# Patient Record
Sex: MALE | Race: WHITE | NOT HISPANIC OR LATINO | Employment: OTHER | URBAN - METROPOLITAN AREA
[De-identification: names, ages, dates, MRNs, and addresses within clinical notes are randomized per-mention and may not be internally consistent; named-entity substitution may affect disease eponyms.]

---

## 2021-01-28 ENCOUNTER — IMMUNIZATION (OUTPATIENT)
Dept: PHARMACY | Facility: CLINIC | Age: 76
End: 2021-01-28
Payer: MEDICARE

## 2021-01-28 DIAGNOSIS — Z23 NEED FOR VACCINATION: Primary | ICD-10-CM

## 2021-02-25 ENCOUNTER — IMMUNIZATION (OUTPATIENT)
Dept: PHARMACY | Facility: CLINIC | Age: 76
End: 2021-02-25
Payer: MEDICARE

## 2021-02-25 DIAGNOSIS — Z23 NEED FOR VACCINATION: Primary | ICD-10-CM

## 2021-05-03 ENCOUNTER — OFFICE VISIT (OUTPATIENT)
Dept: URGENT CARE | Facility: CLINIC | Age: 76
End: 2021-05-03
Payer: MEDICARE

## 2021-05-03 VITALS
SYSTOLIC BLOOD PRESSURE: 144 MMHG | WEIGHT: 186 LBS | OXYGEN SATURATION: 97 % | BODY MASS INDEX: 25.19 KG/M2 | HEIGHT: 72 IN | DIASTOLIC BLOOD PRESSURE: 87 MMHG | TEMPERATURE: 98 F | HEART RATE: 67 BPM

## 2021-05-03 DIAGNOSIS — R07.81 RIB PAIN ON RIGHT SIDE: ICD-10-CM

## 2021-05-03 DIAGNOSIS — T14.8XXA CONTUSION OF SOFT TISSUE: ICD-10-CM
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
## 2021-05-03 DIAGNOSIS — S20.211A RIB CONTUSION, RIGHT, INITIAL ENCOUNTER: Primary | ICD-10-CM

## 2021-05-03 PROCEDURE — 99214 OFFICE O/P EST MOD 30 MIN: CPT | Mod: S$GLB,,, | Performed by: PHYSICIAN ASSISTANT

## 2021-05-03 PROCEDURE — 71045 XR CHEST 1 VIEW: ICD-10-PCS | Mod: S$GLB,,, | Performed by: RADIOLOGY

## 2021-05-03 PROCEDURE — 99214 PR OFFICE/OUTPT VISIT, EST, LEVL IV, 30-39 MIN: ICD-10-PCS | Mod: S$GLB,,, | Performed by: PHYSICIAN ASSISTANT

## 2021-05-03 PROCEDURE — 71045 X-RAY EXAM CHEST 1 VIEW: CPT | Mod: S$GLB,,, | Performed by: RADIOLOGY

## 2021-07-11 ENCOUNTER — OFFICE VISIT (OUTPATIENT)
Dept: URGENT CARE | Facility: CLINIC | Age: 76
End: 2021-07-11
Payer: MEDICARE

## 2021-07-11 VITALS
WEIGHT: 186 LBS | HEART RATE: 59 BPM | RESPIRATION RATE: 16 BRPM | BODY MASS INDEX: 25.23 KG/M2 | DIASTOLIC BLOOD PRESSURE: 91 MMHG | OXYGEN SATURATION: 97 % | TEMPERATURE: 99 F | SYSTOLIC BLOOD PRESSURE: 148 MMHG

## 2021-07-11 DIAGNOSIS — J02.9 SORE THROAT: ICD-10-CM

## 2021-07-11 DIAGNOSIS — H65.93 BILATERAL OTITIS MEDIA WITH EFFUSION: Primary | ICD-10-CM

## 2021-07-11 LAB
CTP QC/QA: YES
SARS-COV-2 RDRP RESP QL NAA+PROBE: NEGATIVE

## 2021-07-11 PROCEDURE — U0002 COVID-19 LAB TEST NON-CDC: HCPCS | Mod: QW,S$GLB,, | Performed by: PHYSICIAN ASSISTANT

## 2021-07-11 PROCEDURE — 99214 OFFICE O/P EST MOD 30 MIN: CPT | Mod: S$GLB,,, | Performed by: PHYSICIAN ASSISTANT

## 2021-07-11 PROCEDURE — 99214 PR OFFICE/OUTPT VISIT, EST, LEVL IV, 30-39 MIN: ICD-10-PCS | Mod: S$GLB,,, | Performed by: PHYSICIAN ASSISTANT

## 2021-07-11 PROCEDURE — U0002: ICD-10-PCS | Mod: QW,S$GLB,, | Performed by: PHYSICIAN ASSISTANT

## 2021-07-11 RX ORDER — PREDNISONE 20 MG/1
TABLET ORAL
Qty: 10 TABLET | Refills: 0 | Status: SHIPPED | OUTPATIENT
Start: 2021-07-11 | End: 2022-01-24

## 2021-07-21 ENCOUNTER — OFFICE VISIT (OUTPATIENT)
Dept: URGENT CARE | Facility: CLINIC | Age: 76
End: 2021-07-21
Payer: MEDICARE

## 2021-07-21 VITALS
HEART RATE: 71 BPM | HEIGHT: 72 IN | SYSTOLIC BLOOD PRESSURE: 158 MMHG | TEMPERATURE: 98 F | WEIGHT: 186 LBS | RESPIRATION RATE: 16 BRPM | BODY MASS INDEX: 25.19 KG/M2 | OXYGEN SATURATION: 97 % | DIASTOLIC BLOOD PRESSURE: 87 MMHG

## 2021-07-21 DIAGNOSIS — R09.82 POST-NASAL DRAINAGE: ICD-10-CM

## 2021-07-21 DIAGNOSIS — J32.9 SINUSITIS, UNSPECIFIED CHRONICITY, UNSPECIFIED LOCATION: Primary | ICD-10-CM

## 2021-07-21 DIAGNOSIS — J02.9 SORE THROAT: ICD-10-CM

## 2021-07-21 LAB
CTP QC/QA: YES
SARS-COV-2 RDRP RESP QL NAA+PROBE: NEGATIVE

## 2021-07-21 PROCEDURE — 99213 OFFICE O/P EST LOW 20 MIN: CPT | Mod: S$GLB,,, | Performed by: NURSE PRACTITIONER

## 2021-07-21 PROCEDURE — U0002: ICD-10-PCS | Mod: QW,S$GLB,, | Performed by: NURSE PRACTITIONER

## 2021-07-21 PROCEDURE — 99213 PR OFFICE/OUTPT VISIT, EST, LEVL III, 20-29 MIN: ICD-10-PCS | Mod: S$GLB,,, | Performed by: NURSE PRACTITIONER

## 2021-07-21 PROCEDURE — U0002 COVID-19 LAB TEST NON-CDC: HCPCS | Mod: QW,S$GLB,, | Performed by: NURSE PRACTITIONER

## 2021-07-21 RX ORDER — AZITHROMYCIN 250 MG/1
TABLET, FILM COATED ORAL
Qty: 6 TABLET | Refills: 0 | Status: SHIPPED | OUTPATIENT
Start: 2021-07-21 | End: 2021-07-26

## 2021-11-03 ENCOUNTER — IMMUNIZATION (OUTPATIENT)
Dept: PHARMACY | Facility: CLINIC | Age: 76
End: 2021-11-03
Payer: MEDICARE

## 2021-11-03 DIAGNOSIS — Z23 NEED FOR VACCINATION: Primary | ICD-10-CM

## 2022-01-19 ENCOUNTER — OFFICE VISIT (OUTPATIENT)
Dept: OTOLARYNGOLOGY | Facility: CLINIC | Age: 77
End: 2022-01-19
Payer: MEDICARE

## 2022-01-19 ENCOUNTER — PATIENT MESSAGE (OUTPATIENT)
Dept: OTOLARYNGOLOGY | Facility: CLINIC | Age: 77
End: 2022-01-19

## 2022-01-19 ENCOUNTER — HOSPITAL ENCOUNTER (OUTPATIENT)
Dept: RADIOLOGY | Facility: HOSPITAL | Age: 77
Discharge: HOME | End: 2022-01-19
Attending: OTOLARYNGOLOGY
Payer: MEDICARE

## 2022-01-19 VITALS — BODY MASS INDEX: 25.3 KG/M2 | WEIGHT: 186.5 LBS

## 2022-01-19 DIAGNOSIS — J39.2 HYPOPHARYNGEAL LESION: Primary | ICD-10-CM

## 2022-01-19 DIAGNOSIS — J39.2 HYPOPHARYNGEAL LESION: ICD-10-CM

## 2022-01-19 DIAGNOSIS — Z01.818 PRE-OP TESTING: ICD-10-CM

## 2022-01-19 DIAGNOSIS — Z01.818 PRE-OP TESTING: Primary | ICD-10-CM

## 2022-01-19 PROCEDURE — 1101F PR PT FALLS ASSESS DOC 0-1 FALLS W/OUT INJ PAST YR: ICD-10-PCS | Mod: CPTII,S$GLB,, | Performed by: OTOLARYNGOLOGY

## 2022-01-19 PROCEDURE — 99204 OFFICE O/P NEW MOD 45 MIN: CPT | Mod: 25,S$GLB,, | Performed by: OTOLARYNGOLOGY

## 2022-01-19 PROCEDURE — 25500020 PHARM REV CODE 255: Mod: PO | Performed by: OTOLARYNGOLOGY

## 2022-01-19 PROCEDURE — 99999 PR PBB SHADOW E&M-EST. PATIENT-LVL III: CPT | Mod: PBBFAC,,, | Performed by: OTOLARYNGOLOGY

## 2022-01-19 PROCEDURE — 3288F PR FALLS RISK ASSESSMENT DOCUMENTED: ICD-10-PCS | Mod: CPTII,S$GLB,, | Performed by: OTOLARYNGOLOGY

## 2022-01-19 PROCEDURE — 99204 PR OFFICE/OUTPT VISIT, NEW, LEVL IV, 45-59 MIN: ICD-10-PCS | Mod: 25,S$GLB,, | Performed by: OTOLARYNGOLOGY

## 2022-01-19 PROCEDURE — 70491 CT SOFT TISSUE NECK W/DYE: CPT | Mod: TC,PO

## 2022-01-19 PROCEDURE — 1126F PR PAIN SEVERITY QUANTIFIED, NO PAIN PRESENT: ICD-10-PCS | Mod: CPTII,S$GLB,, | Performed by: OTOLARYNGOLOGY

## 2022-01-19 PROCEDURE — 1126F AMNT PAIN NOTED NONE PRSNT: CPT | Mod: CPTII,S$GLB,, | Performed by: OTOLARYNGOLOGY

## 2022-01-19 PROCEDURE — 70491 CT SOFT TISSUE NECK WITH CONTRAST: ICD-10-PCS | Mod: 26,,, | Performed by: RADIOLOGY

## 2022-01-19 PROCEDURE — 1101F PT FALLS ASSESS-DOCD LE1/YR: CPT | Mod: CPTII,S$GLB,, | Performed by: OTOLARYNGOLOGY

## 2022-01-19 PROCEDURE — 99999 PR PBB SHADOW E&M-EST. PATIENT-LVL III: ICD-10-PCS | Mod: PBBFAC,,, | Performed by: OTOLARYNGOLOGY

## 2022-01-19 PROCEDURE — 3288F FALL RISK ASSESSMENT DOCD: CPT | Mod: CPTII,S$GLB,, | Performed by: OTOLARYNGOLOGY

## 2022-01-19 PROCEDURE — 70491 CT SOFT TISSUE NECK W/DYE: CPT | Mod: 26,,, | Performed by: RADIOLOGY

## 2022-01-19 RX ORDER — HYDROCODONE BITARTRATE AND ACETAMINOPHEN 5; 325 MG/1; MG/1
1 TABLET ORAL EVERY 4 HOURS PRN
COMMUNITY
Start: 2021-11-17 | End: 2022-01-24

## 2022-01-19 RX ADMIN — IOHEXOL 75 ML: 350 INJECTION, SOLUTION INTRAVENOUS at 11:01

## 2022-01-19 NOTE — PROGRESS NOTES
Subjective:       Patient ID: Arthur Ribeiro is a 76 y.o. male.    Chief Complaint: Sore Throat (Since early ) and Dysphagia (Feels like something is in throat, trouble swallowing)    Arthur is here for sore throat.   Length of symptoms: 7 months.  Diagnosed with URI and prescribed Prednisone. Did not improve, then given Azithromycin.   He was diagnosed with LPR (no scope), prescribed medication for > 4 weeks without improvement.   He has a hiatal hernia.   He also has left sided cheek / temporal pain. He saw a dentist who extracted an infected wisdom tooth on the left.   He has a chronic PND and has been worse since his infection. He feels left sided sinus pressure.   He uses saline every morning.      Patient validated questionnaires (if applicable):      %       No flowsheet data found.  No flowsheet data found.  No flowsheet data found.         Social History     Tobacco Use   Smoking Status Former Smoker    Types: Cigarettes, Cigars    Quit date: 10/1/2005    Years since quittin.3   Smokeless Tobacco Never Used     Social History     Substance and Sexual Activity   Alcohol Use Yes    Comment: daily          Objective:        Constitutional:   He is oriented to person, place, and time. He appears well-developed and well-nourished. He appears alert. He is active. Normal speech.      Head:  Normocephalic and atraumatic. Head is without TMJ tenderness. No scars. Salivary glands normal.  Facial strength is normal.      Ears:    Right Ear: No drainage or swelling. No middle ear effusion.   Left Ear: No drainage or swelling.  No middle ear effusion.     Nose:  Septal deviation (L) present. No mucosal edema, rhinorrhea or sinus tenderness. No turbinate hypertrophy.    Nasal endoscopy indicated    Mouth/Throat  Oropharynx clear and moist without lesions or asymmetry, normal uvula midline and mirror exam normal. Normal dentition. No uvula swelling, lacerations or trismus. No oropharyngeal exudate. Tonsillar  erythema, tonsillar exudate.    Hyperactive gag reflux. Unable to visualize cords on mirror laryngoscopy.        Neck:  Full range of motion with neck supple and no adenopathy. Thyroid tenderness is present. No tracheal deviation, no edema, no erythema, normal range of motion, no stridor, no crepitus and no neck rigidity present. No thyroid mass present.   Mild palpable fullness left neck near hyoid. No discrete mass     Cardiovascular:   Intact distal pulses and normal pulses.      Pulmonary/Chest:   Effort normal and breath sounds normal. No stridor.     Psychiatric:   His speech is normal and behavior is normal. His mood appears not anxious. His affect is not labile.     Neurological:   He is alert and oriented to person, place, and time. No sensory deficit.     Skin:   No abrasions, lacerations, lesions, or rashes. No abrasion and no bruising noted.         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     Post-procedure diagnosis: same    Procedure: Flexible fiberoptic laryngoscopy    Surgeon: Enrique Maya MD    Anesthesia: 2% Lidocaine with Phenylephrine topical    Risks, benefits, and alternatives of the procedure were discussed with the patient, and the patient consented to the fiberoptic examination.  We applied a topical nasal decongestant and analgesic.  After adequate anesthesia was obtained, the flexible fiberoptic scope was passed through the nasal cavity. The entire pharynx (nasopharynx to hypopharynx) and the larynx were visualized. At the end of the examination, the scope was removed. The patient tolerated the procedure well with no complications.     Findings:  -     Laryngeal mucosa is normal right, swollen on the right  -     Post-cricoid region: normal with pooled secretions  -     Lingual tonsils have no hypertrophy  -     Adenoids have no  hypertrophy  -     Right vocal fold: normal mobility     mass/lesion: none  -     Left vocal fold: normal  mobility     mass/lesion: erythema along length of cord with medial edge thickening  -     Other findings: submucosal lesion of left lateral pharyngeal wall near the pyriform sinus, possibly involving the pyriform. AE fold is normal appearing. Mass does not involve base of tongue             Assessment:       1. Hypopharyngeal lesion          Plan:         CT Neck with contrast  Likely need for DL / Bx pending results

## 2022-01-23 ENCOUNTER — LAB VISIT (OUTPATIENT)
Dept: FAMILY MEDICINE | Facility: CLINIC | Age: 77
End: 2022-01-23
Payer: MEDICARE

## 2022-01-23 DIAGNOSIS — Z01.818 PRE-OP TESTING: ICD-10-CM

## 2022-01-23 PROCEDURE — U0003 INFECTIOUS AGENT DETECTION BY NUCLEIC ACID (DNA OR RNA); SEVERE ACUTE RESPIRATORY SYNDROME CORONAVIRUS 2 (SARS-COV-2) (CORONAVIRUS DISEASE [COVID-19]), AMPLIFIED PROBE TECHNIQUE, MAKING USE OF HIGH THROUGHPUT TECHNOLOGIES AS DESCRIBED BY CMS-2020-01-R: HCPCS | Performed by: OTOLARYNGOLOGY

## 2022-01-23 PROCEDURE — U0005 INFEC AGEN DETEC AMPLI PROBE: HCPCS | Performed by: OTOLARYNGOLOGY

## 2022-01-24 RX ORDER — IBUPROFEN 200 MG
400 TABLET ORAL 2 TIMES DAILY PRN
COMMUNITY
End: 2022-08-03

## 2022-01-25 ENCOUNTER — ANESTHESIA EVENT (OUTPATIENT)
Dept: SURGERY | Facility: HOSPITAL | Age: 77
End: 2022-01-25
Payer: MEDICARE

## 2022-01-25 LAB
SARS-COV-2 RNA RESP QL NAA+PROBE: NOT DETECTED
SARS-COV-2- CYCLE NUMBER: NORMAL

## 2022-01-25 RX ORDER — OXYCODONE HYDROCHLORIDE 5 MG/1
5 TABLET ORAL
Status: CANCELLED | OUTPATIENT
Start: 2022-01-25

## 2022-01-25 RX ORDER — FENTANYL CITRATE 50 UG/ML
25 INJECTION, SOLUTION INTRAMUSCULAR; INTRAVENOUS EVERY 5 MIN PRN
Status: CANCELLED | OUTPATIENT
Start: 2022-01-25 | End: 2022-01-25

## 2022-01-25 RX ORDER — HYDROMORPHONE HYDROCHLORIDE 2 MG/ML
0.2 INJECTION, SOLUTION INTRAMUSCULAR; INTRAVENOUS; SUBCUTANEOUS EVERY 5 MIN PRN
Status: CANCELLED | OUTPATIENT
Start: 2022-01-25

## 2022-01-26 ENCOUNTER — HOSPITAL ENCOUNTER (OUTPATIENT)
Facility: HOSPITAL | Age: 77
Discharge: HOME | End: 2022-01-26
Attending: OTOLARYNGOLOGY | Admitting: OTOLARYNGOLOGY
Payer: MEDICARE

## 2022-01-26 ENCOUNTER — ANESTHESIA (OUTPATIENT)
Dept: SURGERY | Facility: HOSPITAL | Age: 77
End: 2022-01-26
Payer: MEDICARE

## 2022-01-26 DIAGNOSIS — J39.2 HYPOPHARYNGEAL MASS: Primary | ICD-10-CM

## 2022-01-26 PROCEDURE — 88342 IMHCHEM/IMCYTCHM 1ST ANTB: CPT | Performed by: STUDENT IN AN ORGANIZED HEALTH CARE EDUCATION/TRAINING PROGRAM

## 2022-01-26 PROCEDURE — 71000015 HC POSTOP RECOV 1ST HR: Mod: PO | Performed by: OTOLARYNGOLOGY

## 2022-01-26 PROCEDURE — D9220A PRA ANESTHESIA: ICD-10-PCS | Mod: ANES,,, | Performed by: ANESTHESIOLOGY

## 2022-01-26 PROCEDURE — 37000009 HC ANESTHESIA EA ADD 15 MINS: Mod: PO | Performed by: OTOLARYNGOLOGY

## 2022-01-26 PROCEDURE — 25000003 PHARM REV CODE 250: Mod: PO | Performed by: NURSE ANESTHETIST, CERTIFIED REGISTERED

## 2022-01-26 PROCEDURE — 63600175 PHARM REV CODE 636 W HCPCS: Mod: PO | Performed by: NURSE ANESTHETIST, CERTIFIED REGISTERED

## 2022-01-26 PROCEDURE — 88305 TISSUE EXAM BY PATHOLOGIST: CPT | Performed by: STUDENT IN AN ORGANIZED HEALTH CARE EDUCATION/TRAINING PROGRAM

## 2022-01-26 PROCEDURE — 88342 IMHCHEM/IMCYTCHM 1ST ANTB: CPT | Mod: 26,,, | Performed by: STUDENT IN AN ORGANIZED HEALTH CARE EDUCATION/TRAINING PROGRAM

## 2022-01-26 PROCEDURE — 37000008 HC ANESTHESIA 1ST 15 MINUTES: Mod: PO | Performed by: OTOLARYNGOLOGY

## 2022-01-26 PROCEDURE — 88342 CHG IMMUNOCYTOCHEMISTRY: ICD-10-PCS | Mod: 26,,, | Performed by: STUDENT IN AN ORGANIZED HEALTH CARE EDUCATION/TRAINING PROGRAM

## 2022-01-26 PROCEDURE — 63600175 PHARM REV CODE 636 W HCPCS: Mod: PO | Performed by: ANESTHESIOLOGY

## 2022-01-26 PROCEDURE — 31536 LARYNGOSCOPY W/BX & OP SCOPE: CPT | Mod: ,,, | Performed by: OTOLARYNGOLOGY

## 2022-01-26 PROCEDURE — D9220A PRA ANESTHESIA: Mod: ANES,,, | Performed by: ANESTHESIOLOGY

## 2022-01-26 PROCEDURE — 31536 PR LARYNGOSCOPY,DIRCT,OP SCOPE,BIOPSY: ICD-10-PCS | Mod: ,,, | Performed by: OTOLARYNGOLOGY

## 2022-01-26 PROCEDURE — D9220A PRA ANESTHESIA: Mod: CRNA,,, | Performed by: NURSE ANESTHETIST, CERTIFIED REGISTERED

## 2022-01-26 PROCEDURE — D9220A PRA ANESTHESIA: ICD-10-PCS | Mod: CRNA,,, | Performed by: NURSE ANESTHETIST, CERTIFIED REGISTERED

## 2022-01-26 PROCEDURE — 71000033 HC RECOVERY, INTIAL HOUR: Mod: PO | Performed by: OTOLARYNGOLOGY

## 2022-01-26 PROCEDURE — 88305 TISSUE EXAM BY PATHOLOGIST: ICD-10-PCS | Mod: 26,,, | Performed by: STUDENT IN AN ORGANIZED HEALTH CARE EDUCATION/TRAINING PROGRAM

## 2022-01-26 PROCEDURE — 88305 TISSUE EXAM BY PATHOLOGIST: CPT | Mod: 26,,, | Performed by: STUDENT IN AN ORGANIZED HEALTH CARE EDUCATION/TRAINING PROGRAM

## 2022-01-26 PROCEDURE — 25000003 PHARM REV CODE 250: Mod: PO | Performed by: OTOLARYNGOLOGY

## 2022-01-26 PROCEDURE — 36000708 HC OR TIME LEV III 1ST 15 MIN: Mod: PO | Performed by: OTOLARYNGOLOGY

## 2022-01-26 PROCEDURE — 36000709 HC OR TIME LEV III EA ADD 15 MIN: Mod: PO | Performed by: OTOLARYNGOLOGY

## 2022-01-26 PROCEDURE — 25000003 PHARM REV CODE 250: Mod: PO | Performed by: ANESTHESIOLOGY

## 2022-01-26 RX ORDER — LIDOCAINE HYDROCHLORIDE 20 MG/ML
INJECTION INTRAVENOUS
Status: DISCONTINUED | OUTPATIENT
Start: 2022-01-26 | End: 2022-01-26

## 2022-01-26 RX ORDER — PROPOFOL 10 MG/ML
VIAL (ML) INTRAVENOUS
Status: DISCONTINUED | OUTPATIENT
Start: 2022-01-26 | End: 2022-01-26

## 2022-01-26 RX ORDER — ONDANSETRON 2 MG/ML
INJECTION INTRAMUSCULAR; INTRAVENOUS
Status: DISCONTINUED | OUTPATIENT
Start: 2022-01-26 | End: 2022-01-26

## 2022-01-26 RX ORDER — HYDROCODONE BITARTRATE AND ACETAMINOPHEN 7.5; 325 MG/1; MG/1
1 TABLET ORAL EVERY 6 HOURS PRN
Qty: 40 TABLET | Refills: 0 | Status: SHIPPED | OUTPATIENT
Start: 2022-01-26 | End: 2022-08-04

## 2022-01-26 RX ORDER — FENTANYL CITRATE 50 UG/ML
INJECTION, SOLUTION INTRAMUSCULAR; INTRAVENOUS
Status: DISCONTINUED | OUTPATIENT
Start: 2022-01-26 | End: 2022-01-26

## 2022-01-26 RX ORDER — DEXAMETHASONE SODIUM PHOSPHATE 4 MG/ML
8 INJECTION, SOLUTION INTRA-ARTICULAR; INTRALESIONAL; INTRAMUSCULAR; INTRAVENOUS; SOFT TISSUE
Status: COMPLETED | OUTPATIENT
Start: 2022-01-26 | End: 2022-01-26

## 2022-01-26 RX ORDER — ROCURONIUM BROMIDE 10 MG/ML
INJECTION, SOLUTION INTRAVENOUS
Status: DISCONTINUED | OUTPATIENT
Start: 2022-01-26 | End: 2022-01-26

## 2022-01-26 RX ORDER — OXYMETAZOLINE HCL 0.05 %
SPRAY, NON-AEROSOL (ML) NASAL
Status: DISCONTINUED | OUTPATIENT
Start: 2022-01-26 | End: 2022-01-26 | Stop reason: HOSPADM

## 2022-01-26 RX ORDER — LIDOCAINE HYDROCHLORIDE 10 MG/ML
1 INJECTION, SOLUTION EPIDURAL; INFILTRATION; INTRACAUDAL; PERINEURAL ONCE
Status: COMPLETED | OUTPATIENT
Start: 2022-01-26 | End: 2022-01-26

## 2022-01-26 RX ORDER — SODIUM CHLORIDE, SODIUM LACTATE, POTASSIUM CHLORIDE, CALCIUM CHLORIDE 600; 310; 30; 20 MG/100ML; MG/100ML; MG/100ML; MG/100ML
INJECTION, SOLUTION INTRAVENOUS CONTINUOUS
Status: DISCONTINUED | OUTPATIENT
Start: 2022-01-26 | End: 2022-01-26 | Stop reason: HOSPADM

## 2022-01-26 RX ORDER — SUCCINYLCHOLINE CHLORIDE 20 MG/ML
INJECTION INTRAMUSCULAR; INTRAVENOUS
Status: DISCONTINUED | OUTPATIENT
Start: 2022-01-26 | End: 2022-01-26

## 2022-01-26 RX ORDER — ACETAMINOPHEN 10 MG/ML
INJECTION, SOLUTION INTRAVENOUS
Status: DISCONTINUED | OUTPATIENT
Start: 2022-01-26 | End: 2022-01-26

## 2022-01-26 RX ADMIN — LIDOCAINE HYDROCHLORIDE 100 MG: 20 INJECTION, SOLUTION INTRAVENOUS at 10:01

## 2022-01-26 RX ADMIN — SUCCINYLCHOLINE CHLORIDE 160 MG: 20 INJECTION, SOLUTION INTRAMUSCULAR; INTRAVENOUS at 10:01

## 2022-01-26 RX ADMIN — LIDOCAINE HYDROCHLORIDE 10 MG: 10 INJECTION, SOLUTION EPIDURAL; INFILTRATION; INTRACAUDAL; PERINEURAL at 10:01

## 2022-01-26 RX ADMIN — SODIUM CHLORIDE, SODIUM LACTATE, POTASSIUM CHLORIDE, AND CALCIUM CHLORIDE: .6; .31; .03; .02 INJECTION, SOLUTION INTRAVENOUS at 10:01

## 2022-01-26 RX ADMIN — PROPOFOL 150 MG: 10 INJECTION, EMULSION INTRAVENOUS at 10:01

## 2022-01-26 RX ADMIN — ONDANSETRON 4 MG: 2 INJECTION INTRAMUSCULAR; INTRAVENOUS at 11:01

## 2022-01-26 RX ADMIN — FENTANYL CITRATE 50 MCG: 50 INJECTION, SOLUTION INTRAMUSCULAR; INTRAVENOUS at 10:01

## 2022-01-26 RX ADMIN — ACETAMINOPHEN 1000 MG: 10 INJECTION, SOLUTION INTRAVENOUS at 10:01

## 2022-01-26 RX ADMIN — DEXAMETHASONE SODIUM PHOSPHATE 8 MG: 4 INJECTION, SOLUTION INTRAMUSCULAR; INTRAVENOUS at 10:01

## 2022-01-26 RX ADMIN — ROCURONIUM BROMIDE 5 MG: 10 INJECTION, SOLUTION INTRAVENOUS at 10:01

## 2022-01-26 NOTE — ANESTHESIA PREPROCEDURE EVALUATION
01/26/2022  Arthur Ribeiro is a 76 y.o., male.    Anesthesia Evaluation    I have reviewed the Patient Summary Reports.    I have reviewed the Nursing Notes. I have reviewed the NPO Status.   I have reviewed the Medications.     Review of Systems  Anesthesia Hx:  No problems with previous Anesthesia    Social:  Former Smoker    Hematology/Oncology:         -- Cancer in past history: Other (see Oncology comments) surgery  Oncology Comments: H/o skin ca s/p excision      EENT/Dental:   chronic allergic rhinitis Hypopharyngeal lesion   Cardiovascular:  Cardiovascular Normal     Pulmonary:  Pulmonary Normal    Renal/:  Renal/ Normal     Hepatic/GI:   GERD    Musculoskeletal:  Spine Disorders:        Physical Exam  General:  Well nourished    Airway/Jaw/Neck:  Airway Findings: Mouth Opening: Normal Tongue: Normal  General Airway Assessment: Adult, Good  Mallampati: II  TM Distance: Normal, at least 6 cm        Eyes/Ears/Nose:  EYES/EARS/NOSE FINDINGS: Normal   Dental:  Dental Findings: In tact   Chest/Lungs:  Chest/Lungs Findings: Clear to auscultation, Normal Respiratory Rate     Heart/Vascular:  Heart Findings: Rate: Normal  Rhythm: Regular Rhythm        Mental Status:  Mental Status Findings: Normal        Anesthesia Plan  Type of Anesthesia, risks & benefits discussed:  Anesthesia Type:  general    Patient's Preference:   Plan Factors:          Intra-op Monitoring Plan: standard ASA monitors  Intra-op Monitoring Plan Comments:   Post Op Pain Control Plan: multimodal analgesia and IV/PO Opioids PRN  Post Op Pain Control Plan Comments:     Induction:   IV  Beta Blocker:  Patient is not currently on a Beta-Blocker (No further documentation required).       Informed Consent: Patient understands risks and agrees with Anesthesia plan.  Questions answered. Anesthesia consent signed with patient.  ASA Score: 2      Day of Surgery Review of History & Physical:    H&P update referred to the surgeon.         Ready For Surgery From Anesthesia Perspective.

## 2022-01-26 NOTE — BRIEF OP NOTE
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    Medications:  Reconciled Home Medications:   Current Discharge Medication List      CONTINUE these medications which have NOT CHANGED    Details   fluticasone (FLONASE) 50 mcg/actuation nasal spray SHAKE LIQUID AND USE 2 SPRAYS(100 MCG) IN EACH NOSTRIL EVERY DAY  Qty: 16 mL, Refills: 11    Associated Diagnoses: Acute nonseasonal allergic rhinitis due to pollen      ibuprofen  (ADVIL) 200 MG tablet Take 400 mg by mouth 2 (two) times daily as needed for Pain.           No discharge procedures on file.

## 2022-01-26 NOTE — PATIENT INSTRUCTIONS
Post-op Microlaryngoscopy   Enrique Maya MD  Otolaryngology - [facility] - [phone]  Cell Phone (after hours) - [phone]    Pain and Activity  · Expect sore throat for 3-5 days. Typically you do not require narcotic pain medication, but your provider will provide a prescription if needed.  · It is common to cough up a small amount of blood in the first 24-48 hours. This will improve quickly.  · Avoid coughing. Call me if you are having uncontrolled coughing or nausea.  · Light activity for 1-2 days, then you can resume your normal activity if you are feeling well.  · Voice rest:   ·  There is no voice rest required for your procedure. You may begin to use your voice normally. You may notice a slight strain or tightness in your voice, but this should improve. If you have sore throat while talking, you can rest your voice as needed.    Diet  · Drink plenty of fluids. Good choices are water, popsicles, and mild juices. Hydration is the MOST IMPORTANT factor in recovery during the healing process.  · No diet restrictions. Resume your usual diet.    Medication  Give only medications approved by your doctor. Follow directions closely when taking medications.  · Antibiotics are typically not needed following laryngoscopy surgery. Dr. Maya will let you know if this is different.  · It is generally OK to resume all of your medications.  · You may take Ibuprofen (advil, motrin) and Tylenol for pain. You can alternate each one, taking something up to every 3 hours.   · Please call Dr. Maya if the pain is not controlled with medicines above so that he can take care of it for you.      When to Call the Doctor  Mild pain and a slight fever are normal after surgery. Call if you have any of the following:  · Fever:   ¨ > 101  · Trouble breathing  · Bright red bleeding that does not stop with pressure  · Any other concerns

## 2022-01-26 NOTE — ANESTHESIA PROCEDURE NOTES
Intubation    Date/Time: 1/26/2022 10:56 AM  Performed by: Phyllis Rivers CRNA  Authorized by: Anish Padilla MD     Intubation:     Induction:  Intravenous    Intubated:  Postinduction    Mask Ventilation:  Easy mask    Attempts:  1    Method of Intubation:  Direct    Blade:  Avelar 2    Laryngeal View Grade: Grade I - full view of cords      Difficult Airway Encountered?: No      Complications:  None    Airway Device:  Oral endotracheal tube    Airway Device Size:  7.0    Style/Cuff Inflation:  Cuffed (inflated to minimal occlusive pressure)    Tube secured:  21    Secured at:  The lips    Placement Verified By:  Capnometry    Complicating Factors:  None    Findings Post-Intubation:  BS equal bilateral and atraumatic/condition of teeth unchanged

## 2022-01-26 NOTE — OP NOTE
01/26/2022     Name: Arthur Ribeiro   MRN: [id number]   YOB: 1945     Pre-procedure diagnoses:  1. Hypopharyngeal mass       Post-procedure diagnoses:  1. Hypopharyngeal mass       Procedures performed  1. Direct laryngoscopy with biopsy    Surgeon: Enrique Maya    Assistants: None    Anesthesia: General, Endotracheal    Intraoperative Findings:   1. Grade 1 view  2. Ulcerative, firm mass centered on the left aspect of posterior pharyngeal wall (hyoppharynx) with extension to the lateral wall of the oropharynx. Mass involved the posterior half of the pyriform sinus as. The esophagus is clear but the tumor extends to the level of the cricopharyngeus along the posterior wall. The base of tongue and vallecula are clear.   3. The AE fold and supraglottis were edematous but uninvolved. The cords were uninvolved.     Specimens:  1. Posterior pharyngeal wall    Complications: None apparent    Blood Loss: Minimal    Disposition: PACU    Indications:     The patient was seen and evaluated in the [facility] outpatient clinic. After history and physical examination, recommendations were made to proceed to the operating room for the above listed procedures. Indications, risks and benefits were discussed with the patient, who agreed to proceed and signed proper informed consent. Specific risks include but are not limited to bleeding, infection, pain, scarring, injury to dentition / oral mucosa / lips / tongue, airway fire, pneumothorax, need for further procedures, worsening of voice, persistence of issues.     Procedure in detail:     The patient was taken to the operating room and laid supine on the operating room table. General inhalational anesthesia was administered by the anesthesia team and the patient was intubated with a 7-0 endotracheal tube. Proper surgeon-initiated time-out was performed.    Once an adequate level of anesthesia was achieved, the head of bed was turned 90 degrees. The FiO2 was low and  communication with Anesthesia provider was maintained through the case. A maxillary tooth guard was placed.    The laryngoscope was inserted atraumatically and advanced to the oral cavity. Sequential examination of the oropharynx (tonsils, soft palate, posterior pharyngeal wall) revealed examination as above. The laryngoscope was advanced to the level of the glottis. The patient was a grade 1 view. Patient was placed in suspension. Photodocumentation was obtained with a 0 degree endoscope. Operative findings are noted above. The microscope was then brought into the field.     Biopsies were taken as above. Specimens were sent for permanent pathology.    Pledgets were placed on the larynx for hemostasis and removed. Laryngoscope was withdrawn and the dentition was noted to be intact. The patient's care was turned back over to anesthesia, and was transported to PACU in stable condition.

## 2022-01-26 NOTE — TRANSFER OF CARE
Anesthesia Transfer of Care Note    Patient: Arthur Ribeiro    Procedure(s) Performed: Procedure(s) (LRB):  LARYNGOSCOPY (Bilateral)    Patient location: PACU    Anesthesia Type: general    Transport from OR: Transported from OR on room air with adequate spontaneous ventilation    Post pain: adequate analgesia    Post assessment: no apparent anesthetic complications and tolerated procedure well    Post vital signs: stable    Level of consciousness: responds to stimulation    Nausea/Vomiting: no nausea/vomiting    Complications: none    Transfer of care protocol was followed      Last vitals:   Visit Vitals  /86 (BP Location: Left arm, Patient Position: Sitting)   Pulse 74   Temp 36.2 °C (97.2 °F) (Skin)   Resp 15   Ht 6' (1.829 m)   Wt 81.6 kg (180 lb)   SpO2 95%   BMI 24.41 kg/m²

## 2022-01-26 NOTE — ANESTHESIA POSTPROCEDURE EVALUATION
Anesthesia Post Evaluation    Patient: Arthur Ribeiro    Procedure(s) Performed: Procedure(s) (LRB):  LARYNGOSCOPY (Bilateral)    Final Anesthesia Type: general      Patient location during evaluation: PACU  Patient participation: Yes- Able to Participate  Level of consciousness: awake and alert  Post-procedure vital signs: reviewed and stable  Pain management: adequate  Airway patency: patent    PONV status at discharge: No PONV  Anesthetic complications: no      Cardiovascular status: hemodynamically stable  Respiratory status: unassisted and room air  Hydration status: euvolemic  Follow-up not needed.          Vitals Value Taken Time   /75 01/26/22 1142   Temp 36.2 °C (97.2 °F) 01/26/22 1124   Pulse 65 01/26/22 1142   Resp 12 01/26/22 1142   SpO2 96 % 01/26/22 1142         Event Time   Out of Recovery 11:28:17         Pain/Ashley Score: Ashley Score: 9 (1/26/2022 11:24 AM)

## 2022-01-27 VITALS
SYSTOLIC BLOOD PRESSURE: 165 MMHG | BODY MASS INDEX: 24.38 KG/M2 | HEIGHT: 72 IN | HEART RATE: 65 BPM | TEMPERATURE: 97 F | RESPIRATION RATE: 12 BRPM | DIASTOLIC BLOOD PRESSURE: 75 MMHG | OXYGEN SATURATION: 96 % | WEIGHT: 180 LBS

## 2022-02-01 ENCOUNTER — PATIENT MESSAGE (OUTPATIENT)
Dept: OTOLARYNGOLOGY | Facility: CLINIC | Age: 77
End: 2022-02-01
Payer: MEDICARE

## 2022-02-01 ENCOUNTER — HOSPITAL ENCOUNTER (OUTPATIENT)
Dept: RADIATION THERAPY | Facility: HOSPITAL | Age: 77
Discharge: HOME | End: 2022-02-01
Attending: STUDENT IN AN ORGANIZED HEALTH CARE EDUCATION/TRAINING PROGRAM
Payer: MEDICARE

## 2022-02-01 DIAGNOSIS — C13.2 MALIGNANT NEOPLASM OF POSTERIOR WALL OF HYPOPHARYNX: ICD-10-CM

## 2022-02-01 DIAGNOSIS — C13.9 HYPOPHARYNGEAL CANCER: Primary | ICD-10-CM

## 2022-02-01 LAB
COMMENT: NORMAL
FINAL PATHOLOGIC DIAGNOSIS: NORMAL
GROSS: NORMAL
Lab: NORMAL
MICROSCOPIC EXAM: NORMAL

## 2022-02-02 ENCOUNTER — TELEPHONE (OUTPATIENT)
Dept: RADIATION ONCOLOGY | Facility: CLINIC | Age: 77
End: 2022-02-02
Payer: MEDICARE

## 2022-02-02 ENCOUNTER — LAB VISIT (OUTPATIENT)
Dept: PRIMARY CARE CLINIC | Facility: OTHER | Age: 77
End: 2022-02-02
Payer: MEDICARE

## 2022-02-02 DIAGNOSIS — R05.9 COUGH: ICD-10-CM

## 2022-02-02 PROCEDURE — U0003 INFECTIOUS AGENT DETECTION BY NUCLEIC ACID (DNA OR RNA); SEVERE ACUTE RESPIRATORY SYNDROME CORONAVIRUS 2 (SARS-COV-2) (CORONAVIRUS DISEASE [COVID-19]), AMPLIFIED PROBE TECHNIQUE, MAKING USE OF HIGH THROUGHPUT TECHNOLOGIES AS DESCRIBED BY CMS-2020-01-R: HCPCS | Performed by: FAMILY MEDICINE

## 2022-02-02 NOTE — TELEPHONE ENCOUNTER
----- Message from [place] sent at 2/2/2022  9:06 AM CST -----  Regarding: Call back  Contact: [phone]  Type:  Patient Call Back    Who Called:pt  What this is regarding?:returning call to dequan   Would the patient rather a call back or a response via [facility]? Call back  Best Call Back Number:[phone]  Additional Information:     Advised to call back directly if there are further questions, or if these symptoms fail to improve as anticipated or worsen.

## 2022-02-02 NOTE — TELEPHONE ENCOUNTER
S/w pt and he states that he is now having some symptoms of covid (cough, HA, sinus congestion). Advised pt that he can come to [facility] to the tent on the side of the building. Advised pt to let the staff know that he is having symptoms and would like to get tested because he is scheduled for a PET CT tomorrow. Pt verbalized understanding.

## 2022-02-03 DIAGNOSIS — U07.1 COVID-19 VIRUS DETECTED: ICD-10-CM

## 2022-02-03 LAB
SARS-COV-2 RNA RESP QL NAA+PROBE: DETECTED
SARS-COV-2- CYCLE NUMBER: 30

## 2022-02-04 ENCOUNTER — TELEPHONE (OUTPATIENT)
Dept: OTOLARYNGOLOGY | Facility: CLINIC | Age: 77
End: 2022-02-04
Payer: MEDICARE

## 2022-02-04 NOTE — TELEPHONE ENCOUNTER
----- Message from Amparo Camarillo sent at 2/4/2022  1:20 PM CST -----  Type: Needs Medical Advice  Who Called:  Patient  Best Call Back Number:   Additional Information: Calling to speak with the nurse about his test results if they are available.

## 2022-02-09 PROBLEM — C13.9: Status: ACTIVE | Noted: 2022-02-09

## 2022-02-09 NOTE — PROGRESS NOTES
[facility] Consult Note    Referring provider: Enrique Maya MD    Diagnosis:  Arthur Ribeiro is a 76 y.o. 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     Oncologic History:  He has a history of tobacco use and burkitt's lymphoma treated with chemotherapy alone with Dr. Rahman.   · 1/19/22:   · FNL with Dr. Maya with normal mobility of the VC bilaterally, erythema of the left VC with medial edge thickening  · CT Neck with heterogeneous enhancement along the lateral pharyngeal wall with involvement vs narrowing of the pyriform sinus. Crosses midline. Suspected retropharyngeal space effusion. Shotty LN bilaterally, enlarged and or necrotic LN in left lbl 2B and 3. ? Mass in left TVC? Possible left RP fullness on personal review (series 6 image 1)  · 1/26/22: DL and Biopsy:  · Op note: ulcerative mass in the left posterior pharyngeal wall with extension to the lateral wall of the OPX. No involvement of esophagus but extends to the level of the cricopharyngeus along the posterior wall. No involvement of BOT or vallecula. No involvement of cords or supraglottis.  · Path: L posterior wall with moderately differentiated squamous cell carcinoma, p16-, no PNI or LVI.        History of Present Illness:  Arthur Ribeiro presents today to discuss radiotherapy in the management of his hypopharynx cancer. He is scheduled for PET/CT today.    He has no complaints at today's visit and presents to discuss radiation therapy. Hx of dental and sinus infections with associated left cheek pain. Is having some throat pain. Some times blood in nasal mucus. No hemoptysis. Has difficulty with taking pills/ tablets. Tolerating full diet but must chew this well. Has lost ~10 lbs over the past 6 months  He saw oral surgeon in January and dentist in December, and has discussed the possibility of head and neck RT, and they did not recommend any further dental work prior to  RT.    Review of Systems:  ROS as above    Social History:  Social History     Tobacco Use    Smoking status: Former Smoker     Types: Cigarettes, Cigars     Quit date: 10/1/2005     Years since quittin.3    Smokeless tobacco: Never Used   Substance Use Topics    Alcohol use: Not Currently     Comment: daily    Drug use: No       Past Medical History:  Past Medical History:   Diagnosis Date    Allergic rhinitis, cause unspecified 2015    Cancer 2005    Rakesh's lymphoma     Encounter for blood transfusion     Foot drop     Neuropathy     Spinal stenosis        Past Surgical History:   Procedure Laterality Date    INSERTION OF VENOUS ACCESS PORT  2005    LARYNGOSCOPY Bilateral 2022    Procedure: LARYNGOSCOPY;  Surgeon: Enrique Maya MD;  Location: [facility] OR;  Service: ENT;  Laterality: Bilateral;    SKIN CANCER EXCISION         Cancer-related family history includes Cancer in his brother, father, and sister.    Medications:  Current Outpatient Medications on File Prior to Visit   Medication Sig Dispense Refill    (Magic mouthwash) 1:1:1 diphenhydramine(Benadryl) 12.5mg/5ml liq, aluminum & magnesium hydroxide-simethicone (Maalox), LIDOcaine viscous 2% Swish and spit 10 mLs every 4 (four) hours as needed (gargle for 10 seconds and spit). gargle for 10 seconds and spit 450 mL 0    fluticasone (FLONASE) 50 mcg/actuation nasal spray SHAKE LIQUID AND USE 2 SPRAYS(100 MCG) IN EACH NOSTRIL EVERY DAY 16 mL 11    HYDROcodone-acetaminophen (NORCO) 7.5-325 mg per tablet Take 1 tablet by mouth every 6 (six) hours as needed for Pain. 40 tablet 0    ibuprofen (ADVIL) 200 MG tablet Take 400 mg by mouth 2 (two) times daily as needed for Pain.       No current facility-administered medications on file prior to visit.       Allergies:  Review of patient's allergies indicates:  No Known Allergies    Exam:  Vitals:    02/10/22 1014   BP: (!) 148/70   BP Location: Left arm   Patient Position: Sitting    Pulse: 81   Resp: 16   Temp: 98.4 °F (36.9 °C)   Weight: 80.9 kg (178 lb 5.6 oz)   Height: 6' (1.829 m)     Constitutional: Pleasant 76 y.o. male in no acute distress.  Well nourished. Well groomed.   HEENT: moist mucus membranes. No appreciated oral cavity or oropharyngeal mucosal lesions on direct exam. Good dentition, s/p extraction of left posterior maxillary molar. FOM soft, no appreciated oral tongue masses.   Lymph: mobile, ~1cm palpable lymph node in left neck at level of the hyoid  Cardiovascular: Upper extremities warm to touch  Lungs: No audible wheezing.  Normal effort.   Musculoskeletal: No gross MSK deformities.  Skin: No rashes appreciated.  Psych: Alert and oriented with appropriate mood and affect.  Neuro: Grossly normal.    Data Review:    Independent Interpretation of Test(s): CT neck from 1/19/22 was personally reviewed as above.    Discussion with Another Provider or Non-healthcare Professional:  I discussed this case with Dr. Maya in order to coordinate care    Assessment:   [id number] hypopharynx cancer  o Going for PET/CT today  o He has quit smoking   ECOG: (1) Restricted in physically strenuous activity, ambulatory and able to do work of light nature    Possibility of pregnancy: N/A  History of prior irradiation: No  History of prior systemic anti-cancer therapy: Yes - for Burkitt's lymphoma  History of collagen vascular disease: No  Implanted electronic device (pacer/defib/nerve stimulator): No    Plan:   Treatment options were discussed with the patient including surgery, radiation, systemic therapy, and some combination thereof.   He is to go for PET/CT after this, for staging. If PET/CT confirms localized disease, we discussed definitive radiation vs CRT, with systemic therapy per medical oncology. I also discussed palliative radiotherapy as well.   The risks, benefits, scheduling, alternatives to and rationale of radiation therapy were explained in detail.     Referral to  speech and nutrition.   He has a 10 lb weight loss, I discussed the high likelihood of requiring a PEG tube, but we will discuss that further after his PET/CT   He has met with dentistry and I will plan to reach out to them to ensure no prior dental work is required before proceeding with RT.  HEAD & NECK INFORMED CONSENT: Acute and delayed side effects of head and neck radiation, including but not limited to fatigue, redness of the skin, desquamation, dysphagia, odynophagia, mucositis, long term difficulties with swallowing, feeding tube dependency, fibrosis, xerostomia, hypothyroidism, infection as well as rare but catastrophic injury to the spinal cord, mandible, brainstem, and carotid arteries were discussed with the patient in great detail today.   I reviewed the rationale and goal of the proposed treatment course. The radiotherapeutic procedure with associated side effects and potential complications were explained. Mr. Ribeiro and his wife had the opportunity to ask questions which were answered to the best of my knowledge. The patient voiced understanding of the above and has elected to proceed with treatment. Consent form was signed and the patient was given our contact information should further questions arise.       Radiation Treatment Details:   If PET/CT confirms localized disease, I will plan to treat the hypopharyngeal primary and involved adenopathy to a dose of 70 Gy in 35 fractions and the bilateral elective neck to 56-63 Gy in 35 fractions, using IMRT and a simultaneous integrated boost.      Addendum  Called and Spoke with Mr. Ribeiro regarding PET, and forwarded PET/CT results to Dr. Rahman. His dentist is Dr. Pitts [phone]. He is to see medical oncology next week. We will proceed on with RT planning    Thuan Peñaloza MD  Radiation Oncology

## 2022-02-10 ENCOUNTER — OFFICE VISIT (OUTPATIENT)
Dept: RADIATION ONCOLOGY | Facility: CLINIC | Age: 77
End: 2022-02-10
Payer: MEDICARE

## 2022-02-10 ENCOUNTER — HOSPITAL ENCOUNTER (OUTPATIENT)
Dept: RADIOLOGY | Facility: HOSPITAL | Age: 77
Discharge: HOME | End: 2022-02-10
Attending: OTOLARYNGOLOGY
Payer: MEDICARE

## 2022-02-10 VITALS
SYSTOLIC BLOOD PRESSURE: 148 MMHG | HEIGHT: 72 IN | DIASTOLIC BLOOD PRESSURE: 70 MMHG | RESPIRATION RATE: 16 BRPM | HEART RATE: 81 BPM | TEMPERATURE: 98 F | BODY MASS INDEX: 24.16 KG/M2 | WEIGHT: 178.38 LBS

## 2022-02-10 DIAGNOSIS — C13.9 HYPOPHARYNGEAL CANCER: ICD-10-CM

## 2022-02-10 DIAGNOSIS — C13.9 PRIMARY MALIGNANT NEOPLASM OF HYPOPHARYNX: ICD-10-CM

## 2022-02-10 DIAGNOSIS — C13.2 MALIGNANT NEOPLASM OF POSTERIOR WALL OF HYPOPHARYNX: ICD-10-CM

## 2022-02-10 LAB — GLUCOSE SERPL-MCNC: 88 MG/DL (ref 70–110)

## 2022-02-10 PROCEDURE — 99205 OFFICE O/P NEW HI 60 MIN: CPT | Mod: 25,S$GLB,, | Performed by: STUDENT IN AN ORGANIZED HEALTH CARE EDUCATION/TRAINING PROGRAM

## 2022-02-10 PROCEDURE — 3077F SYST BP >= 140 MM HG: CPT | Mod: CPTII,S$GLB,, | Performed by: STUDENT IN AN ORGANIZED HEALTH CARE EDUCATION/TRAINING PROGRAM

## 2022-02-10 PROCEDURE — 77334 PR  RADN TREATMENT AID(S) COMPLX: ICD-10-PCS | Mod: 26,,, | Performed by: STUDENT IN AN ORGANIZED HEALTH CARE EDUCATION/TRAINING PROGRAM

## 2022-02-10 PROCEDURE — 78815 NM PET CT ROUTINE: ICD-10-PCS | Mod: 26,PS,, | Performed by: RADIOLOGY

## 2022-02-10 PROCEDURE — 1159F PR MEDICATION LIST DOCUMENTED IN MEDICAL RECORD: ICD-10-PCS | Mod: CPTII,S$GLB,, | Performed by: STUDENT IN AN ORGANIZED HEALTH CARE EDUCATION/TRAINING PROGRAM

## 2022-02-10 PROCEDURE — 99999 PR PBB SHADOW E&M-EST. PATIENT-LVL V: ICD-10-PCS | Mod: PBBFAC,,, | Performed by: STUDENT IN AN ORGANIZED HEALTH CARE EDUCATION/TRAINING PROGRAM

## 2022-02-10 PROCEDURE — 1125F AMNT PAIN NOTED PAIN PRSNT: CPT | Mod: CPTII,S$GLB,, | Performed by: STUDENT IN AN ORGANIZED HEALTH CARE EDUCATION/TRAINING PROGRAM

## 2022-02-10 PROCEDURE — 99205 PR OFFICE/OUTPT VISIT, NEW, LEVL V, 60-74 MIN: ICD-10-PCS | Mod: 25,S$GLB,, | Performed by: STUDENT IN AN ORGANIZED HEALTH CARE EDUCATION/TRAINING PROGRAM

## 2022-02-10 PROCEDURE — 99999 PR PBB SHADOW E&M-EST. PATIENT-LVL V: CPT | Mod: PBBFAC,,, | Performed by: STUDENT IN AN ORGANIZED HEALTH CARE EDUCATION/TRAINING PROGRAM

## 2022-02-10 PROCEDURE — 3288F PR FALLS RISK ASSESSMENT DOCUMENTED: ICD-10-PCS | Mod: CPTII,S$GLB,, | Performed by: STUDENT IN AN ORGANIZED HEALTH CARE EDUCATION/TRAINING PROGRAM

## 2022-02-10 PROCEDURE — 1101F PR PT FALLS ASSESS DOC 0-1 FALLS W/OUT INJ PAST YR: ICD-10-PCS | Mod: CPTII,S$GLB,, | Performed by: STUDENT IN AN ORGANIZED HEALTH CARE EDUCATION/TRAINING PROGRAM

## 2022-02-10 PROCEDURE — 3078F DIAST BP <80 MM HG: CPT | Mod: CPTII,S$GLB,, | Performed by: STUDENT IN AN ORGANIZED HEALTH CARE EDUCATION/TRAINING PROGRAM

## 2022-02-10 PROCEDURE — 78815 PET IMAGE W/CT SKULL-THIGH: CPT | Mod: TC,PN,PS

## 2022-02-10 PROCEDURE — 1101F PT FALLS ASSESS-DOCD LE1/YR: CPT | Mod: CPTII,S$GLB,, | Performed by: STUDENT IN AN ORGANIZED HEALTH CARE EDUCATION/TRAINING PROGRAM

## 2022-02-10 PROCEDURE — 3288F FALL RISK ASSESSMENT DOCD: CPT | Mod: CPTII,S$GLB,, | Performed by: STUDENT IN AN ORGANIZED HEALTH CARE EDUCATION/TRAINING PROGRAM

## 2022-02-10 PROCEDURE — 77334 RADIATION TREATMENT AID(S): CPT | Mod: TC,PN | Performed by: STUDENT IN AN ORGANIZED HEALTH CARE EDUCATION/TRAINING PROGRAM

## 2022-02-10 PROCEDURE — 1159F MED LIST DOCD IN RCRD: CPT | Mod: CPTII,S$GLB,, | Performed by: STUDENT IN AN ORGANIZED HEALTH CARE EDUCATION/TRAINING PROGRAM

## 2022-02-10 PROCEDURE — 3077F PR MOST RECENT SYSTOLIC BLOOD PRESSURE >= 140 MM HG: ICD-10-PCS | Mod: CPTII,S$GLB,, | Performed by: STUDENT IN AN ORGANIZED HEALTH CARE EDUCATION/TRAINING PROGRAM

## 2022-02-10 PROCEDURE — 77334 RADIATION TREATMENT AID(S): CPT | Mod: 26,,, | Performed by: STUDENT IN AN ORGANIZED HEALTH CARE EDUCATION/TRAINING PROGRAM

## 2022-02-10 PROCEDURE — 1125F PR PAIN SEVERITY QUANTIFIED, PAIN PRESENT: ICD-10-PCS | Mod: CPTII,S$GLB,, | Performed by: STUDENT IN AN ORGANIZED HEALTH CARE EDUCATION/TRAINING PROGRAM

## 2022-02-10 PROCEDURE — 78815 PET IMAGE W/CT SKULL-THIGH: CPT | Mod: 26,PS,, | Performed by: RADIOLOGY

## 2022-02-10 PROCEDURE — 3078F PR MOST RECENT DIASTOLIC BLOOD PRESSURE < 80 MM HG: ICD-10-PCS | Mod: CPTII,S$GLB,, | Performed by: STUDENT IN AN ORGANIZED HEALTH CARE EDUCATION/TRAINING PROGRAM

## 2022-02-10 NOTE — PROGRESS NOTES
PET Imaging Questionnaire    1. Are you a Diabetic? Recent Blood Sugar level? No    2. Are you anemic? Bone Marrow Stimulation Meds? No    3. Have you had a CT Scan, if so when & where was your last one? Yes -     4. Have you had a PET Scan, if so when & where was your last one? No    5. Chemotherapy or currently on Chemotherapy? Yes    6. Radiation therapy? No    Surgical History:   Past Surgical History:   Procedure Laterality Date    INSERTION OF VENOUS ACCESS PORT  2005    LARYNGOSCOPY Bilateral 1/26/2022    Procedure: LARYNGOSCOPY;  Surgeon: Enrique Maya MD;  [facility];  Service: ENT;  Laterality: Bilateral;    SKIN CANCER EXCISION     7.      8. Have you been fasting for at least 6 hours? Yes    9. Is there any chance you may be pregnant or breastfeeding? No    Assay: 11.41 MCi@:11.22   Injection Site:lt ac     Residual: .934 mCi@: 11.24   Technologist: Nancy Laguna Injected:10.47mCi

## 2022-02-11 PROCEDURE — 77263 PR  RADIATION THERAPY PLAN COMPLEX: ICD-10-PCS | Mod: ,,, | Performed by: STUDENT IN AN ORGANIZED HEALTH CARE EDUCATION/TRAINING PROGRAM

## 2022-02-11 PROCEDURE — 77263 THER RADIOLOGY TX PLNG CPLX: CPT | Mod: ,,, | Performed by: STUDENT IN AN ORGANIZED HEALTH CARE EDUCATION/TRAINING PROGRAM

## 2022-02-17 ENCOUNTER — TELEPHONE (OUTPATIENT)
Dept: HEMATOLOGY/ONCOLOGY | Facility: CLINIC | Age: 77
End: 2022-02-17
Payer: MEDICARE

## 2022-02-17 ENCOUNTER — DOCUMENTATION ONLY (OUTPATIENT)
Dept: RADIATION ONCOLOGY | Facility: CLINIC | Age: 77
End: 2022-02-17
Payer: MEDICARE

## 2022-02-17 DIAGNOSIS — C13.9 PRIMARY MALIGNANT NEOPLASM OF HYPOPHARYNX: Primary | ICD-10-CM

## 2022-02-17 NOTE — NURSING
Received msg regarding referral from Dr. Peñaloza to Dr. Stout. Dr. Stout request he be scheduled 2/23/22 @ 2pm. Patient schedueld and Lay Navigator confirmed appt with patient.    Oncology Navigation   Intake  Cancer Type: Head and Neck  Internal / External Referral: Internal  Referral Source: Epic referral from Dr. Peñaloza  Date of Referral: 2/17/2022  Initial Nurse Navigator Contact: 2/17/2022  Referral to Initial Contact Timeline (days): 0  Date Worked: 2/17/2022     Treatment              Procedures: PET scan; CT; Biopsy  Biopsy Schedule Date: 1/26/2022  CT Schedule Date: 1/19/2022  PET Scan Schedule Date: 2/10/2022                 Acuity      Follow Up  No follow-ups on file.

## 2022-02-17 NOTE — PROGRESS NOTES
Mr. Ribeiro was presented at H&N tumor board as a [id number] squamous cell carcinoma of the hypopharynx. He has a history of Burkitt's lymphoma treated with chemotherapy alone. On review of the PET/CT, he has possible involvement of the posterolateral thyroid cartilage, possible T4a. Question regarding surgical intervention as well possible induction, given age and prior chemotherapy.     TB recommendation for referral to H&N surgery. Given borderline T3/T4a, consideration for induction and local therapy pending response.     Referral placed to H&N surgery. I have called and discussed recommendations with Mr. Ribeiro. I have also called Dr. Rahman's office to discuss TB recommendations.     Thuan Peñaloza MD  Radiation Oncology

## 2022-02-18 ENCOUNTER — TUMOR BOARD CONFERENCE (OUTPATIENT)
Dept: OTOLARYNGOLOGY | Facility: HOSPITAL | Age: 77
End: 2022-02-18
Payer: MEDICARE

## 2022-02-18 NOTE — PROGRESS NOTES
Cancer Staging  Primary malignant neoplasm of hypopharynx  Staging form: Pharynx - Hypopharynx, AJCC 8th Edition  - Clinical stage from 2/17/2022: Stage RENO (cT3, cN2b, cM0) - Signed by Evon Galo NP on 2/18/2022    Presenting Hospital / Clinic: [facility]  Virtual Tumor Board Conference: Virtual  Presenter: Thuan Peñaloza  Date Presented to Tumor Board: 2/17/2022  Specialties Present: Medical Oncology; Radiation Oncology; Pathology; Navigation; Head and Neck; Nutrition; Speech Pathology  Presentation at Cancer Conference: Prospective  Cancer Type: Head and neck cancer  Recommended Plan Note: Head and Neck Oncology referral, oncology referral at [facility]

## 2022-02-23 ENCOUNTER — OFFICE VISIT (OUTPATIENT)
Dept: HEMATOLOGY/ONCOLOGY | Facility: CLINIC | Age: 77
End: 2022-02-23
Payer: MEDICARE

## 2022-02-23 ENCOUNTER — NUTRITION (OUTPATIENT)
Dept: INFUSION THERAPY | Facility: HOSPITAL | Age: 77
End: 2022-02-23
Attending: OTOLARYNGOLOGY
Payer: MEDICARE

## 2022-02-23 VITALS
HEIGHT: 72 IN | WEIGHT: 179.69 LBS | DIASTOLIC BLOOD PRESSURE: 67 MMHG | BODY MASS INDEX: 24.34 KG/M2 | TEMPERATURE: 97 F | SYSTOLIC BLOOD PRESSURE: 138 MMHG | OXYGEN SATURATION: 94 % | RESPIRATION RATE: 16 BRPM | HEART RATE: 73 BPM

## 2022-02-23 DIAGNOSIS — C13.9 PRIMARY MALIGNANT NEOPLASM OF HYPOPHARYNX: ICD-10-CM

## 2022-02-23 DIAGNOSIS — R13.13 PHARYNGEAL DYSPHAGIA: ICD-10-CM

## 2022-02-23 DIAGNOSIS — C13.9 HYPOPHARYNGEAL CANCER: ICD-10-CM

## 2022-02-23 PROCEDURE — 1125F AMNT PAIN NOTED PAIN PRSNT: CPT | Mod: CPTII,S$GLB,, | Performed by: OTOLARYNGOLOGY

## 2022-02-23 PROCEDURE — 99999 PR PBB SHADOW E&M-EST. PATIENT-LVL V: ICD-10-PCS | Mod: PBBFAC,,, | Performed by: OTOLARYNGOLOGY

## 2022-02-23 PROCEDURE — 1159F MED LIST DOCD IN RCRD: CPT | Mod: CPTII,S$GLB,, | Performed by: OTOLARYNGOLOGY

## 2022-02-23 PROCEDURE — 3078F DIAST BP <80 MM HG: CPT | Mod: CPTII,S$GLB,, | Performed by: OTOLARYNGOLOGY

## 2022-02-23 PROCEDURE — 3075F PR MOST RECENT SYSTOLIC BLOOD PRESS GE 130-139MM HG: ICD-10-PCS | Mod: CPTII,S$GLB,, | Performed by: OTOLARYNGOLOGY

## 2022-02-23 PROCEDURE — 1160F PR REVIEW ALL MEDS BY PRESCRIBER/CLIN PHARMACIST DOCUMENTED: ICD-10-PCS | Mod: CPTII,S$GLB,, | Performed by: OTOLARYNGOLOGY

## 2022-02-23 PROCEDURE — 31575 DIAGNOSTIC LARYNGOSCOPY: CPT | Mod: S$GLB,,, | Performed by: OTOLARYNGOLOGY

## 2022-02-23 PROCEDURE — 3078F PR MOST RECENT DIASTOLIC BLOOD PRESSURE < 80 MM HG: ICD-10-PCS | Mod: CPTII,S$GLB,, | Performed by: OTOLARYNGOLOGY

## 2022-02-23 PROCEDURE — 99215 OFFICE O/P EST HI 40 MIN: CPT | Mod: 25,S$GLB,, | Performed by: OTOLARYNGOLOGY

## 2022-02-23 PROCEDURE — 3075F SYST BP GE 130 - 139MM HG: CPT | Mod: CPTII,S$GLB,, | Performed by: OTOLARYNGOLOGY

## 2022-02-23 PROCEDURE — 3288F FALL RISK ASSESSMENT DOCD: CPT | Mod: CPTII,S$GLB,, | Performed by: OTOLARYNGOLOGY

## 2022-02-23 PROCEDURE — 3288F PR FALLS RISK ASSESSMENT DOCUMENTED: ICD-10-PCS | Mod: CPTII,S$GLB,, | Performed by: OTOLARYNGOLOGY

## 2022-02-23 PROCEDURE — 1125F PR PAIN SEVERITY QUANTIFIED, PAIN PRESENT: ICD-10-PCS | Mod: CPTII,S$GLB,, | Performed by: OTOLARYNGOLOGY

## 2022-02-23 PROCEDURE — 1101F PR PT FALLS ASSESS DOC 0-1 FALLS W/OUT INJ PAST YR: ICD-10-PCS | Mod: CPTII,S$GLB,, | Performed by: OTOLARYNGOLOGY

## 2022-02-23 PROCEDURE — 1160F RVW MEDS BY RX/DR IN RCRD: CPT | Mod: CPTII,S$GLB,, | Performed by: OTOLARYNGOLOGY

## 2022-02-23 PROCEDURE — 1159F PR MEDICATION LIST DOCUMENTED IN MEDICAL RECORD: ICD-10-PCS | Mod: CPTII,S$GLB,, | Performed by: OTOLARYNGOLOGY

## 2022-02-23 PROCEDURE — 99999 PR PBB SHADOW E&M-EST. PATIENT-LVL V: CPT | Mod: PBBFAC,,, | Performed by: OTOLARYNGOLOGY

## 2022-02-23 PROCEDURE — 99215 PR OFFICE/OUTPT VISIT, EST, LEVL V, 40-54 MIN: ICD-10-PCS | Mod: 25,S$GLB,, | Performed by: OTOLARYNGOLOGY

## 2022-02-23 PROCEDURE — 31575 PR LARYNGOSCOPY, FLEXIBLE; DIAGNOSTIC: ICD-10-PCS | Mod: S$GLB,,, | Performed by: OTOLARYNGOLOGY

## 2022-02-23 PROCEDURE — 1101F PT FALLS ASSESS-DOCD LE1/YR: CPT | Mod: CPTII,S$GLB,, | Performed by: OTOLARYNGOLOGY

## 2022-02-23 RX ORDER — LIDOCAINE HYDROCHLORIDE 20 MG/ML
SOLUTION ORAL; TOPICAL
COMMUNITY
Start: 2022-01-26

## 2022-02-23 NOTE — PATIENT INSTRUCTIONS
Boil one quart of water and then mix one tablespoon of salt and baking soda each   Gargle and spit 6 times a day and PRN  Saline nasal irrigations twice a day  Use Magic mouthwash--swish and swallow- before meals and PRN

## 2022-02-23 NOTE — PROGRESS NOTES
Date of Encounter: 2/23/2022  Provider: Bere Stout MD  Referring MD:PCP:  [facility] Onc: Mehran Peñaloza MD  Med Onc: Zenaida Jones MD; Quentin Rahman MD  ENT: Enrique Maya MD  Dentist: BO Pitts; Cecilio Banks [facility]      CC: [id number] SCCA left hypopharynx, p 16 negative       HPI:    Patient is 76-year-old male who was referred for evaluation of a hypopharyngeal tumor for consideration of total laryngectomy.  Patient has a history of dysphagia and odynophagia.  He was treated with steroids and reflux medication without resolution.  He was seen by Dr Maya who noted a lesion involving the left oropharynx and hypopharynx.  He was taken to the operating room where he underwent endoscopy and biopsy.  The mass was found to be involving the left hypopharynx extending up to the lateral pharyngeal wall and down to the level of the cricopharyngeus.  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 His case was presented at multidisciplinary head neck tumor Board and it was recommended that he undergo adjuvant chemotherapy with consideration for total laryngectomy.    Patient reports that he has difficulty swallowing pills and therefore he was you taking liquid pain medicine which alleviates the pain.  He also complains of thick mucus in the back of his throat and thick postnasal drip.     Patient has a history of Burkitt's lymphoma for which he was treated with chemotherapy only by Dr. Rahman who is also involved it the care of this patient.  He was seen by Dr Rahman on Thursday who has ordered chemo (tentatively set for 3/8/2022) and has an appointment to have a port placed. He has not spoken with Dr Peñaloza since he has seen Josiah.    ROS: see HPI  Constitutional: Negative for activity change and appetite change, weight loss.   Eyes: Negative for discharge, visual changes.   Respiratory: Negative for difficulty breathing and wheezing   Cardiovascular: Negative for chest pain.   Gastrointestinal: Negative for abdominal distention and abdominal  pain.   Endocrine: Negative for cold intolerance and heat intolerance.   Genitourinary: Negative for dysuria.   Musculoskeletal: Negative for gait problem, muscle pain and joint swelling.   Skin: Negative for color change and pallor; negative for skin lesions.   Neurological: Negative for syncope and weakness; no numbness face.   Psychiatric/Behavioral: Negative for agitation and confusion; negative for depression.    Physical Exam:      Constitutional  · General Appearance: well nourished, well-developed, alert, oriented, in no acute distress  · Communication: ability, understanding, normal  Head and Face  · Inspection: normocephalic, atraumatic, no scars, lesions or masses   · Palpation: no stepoffs, sinus tenderness or masses  · Parotid glands: no masses, stones, swelling or tenderness  Oral Cavity / Oropharynx  · Lips: upper and lower lips pink and moist  · Teeth: good dentition  · Gingiva: healthy  · Oral Mucosa: moist, no mucosal lesions  · Floor of Mouth: normal, no lesions, salivary ducts patent  · Tongue: moist, normal mobility, no lesions  · Palate: soft and hard palates without lesions or ulcers  Oropharynx: tonsils and walls without erythema, exudate, base of tongue soft to palpation  Nasopharynx, Hypopharynx, and Larynx  · Indirect: could not perform exam due to intolerance by patient  Neck  · Inspection and Palpation: no erythema, induration, emphysema, tenderness or masses  · Larynx and Trachea: normal position; normal crepitus  · Thyroid: no tenderness, enlargement or nodules  · Submandibular Glands: no masses or tenderness  Lymphatic:  · Anterior, Posterior, Submandibular, Submental, Supraclavicular: no lymphadenopathy present  Neurological  · Cranial Nerves: grossly intact  · General: no focal deficits  Psychiatric  · Orientation: oriented to time, place and person  · Mood and Affect: no depression, anxiety or agitation    PROCEDURES:   -------------------- LARYNGOSCOPY / NASOPHARYNGOSCOPY  -------------------------     Pre-op DX: SCCA hypopharynx; dysphagia  Post-op DX: same  Anesthesia: Topical Neosynephrine / Tetracaine  Indications: to look at larynx and pharynx  Adverse Events: None        Procedure in Detail:     Flexible endoscopy with video was performed through the nasal passages. The nasal cavity, nasopharynx, oropharynx, hypopharynx and larynx were adequately visualized. The true vocal cords and arytenoids were examined during phonation and repose.        Operative Findings:     Nasal Cavity: Within normal limits  Nasopharynx: Within normal limits  Tongue Base: Within normal limits  Pharyngeal Walls:thick tumor involving left lateral pharyngeal wall at level of tip of epiglottis extending across posterior pharyngeal wall; bulk of tumor and secretions makes it difficult to visualize the post cricoid mucosa and posterior VC's; pooling of copious secretions post cricoid  Epiglottis / Aryepiglottic Folds: Within normal limits  Pyriform Sinus: Within normal limits; pooling of secretions left pyriform sinus--cannot completely visualize  Vocal Cords: Within normal limits; left TVC slightly erythematous; mobile bilaterally  Arytenoids: cannot visualize arytenoid due to tumor     Test results:  Path:  Pharynx, left posterior wall, biopsies:   - Squamous cell carcinoma, invasive, moderately differentiated   - Depth of invasion (DOI): at least 0.5 cm   - p16 status (IHC): Negative   - No lymphovascular invasion identified   - No perineural invasion identified   NOTE: Immunostain for p16 was performed and did not demonstrate   block-immunopositive staining (interpreted as negative).  Positive control   and internal negative control for immunostain was examined and was   appropriate.     CT scans:  FINDINGS:  Skull base and brain: The included intracranial contents are grossly normal.  This is a limited evaluation of the brain but there is no hydrocephalus or midline shift.  The basilar cisterns are  open.  The vessels at the base of the brain enhance normally.  The cerebellar tonsils are normal position.     Soft tissue: The parapharyngeal fat spaces are preserved.  Fat and soft tissue planes in the neck are grossly normal.     Parotid/Submandibular glands: The parotid and submandibular glands appear normal and symmetric without focal parenchymal abnormality, calcification or ductal dilatation.     Thyroid gland: The thyroid gland appears normal in size.  There is a coarse 8 mm calcified nodule in the right lobe of the gland.  There is a small 3 mm hypodense nodule posteriorly in the left lobe with a similar small hypodense lesion in the lateral mid to lower pole of the left lobe.     Larynx, pharynx: There is indistinctness of fat and soft tissue planes with heterogeneous enhancement along the lateral pharyngeal wall on the left possibly arising from or involving the piriform sinus which is deformed and narrowed.  This heterogeneously enhancing tissue does cross midline to the left at the level of the piriform sinus.  There is a suspected retropharyngeal space effusion.  The anterior aspect of the left aryepiglottic fold does appear thickened.  The epiglottis appears normal.     Lymph nodes: There are multiple shotty lymph nodes in the neck bilaterally.  There are prominent left neck lymph nodes.  There is a 1.9 x 1.5 x 2.2 cm left level 2 lymph node.  Just inferior to this prominent node is a 1.2 x 0.9 x 2.1 cm node.  Both of these nodes are slightly heterogeneous which could represent early necrosis or cystic degeneration.     Vasculature: The vessels in the neck enhance normally.  There is no critical stenosis or occlusion.     Airway, lungs: The airway is mildly narrowed at the level of the hypopharynx but is otherwise patent.  There is mild scarring in the lung apices bilaterally.     Sinus/mastoid: There is partial opacification of the left maxillary sinus with soft tissue material and calcification  which likely represents changes of chronic sinus inflammatory disease.  The bony margins of the left maxillary sinus are somewhat thickened and sclerotic again suggesting a chronic process.  The remainder of the included paranasal sinuses are clear.  The mastoid air cells are clear.     Bones: There is degenerative change in the cervical spine but there is no fracture or malalignment and there is no significant spinal stenosis suspected.     Impression:     1. There is a poorly defined heterogeneously enhancing lesion in the lateral pharyngeal wall on the left likely involving the piriform sinus.  The anterior aspect of the left aryepiglottic fold is somewhat thickened as well.  This process does cross midline posteriorly.  Additionally, there are prominent left neck lymph nodes which are heterogeneous in density in could represent changes of early necrosis or cystic degeneration with measurements provided above.  The largest node measures 1.9 x 1.5 x 2.2 cm.  2. There are several nodules in the thyroid gland.  There is an 8 mm calcified nodule in the right lobe with smaller noncalcified nodules in the left lobe.  The thyroid gland is grossly normal in size.    PET CT scan:  FINDINGS:  Quality of the study: Adequate.     Lesion 1: Left level 2 chain of cervical lymph nodes, this shows max SUV of 14.     Lesion 2: Left pharyngeal and oropharyngeal mass showing max SUV of 26.     There are mildly avid mediastinal lymph nodes, specifically there is a superior mediastinal lymph node showing max SUV of 4.4 and a right hilar lymph node showing max SUV of 3.8.  The superior mediastinal lymph node measures 5.5 mm.  The right hilar lymph node is not clearly delineated from adjacent vascular structures.     Physiologic uptake of the tracer is present within the brain, salivary glands, myocardium, GI and  tracts.     Incidental CT findings: There is dense material seen within the left maxillary sinus from chronic sinus  infection.     Impression:     There are CT findings of a left oropharyngeal mass and chain of left level 2 cervical lymph nodes concerning for primary head and neck cancer and metastatic disease to adjacent lymph nodes.   There is mild uptake within mediastinal lymph nodes.  The degree of uptake suggest inflammatory etiology.  Continued surveillance is recommended.    I personally reviewed the following imaging: CT neck with contrast and PET CT    Records reviewed:  Op Note  Intraoperative Findings:   1. Grade 1 view  2. Ulcerative, firm mass centered on the left aspect of posterior pharyngeal wall (hyoppharynx) with extension to the lateral wall of the oropharynx. Mass involved the posterior half of the pyriform sinus as. The esophagus is clear but the tumor extends to the level of the cricopharyngeus along the posterior wall. The base of tongue and vallecula are clear.   3. The AE fold and supraglottis were edematous but uninvolved. The cords were uninvolved.     Tumor Board recommendations:  TB recommendation for referral to H&N surgery. Given borderline T3/T4a, consideration for induction and TL/TP pending response.  There is questionable involvement of the posterior lateral thyroid ala.     Assessment:   [id number] SCCA hypopharynx-tumor is bulky  Dysphagia  Pooling of thick copious secretion in hypopharynx    Plan:  According to patient plan is to proceed with concomitant chemorads  However, I will confirm plan with Tanja Peñaloza and Josiah  Referral resent for ST and PT/OT  Nutrition to see patient today  F/U with me in 8 weeks  Recommend SS gargles multiple times a day and saline nasal irrigations twice a day  Magic moutwash prior to meals and PRN    Time spent with patient 45 minutes which was greater than 50% time spent examining him

## 2022-02-24 ENCOUNTER — TUMOR BOARD CONFERENCE (OUTPATIENT)
Dept: OTOLARYNGOLOGY | Facility: CLINIC | Age: 77
End: 2022-02-24
Payer: MEDICARE

## 2022-02-24 ENCOUNTER — TELEPHONE (OUTPATIENT)
Dept: HEMATOLOGY/ONCOLOGY | Facility: CLINIC | Age: 77
End: 2022-02-24
Payer: MEDICARE

## 2022-02-24 DIAGNOSIS — C13.9 PRIMARY MALIGNANT NEOPLASM OF HYPOPHARYNX: Primary | ICD-10-CM

## 2022-02-24 NOTE — PROGRESS NOTES
Cancer Staging  Primary malignant neoplasm of hypopharynx  Staging form: Pharynx - Hypopharynx, AJCC 8th Edition  - Clinical stage from 2/17/2022: Stage RENO (cT3, cN2b, cM0) - Signed by Evon Galo NP on 2/18/2022      Presenting Hospital / Clinic: [facility]  Virtual Tumor Board Conference: Virtual  Presenter: Dr. Stout  Date Presented to Tumor Board: 2/24/2022  Specialties Present: Medical Oncology; Radiation Oncology; Navigation; Radiology; Head and Neck; Nutrition; Pathology  Presentation at Cancer Conference: Prospective  Cancer Type: Head and neck cancer  Recommended Plan Note: induction chemo, check for resonse. If no response, consider total laryngectomy. If there is a response, proceed with CRT.

## 2022-02-24 NOTE — TELEPHONE ENCOUNTER
lvm on cell, called home number and spoke with wife Dary and she said he will call back today. He was getting ready to go to another appt. I left contact information.    Poss laryngectomy, thick mucus, difficulty swallowing        ----- Message from Puja Stroud RN sent at 2/23/2022  3:26 PM CST -----  Hey looks like there are 2 referrals for Speech Therapy in for this patient but I'm not sure if you saw them. Can you reach out to schedule him?    Thanks    ----- Message -----  From: Bere Stout MD  Sent: 2/23/2022   3:04 PM CST  To: Puja Stroud RN    Please schedule him speech tx appt  Thanks

## 2022-02-24 NOTE — PROGRESS NOTES
ONCOLOGY NUTRITION  INITIAL VISIT        Arthur Ribeiro is a 76 y.o. male.    DATE: 2022     Oncology Diagnosis: Head and Neck Cancer     REFERRAL FROM:   [] Integrative Oncology   [] Med/Heme Oncology  [] Radiation Oncology  [] Surgical Oncology     [x] Routine Nutrition follow up    TREATMENT PLAN:   [] Full treatment plan pending  [x] Chemotherapy  [] Immunotherapy  [] Radiation  [] Concurrent  [] Surgery  [] Treatment complete/post-treatment    PAST MEDICAL HISTORY:  Past Medical History:   Diagnosis Date    Allergic rhinitis, cause unspecified 2015    Cancer 2005    Rakesh's lymphoma     Encounter for blood transfusion     Foot drop     Neuropathy     Spinal stenosis      Past Surgical History:   Procedure Laterality Date    INSERTION OF VENOUS ACCESS PORT      LARYNGOSCOPY Bilateral 2022    Procedure: LARYNGOSCOPY;  Surgeon: Enrique Maya MD;  Location: [facility];  Service: ENT;  Laterality: Bilateral;    SKIN CANCER EXCISION       Family History   Problem Relation Age of Onset    Heart disease Mother     Hypertension Mother     Cancer Father         throat    Cancer Sister         thyroid    Cancer Brother         pancreatic    No Known Problems Daughter     No Known Problems Son      Social History     Tobacco Use    Smoking status: Former Smoker     Types: Cigarettes, Cigars     Quit date: 10/1/2005     Years since quittin.4    Smokeless tobacco: Never Used   Substance and Sexual Activity    Alcohol use: Not Currently     Comment: daily    Drug use: No    Sexual activity: Not on file     Review of patient's allergies indicates:  No Known Allergies    Review of patient's allergies indicates:  No Known Allergies    ANTHROPOMETRICS:  Wt Readings from Last 10 Encounters:   22 81.5 kg (179 lb 10.8 oz)   02/10/22 80.9 kg (178 lb 5.6 oz)   22 81.6 kg (180 lb)   22 84.6 kg (186 lb 8.2 oz)   21 85.7 kg (188 lb 15 oz)   21 84.4 kg (186 lb)    07/11/21 84.4 kg (186 lb)   05/03/21 84.4 kg (186 lb)   03/04/21 86.2 kg (190 lb)   03/10/20 85.2 kg (187 lb 13.3 oz)     Weight Changes: 3.7% weight change in 7 months    PHYSICAL EXAM:    Muscle Wasting Observed:  [] No Deficit  [x] Mild Deficit  [] Moderate  [] Severe    INTAKE:  [x] PO Intake [] TF Intake  Current Diet: regular diet  Dietary Patterns:  Patient eating meals/snacks as tolerated - pt having minimal difficulties chewing/swallowing despite location of cancer  [x] Oral nutritional supplements: Boost - once daily    FOOD INSECURITY:   [x]Not discussed at this time  Patient is worried whether their food would run out before they got money to buy more.[] Often  [] Sometimes []Never  The food that they bought just didn't last and we didn't have money to get more.[] Often  [] Sometimes []Never    SYMPTOMS/COMPLAINTS:   [] No nutritional concerns at current  [] Diarrhea                    [] Constipation           [] Nausea                 [] Vomiting                [] Indigestion                [] Reflux              [] Poor Appetite            [] Anorexia                 [] Early Satiety         [] Gas                       [] Bloating                     [] Dry Mouth    [x] Mucositis                   [] Mouth Sores           [] Poor Dentition      [] Difficulty chewing  [] Difficulty Swallowing   [] Pain with swallowing [] Change in taste      [] Change in smell   [] Pain (general)       [] Fatigue                      [] Sleep issues   [] Weight loss   [] other, please specify    Initial Nutrition Assessment Risk: Moderate    MEDICATIONS:    Current Outpatient Medications:     (Magic mouthwash) 1:1:1 diphenhydramine(Benadryl) 12.5mg/5ml liq, aluminum & magnesium hydroxide-simethicone (Maalox), LIDOcaine viscous 2%, Swish and spit 10 mLs every 4 (four) hours as needed (gargle for 10 seconds and spit). gargle for 10 seconds and spit, Disp: 450 mL, Rfl: 0    fluticasone (FLONASE) 50 mcg/actuation  nasal spray, SHAKE LIQUID AND USE 2 SPRAYS(100 MCG) IN EACH NOSTRIL EVERY DAY, Disp: 16 mL, Rfl: 11    HYDROcodone-acetaminophen (NORCO) 7.5-325 mg per tablet, Take 1 tablet by mouth every 6 (six) hours as needed for Pain. (Patient not taking: Reported on 2/23/2022), Disp: 40 tablet, Rfl: 0    hydrocodone/acetam/diet.sup.11 (HYDROCODONE-ACETAMINOPH-SUPP11 ORAL), , Disp: , Rfl:     ibuprofen (ADVIL,MOTRIN) 200 MG tablet, Take 400 mg by mouth 2 (two) times daily as needed for Pain., Disp: , Rfl:     LIDOCAINE VISCOUS 2 % solution, , Disp: , Rfl:      LABS:  WBC   Date Value Ref Range Status   02/22/2022 6.13 3.90 - 12.70 K/uL Final     RBC   Date Value Ref Range Status   02/22/2022 3.81 (L) 4.60 - 6.20 M/uL Final     Hemoglobin   Date Value Ref Range Status   02/22/2022 11.1 (L) 14.0 - 18.0 g/dL Final     Hematocrit   Date Value Ref Range Status   02/22/2022 34.7 (L) 40.0 - 54.0 % Final     Platelets   Date Value Ref Range Status   02/22/2022 272 150 - 450 K/uL Final     Glucose   Date Value Ref Range Status   02/22/2022 83 70 - 110 mg/dL Final     Comment:     The ADA recommends the following guidelines for fasting glucose:    Normal:       less than 100 mg/dL    Prediabetes:  100 mg/dL to 125 mg/dL    Diabetes:     126 mg/dL or higher       Sodium   Date Value Ref Range Status   02/22/2022 136 136 - 145 mmol/L Final     Potassium   Date Value Ref Range Status   02/22/2022 4.3 3.5 - 5.1 mmol/L Final     Calcium   Date Value Ref Range Status   02/22/2022 8.8 8.4 - 10.2 mg/dL Final     Alkaline Phosphatase   Date Value Ref Range Status   02/22/2022 81 38 - 145 U/L Final     BUN   Date Value Ref Range Status   02/22/2022 22 (H) 9 - 21 mg/dL Final     Creatinine   Date Value Ref Range Status   02/22/2022 0.82 0.50 - 1.40 mg/dL Final     Total Bilirubin   Date Value Ref Range Status   02/22/2022 0.4 0.2 - 1.3 mg/dL Final     AST   Date Value Ref Range Status   02/22/2022 25 17 - 59 U/L Final     ALT   Date Value Ref  Range Status   02/22/2022 22 0 - 50 U/L Final     Albumin   Date Value Ref Range Status   02/22/2022 3.8 3.5 - 5.2 g/dL Final     Total Protein   Date Value Ref Range Status   02/22/2022 6.6 6.0 - 8.4 g/dL Final     eGFR if    Date Value Ref Range Status   02/22/2022 >60 >60 mL/min/1.73 m^2 Final     eGFR if non    Date Value Ref Range Status   02/22/2022 >60 >60 mL/min/1.73 m^2 Final     Comment:     Calculation used to obtain the estimated glomerular filtration  rate (eGFR) is the CKD-EPI equation.          ESTIMATED ENERGY NEEDS:  Calories : 2445 kcals (30kcals/kg)  Protein : 122 g (1.5g/kg)  Fluid: 2445 mL (1mL/kcal)    NUTRITION DIAGNOSIS (PES):   Problem: Increased energy/protein needs   Etiology (related to): Increased demands   Signs/Symptoms (as evidenced by): current H&N cancer dx and pending tx plan    EDUCATION PROVIDED:   [] No Education Needed at this time  [] New Patient Education (Food safety, common nutritional side effects regarding specific medication and treatment plan, importance of nutrition as it relates to cancer, weight maintenance, side effect/symptom management, adequate calories, protein and hydration importance)  [] Diarrhea                                              [] Constipation                          [] Nausea/Vomiting  [] Mucositis                [] Dry Mouth    [] Dealing with changes in Taste/Smell  [] Dealing with Poor Appetite   [x] Soft/moist Diet      [] Weight Loss/Gain     [x] Weight Maintenance                           [] Indigestion/GERD                 [] Gas/Bloating          [] Foods High/ Low in specific nutrients [x] Increasing Calories/Protein   [] Milkshake/Smoothies Recipes   [x] Nutrition Supplements                        [] Increasing Fluid Intakes         [] Foods that fight cancer    [] Evidence bases resources                 [] Fermented Foods/Probiotics  [] Mediterranean/Plant Based Diet     [] Other, specify                                    [] Handouts provided      [] Samples provided     RD NOTE:  RD met with patient and his wife, Dary, following appointment with Dr. Stout. Pt states despite location of cancer he is eating pretty well. Pt with some weight loss but not considered significant at this time. Pt states his goal is to be around 180#. RD encouraged pt to work to gain weight until start of treatment. Tentatively starting chemo/XRT in two weeks along with possible surgery. RD explained role in POC, especially once treatment starts and possible nutrition related side effects.     RD Goals:   [] Weight stable                  [x] Weight gain                      [] Weight Loss                               [] Continue adequate Kcal/protein   [x] Increase Kcal/protein      [] Adjust Tube-feeding Rx   [] Tolerate Tube Feedings             [] Increase tube feedings to goal     [] Tolerate Supplements     [] Symptom Improvement   [] Understand nutrition Education  [] Offer supportive visits   [] other, please specify      RECOMMENDATIONS:  1. Increase Boost to BID to help with weight gain prior to starting treatment  2. Consume adequate nutritional intake to help with ~10# weight gain  3. Continue current fluid intake to maintain proper hydration    Follow up: Start of concurrent XRT/chemo treatment    Shahnaz Galo, MS, RD, LDN  02/24/2022  8:00 AM

## 2022-02-24 NOTE — TELEPHONE ENCOUNTER
I spoke with patient about getting PT and ST scheduled. he verbalized understanding of what we offer and verbalized acceptance of a date/time. Location was reviewed.

## 2022-02-25 ENCOUNTER — CLINICAL SUPPORT (OUTPATIENT)
Dept: REHABILITATION | Facility: HOSPITAL | Age: 77
End: 2022-02-25
Payer: MEDICARE

## 2022-02-25 DIAGNOSIS — Z91.89 AT RISK FOR LYMPHEDEMA: Primary | ICD-10-CM

## 2022-02-25 DIAGNOSIS — R53.81 DEBILITY: ICD-10-CM

## 2022-02-25 DIAGNOSIS — C13.9 PRIMARY MALIGNANT NEOPLASM OF HYPOPHARYNX: ICD-10-CM

## 2022-02-25 PROCEDURE — 97162 PT EVAL MOD COMPLEX 30 MIN: CPT | Mod: PN

## 2022-02-25 NOTE — PROGRESS NOTES
Physical Therapy Initial Evaluation     Patient: Arthur Ribeiro  [facility] #: [id number]    Date: 2/25/2022  Physician: Bere Stout MD  Diagnosis:   Encounter Diagnoses   Name Primary?    At risk for lymphedema Yes    Debility     Primary malignant neoplasm of hypopharynx        Patient is a 76 y.o. 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 Patient reports recently being diagnosed with a hypopharyngeal tumor, with in the next week will be determined if he is having surgery with chemo and radiation. States his swallowing has been difficult in last 3 weeks has gotten worse, even mucous secretions he feels build up with sitting upright.  Pt states has a history of tumors on his spine at T9 and L2 (which he states his cancer was caused [place] agent [place]. Pt reports having 4 bulging disc at L2,3,4 and 5 as well in past which in 2010 ended up with foot drop in both feet.   Been trying to get back into my exercises, I have a small trampoline and march on it, use to do some wall push ups and squats. Was walking 4-5 yrs ago a mile to mile half at least 5 times a week. After my trip I got out of it. In the summer I cut my own grass and do own yard work but will probably have to stop soon with chemo and radiation coming up.     History of Present Illness:   HPI:  per medical records/Dr. Stout last visit with patient on 02/23/2022  Patient is 76-year-old male who was referred for evaluation of a hypopharyngeal tumor for consideration of total laryngectomy.  Patient has a history of dysphagia and odynophagia.  He was treated with steroids and reflux medication without resolution.  He was seen by Dr Maya who noted a lesion involving the left oropharynx and hypopharynx.  He was taken to the operating room where he underwent endoscopy and biopsy.  The mass was  found to be involving the left hypopharynx extending up to the lateral pharyngeal wall and down to the level of the cricopharyngeus.  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 His case was presented at multidisciplinary head neck tumor Board and it was recommended that he undergo adjuvant chemotherapy with consideration for total laryngectomy.   Patient reports that he has difficulty swallowing pills and therefore he was you taking liquid pain medicine which alleviates the pain.  He also complains of thick mucus in the back of his throat and thick postnasal drip.    Patient has a history of Burkitt's lymphoma for which he was treated with chemotherapy only by Dr. Rahman who is also involved it the care of this patient.  He was seen by Dr Rahman on Thursday who has ordered chemo (tentatively set for 3/8/2022) and has an appointment to have a port placed. He has not spoken with Dr Peñaloza since he has seen Josiah.  Surgery: pending  Radiation: unknown  Chemotherapy: tentatively set for 03/08/2022  Previous Lymphedema Treatment: no  PT for back pain and after chemo in [id number]    Past Medical History:   Diagnosis Date    Allergic rhinitis, cause unspecified 8/4/2015    Cancer 2005    Rakesh's lymphoma     Encounter for blood transfusion     Foot drop     Neuropathy     Spinal stenosis      Current Outpatient Medications   Medication Sig    (Magic mouthwash) 1:1:1 diphenhydramine(Benadryl) 12.5mg/5ml liq, aluminum & magnesium hydroxide-simethicone (Maalox), LIDOcaine viscous 2% Swish and spit 10 mLs every 4 (four) hours as needed (gargle for 10 seconds and spit). gargle for 10 seconds and spit    fluticasone (FLONASE) 50 mcg/actuation nasal spray SHAKE LIQUID AND USE 2 SPRAYS(100 MCG) IN EACH NOSTRIL EVERY DAY    HYDROcodone-acetaminophen (NORCO) 7.5-325 mg per tablet Take 1 tablet by mouth every 6 (six) hours as needed for Pain. (Patient not taking: Reported on 2/23/2022)    hydrocodone/acetam/diet.sup.11  (HYDROCODONE-ACETAMINOPH-SUPP11 ORAL)     ibuprofen (ADVIL,MOTRIN) 200 MG tablet Take 400 mg by mouth 2 (two) times daily as needed for Pain.    LIDOCAINE VISCOUS 2 % solution      No current facility-administered medications for this visit.     Review of patient's allergies indicates:  No Known Allergies    Precautions: cancer, Fall risk    Social History: Lives with wife, 2 story home, master bedroom on first floor. His office upstairs.  Environmental barriers: none  Abuse/Neglect: none  Nutritional status: has been difficult due to swallowing, states tries to stick with soft food.   Educational needs: signs and symptoms of lymphedema which could be associated with radiation treatments.  Spiritual/Cultural: included in POC  Fall risk: yes, bilateral foot drop, committed to using his cane 6-7 yrs ago.     MD orders:  Electronically signed by: Bere Stout MD on 02/23/22 1512 Status: Active   Ordering user: Bere Stout MD 02/23/22 1512 Authorized by: Bere Stout MD   Ordering mode: Standard   Frequency:  02/23/22 -     Diagnoses   Primary malignant neoplasm of hypopharynx [C13.9]     Questionnaire    Question Answer   Post Surgical? Yes   Eval and Treat Yes   Type of Therapy Outpatient Therapy   Order comments: primary chemorads Please evaluate and treat Thanks Lymphedema and PT       Subjective     Arthur Ribeiro rates pain at a 2/10 where 0 = no pain and 10 = maximal pain. Taking liquid tylenol, unable to take pill form. States also starting to wake up with headaches    Patient's goals are as follows: build my strength and prepare me for what is coming in regards to chemo and radiation.     Objective     Amount of Swelling: no observable to cervical region, palpable swelling to B LE's stage 2  Location of Swelling: bilateral lower legs/ankles  Skin Integrity: intact, dryness noted to LE's  Palpation/Texture: soft pliable around neck.  Circulation: intact radial  Posture: kyphotic, forward head, protracted  "scapula, slouched posture    Range of Motion - UE  (R) shoulder flexion 125, abduction 130, ER/IR WFL, elbow flexion/extension WNL  (L) shoulder flexion 130, abduction 130, ER/IR WFL, elbow flexion/extension WNL    Upper Extremity strength:  R UE grossly 4-/5 shoulders, 4/5 elbow flex/ext  L UE grossly 4-/5 shoulders, 4/5 elbow flex/ext    Lower Extremity Strength:  B LE hip flexion 4-/5, hip abd 4-/5, knee extension 4+/5, knee flexion 4-/5, ankle DF 3-/5  B LE hip flexion 4-/5, hip abd 4-/5, knee extension 4+/5, knee flexion 4-/5, ankle DF 3-/5    Current Functional Status:  Gait: ambulated with walking stick, high steppage gait to compensate for bilateral foot drop, increased ALEXANDRE  Transfers: mod I  Bed Mobility: mod I    Girth Measurements (in centimeters)  LANDMARK LEFT LE RIGHT LE   Cervical flexion  WNL    Cervical Extension 15 deg past neutral / forward head    Rotation  65 cm 65 cm   Cervical SB 20 cm 25 cm        Girth 2" below earlobe 41.7 cm    Girth 3" below earlobe 39.6 cm    Girth 4" below earlobe 37.0 cm    Manibular Depression 35 mm        Sensation: intact    Treatment Evaluation, Pt rec'd education on etiology of lymphedema, signs and symptoms of Lymphedema which could be a side effect with radiation treatment. Discussed with patient possible need for bilateral AFO's to assist with foot drop and prevent falls. Discussed with patient future visits to address education on self manual lymph drainage, overall strengthening and endurance program.    Evaluation Date: 02/25/2022  Authorization Period Expiration: pending  Plan of Care Certification Period: 10 visits/ 30 days  Visit #/Visits authorized: pending    Time In: 10:00 AM  Time Out: 11:00 AM    This patient is in agreement to participate in PT/Lymphedema treatment.    Assessment     This patient presents with recent diagnosis of primary malignant neoplasm of hypopharynx, referred to therapy PT/Lymphedema as patient is anticipating chemo and " radiation treatment. Patient also has a history of Burkitts lymphoma with complaints of residual side effects of weakness from chemo in the past. Base line measurements of ROM and cervical girth taken today as well as educating patient on signs and symptoms of lymphedema. Patient also presents with overall postural, UE and LE weakness. Functionally patient reports limitations with endurance in community, house hold chores such as managing his own lawn, safety with ambulation.      This pt would benefit from skilled therapy services to address the above impairments.    The following goals were discussed with the patient and patient is in agreement with them as to be addressed in the treatment plan.     Short Term Goals: (4 weeks)  1. Patient to demonstrate improved postural awareness and sitting tolerance.  2. Patient will demonstrate 100% knowledge of lymphedema precautions and signs of infection.   3. Patient will perform self lymph drainage techniques independently.  4. Patient will increase B UE strength 1/3 grade.   5. Patient will increase B LE strength 1/3 grade for improved functional tranfers and decrease fall risk.       Long Term Goals: (8 weeks)  1. Patient will increase B UE strength 1/3 grade.   2. Patient will increase B LE strength 1/3 grade for improved functional tranfers and decrease fall risk.   3. Patient will perform self lymph drainage techniques      PLAN   Patient to be seen 2 x per week for 8 weeks for the medically necessary treatments to include: therapeutic exercises, therapeutic activities, modalities as needed, decongestive massage, intermittent compression pump, education in lymphedema precautions, self massage, self bandaging, and assistance in obtaining appropriate compression garment in future if needed.  Next visit: assess functional 30 sec sit to stand test, initiate strengthening program.    Leticia Moses, PT, CLT  2/25/2022

## 2022-03-03 ENCOUNTER — TELEPHONE (OUTPATIENT)
Dept: HEMATOLOGY/ONCOLOGY | Facility: CLINIC | Age: 77
End: 2022-03-03
Payer: MEDICARE

## 2022-03-03 ENCOUNTER — DOCUMENTATION ONLY (OUTPATIENT)
Dept: HEMATOLOGY/ONCOLOGY | Facility: CLINIC | Age: 77
End: 2022-03-03
Payer: MEDICARE

## 2022-03-03 NOTE — TELEPHONE ENCOUNTER
Pt's wife calling to notify clinic that chemo has been set up with Dr. Rahman to start 3/8/22.  Wife would like to speak with Dr. Stout about radiation therapy.  Message given to Dr. Stout.

## 2022-03-04 NOTE — PROGRESS NOTES
Spoke with patient and discussed plan  Neoadjuvant chemo--re-image--if responds then proceed with concurrent chemorads--if not proceed to surgery

## 2022-03-08 ENCOUNTER — CLINICAL SUPPORT (OUTPATIENT)
Dept: REHABILITATION | Facility: HOSPITAL | Age: 77
End: 2022-03-08
Payer: MEDICARE

## 2022-03-08 DIAGNOSIS — C13.9 HYPOPHARYNGEAL CANCER: ICD-10-CM

## 2022-03-08 DIAGNOSIS — R13.13 PHARYNGEAL DYSPHAGIA: Primary | ICD-10-CM

## 2022-03-08 DIAGNOSIS — C13.9 PRIMARY MALIGNANT NEOPLASM OF HYPOPHARYNX: ICD-10-CM

## 2022-03-08 PROCEDURE — 92610 EVALUATE SWALLOWING FUNCTION: CPT | Mod: PN

## 2022-03-08 NOTE — PROGRESS NOTES
"[facility] Outpatient Therapy and Wellness  Speech and Language Therapy Evaluation    Date: 3/8/2022    Name: Arthur Ribeiro  MRN: [id number]  Therapy Diagnosis:   Encounter Diagnoses   Name Primary?    Primary malignant neoplasm of hypopharynx     Hypopharyngeal cancer     Pharyngeal dysphagia Yes     Physician: Thuan Peñaloza Jr., *  Physician Orders: referral to   Medical Diagnosis: primary malignant neoplasm of hypopharynx    Visit #:   1    Date of Evaluation: 03/08/22       Procedure Min.   Swallow and Oral Function Evaluation   75 min          Time In: 1430   Time Out: 1545   Total Billable Time: 75 min    Precautions:Standard    Subjective     Date of Onset: June 2021  History of Current Condition:   Pt accompanied by his wife. Pt reports sore throat since June of 2021. He reports his throat feels "tight" due to inflammation. He also reports thick secretions in his throat which make it difficult to swallow. He denies coughing/choking when swallowing. Wife confirms. He reports food feels like it gets stuck in his throat but he is able to clear with liquid wash. He is unable to take whole pills. He denies any recent chest congestion or pneumonia.  Pt reports his treatment plan is to begin chemotherapy next week. Following 3 weeks of chemotherapy, it will be decided if he will proceed with concurrent chemoradiation or surgery.  Past Medical History: Arthur Ribeiro  has a past medical history of Allergic rhinitis, cause unspecified (8/4/2015), Cancer (2005), Encounter for blood transfusion, Foot drop, Neuropathy, and Spinal stenosis.  Arthur Ribeiro  has a past surgical history that includes Skin cancer excision; Insertion of venous access port (2005); and Laryngoscopy (Bilateral, 1/26/2022).  Medical Hx and Allergies: Arthur has a current medication list which includes the following prescription(s): diphenhydramine hcl, fluticasone propionate, hydrocodone-acetaminophen, " hydrocodone/acetam/diet.sup.11, ibuprofen, and lidocaine viscous. Review of patient's allergies indicates:  No Known Allergies  Prior Therapy:  n/a  Social History:  Pt lives with his wife.   Current Level of Function: Eating mostly regular consistency foods with thin liquids. Walks with a walking stick.  Pain:   2/10  Pain Location / Description: throat, pain increases up to a 4 or 5 at times  Nutrition:  Regular consistency diet with thin liquids  Patient's Therapy Goals:  To improve swallow    Objective     Bedside Swallow Evaluation:    Subjective comments:  Pt reports he has lost approximately 15 pounds despite adequate oral intake. He is drinking 1 to 11/2 Bosst per day. He is using Magic Mouthwash and baking soda/salt oral rinses.  Objective:  Symptoms/Complaints impacting oral intake:  __Oral Dysphagia  __X Pharyngeal Dysphagia __Esophageal Dysphagia  Comments: Pt c/o food sticking in his throat  _X_ Pain   _X_ Changes in Taste __ Dry mouth  _X_ Mucositis   __ Poor Appetite  __ Mouth sores  __Nausea   __Vomiting   __ Other:  Comments:     Oral Motor Exam:  Labial: WFL    Lingual: WFL  Palate:  WFL    Jaw: WFL  Oral mucosa: pink, moist  Dentition: Natural teeth, recent tooth extraction    Swallow Reassessment:  Current Diet: regular consistency including steak, trout, fried fish, shepherds pie, soup, mash pot, eggs  Reported Caloric Intake: Adequate per pt  Weight: lost 15 pounds    Consistencies Assessed:ice water, tap water, nectar thick juice, applesauce, pudding, diced peaches, ladonna cracker  Oral Phase:  Adequate lip seal without loss of bolus. Adequate bolus preparation and mastication. AP propulsion appeared timely. No oral residue noted on any consistency.      Pharyngeal Phase:  Laryngeal lift is present on palpation and appears adequate. Bolus transit time from presentation of bolus to laryngeal lift appears timely.   Overt s/s of oropharyngeal distress (cough x1, mild throat clearing) were noted  "with thin liquid swallow. No change in vocal quality. Pt reports this is not normal for him. Improvement noted with change to tap water. Decreased s/s oropharyngeal distress noted with nectar thick liquids.   Pt noted with throat clear/regurgitation to mouth with puree and solid swallows with use of liquid wash to clear food "hanging up" in his throat. Pt reports this is normal for him and has been occurring for months but has gotten progressively worse. Trial with head turn to left did not improve swallow.  Pt had to spit thick secretions into sink several times throughout session.     Impression: moderate pharyngeal dysphagia    Education Provided:  __ Adequate swallow function for safe oral intake  _X_ Diet Modification- Encouraged use of naturally thicker liquids (Boost, smoothies) or thickener to improve swallow, Encouraged pt to choose soft, moist foods  _X_ Swallow strategies:   _X_ Strategies to increase oral intake: Increased intake with Boost outside of meals  __ Reviewed swallow exercises  __ Education regarding alternative nutrition  _X_ Monitor calories/hydration Instructed regarding My fitness pal sophy to monitor caloric intake  __ Handout for managing changes in taste  __ Strategies for managing dry mouth/oral dysphagia         Assessment     Arthur presents to [facility] s/p medical diagnosis of  Primary malignant neoplam of hypopharynx. He presents with moderate pharyngeal dysphagia.  Positive prognostic factors include pt motivation, positive outlook and family support as well as anticipation that medical treatment with chemotherapy may improve swallow function. Patient will benefit from skilled, outpatient neurological rehabilitation speech therapy to address swallow impairment throughout treatment for throat cancer.      Plan     Plan of Care Certification Period: 3/8/2022 to 6/8/22    Recommended Treatment Plan:  ST will follow up with patient in 2 " weeks to monitor swallow function. Pt will return to ST prior to initiating XRT for further education. Discussed MBSS to fully assess swallow funciton, pt would like to hold off for now as he anticipates swallow will improve with chemotherapy. Instructed pt should he notice consistent coughing after swallow a MBSS would be indicated to determine efficacy of swallow strategies and guide further treatment. Pt and wife verbalized understanding.         Therapist's Name:   Lashonda Tirado CCC-SLP   3/8/2022    Physician Name: _______________________________    Physician Signature: ____________________________

## 2022-03-22 ENCOUNTER — TELEPHONE (OUTPATIENT)
Dept: REHABILITATION | Facility: HOSPITAL | Age: 77
End: 2022-03-22
Payer: MEDICARE

## 2022-03-22 NOTE — TELEPHONE ENCOUNTER
Speech phone call follow up  Arthur reports he is doing better.  He reports he is not having any pain and is now able to swallow better. He reports he is tolerating soft, moist foods. He also reports his secretions have been a little better. He reports he is scheduled to finish chemotherapy around May 24 and then possibly begin XRT. Will follow up at end of May and schedule visit for pre XRT counseling and instruction as indicated.   Instructed pt to call ST with any questions or concerns regarding his swallow.  Lashonda Tirado MS CCC-SLP  Speech Language Pathologist  [facility]  Outpatient Therapy and Wellness  [email or web address]  Office number [phone]

## 2022-03-29 ENCOUNTER — TELEPHONE (OUTPATIENT)
Dept: HEMATOLOGY/ONCOLOGY | Facility: CLINIC | Age: 77
End: 2022-03-29
Payer: MEDICARE

## 2022-03-29 NOTE — TELEPHONE ENCOUNTER
----- Message from Anastasiya Ash sent at 3/29/2022  4:29 PM CDT -----  Type: Needs Medical Advice  Who Called: patient   Best Call Back Number: [phone]  Additional Information: patient request call back returning Iman edgar.

## 2022-04-18 ENCOUNTER — DOCUMENTATION ONLY (OUTPATIENT)
Dept: INFUSION THERAPY | Facility: HOSPITAL | Age: 77
End: 2022-04-18
Payer: MEDICARE

## 2022-04-18 ENCOUNTER — OFFICE VISIT (OUTPATIENT)
Dept: HEMATOLOGY/ONCOLOGY | Facility: CLINIC | Age: 77
End: 2022-04-18
Payer: MEDICARE

## 2022-04-18 VITALS
DIASTOLIC BLOOD PRESSURE: 73 MMHG | WEIGHT: 163.38 LBS | BODY MASS INDEX: 22.13 KG/M2 | HEIGHT: 72 IN | SYSTOLIC BLOOD PRESSURE: 128 MMHG | OXYGEN SATURATION: 96 % | TEMPERATURE: 98 F | HEART RATE: 88 BPM | RESPIRATION RATE: 18 BRPM

## 2022-04-18 DIAGNOSIS — B02.9 HERPES ZOSTER WITHOUT COMPLICATION: ICD-10-CM

## 2022-04-18 DIAGNOSIS — R63.4 WEIGHT LOSS: ICD-10-CM

## 2022-04-18 DIAGNOSIS — C13.9 PRIMARY MALIGNANT NEOPLASM OF HYPOPHARYNX: Primary | ICD-10-CM

## 2022-04-18 DIAGNOSIS — R63.0 ANOREXIA: ICD-10-CM

## 2022-04-18 PROCEDURE — 1159F MED LIST DOCD IN RCRD: CPT | Mod: CPTII,S$GLB,, | Performed by: OTOLARYNGOLOGY

## 2022-04-18 PROCEDURE — 3074F PR MOST RECENT SYSTOLIC BLOOD PRESSURE < 130 MM HG: ICD-10-PCS | Mod: CPTII,S$GLB,, | Performed by: OTOLARYNGOLOGY

## 2022-04-18 PROCEDURE — 1160F PR REVIEW ALL MEDS BY PRESCRIBER/CLIN PHARMACIST DOCUMENTED: ICD-10-PCS | Mod: CPTII,S$GLB,, | Performed by: OTOLARYNGOLOGY

## 2022-04-18 PROCEDURE — 3288F FALL RISK ASSESSMENT DOCD: CPT | Mod: CPTII,S$GLB,, | Performed by: OTOLARYNGOLOGY

## 2022-04-18 PROCEDURE — 99214 OFFICE O/P EST MOD 30 MIN: CPT | Mod: 25,S$GLB,, | Performed by: OTOLARYNGOLOGY

## 2022-04-18 PROCEDURE — 1126F AMNT PAIN NOTED NONE PRSNT: CPT | Mod: CPTII,S$GLB,, | Performed by: OTOLARYNGOLOGY

## 2022-04-18 PROCEDURE — 3078F PR MOST RECENT DIASTOLIC BLOOD PRESSURE < 80 MM HG: ICD-10-PCS | Mod: CPTII,S$GLB,, | Performed by: OTOLARYNGOLOGY

## 2022-04-18 PROCEDURE — 99999 PR PBB SHADOW E&M-EST. PATIENT-LVL IV: ICD-10-PCS | Mod: PBBFAC,,, | Performed by: OTOLARYNGOLOGY

## 2022-04-18 PROCEDURE — 3288F PR FALLS RISK ASSESSMENT DOCUMENTED: ICD-10-PCS | Mod: CPTII,S$GLB,, | Performed by: OTOLARYNGOLOGY

## 2022-04-18 PROCEDURE — 1101F PR PT FALLS ASSESS DOC 0-1 FALLS W/OUT INJ PAST YR: ICD-10-PCS | Mod: CPTII,S$GLB,, | Performed by: OTOLARYNGOLOGY

## 2022-04-18 PROCEDURE — 31575 DIAGNOSTIC LARYNGOSCOPY: CPT | Mod: S$GLB,,, | Performed by: OTOLARYNGOLOGY

## 2022-04-18 PROCEDURE — 99999 PR PBB SHADOW E&M-EST. PATIENT-LVL IV: CPT | Mod: PBBFAC,,, | Performed by: OTOLARYNGOLOGY

## 2022-04-18 PROCEDURE — 99214 PR OFFICE/OUTPT VISIT, EST, LEVL IV, 30-39 MIN: ICD-10-PCS | Mod: 25,S$GLB,, | Performed by: OTOLARYNGOLOGY

## 2022-04-18 PROCEDURE — 1101F PT FALLS ASSESS-DOCD LE1/YR: CPT | Mod: CPTII,S$GLB,, | Performed by: OTOLARYNGOLOGY

## 2022-04-18 PROCEDURE — 3078F DIAST BP <80 MM HG: CPT | Mod: CPTII,S$GLB,, | Performed by: OTOLARYNGOLOGY

## 2022-04-18 PROCEDURE — 1159F PR MEDICATION LIST DOCUMENTED IN MEDICAL RECORD: ICD-10-PCS | Mod: CPTII,S$GLB,, | Performed by: OTOLARYNGOLOGY

## 2022-04-18 PROCEDURE — 3074F SYST BP LT 130 MM HG: CPT | Mod: CPTII,S$GLB,, | Performed by: OTOLARYNGOLOGY

## 2022-04-18 PROCEDURE — 1160F RVW MEDS BY RX/DR IN RCRD: CPT | Mod: CPTII,S$GLB,, | Performed by: OTOLARYNGOLOGY

## 2022-04-18 PROCEDURE — 31575 PR LARYNGOSCOPY, FLEXIBLE; DIAGNOSTIC: ICD-10-PCS | Mod: S$GLB,,, | Performed by: OTOLARYNGOLOGY

## 2022-04-18 PROCEDURE — 1126F PR PAIN SEVERITY QUANTIFIED, NO PAIN PRESENT: ICD-10-PCS | Mod: CPTII,S$GLB,, | Performed by: OTOLARYNGOLOGY

## 2022-04-18 RX ORDER — VALACYCLOVIR HYDROCHLORIDE 1 G/1
1000 TABLET, FILM COATED ORAL 3 TIMES DAILY
COMMUNITY
Start: 2022-04-12 | End: 2023-03-16

## 2022-04-18 RX ORDER — ONDANSETRON 8 MG/1
TABLET, ORALLY DISINTEGRATING ORAL
COMMUNITY
Start: 2022-03-15 | End: 2023-03-16

## 2022-04-18 NOTE — PROGRESS NOTES
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  Patient has a history of dysphagia and odynophagia.  He was treated with steroids and reflux medication without resolution.  He was seen by Dr Maya who noted a lesion involving the left oropharynx and hypopharynx.  He was taken to the operating room where he underwent endoscopy and biopsy.  The mass was found to be involving the left hypopharynx extending up to the lateral pharyngeal wall and down to the level of the cricopharyngeus.  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 His case was presented at multidisciplinary head neck tumor Board and it was recommended that he undergo adjuvant chemotherapy with consideration for total laryngectomy.    Patient reports that he has difficulty swallowing pills and therefore he was you taking liquid pain medicine which alleviates the pain.  He also complains of thick mucus in the back of his throat and thick postnasal drip.     Patient has a history of Burkitt's lymphoma for which he was treated with chemotherapy only by Dr. Rahman who is also involved it the care of this patient.  He was seen by Dr Rahman on Thursday who has ordered chemo (tentatively set for 3/8/2022) and has an appointment to have a port placed. He has not spoken with Dr Peñaloza since he has seen Josiah.      5/18/2022  Partient is here today for F/U. Tumor board recommended neoadjuvant chemo, followed with reimaging--if responds then proceed with concurrent chemorads--if not proceed to surgery.  He is being treated by Dr Quentin Rahman.   He is S/P 3 rounds of induction chemotherapy, once a week. He had one week break  and restarted chemo last Tuesday but he is now only receiving one drug instead of tow due to SOB.   Patient is to have another dose tomorrow but according to patient Dr Rahman will substitute another drug as he had a reaction to the other.  Patient had nausea and diarrhea last week and now is taking antiemetics 3 times a day.    Patient reports that since start of therapy, his throat pain has resolved condition 2 otalgia and intermittent sharp shooting pains up the left side of his head    He was seen by ST who discussed diet consistencies. According to patient he was not given swallowing exercises.  He has had no issues swallowing but he has anorexia resulting in a 16 pound weight loss since diagnosis.   He was drinking 1-2 Ensures/day but has stopped since he developed diarrhea.  Patient is able to tolerate some fruits, soups, pureed foods such as applesauce baked beans and is starting to eat Belvita bars and oatmeal    He was diagnosed with shingles last week and is taking Valtrex.  He has not had the vaccine      ROS: see HPI  Constitutional: Negative for activity change and appetite change, weight loss.   Eyes: Negative for discharge, visual changes.   Respiratory: Negative for difficulty breathing and wheezing   Cardiovascular: Negative for chest pain.   Gastrointestinal: Negative for abdominal distention and abdominal pain.   Endocrine: Negative for cold intolerance and heat intolerance.   Genitourinary: Negative for dysuria.   Musculoskeletal: Negative for gait problem, muscle pain and joint swelling.   Skin: Negative for color change and pallor; negative for skin lesions.   Neurological: Negative for syncope and weakness; no numbness face.   Psychiatric/Behavioral: Negative for agitation and confusion; negative for depression.    Physical Exam:      Constitutional  · General Appearance: well nourished, well-developed, alert, oriented, in no acute distress; walking with cane for  balance  · Communication: ability, understanding, normal  Head and Face  · Inspection: normocephalic, atraumatic, no scars, lesions or masses   · Palpation: no stepoffs, sinus tenderness or masses  · Parotid glands: no masses, stones, swelling or tenderness  Oral Cavity / Oropharynx  · Lips: upper and lower lips pink and moist  · Teeth: good dentition  · Gingiva: healthy  · Oral Mucosa: moist, no mucosal lesions  · Floor of Mouth: normal, no lesions, salivary ducts patent  · Tongue: moist, normal mobility, no lesions  · Palate: soft and hard palates without lesions or ulcers  Oropharynx: tonsils and walls without erythema, exudate, base of tongue soft to palpation  Nasopharynx, Hypopharynx, and Larynx  · Indirect: could not perform exam due to intolerance by patient  Neck  · Inspection and Palpation: no erythema, induration, emphysema, tenderness or masses  · Larynx and Trachea: normal position; normal crepitus  · Thyroid: no tenderness, enlargement or nodules  · Submandibular Glands: no masses or tenderness  Lymphatic:  · Anterior, Posterior, Submandibular, Submental, Supraclavicular: no lymphadenopathy present  Neurological  · Cranial Nerves: grossly intact  · General: no focal deficits  Psychiatric  · Orientation: oriented to time, place and person  · Mood and Affect: no depression, anxiety or agitation    PROCEDURES:   -------------------- LARYNGOSCOPY / NASOPHARYNGOSCOPY -------------------------     Pre-op DX: SCCA hypopharynx; dysphagia  Post-op DX: same  Anesthesia: Topical Neosynephrine / Tetracaine  Indications: to look at larynx and pharynx  Adverse Events: None        Procedure in Detail:     Flexible endoscopy with video was performed through the nasal passages. The nasal cavity, nasopharynx, oropharynx, hypopharynx and larynx were adequately visualized. The true vocal cords and arytenoids were examined during phonation and repose.        Operative Findings:     Nasal Cavity: Within normal  limits  Nasopharynx: Within normal limits  Tongue Base: Within normal limits  Pharyngeal Walls:no tumor seen  Epiglottis / Aryepiglottic Folds: Within normal limits  Pyriform Sinus: Within normal limits; pooling of secretions right post cricoid area  Vocal Cords: Within normal limits;mobile bilaterally  Arytenoids: able to visualize today-normal     Test results:  Path:  Pharynx, left posterior wall, biopsies:   - Squamous cell carcinoma, invasive, moderately differentiated   - Depth of invasion (DOI): at least 0.5 cm   - p16 status (IHC): Negative   - No lymphovascular invasion identified   - No perineural invasion identified   NOTE: Immunostain for p16 was performed and did not demonstrate   block-immunopositive staining (interpreted as negative).  Positive control   and internal negative control for immunostain was examined and was   appropriate.       Records reviewed:  Op Note  Intraoperative Findings:   1. Grade 1 view  2. Ulcerative, firm mass centered on the left aspect of posterior pharyngeal wall (hyoppharynx) with extension to the lateral wall of the oropharynx. Mass involved the posterior half of the pyriform sinus as. The esophagus is clear but the tumor extends to the level of the cricopharyngeus along the posterior wall. The base of tongue and vallecula are clear.   3. The AE fold and supraglottis were edematous but uninvolved. The cords were uninvolved.     Tumor Board recommendations:  TB recommendation for referral to H&N surgery. Given borderline T3/T4a, consideration for induction and TL/TP pending response.  There is questionable involvement of the posterior lateral thyroid ala.     Assessment:   [id number] SCCA hypopharynx, S/P 4 doses of induction chemo--no tumor seen on exam today; symptoms resolved  Anorexia and weight loss    Plan:  Continue treatment as planned  Re-referred to   To see Nurtitonist today  F/U with me in 6 weeks  Will obtain progress notes from Dr Rahman    Time spent with  patient 30-35 minutes which was greater than 50% of the time spent examining him    ADDENDUM  Dr Rahman 4/19/2022  Initial treatment: Carboplatin and Taxol--received one cycle  Recent plan: continue chemotherapy with 10% dose reduction. Will stop Taxol due to infusion reaction  Started cycle 2, day 8 4/19/2022

## 2022-04-18 NOTE — PROGRESS NOTES
ONCOLOGY NUTRITION   FOLLOW UP VISIT        Arthur Ribeiro is a 76 y.o. male.  DATE: 04/18/2022        Oncology Diagnosis: Lymphoma, Primary malignant neoplasm of hypopharynx     REFERRAL FROM:   [] Integrative Oncology   [] Med/Heme Oncology  [] Radiation Oncology  [x] Surgical Oncology   [] Infusion Nurse    [] Routine Nutrition follow up    TREATMENT PLAN:   [] Full treatment plan pending  [] Chemotherapy  [] Immunotherapy  [] Radiation  [x] Concurrent  [] Surgery  [] Treatment complete/post-treatment    ANTHROPOMETRICS:  Wt Readings from Last 10 Encounters:   04/18/22 74.1 kg (163 lb 5.8 oz)   02/23/22 81.5 kg (179 lb 10.8 oz)   02/10/22 80.9 kg (178 lb 5.6 oz)   01/24/22 81.6 kg (180 lb)   01/19/22 84.6 kg (186 lb 8.2 oz)   09/07/21 85.7 kg (188 lb 15 oz)   07/21/21 84.4 kg (186 lb)   07/11/21 84.4 kg (186 lb)   05/03/21 84.4 kg (186 lb)   03/04/21 86.2 kg (190 lb)      Weight Changes: has decreased 16 pounds over last 8 weeks    PHYSICAL EXAM:  Muscle Wasting Observed:  [] No Deficit   [] Mild Deficit   [x] Moderate   [] Severe    INTAKE:  [x] PO Intake [] TF Intake  Current Diet: regular diet  Dietary Patterns:  Eating meals/snacks as tolerated  [x] Oral nutritional supplements: Boost daily    SYMPTOMS/COMPLAINTS:  [] No nutritional concerns at current  [x] Diarrhea                    [] Constipation           [] Nausea                 [] Vomiting                [] Indigestion                [] Reflux              [x] Poor Appetite            [] Anorexia                 [] Early Satiety         [] Gas                       [] Bloating                     [] Dry Mouth    [] Mucositis                   [] Mouth Sores           [] Poor Dentition      [] Difficulty chewing  [] Difficulty Swallowing   [] Pain with swallowing [] Change in taste      [] Change in smell   [] Pain (general)       [] Fatigue                      [] Sleep issues    [x] Weight loss  [] other, please specify    Nutrition Re-Assessment  "Risk: Severe    [x] Labs reviewed   [x] Meds reviewed    Education Provided:   [] No Education Needed at this time  [x] Diarrhea                                              [] Constipation                          [] Nausea/Vomiting  [] Mucositis                [] Dry Mouth    [] Dealing with changes in Taste/Smell  [x] Dealing with Poor Appetite   [] Soft/moist Diet      [] Weight Loss/Gain     [] Weight Maintenance                           [] Indigestion/GERD                 [] Gas/Bloating          [] Foods High/ Low in specific nutrients [x] Increasing Calories/Protein   [] Milkshake/Smoothies Recipes   [x] Nutrition Supplements                        [] Increasing Fluid Intakes         [] Foods that fight cancer    [] Evidence bases resources                 [] Fermented Foods/Probiotics  [] Mediterranean/Plant Based Diet     [] Other, specify                                   [x] Handouts provided: Tips to Increasing Calories and Protein     [x] Samples provided: Boost Plus, Unjury Chicken Protein Powder    RD NOTE:  RD met with patient and his wife, Dary, in the clinic following Dr. Stout appointment. Dr. Stout asked me to speak with patient d/t significant weight loss in the last few weeks. Pt states there are a few things contributing to this- 1. Patient had an allergic reaction to one of his chemotherapy drugs last week 2. Pt has shingles and just started new medication for that 3. Was not taking zofran per recommendations.   Pt states prior to these things that happened last week he was very on top of getting adequate caloric intake. RD discussed ways to "sneak in" calories such as adding PB to oatmeal and adding avocados to smoothies.   Pt states he just started taking zofran TID as well which helps.  Pt did start having some diarrhea so he stopped drinking his Boost everyday. RD discussed how Boost does not usually cause diarrhea and it may be on of the new medications. Pt VU and said he would try again. " Encouraged Boost Plus because it is higher in calorie than Boost original.   Pt getting infusion treatments at [facility]. He is not sure if there is a dietitian there.      RD Goals:  [] Weight stable                  [x] Weight gain                      [] Weight Loss                               [] Continue adequate Kcal/protein   [x] Increase Kcal/protein      [] Adjust Tube-feeding Rx   [] Tolerate Tube Feedings             [] Increase tube feedings to goal     [] Tolerate Supplements     [x] Symptom Improvement   [] Understand nutrition Education  [] Offer supportive visits   [] other, please specify    RECOMMENDATIONS:  1. Drink Boost Plus BID  2. Use tips discussed to increase caloric intake  3. Continue antiemetic per MD recs  4. Provided this RD contact information for patient to call with any other concerns that arise    Follow up: Next visit in clinic or PRN throughout treatment    Shahnaz Galo MS, RD, LDN  04/18/2022  3:42 PM

## 2022-04-20 ENCOUNTER — TELEPHONE (OUTPATIENT)
Dept: HEMATOLOGY/ONCOLOGY | Facility: CLINIC | Age: 77
End: 2022-04-20
Payer: MEDICARE

## 2022-04-20 NOTE — TELEPHONE ENCOUNTER
I spoke with Arthur about getting an appt for ST pre rxt. He said he doesn't know when rxt will be and still has to finish chemo first. I recommended getting an appt on the books and we will see when that appt time comes. He asked if I could call him mid-May to see if he has a better idea of when rxt will start.

## 2022-04-22 ENCOUNTER — TELEPHONE (OUTPATIENT)
Dept: HEMATOLOGY/ONCOLOGY | Facility: CLINIC | Age: 77
End: 2022-04-22
Payer: MEDICARE

## 2022-04-22 NOTE — NURSING
Oncology Navigation   Intake  Cancer Type: Head and Neck  Internal / External Referral: Internal  Referral Source: Epic referral from Dr. Peñaloza  Date of Referral: 2/17/2022  Initial Nurse Navigator Contact: 2/17/2022  Referral to Initial Contact Timeline (days): 0  Date Worked: 4/22/2022     Treatment  Current Status: Active  Date Presented to Tumor Board: 2/24/2022    Surgical Oncologist: Dr. Bere Stout    Medical Oncologist: Dr. Quentin Rahman  Chemotherapy: N/A  Chemotherapy Not Applicable: Patient currently being treated by Dr. Quentin Rahman    Radiation Oncologist: Dr. Thuan Peñaloza    Procedures: PET scan; CT; Biopsy  Biopsy Schedule Date: 1/26/2022  CT Schedule Date: 1/19/2022  PET Scan Schedule Date: 2/10/2022    General Referrals: Speech pathology; Dietitian  Dietitian Referral Date: 4/18/2022       Radiation Oncologist: Dr. Thuan Peñaloza        Acuity      Follow Up  No follow-ups on file.

## 2022-05-06 ENCOUNTER — TELEPHONE (OUTPATIENT)
Dept: HEMATOLOGY/ONCOLOGY | Facility: CLINIC | Age: 77
End: 2022-05-06
Payer: MEDICARE

## 2022-05-06 NOTE — TELEPHONE ENCOUNTER
----- Message from Jewell Lemus Patient Care Assistant sent at 5/6/2022 11:18 AM CDT -----  Contact: tiana  Type:  Patient Returning Call    Who Called:  tiana  Who Left Message for Patient:  carlos  Does the patient know what this is regarding?:  ?  Best Call Back Number:  [phone]    Additional Information:  tiana was returning call , please call to a speak with , thank you

## 2022-05-06 NOTE — TELEPHONE ENCOUNTER
Pt calling to discuss upcoming appointment clinic appointment with Dr Stout.   Pt wanted to make sure clinic will be open on Memorial Day, instructed that we will be here.

## 2022-05-16 ENCOUNTER — TELEPHONE (OUTPATIENT)
Dept: HEMATOLOGY/ONCOLOGY | Facility: CLINIC | Age: 77
End: 2022-05-16
Payer: MEDICARE

## 2022-05-16 NOTE — TELEPHONE ENCOUNTER
Wife calling to get the name of [facility] speech therapist that Dr Stout had referred pt to.  Information provided and I notified ST that pt would like an appointment.

## 2022-05-18 ENCOUNTER — TELEPHONE (OUTPATIENT)
Dept: HEMATOLOGY/ONCOLOGY | Facility: CLINIC | Age: 77
End: 2022-05-18
Payer: MEDICARE

## 2022-05-18 NOTE — TELEPHONE ENCOUNTER
Returned pt call and spoke to pt.  Pt is requesting to schedule his next speech therapy appt with Lashonda VOGT.   Told him I would contact the [facility] and she will be calling him back to schedule.  Pt verbalized understanding.      Called pt back and scheduled pt on speech therapy schedule.  Pt said he could not do next Tuesday 5/24, but did jairo for 5/31.

## 2022-05-18 NOTE — TELEPHONE ENCOUNTER
----- Message from Jewell Lemus Patient Care Assistant sent at 5/18/2022  2:45 PM CDT -----  Contact: tiana  Type:  Patient Returning Call    Who Called:  tiana  Who Left Message for Patient:  melissa  Does the patient know what this is regarding?:  >  Best Call Back Number: :[phone]      Additional Information:  please call  tiana marti , thanks

## 2022-05-27 ENCOUNTER — OFFICE VISIT (OUTPATIENT)
Dept: URGENT CARE | Facility: CLINIC | Age: 77
End: 2022-05-27
Payer: MEDICARE

## 2022-05-27 VITALS
WEIGHT: 163 LBS | HEART RATE: 80 BPM | RESPIRATION RATE: 18 BRPM | BODY MASS INDEX: 22.08 KG/M2 | DIASTOLIC BLOOD PRESSURE: 80 MMHG | HEIGHT: 72 IN | SYSTOLIC BLOOD PRESSURE: 144 MMHG | OXYGEN SATURATION: 98 % | TEMPERATURE: 99 F

## 2022-05-27 DIAGNOSIS — B02.9 HERPES ZOSTER WITHOUT COMPLICATION: Primary | ICD-10-CM

## 2022-05-27 PROCEDURE — 3077F PR MOST RECENT SYSTOLIC BLOOD PRESSURE >= 140 MM HG: ICD-10-PCS | Mod: CPTII,S$GLB,, | Performed by: PHYSICIAN ASSISTANT

## 2022-05-27 PROCEDURE — 1160F PR REVIEW ALL MEDS BY PRESCRIBER/CLIN PHARMACIST DOCUMENTED: ICD-10-PCS | Mod: CPTII,S$GLB,, | Performed by: PHYSICIAN ASSISTANT

## 2022-05-27 PROCEDURE — 3079F PR MOST RECENT DIASTOLIC BLOOD PRESSURE 80-89 MM HG: ICD-10-PCS | Mod: CPTII,S$GLB,, | Performed by: PHYSICIAN ASSISTANT

## 2022-05-27 PROCEDURE — 1125F PR PAIN SEVERITY QUANTIFIED, PAIN PRESENT: ICD-10-PCS | Mod: CPTII,S$GLB,, | Performed by: PHYSICIAN ASSISTANT

## 2022-05-27 PROCEDURE — 3077F SYST BP >= 140 MM HG: CPT | Mod: CPTII,S$GLB,, | Performed by: PHYSICIAN ASSISTANT

## 2022-05-27 PROCEDURE — 1159F PR MEDICATION LIST DOCUMENTED IN MEDICAL RECORD: ICD-10-PCS | Mod: CPTII,S$GLB,, | Performed by: PHYSICIAN ASSISTANT

## 2022-05-27 PROCEDURE — 1125F AMNT PAIN NOTED PAIN PRSNT: CPT | Mod: CPTII,S$GLB,, | Performed by: PHYSICIAN ASSISTANT

## 2022-05-27 PROCEDURE — 1160F RVW MEDS BY RX/DR IN RCRD: CPT | Mod: CPTII,S$GLB,, | Performed by: PHYSICIAN ASSISTANT

## 2022-05-27 PROCEDURE — 99213 PR OFFICE/OUTPT VISIT, EST, LEVL III, 20-29 MIN: ICD-10-PCS | Mod: 25,S$GLB,, | Performed by: PHYSICIAN ASSISTANT

## 2022-05-27 PROCEDURE — 3079F DIAST BP 80-89 MM HG: CPT | Mod: CPTII,S$GLB,, | Performed by: PHYSICIAN ASSISTANT

## 2022-05-27 PROCEDURE — 1159F MED LIST DOCD IN RCRD: CPT | Mod: CPTII,S$GLB,, | Performed by: PHYSICIAN ASSISTANT

## 2022-05-27 PROCEDURE — 99213 OFFICE O/P EST LOW 20 MIN: CPT | Mod: 25,S$GLB,, | Performed by: PHYSICIAN ASSISTANT

## 2022-05-27 PROCEDURE — 96372 PR INJECTION,THERAP/PROPH/DIAG2ST, IM OR SUBCUT: ICD-10-PCS | Mod: S$GLB,,, | Performed by: PHYSICIAN ASSISTANT

## 2022-05-27 PROCEDURE — 96372 THER/PROPH/DIAG INJ SC/IM: CPT | Mod: S$GLB,,, | Performed by: PHYSICIAN ASSISTANT

## 2022-05-27 RX ORDER — HYDROCODONE BITARTRATE AND ACETAMINOPHEN 7.5; 325 MG/1; MG/1
1 TABLET ORAL EVERY 6 HOURS PRN
Qty: 16 TABLET | Refills: 0 | Status: SHIPPED | OUTPATIENT
Start: 2022-05-27 | End: 2022-08-04

## 2022-05-27 RX ORDER — KETOROLAC TROMETHAMINE 30 MG/ML
30 INJECTION, SOLUTION INTRAMUSCULAR; INTRAVENOUS
Status: COMPLETED | OUTPATIENT
Start: 2022-05-27 | End: 2022-05-27

## 2022-05-27 RX ADMIN — KETOROLAC TROMETHAMINE 30 MG: 30 INJECTION, SOLUTION INTRAMUSCULAR; INTRAVENOUS at 04:05

## 2022-05-27 NOTE — PATIENT INSTRUCTIONS
You must understand that you've received an Urgent Care treatment only and that you may be released before all your medical problems are known or treated. You, the patient, will arrange for follow up care as instructed.  Follow up with your PCP or specialty clinic as directed if not improved or as needed. You can call [phone] to schedule an appointment with the appropriate provider.  If your condition worsens we recommend that you receive another evaluation at the Emergency Department for any concerns or worsening of condition.  Patient aware and verbalized understanding.    Avoid picking or manipulating the rash.  Norco RX as prescribed for pain as needed - will cause drowsiness.  Follow-up with your PCP for further evaluation as needed.  STRICT ER precautions given to patient.  Patient aware and verbalized understanding.

## 2022-05-27 NOTE — PROGRESS NOTES
Subjective:       Patient ID: Arthur Ribeiro is a 76 y.o. male.    Vitals:  height is 6' (1.829 m) and weight is 73.9 kg (163 lb). His temperature is 98.8 °F (37.1 °C). His blood pressure is 144/80 (abnormal) and his pulse is 80. His respiration is 18 and oxygen saturation is 98%.     Chief Complaint: Rash    Patient is with parent on exam. Patient reports that he was diagnosed with shingles in April - healing well, but still experiencing pain at this time. Patient has tried OTC Advil with only mild relief. Patient reports that he just completed 9 rounds of chemo and is about to begin radiation.    Rash  This is a new problem. The current episode started 1 to 4 weeks ago. The problem is unchanged. The affected locations include the back and torso. The rash is characterized by dryness, redness, pain and burning. Pertinent negatives include no anorexia, congestion, cough, diarrhea, eye pain, facial edema, fatigue, fever, joint pain, nail changes, rhinorrhea, shortness of breath, sore throat or vomiting. Treatments tried: Advil. The treatment provided mild relief.       Constitution: Negative for chills, sweating, fatigue and fever.   HENT: Negative for ear pain, drooling, congestion, sore throat, trouble swallowing and voice change.    Neck: Negative for neck pain, neck stiffness and painful lymph nodes.   Cardiovascular: Negative for chest pain, leg swelling, palpitations, sob on exertion and passing out.   Eyes: Negative for eye discharge, eye itching, eye pain, eye redness and eyelid swelling.   Respiratory: Negative for chest tightness, cough, sputum production, bloody sputum, shortness of breath, stridor and wheezing.    Gastrointestinal: Negative for abdominal pain, abdominal bloating, nausea, vomiting, constipation, diarrhea and heartburn.   Genitourinary: Negative for urine decreased.   Musculoskeletal: Positive for muscle ache. Negative for joint pain, joint swelling, abnormal ROM of joint, pain with walking  and muscle cramps.   Skin: Positive for rash. Negative for hives.   Allergic/Immunologic: Negative for hives, itching and sneezing.   Neurological: Negative for dizziness, light-headedness, passing out, loss of balance, headaches, altered mental status, loss of consciousness and seizures.   Hematologic/Lymphatic: Negative for swollen lymph nodes.   Psychiatric/Behavioral: Negative for altered mental status and nervous/anxious. The patient is not nervous/anxious.        Objective:      Physical Exam   Constitutional: He is oriented to person, place, and time. He appears well-developed. He is cooperative.  Non-toxic appearance. He does not appear ill. No distress.   HENT:   Head: Normocephalic and atraumatic.   Ears:   Right Ear: Hearing, tympanic membrane, external ear and ear canal normal.   Left Ear: Hearing, tympanic membrane, external ear and ear canal normal.   Nose: Nose normal. No mucosal edema, rhinorrhea or nasal deformity. No epistaxis. Right sinus exhibits no maxillary sinus tenderness and no frontal sinus tenderness. Left sinus exhibits no maxillary sinus tenderness and no frontal sinus tenderness.   Mouth/Throat: Uvula is midline, oropharynx is clear and moist and mucous membranes are normal. No trismus in the jaw. Normal dentition. No uvula swelling. No oropharyngeal exudate, posterior oropharyngeal edema or posterior oropharyngeal erythema.   Eyes: Conjunctivae and lids are normal. No scleral icterus.   Neck: Trachea normal and phonation normal. Neck supple. No edema present. No erythema present. No neck rigidity present.   Cardiovascular: Normal rate, regular rhythm, normal heart sounds and normal pulses.   Pulmonary/Chest: Effort normal and breath sounds normal. No accessory muscle usage or stridor. No respiratory distress. He has no decreased breath sounds. He has no wheezes. He has no rhonchi. He has no rales.   Abdominal: Normal appearance.   Musculoskeletal: Normal range of motion.          General: No deformity. Normal range of motion.   Neurological: He is alert and oriented to person, place, and time. He exhibits normal muscle tone. Coordination normal.   Skin: Skin is warm, dry, intact, not diaphoretic, not pale, rash and no abscessed. Capillary refill takes less than 2 seconds. No abrasion, No burn, No bruising, No ecchymosis and No petechiae        Psychiatric: His speech is normal and behavior is normal. Judgment and thought content normal.   Nursing note and vitals reviewed.        Assessment:       1. Herpes zoster without complication          Plan:     Reviewed patient's . Advised close follow-up with PCP and/or Specialist for further evaluation as needed. STRICT ER precautions given to patient as well. Patient aware, verbalized understanding and agreed with plan of care.    Herpes zoster without complication    Other orders  -     HYDROcodone-acetaminophen (NORCO) 7.5-325 mg per tablet; Take 1 tablet by mouth every 6 (six) hours as needed for Pain.  Dispense: 16 tablet; Refill: 0  -     ketorolac injection 30 mg      Patient Instructions   You must understand that you've received an Urgent Care treatment only and that you may be released before all your medical problems are known or treated. You, the patient, will arrange for follow up care as instructed.  Follow up with your PCP or specialty clinic as directed if not improved or as needed. You can call [phone] to schedule an appointment with the appropriate provider.  If your condition worsens we recommend that you receive another evaluation at the Emergency Department for any concerns or worsening of condition.  Patient aware and verbalized understanding.    Avoid picking or manipulating the rash.  Norco RX as prescribed for pain as needed - will cause drowsiness.  Follow-up with your PCP for further evaluation as needed.  STRICT ER precautions given to patient.  Patient aware and verbalized understanding.

## 2022-05-30 ENCOUNTER — OFFICE VISIT (OUTPATIENT)
Dept: HEMATOLOGY/ONCOLOGY | Facility: CLINIC | Age: 77
End: 2022-05-30
Payer: MEDICARE

## 2022-05-30 VITALS
BODY MASS INDEX: 20.49 KG/M2 | SYSTOLIC BLOOD PRESSURE: 121 MMHG | WEIGHT: 151.25 LBS | OXYGEN SATURATION: 97 % | RESPIRATION RATE: 18 BRPM | DIASTOLIC BLOOD PRESSURE: 76 MMHG | HEART RATE: 92 BPM | HEIGHT: 72 IN | TEMPERATURE: 97 F

## 2022-05-30 DIAGNOSIS — C13.9 PRIMARY MALIGNANT NEOPLASM OF HYPOPHARYNX: Primary | ICD-10-CM

## 2022-05-30 DIAGNOSIS — R13.12 OROPHARYNGEAL DYSPHAGIA: ICD-10-CM

## 2022-05-30 PROCEDURE — 3288F FALL RISK ASSESSMENT DOCD: CPT | Mod: CPTII,S$GLB,, | Performed by: OTOLARYNGOLOGY

## 2022-05-30 PROCEDURE — 3074F PR MOST RECENT SYSTOLIC BLOOD PRESSURE < 130 MM HG: ICD-10-PCS | Mod: CPTII,S$GLB,, | Performed by: OTOLARYNGOLOGY

## 2022-05-30 PROCEDURE — 99213 PR OFFICE/OUTPT VISIT, EST, LEVL III, 20-29 MIN: ICD-10-PCS | Mod: 25,S$GLB,, | Performed by: OTOLARYNGOLOGY

## 2022-05-30 PROCEDURE — 3078F DIAST BP <80 MM HG: CPT | Mod: CPTII,S$GLB,, | Performed by: OTOLARYNGOLOGY

## 2022-05-30 PROCEDURE — 31575 DIAGNOSTIC LARYNGOSCOPY: CPT | Mod: S$GLB,,, | Performed by: OTOLARYNGOLOGY

## 2022-05-30 PROCEDURE — 99213 OFFICE O/P EST LOW 20 MIN: CPT | Mod: 25,S$GLB,, | Performed by: OTOLARYNGOLOGY

## 2022-05-30 PROCEDURE — 3074F SYST BP LT 130 MM HG: CPT | Mod: CPTII,S$GLB,, | Performed by: OTOLARYNGOLOGY

## 2022-05-30 PROCEDURE — 1159F MED LIST DOCD IN RCRD: CPT | Mod: CPTII,S$GLB,, | Performed by: OTOLARYNGOLOGY

## 2022-05-30 PROCEDURE — 1101F PT FALLS ASSESS-DOCD LE1/YR: CPT | Mod: CPTII,S$GLB,, | Performed by: OTOLARYNGOLOGY

## 2022-05-30 PROCEDURE — 99999 PR PBB SHADOW E&M-EST. PATIENT-LVL IV: CPT | Mod: PBBFAC,,, | Performed by: OTOLARYNGOLOGY

## 2022-05-30 PROCEDURE — 3078F PR MOST RECENT DIASTOLIC BLOOD PRESSURE < 80 MM HG: ICD-10-PCS | Mod: CPTII,S$GLB,, | Performed by: OTOLARYNGOLOGY

## 2022-05-30 PROCEDURE — 3288F PR FALLS RISK ASSESSMENT DOCUMENTED: ICD-10-PCS | Mod: CPTII,S$GLB,, | Performed by: OTOLARYNGOLOGY

## 2022-05-30 PROCEDURE — 1125F AMNT PAIN NOTED PAIN PRSNT: CPT | Mod: CPTII,S$GLB,, | Performed by: OTOLARYNGOLOGY

## 2022-05-30 PROCEDURE — 1159F PR MEDICATION LIST DOCUMENTED IN MEDICAL RECORD: ICD-10-PCS | Mod: CPTII,S$GLB,, | Performed by: OTOLARYNGOLOGY

## 2022-05-30 PROCEDURE — 1160F RVW MEDS BY RX/DR IN RCRD: CPT | Mod: CPTII,S$GLB,, | Performed by: OTOLARYNGOLOGY

## 2022-05-30 PROCEDURE — 99999 PR PBB SHADOW E&M-EST. PATIENT-LVL IV: ICD-10-PCS | Mod: PBBFAC,,, | Performed by: OTOLARYNGOLOGY

## 2022-05-30 PROCEDURE — 1160F PR REVIEW ALL MEDS BY PRESCRIBER/CLIN PHARMACIST DOCUMENTED: ICD-10-PCS | Mod: CPTII,S$GLB,, | Performed by: OTOLARYNGOLOGY

## 2022-05-30 PROCEDURE — 1101F PR PT FALLS ASSESS DOC 0-1 FALLS W/OUT INJ PAST YR: ICD-10-PCS | Mod: CPTII,S$GLB,, | Performed by: OTOLARYNGOLOGY

## 2022-05-30 PROCEDURE — 31575 PR LARYNGOSCOPY, FLEXIBLE; DIAGNOSTIC: ICD-10-PCS | Mod: S$GLB,,, | Performed by: OTOLARYNGOLOGY

## 2022-05-30 PROCEDURE — 1125F PR PAIN SEVERITY QUANTIFIED, PAIN PRESENT: ICD-10-PCS | Mod: CPTII,S$GLB,, | Performed by: OTOLARYNGOLOGY

## 2022-05-30 NOTE — PROGRESS NOTES
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  Patient has a history of dysphagia and odynophagia.  He was treated with steroids and reflux medication without resolution.  He was seen by Dr Maya who noted a lesion involving the left oropharynx and hypopharynx.  He was taken to the operating room where he underwent endoscopy and biopsy.  The mass was found to be involving the left hypopharynx extending up to the lateral pharyngeal wall and down to the level of the cricopharyngeus.  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 His case was presented at multidisciplinary head neck tumor Board and it was recommended that he undergo adjuvant chemotherapy with consideration for total laryngectomy.    Patient reports that he has difficulty swallowing pills and therefore he was you taking liquid pain medicine which alleviates the pain.  He also complains of thick mucus in the back of his throat and thick postnasal drip.     Patient has a history of Burkitt's lymphoma for which he was treated with chemotherapy only by Dr. Rahman who is also involved it the care of this patient.  He was seen by Dr Rahman on Thursday who has ordered chemo (tentatively set for 3/8/2022) and has an appointment to have a port placed. He has not spoken with Dr Peñaloza since he has seen Josiah.      4/18/2022  Partient is here today for F/U. Tumor board recommended neoadjuvant chemo, followed with reimaging--if responds then proceed with concurrent chemorads--if not proceed to surgery.  He is being treated by Dr Quentin Rahman.   He is S/P 3 rounds of induction chemotherapy, once a week. He had one week break  and restarted chemo last Tuesday but he is now only receiving one drug instead of tow due to SOB.   Patient is to have another dose tomorrow but according to patient Dr Rahman will substitute another drug as he had a reaction to the other.  Patient had nausea and diarrhea last week and now is taking antiemetics 3 times a day.    Patient reports that since start of therapy, his throat pain has resolved in addition to otalgia and intermittent sharp shooting pains up the left side of his head    He was seen by ST who discussed diet consistencies. According to patient he was not given swallowing exercises.  He has had no issues swallowing but he has anorexia resulting in a 16 pound weight loss since diagnosis.   He was drinking 1-2 Ensures/day but has stopped since he developed diarrhea.  Patient is able to tolerate some fruits, soups, pureed foods such as applesauce baked beans and is starting to eat Belvita bars and oatmeal    He was diagnosed with shingles last week and is taking Valtrex.  He has not had the vaccine    5/30/2022  Patient is here today for cancer surveillance.  He has a history of squamous cell carcinoma of the hypopharynx.  He is receiving induction chemotherapy by Dr. Rahman.  He received his last dose of Carboplatinum last week(Taxol was stopped due to reaction).  He is to see Yuniel Dodson On, tomorrow to discuss concurrent chemorads.    He has not seen ST yet; he thought that he did not need an appointment. Patient reports that he is able to swallow fried foods and soft foods was having difficulty eating pasta and hamburger.  He feels like the food is getting caught in a flap in the back of his throat.  He also reports that he has a lot of white secretions that he is coughing up.  He is not using salt baking soda gargles as instructed    ROS: see HPI  Constitutional: Negative for activity change and appetite change, weight loss.   Eyes: Negative for discharge, visual changes.   Respiratory:  Negative for difficulty breathing and wheezing   Cardiovascular: Negative for chest pain.   Gastrointestinal: Negative for abdominal distention and abdominal pain.   Endocrine: Negative for cold intolerance and heat intolerance.   Genitourinary: Negative for dysuria.   Musculoskeletal: Negative for gait problem, muscle pain and joint swelling.   Skin: Negative for color change and pallor; negative for skin lesions.   Neurological: Negative for syncope and weakness; no numbness face.   Psychiatric/Behavioral: Negative for agitation and confusion; negative for depression.    Physical Exam:      Constitutional  · General Appearance: well nourished, well-developed, alert, oriented, in no acute distress; walking with cane for balance  · Communication: ability, understanding, normal  Head and Face  · Inspection: normocephalic, atraumatic, no scars, lesions or masses   · Palpation: no stepoffs, sinus tenderness or masses  · Parotid glands: no masses, stones, swelling or tenderness  Oral Cavity / Oropharynx  · Lips: upper and lower lips pink and moist  · Teeth: good dentition  · Gingiva: healthy  · Oral Mucosa: moist, no mucosal lesions  · Floor of Mouth: normal, no lesions, salivary ducts patent  · Tongue: moist, normal mobility, no lesions  · Palate: soft and hard palates without lesions or ulcers  Oropharynx: tonsils and walls without erythema, exudate, base of tongue soft to palpation  Nasopharynx, Hypopharynx, and Larynx  · Indirect: could not perform exam due to intolerance by patient  Neck  · Inspection and Palpation: no erythema, induration, emphysema, tenderness or masses  · Larynx and Trachea: normal position; normal crepitus  · Thyroid: no tenderness, enlargement or nodules  · Submandibular Glands: no masses or tenderness  Lymphatic:  · Anterior, Posterior, Submandibular, Submental, Supraclavicular: no lymphadenopathy present  Neurological  · Cranial Nerves: grossly intact  · General: no focal  deficits  Psychiatric  · Orientation: oriented to time, place and person  · Mood and Affect: no depression, anxiety or agitation    PROCEDURES:   -------------------- LARYNGOSCOPY / NASOPHARYNGOSCOPY -------------------------     Pre-op DX: SCCA hypopharynx; dysphagia  Post-op DX: same  Anesthesia: Topical Neosynephrine / Tetracaine  Indications: to look at larynx and pharynx  Adverse Events: None        Procedure in Detail:     Flexible endoscopy with video was performed through the nasal passages. The nasal cavity, nasopharynx, oropharynx, hypopharynx and larynx were adequately visualized. The true vocal cords and arytenoids were examined during phonation and repose.        Operative Findings:     Nasal Cavity: Within normal limits  Nasopharynx: Within normal limits  Tongue Base: Within normal limits  Pharyngeal Walls:no tumor seen; mild thickening on left  Epiglottis / Aryepiglottic Folds: Within normal limits  Pyriform Sinus: Within normal limits; pooling of secretions right post cricoid area--very minimal  Vocal Cords: Within normal limits;mobile bilaterally  Arytenoids: able to visualize today-normal     Test results:  Path:  Pharynx, left posterior wall, biopsies:   - Squamous cell carcinoma, invasive, moderately differentiated   - Depth of invasion (DOI): at least 0.5 cm   - p16 status (IHC): Negative   - No lymphovascular invasion identified   - No perineural invasion identified   NOTE: Immunostain for p16 was performed and did not demonstrate   block-immunopositive staining (interpreted as negative).  Positive control   and internal negative control for immunostain was examined and was   appropriate.       Records reviewed:  Op Note  Intraoperative Findings:   1. Grade 1 view  2. Ulcerative, firm mass centered on the left aspect of posterior pharyngeal wall (hyoppharynx) with extension to the lateral wall of the oropharynx. Mass involved the posterior half of the pyriform sinus as. The esophagus is clear  but the tumor extends to the level of the cricopharyngeus along the posterior wall. The base of tongue and vallecula are clear.   3. The AE fold and supraglottis were edematous but uninvolved. The cords were uninvolved.     Tumor Board recommendations:  TB recommendation for referral to H&N surgery. Given borderline T3/T4a, consideration for induction and TL/TP pending response.  There is questionable involvement of the posterior lateral thyroid ala.     Assessment:   [id number] SCCA hypopharynx, S/P 4 doses of induction chemo--no tumor seen on exam today; symptoms resolved  Anorexia and weight loss, but he reports that his weight has now stabilized  Dysphagia-he did not see speech therapy as instructed but is able to tolerate fried foods    Plan:  Continue treatment as planned  He is to see speech therapist tomorrow  It was recommended that he gargle with salt and baking soda approximately 10 times a day to help with secretions  Appointment with Dr. Peñaloza tomorrow  Follow-up with me in 2 months

## 2022-05-31 ENCOUNTER — CLINICAL SUPPORT (OUTPATIENT)
Dept: REHABILITATION | Facility: HOSPITAL | Age: 77
End: 2022-05-31
Payer: MEDICARE

## 2022-05-31 ENCOUNTER — OFFICE VISIT (OUTPATIENT)
Dept: RADIATION ONCOLOGY | Facility: CLINIC | Age: 77
End: 2022-05-31
Payer: MEDICARE

## 2022-05-31 VITALS
TEMPERATURE: 98 F | HEART RATE: 94 BPM | BODY MASS INDEX: 20.22 KG/M2 | DIASTOLIC BLOOD PRESSURE: 63 MMHG | OXYGEN SATURATION: 97 % | RESPIRATION RATE: 18 BRPM | WEIGHT: 149.25 LBS | HEIGHT: 72 IN | SYSTOLIC BLOOD PRESSURE: 125 MMHG

## 2022-05-31 DIAGNOSIS — R13.10 DYSPHAGIA, UNSPECIFIED TYPE: ICD-10-CM

## 2022-05-31 DIAGNOSIS — R63.4 WEIGHT LOSS: ICD-10-CM

## 2022-05-31 DIAGNOSIS — R68.89 COLD INTOLERANCE: ICD-10-CM

## 2022-05-31 DIAGNOSIS — C13.9 PRIMARY MALIGNANT NEOPLASM OF HYPOPHARYNX: Primary | ICD-10-CM

## 2022-05-31 DIAGNOSIS — R13.14 PHARYNGOESOPHAGEAL DYSPHAGIA: Primary | ICD-10-CM

## 2022-05-31 DIAGNOSIS — C13.9 HYPOPHARYNGEAL CANCER: ICD-10-CM

## 2022-05-31 DIAGNOSIS — R47.02 DYSPHASIA: ICD-10-CM

## 2022-05-31 DIAGNOSIS — R13.13 PHARYNGEAL DYSPHAGIA: Primary | ICD-10-CM

## 2022-05-31 DIAGNOSIS — R53.81 PHYSICAL DECONDITIONING: ICD-10-CM

## 2022-05-31 PROCEDURE — 99215 OFFICE O/P EST HI 40 MIN: CPT | Mod: S$GLB,,, | Performed by: STUDENT IN AN ORGANIZED HEALTH CARE EDUCATION/TRAINING PROGRAM

## 2022-05-31 PROCEDURE — 99215 PR OFFICE/OUTPT VISIT, EST, LEVL V, 40-54 MIN: ICD-10-PCS | Mod: S$GLB,,, | Performed by: STUDENT IN AN ORGANIZED HEALTH CARE EDUCATION/TRAINING PROGRAM

## 2022-05-31 PROCEDURE — 99999 PR PBB SHADOW E&M-EST. PATIENT-LVL IV: CPT | Mod: PBBFAC,,, | Performed by: STUDENT IN AN ORGANIZED HEALTH CARE EDUCATION/TRAINING PROGRAM

## 2022-05-31 PROCEDURE — 3288F PR FALLS RISK ASSESSMENT DOCUMENTED: ICD-10-PCS | Mod: CPTII,S$GLB,, | Performed by: STUDENT IN AN ORGANIZED HEALTH CARE EDUCATION/TRAINING PROGRAM

## 2022-05-31 PROCEDURE — 1159F PR MEDICATION LIST DOCUMENTED IN MEDICAL RECORD: ICD-10-PCS | Mod: CPTII,S$GLB,, | Performed by: STUDENT IN AN ORGANIZED HEALTH CARE EDUCATION/TRAINING PROGRAM

## 2022-05-31 PROCEDURE — 3078F PR MOST RECENT DIASTOLIC BLOOD PRESSURE < 80 MM HG: ICD-10-PCS | Mod: CPTII,S$GLB,, | Performed by: STUDENT IN AN ORGANIZED HEALTH CARE EDUCATION/TRAINING PROGRAM

## 2022-05-31 PROCEDURE — 3078F DIAST BP <80 MM HG: CPT | Mod: CPTII,S$GLB,, | Performed by: STUDENT IN AN ORGANIZED HEALTH CARE EDUCATION/TRAINING PROGRAM

## 2022-05-31 PROCEDURE — 99999 PR PBB SHADOW E&M-EST. PATIENT-LVL IV: ICD-10-PCS | Mod: PBBFAC,,, | Performed by: STUDENT IN AN ORGANIZED HEALTH CARE EDUCATION/TRAINING PROGRAM

## 2022-05-31 PROCEDURE — 92610 EVALUATE SWALLOWING FUNCTION: CPT | Mod: PN

## 2022-05-31 PROCEDURE — 1101F PR PT FALLS ASSESS DOC 0-1 FALLS W/OUT INJ PAST YR: ICD-10-PCS | Mod: CPTII,S$GLB,, | Performed by: STUDENT IN AN ORGANIZED HEALTH CARE EDUCATION/TRAINING PROGRAM

## 2022-05-31 PROCEDURE — 3074F SYST BP LT 130 MM HG: CPT | Mod: CPTII,S$GLB,, | Performed by: STUDENT IN AN ORGANIZED HEALTH CARE EDUCATION/TRAINING PROGRAM

## 2022-05-31 PROCEDURE — 1101F PT FALLS ASSESS-DOCD LE1/YR: CPT | Mod: CPTII,S$GLB,, | Performed by: STUDENT IN AN ORGANIZED HEALTH CARE EDUCATION/TRAINING PROGRAM

## 2022-05-31 PROCEDURE — 1125F PR PAIN SEVERITY QUANTIFIED, PAIN PRESENT: ICD-10-PCS | Mod: CPTII,S$GLB,, | Performed by: STUDENT IN AN ORGANIZED HEALTH CARE EDUCATION/TRAINING PROGRAM

## 2022-05-31 PROCEDURE — 3288F FALL RISK ASSESSMENT DOCD: CPT | Mod: CPTII,S$GLB,, | Performed by: STUDENT IN AN ORGANIZED HEALTH CARE EDUCATION/TRAINING PROGRAM

## 2022-05-31 PROCEDURE — 1125F AMNT PAIN NOTED PAIN PRSNT: CPT | Mod: CPTII,S$GLB,, | Performed by: STUDENT IN AN ORGANIZED HEALTH CARE EDUCATION/TRAINING PROGRAM

## 2022-05-31 PROCEDURE — 3074F PR MOST RECENT SYSTOLIC BLOOD PRESSURE < 130 MM HG: ICD-10-PCS | Mod: CPTII,S$GLB,, | Performed by: STUDENT IN AN ORGANIZED HEALTH CARE EDUCATION/TRAINING PROGRAM

## 2022-05-31 PROCEDURE — 1159F MED LIST DOCD IN RCRD: CPT | Mod: CPTII,S$GLB,, | Performed by: STUDENT IN AN ORGANIZED HEALTH CARE EDUCATION/TRAINING PROGRAM

## 2022-05-31 NOTE — PROGRESS NOTES
[facility] Outpatient Therapy [facility]  Speech and Language Therapy Evaluation         Date: 5/31/2022      Name: Arthur Ribeiro   MRN: [id number]   Therapy Diagnosis:   Encounter Diagnoses   Name Primary?    Hypopharyngeal cancer     Pharyngeal dysphagia Yes        Physician: Enrique Maya MD   Physician Orders:Consult to     Medical Diagnosis: hypopharyngeal cancer, pharyngeal dysphagia     Visit #:  2   Date of Evaluation: 05/31/22                  Procedure Min.   Swallow and Oral Function Evaluation   80 min            Time In:1020  Time Out: 1140  Total Billable Time: 80 min  Precautions:Standard  Subjective   Date of Onset: February 2022  History of Current Condition:   Pt reports he has completed 9 rounds of chemotherapy. He reports his swallowing function improved following 2 to 3 chemotherapy sessions. He reports he anticipates initiating radiation treatment within the next 3 weeks. .Arthur reports no sensation of food/liquid going down the wrong way but he does report sensation of food particles getting stuck in his throat which he clears mostly with a liquid wash or coughs particle back to mouth and re swallows. He reports he continues to have thick secretions which he has to cough up and spit out. His medical history is significant for shingles and reflux.     Arthur is seen today for dysphagia evaluation, counseling and instruction with swallow exercises pre radiation treatment.      Past Medical History: Arthur Ribeiro  has a past medical history of Allergic rhinitis, cause unspecified (8/4/2015), Cancer (2005), Encounter for blood transfusion, Foot drop, Neuropathy, and Spinal stenosis.  Arthur Ribeiro  has a past surgical history that includes Skin cancer excision; Insertion of venous access port (2005); and Laryngoscopy (Bilateral, 1/26/2022).   Medical Hx and Allergies: Arthur has a current medication list which includes the following prescription(s): diphenhydramine hcl,  fluticasone propionate, hydrocodone-acetaminophen, hydrocodone-acetaminophen, hydrocodone/acetam/diet.sup.11, ibuprofen, lidocaine viscous, ondansetron, and valacyclovir. Review of patient's allergies indicates:  No Known Allergies  Prior Therapy: Pt seen for BSE on 03/08/22  Social History: Pt is retired, he lives with his wife.  Current Level of Function:Independent  Pain:   7/10  Pain Location / Description: intermittent pain in abdomen due to shingles  Nutrition:  Patient currently eating a soft consistency diet with thin liquids  Patient's Therapy Goals:  To maintain swallow function  Objective      Formal Assessment:     BEHAVIOR:  Arthur was a very pleasant 76 year old male.  Arthur 's affect and social interaction were appropriate for situation and setting.  He was fully cooperative for the assessment. Results are considered indicative of his current levels of speech and swallowing functioning.     HEARING:  Subjectively, within normal limits.     ASSESSMENT:  Oral Peripheral:                Mandible:WFL, able to fit 3 fingers between teeth, pt reports mild tightness in jaw with stretches              Lips:  WNL for rounding, spreading, and alternating as well as impounding air, smacking, and repetition of /p/.              Tongue:  WLfor protrusion, lateralization, elevation, retroflexion.               Velum and Hard Palate:  SNL              Dentition:  good              Oropharynx: Pt reports dry mouth     Speech:  100% intelligible with no distortions.       Swallowing:  Assessed with water, applesauce, mandarin oranges, cracker.   Oral phase appears mildly reduced due to dry mouth. Pt requires foods with additional moisture of liquid wash to assist with forming bolus with dry foods. Adequate mastication noted  Pharyngeal phase: Laryngeal lift is present on palpation and appears timely. Pt noted with double swallow across consistencies. No defensive airway cough observed with any consistency. Voice remained  "clear. Pt c/o sensation of food getting stuck. He was observed with consistent regurgitation/cough after water and applesauce swallows and less consistently with oranges and cracker swallows with patient spitting up secretions and occasional secretions mixed with bolus.    Pt reports the secretions "just appear" and he has to spit them out. He reports this occurs mostly when he eats and if he is talking a lot.      Voice/Resonance:  Within normal limits.          Treatment      Education:   Arthur Ribeiro was engaged in discussion of normal swallowing function, possible effects of CRT, and therapeutic intervention.  Patient was educated regarding the possible side effects related to treatment:  Xerostomia:   Provided written resources for OTC and homemade (baking soda rinses) treatments for dry mouth.    Irritation:  Discussed that the treatment itself can cause irritation to the tissues plus certain tastes and textures may be found to be irritating as well.  Discussed mucositis. Reinforced recommendations from Dr Stout regarding baking soda mouth rinse 10 times per day.  Changes in taste: Discussed this possibility and the resource of Nutrition to assist in making diet changes that may help address this.  Radiation fibrosis: Described this effect and the possible need for continued use of swallowing exercises for an extended time (including lifelong) after completion of XRT to keep the structures involved in swallowing functioning at their maximum potential for safe swallowing.    Lymphedema:  Described this and its possible emergence following surgery and/or XRT and that it is treatable.     Reviewed the basics of the normal pharyngeal swallow using Dysphagia Randal.     Reviewed and provided written instructions for swallowing strategies and exercises as follows:  Melva, CTAR, lingual press, jaw opening, effortful swallow, mendelsohn and breath hold.  Arthur terry melvaako, mendelshohn, CTAR, lingual press, breath " hold and jaw opening exercises with good technique.Pt demonstrated consistent need to spit after water swallows when completing Effortful swallow, mendelhsohn and huyen. Handout with exercises and ball provided for CTAR exercises.     Discussed the goal of swallowing whatever is possible, as much as possible throughout  treatment for the best possible outcome (which may be relative to a given patient)..  Discussed that he may need to change the consistency of what he can swallow (e.g., if chewable solids are too hard/painful/irritating, change to pureed foods or liquids) and this is perfectly acceptable within the goal of continuing to swallow throughout the treatment and beyond.  Advised that most patients begin to experience swallowing problems about half-way through XRT.    Reiterated that the swallowing exercises are done in an effort to prevent or limit swallowing function problems during or after XRT and that he should continue to do them even if he does not think he is having a problem with swallowing safely.   Due to results of BSE with signs of decreased efficiency of swallow, a Modified Barium Swallow Study is recommended to assess his swallowing function. Pt and wife in agreement. ST will contact patient following MBSS to schedule a follow up visit to discuss results and implement any additional exercises or swallow strategies.     Assessment      Arthur presents to [facility] s/p medical diagnosis of hypopharyngeal cancer. He presents with impaired swallow function at this time c/b signs of decreased efficiency of swallow with pt c/o food sticking in his throat and consistent regurgitation of liquid/food.      RECOMMENDATIONS/PLAN OF CARE:  It is felt that Arthur Ribeiro  would benefit from  1.  Continuation of his current regular consistency diet with thin liquids using the above strategies and common aspiration precautions, including, but not limited  to  monitoring for any signs/symptoms of aspiration (such as wet/gurgly voice that does not clear with coughing, inability to make any voice sounds, any persistent coughing with oral intake, otherwise unexplained fever, unexplained increased or new difficulty or discomfort breathing, unexplained increase in  sleepiness/lethargy/significant fatigue, unexplained increase or new onset confusion or change in cognitive functioning, or any other unexplained change in health or well-being that could be related to swallowing).    Alternative nutrition may be beneficial for Arthur due to current changes in his swallow function and difficulty meeting nutritional needs by mouth. Arthur and his wife report that Dr Peñaloza has recommended a feeding tube prior to XRT. ST in agreement.     2.  Changing diet consistency as needed to facilitate swallowing.  3.  Begin swallowing exercises now and continue 3x/day through at least 3 months after completion of treatment.  4.  Monitor weight and hydration. Education provided regarding apps that may assist with monitoring calories.   5.  Return at mid-point of radiation and again 1 month after completion of treatment      Rehab Potential: good  Pt's spiritual, cultural and educational needs considered and pt agreeable to plan of care and goals.     Short Term Goals   Arthur will perform swallow exercises Independently  Long Term Goals   Arthur will be able to consume a regular diet with no sign/symptoms of aspiration.  [facility]        Therapist's Name:   Lashonda Tirado CCC-SLP   5/31/2022

## 2022-05-31 NOTE — PROGRESS NOTES
[facility] Department of Radiation Oncology  Follow Up Visit Note    Diagnosis:  Arthur Ribeiro is a 76 y.o. 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    Oncologic History:  He has a history of tobacco use and burkitt's lymphoma treated with chemotherapy alone with Dr. Rahman.   · 22:   ? FNL with Dr. Maya with normal mobility of the VC bilaterally, erythema of the left VC with medial edge thickening  ? CT Neck with heterogeneous enhancement along the lateral pharyngeal wall with involvement vs narrowing of the pyriform sinus. Crosses midline. Suspected retropharyngeal space effusion. Shotty LN bilaterally, enlarged and or necrotic LN in left lbl 2B and 3. ? Mass in left TVC? Possible left RP fullness on personal review (series 6 image 1)  · 22: DL and Biopsy:  ? Op note: ulcerative mass in the left posterior pharyngeal wall with extension to the lateral wall of the OPX. No involvement of esophagus but extends to the level of the cricopharyngeus along the posterior wall. No involvement of BOT or vallecula. No involvement of cords or supraglottis.  ? Path: L posterior wall with moderately differentiated squamous cell carcinoma, p16-, no PNI or LVI.   · Induction chemotherapy  · 22: exam with H&N surgery with ANA on FNL       Interval History  The patient presents today for a regularly scheduled follow up visit.  He was last seen in our clinic on 2/10/22.        He is recovering well, with no otalgia, no hemoptysis, no trismus. He is tolerating most foods other than beef and pork. He is doing well with fried foods. He has some dry mouth and dysgeusia. He has lost 30 lbs since starting chemo.     Review of Systems   ROS as above    Social History:  Social History     Tobacco Use    Smoking status: Former Smoker     Types: Cigarettes, Cigars     Quit date: 10/1/2005     Years since quittin.6    Smokeless tobacco:  Never Used   Substance Use Topics    Alcohol use: Not Currently     Comment: daily    Drug use: No       Family History:  Cancer-related family history includes Cancer in his brother, father, and sister.    Exam:  Vitals:    05/31/22 0904   BP: 125/63   BP Location: Right arm   Patient Position: Sitting   Pulse: 94   Resp: 18   Temp: 98.2 °F (36.8 °C)   SpO2: 97%   Weight: 67.7 kg (149 lb 4 oz)   Height: 6' (1.829 m)     Constitutional: Pleasant 76 y.o. male in no acute distress.  Well nourished. Well groomed.   HEENT: no appreciated OC or OPX lesions on direct exam. No palpable cervical LN bilaterally. No appreciated facial asymmetry  Lungs: No audible wheezing.  Normal effort. **  Musculoskeletal: No gross MSK deformities. Ambulates with a cane  Skin: No rashes appreciated.   Psych: Alert and oriented with appropriate mood and affect.  Neuro:  Grossly normal.    Data Review:      Independent Interpretation of Test(s): PET/CT from 2/10/22 was personally reviewed and shows as interpretation. There are bilateral rounded and indeterminate level II adenopathy. 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   ECOG: (1) Restricted in physically strenuous activity, ambulatory and able to do work of light nature    Plan:   Restaging scans with MRI neck and CT chest with plan for CT sim to follow.   He is set up with speech and nutrition   Should see dentistry once again (did have clearance back in 3/2022)   Referral to PT/OT for deconditioning   Think he will need a PEG, but will see how he does with pushing PO now that he has completed induction chemo.   Plan to start CRT in ~4 weeks    HEAD & NECK INFORMED CONSENT: Acute and delayed side effects of head and neck radiation, including but not limited to fatigue, redness of the skin, desquamation, dysphagia,  odynophagia, mucositis, long term difficulties with swallowing, feeding tube dependency, fibrosis, xerostomia, hypothyroidism, infection as well as rare but catastrophic injury to the spinal cord, mandible, brainstem, and carotid arteries were discussed with the patient in great detail today.    I reviewed the rationale and goal of the proposed treatment course. The radiotherapeutic procedure with associated side effects and potential complications were explained. He and his wife had the opportunity to ask questions which were answered to the best of my knowledge. The patient voiced understanding of the above and has elected to proceed with treatment. Consent form was signed and the patient was given our contact information should further questions arise.    Radiation Treatment Details:   I will plan to treat the hypopharyngeal primary and involved adenopathy to a dose of 70 Gy in 35 fractions and the bilateral elective neck to 56-63 Gy in 35 fractions, using IMRT and a simultaneous integrated boost    Thuan Peñaloza MD  Radiation Oncology

## 2022-05-31 NOTE — PLAN OF CARE
[facility] Outpatient Therapy and Wellness  Speech and Language Therapy Evaluation           Date: 5/31/2022      Name: Arthur Ribeiro   MRN: [id number]   Therapy Diagnosis:        Encounter Diagnoses   Name Primary?    Hypopharyngeal cancer      Pharyngeal dysphagia Yes         Physician: Enrique Maya MD   Physician Orders:Consult to      Medical Diagnosis: hypopharyngeal cancer, pharyngeal dysphagia     Visit #:  2   Date of Evaluation: 05/31/22                   Procedure Min.   Swallow and Oral Function Evaluation   80 min            Time In:1020  Time Out: 1140  Total Billable Time: 80 min  Precautions:Standard  Subjective   Date of Onset: February 2022  History of Current Condition:   Pt reports he has completed 9 rounds of chemotherapy. He reports his swallowing function improved following 2 to 3 chemotherapy sessions. He reports he anticipates initiating radiation treatment within the next 3 weeks. .Arthur reports no sensation of food/liquid going down the wrong way but he does report sensation of food particles getting stuck in his throat which he clears mostly with a liquid wash or coughs particle back to mouth and re swallows. He reports he continues to have thick secretions which he has to cough up and spit out. His medical history is significant for shingles and reflux.     Arthur is seen today for dysphagia evaluation, counseling and instruction with swallow exercises pre radiation treatment.        Past Medical History: Arthur Ribeiro  has a past medical history of Allergic rhinitis, cause unspecified (8/4/2015), Cancer (2005), Encounter for blood transfusion, Foot drop, Neuropathy, and Spinal stenosis.  Arthur Ribeiro  has a past surgical history that includes Skin cancer excision; Insertion of venous access port (2005); and Laryngoscopy (Bilateral, 1/26/2022).   Medical Hx and Allergies: Arthur has a current medication list which includes the following prescription(s): diphenhydramine  hcl, fluticasone propionate, hydrocodone-acetaminophen, hydrocodone-acetaminophen, hydrocodone/acetam/diet.sup.11, ibuprofen, lidocaine viscous, ondansetron, and valacyclovir. Review of patient's allergies indicates:  No Known Allergies  Prior Therapy: Pt seen for BSE on 03/08/22  Social History: Pt is retired, he lives with his wife.  Current Level of Function:Independent  Pain:   7/10  Pain Location / Description: intermittent pain in abdomen due to shingles  Nutrition:  Patient currently eating a soft consistency diet with thin liquids  Patient's Therapy Goals:  To maintain swallow function  Objective      Formal Assessment:     BEHAVIOR:  Arthur was a very pleasant 76 year old male.  Arthur 's affect and social interaction were appropriate for situation and setting.  He was fully cooperative for the assessment. Results are considered indicative of his current levels of speech and swallowing functioning.     HEARING:  Subjectively, within normal limits.     ASSESSMENT:  Oral Peripheral:                Mandible:WFL, able to fit 3 fingers between teeth, pt reports mild tightness in jaw with stretches              Lips:  WNL for rounding, spreading, and alternating as well as impounding air, smacking, and repetition of /p/.              Tongue:  WLfor protrusion, lateralization, elevation, retroflexion.               Velum and Hard Palate:  SNL              Dentition:  good              Oropharynx: Pt reports dry mouth     Speech:  100% intelligible with no distortions.       Swallowing:  Assessed with water, applesauce, mandarin oranges, cracker.   Oral phase appears mildly reduced due to dry mouth. Pt requires foods with additional moisture of liquid wash to assist with forming bolus with dry foods. Adequate mastication noted  Pharyngeal phase: Laryngeal lift is present on palpation and appears timely. Pt noted with double swallow across consistencies. No defensive airway cough observed with any consistency. Voice  "remained clear. Pt c/o sensation of food getting stuck. He was observed with consistent regurgitation/cough after water and applesauce swallows and less consistently with oranges and cracker swallows with patient spitting up secretions and occasional secretions mixed with bolus.    Pt reports the secretions "just appear" and he has to spit them out. He reports this occurs mostly when he eats and if he is talking a lot.      Voice/Resonance:  Within normal limits.          Treatment      Education:   Arthur Ribeiro was engaged in discussion of normal swallowing function, possible effects of CRT, and therapeutic intervention.  Patient was educated regarding the possible side effects related to treatment:  Xerostomia:   Provided written resources for OTC and homemade (baking soda rinses) treatments for dry mouth.    Irritation:  Discussed that the treatment itself can cause irritation to the tissues plus certain tastes and textures may be found to be irritating as well.  Discussed mucositis. Reinforced recommendations from Dr Stout regarding baking soda mouth rinse 10 times per day.  Changes in taste: Discussed this possibility and the resource of Nutrition to assist in making diet changes that may help address this.  Radiation fibrosis: Described this effect and the possible need for continued use of swallowing exercises for an extended time (including lifelong) after completion of XRT to keep the structures involved in swallowing functioning at their maximum potential for safe swallowing.    Lymphedema:  Described this and its possible emergence following surgery and/or XRT and that it is treatable.     Reviewed the basics of the normal pharyngeal swallow using Dysphagia Randal.     Reviewed and provided written instructions for swallowing strategies and exercises as follows:  Melva, CTAR, lingual press, jaw opening, effortful swallow, mendelsohn and breath hold.  Arthur completed melvaako, mendelshohn, CTAR, lingual press, " breath hold and jaw opening exercises with good technique.Pt demonstrated consistent need to spit after water swallows when completing Effortful swallow, mendelhsohn and huyen. Handout with exercises and ball provided for CTAR exercises.     Discussed the goal of swallowing whatever is possible, as much as possible throughout  treatment for the best possible outcome (which may be relative to a given patient)..  Discussed that he may need to change the consistency of what he can swallow (e.g., if chewable solids are too hard/painful/irritating, change to pureed foods or liquids) and this is perfectly acceptable within the goal of continuing to swallow throughout the treatment and beyond.  Advised that most patients begin to experience swallowing problems about half-way through XRT.    Reiterated that the swallowing exercises are done in an effort to prevent or limit swallowing function problems during or after XRT and that he should continue to do them even if he does not think he is having a problem with swallowing safely.   Due to results of BSE with signs of decreased efficiency of swallow, a Modified Barium Swallow Study is recommended to assess his swallowing function. Pt and wife in agreement. ST will contact patient following MBSS to schedule a follow up visit to discuss results and implement any additional exercises or swallow strategies.     Assessment      Arthur presents to [facility] s/p medical diagnosis of hypopharyngeal cancer. He presents with impaired swallow function at this time c/b signs of decreased efficiency of swallow with pt c/o food sticking in his throat and consistent regurgitation of liquid/food.      RECOMMENDATIONS/PLAN OF CARE:  It is felt that Arthur Ribeiro  would benefit from  1.  Continuation of his current regular consistency diet with thin liquids using the above strategies and common aspiration precautions, including, but not limited  to  monitoring for any signs/symptoms of aspiration (such as wet/gurgly voice that does not clear with coughing, inability to make any voice sounds, any persistent coughing with oral intake, otherwise unexplained fever, unexplained increased or new difficulty or discomfort breathing, unexplained increase in  sleepiness/lethargy/significant fatigue, unexplained increase or new onset confusion or change in cognitive functioning, or any other unexplained change in health or well-being that could be related to swallowing).     Alternative nutrition may be beneficial for Arthur due to current changes in his swallow function and difficulty meeting nutritional needs by mouth. Arthur and his wife report that Dr Peñaloza has recommended a feeding tube prior to XRT. ST in agreement.      2.  Changing diet consistency as needed to facilitate swallowing.  3.  Begin swallowing exercises now and continue 3x/day through at least 3 months after completion of treatment.  4.  Monitor weight and hydration. Education provided regarding apps that may assist with monitoring calories.   5.  Return at mid-point of radiation and again 1 month after completion of treatment      Rehab Potential: good  Pt's spiritual, cultural and educational needs considered and pt agreeable to plan of care and goals.     Short Term Goals   Arthur will perform swallow exercises Independently  Long Term Goals   Arthur will be able to consume a regular diet with no sign/symptoms of aspiration.  [facility]        Therapist's Name:   Lashonda Tirado CCC-SLP   5/31/2022

## 2022-06-02 ENCOUNTER — TELEPHONE (OUTPATIENT)
Dept: HEMATOLOGY/ONCOLOGY | Facility: CLINIC | Age: 77
End: 2022-06-02
Payer: MEDICARE

## 2022-06-02 DIAGNOSIS — Z91.89 AT RISK FOR LYMPHEDEMA: ICD-10-CM

## 2022-06-02 DIAGNOSIS — R53.81 DEBILITY: ICD-10-CM

## 2022-06-02 DIAGNOSIS — C13.9 PRIMARY MALIGNANT NEOPLASM OF HYPOPHARYNX: Primary | ICD-10-CM

## 2022-06-02 NOTE — TELEPHONE ENCOUNTER
LVM to get PT scheduled      ----- Message from Juan A Lion MA sent at 6/2/2022 10:23 AM CDT -----  Regarding: PT  2x for 8 weeks of PT    1st appt see Candi Kelly

## 2022-06-03 ENCOUNTER — TELEPHONE (OUTPATIENT)
Dept: HEMATOLOGY/ONCOLOGY | Facility: CLINIC | Age: 77
End: 2022-06-03
Payer: MEDICARE

## 2022-06-03 NOTE — TELEPHONE ENCOUNTER
I spoke with Arthur barry et him scheduled for PT. He voiced acceptance of all dates give. He said he will start rxt at some point, end of June and we can adjust his schedule according when he finds out his times.      ----- Message from Becki Parra sent at 6/3/2022  9:52 AM CDT -----  Type:Needs Medical Advice    Who Called:PT  Best call back number:[phone]  Additional Info: Requesting a call back regarding#Returning [facility] call to schedule PT  Please Advise- Thank you

## 2022-06-06 ENCOUNTER — IMMUNIZATION (OUTPATIENT)
Dept: PHARMACY | Facility: CLINIC | Age: 77
End: 2022-06-06
Payer: MEDICARE

## 2022-06-06 ENCOUNTER — TELEPHONE (OUTPATIENT)
Dept: HEMATOLOGY/ONCOLOGY | Facility: CLINIC | Age: 77
End: 2022-06-06
Payer: MEDICARE

## 2022-06-06 ENCOUNTER — LAB VISIT (OUTPATIENT)
Dept: LAB | Facility: HOSPITAL | Age: 77
End: 2022-06-06
Attending: STUDENT IN AN ORGANIZED HEALTH CARE EDUCATION/TRAINING PROGRAM
Payer: MEDICARE

## 2022-06-06 DIAGNOSIS — C13.9 PRIMARY MALIGNANT NEOPLASM OF HYPOPHARYNX: ICD-10-CM

## 2022-06-06 DIAGNOSIS — R53.81 PHYSICAL DECONDITIONING: ICD-10-CM

## 2022-06-06 DIAGNOSIS — Z23 NEED FOR VACCINATION: Primary | ICD-10-CM

## 2022-06-06 DIAGNOSIS — R68.89 COLD INTOLERANCE: ICD-10-CM

## 2022-06-06 LAB — TSH SERPL DL<=0.005 MIU/L-ACNC: 1.92 UIU/ML (ref 0.4–4)

## 2022-06-06 PROCEDURE — 84443 ASSAY THYROID STIM HORMONE: CPT | Performed by: STUDENT IN AN ORGANIZED HEALTH CARE EDUCATION/TRAINING PROGRAM

## 2022-06-06 PROCEDURE — 36415 COLL VENOUS BLD VENIPUNCTURE: CPT | Mod: PN | Performed by: STUDENT IN AN ORGANIZED HEALTH CARE EDUCATION/TRAINING PROGRAM

## 2022-06-06 NOTE — NURSING
Oncology Navigation   Intake  Cancer Type: Head and Neck  Internal / External Referral: Internal  Referral Source: Epic referral from Dr. Peñaloza  Date of Referral: 2/17/2022  Initial Nurse Navigator Contact: 2/17/2022  Referral to Initial Contact Timeline (days): 0  Date Worked: 6/6/2022     Treatment  Current Status: Active  Date Presented to Tumor Board: 2/24/2022    Surgical Oncologist: Dr. Bere Stout    Medical Oncologist: Dr. Quentin Rahman  Chemotherapy: N/A  Chemotherapy Not Applicable: Patient currently being treated by Dr. Quentin Rahman    Radiation Therapy: Planned  Radiation Oncologist: Simualtion date 6/15/22    Procedures: PET scan; CT; Biopsy  Biopsy Schedule Date: 1/26/2022  CT Schedule Date: 1/19/2022  PET Scan Schedule Date: 2/10/2022    General Referrals: Speech pathology; Dietitian  Dietitian Referral Date: 4/18/2022       Radiation Oncologist: Simualtion date 6/15/22        Acuity      Follow Up  No follow-ups on file.

## 2022-06-15 ENCOUNTER — CLINICAL SUPPORT (OUTPATIENT)
Dept: REHABILITATION | Facility: HOSPITAL | Age: 77
End: 2022-06-15
Attending: PSYCHIATRY & NEUROLOGY
Payer: MEDICARE

## 2022-06-15 ENCOUNTER — HOSPITAL ENCOUNTER (OUTPATIENT)
Dept: RADIATION THERAPY | Facility: HOSPITAL | Age: 77
Discharge: HOME | End: 2022-06-15
Attending: RADIOLOGY
Payer: MEDICARE

## 2022-06-15 DIAGNOSIS — R63.4 WEIGHT LOSS: ICD-10-CM

## 2022-06-15 DIAGNOSIS — C13.9 PRIMARY MALIGNANT NEOPLASM OF HYPOPHARYNX: ICD-10-CM

## 2022-06-15 DIAGNOSIS — R53.81 PHYSICAL DECONDITIONING: ICD-10-CM

## 2022-06-15 DIAGNOSIS — R53.81 DEBILITY: Primary | ICD-10-CM

## 2022-06-15 DIAGNOSIS — C13.9 PRIMARY MALIGNANT NEOPLASM OF HYPOPHARYNX: Primary | ICD-10-CM

## 2022-06-15 DIAGNOSIS — C13.9 HYPOPHARYNGEAL CANCER: ICD-10-CM

## 2022-06-15 DIAGNOSIS — Z91.89 AT RISK FOR LYMPHEDEMA: ICD-10-CM

## 2022-06-15 PROCEDURE — 77014 HC CT GUIDANCE RADIATION THERAPY FLDS PLACEMENT: CPT | Mod: TC,PN | Performed by: STUDENT IN AN ORGANIZED HEALTH CARE EDUCATION/TRAINING PROGRAM

## 2022-06-15 PROCEDURE — 77263 PR  RADIATION THERAPY PLAN COMPLEX: ICD-10-PCS | Mod: ,,, | Performed by: STUDENT IN AN ORGANIZED HEALTH CARE EDUCATION/TRAINING PROGRAM

## 2022-06-15 PROCEDURE — 77334 RADIATION TREATMENT AID(S): CPT | Mod: 26,,, | Performed by: STUDENT IN AN ORGANIZED HEALTH CARE EDUCATION/TRAINING PROGRAM

## 2022-06-15 PROCEDURE — 77334 PR  RADN TREATMENT AID(S) COMPLX: ICD-10-PCS | Mod: 26,,, | Performed by: STUDENT IN AN ORGANIZED HEALTH CARE EDUCATION/TRAINING PROGRAM

## 2022-06-15 PROCEDURE — 77014 PR  CT GUIDANCE PLACEMENT RAD THERAPY FIELDS: CPT | Mod: 26,,, | Performed by: STUDENT IN AN ORGANIZED HEALTH CARE EDUCATION/TRAINING PROGRAM

## 2022-06-15 PROCEDURE — 77014 PR  CT GUIDANCE PLACEMENT RAD THERAPY FIELDS: ICD-10-PCS | Mod: 26,,, | Performed by: STUDENT IN AN ORGANIZED HEALTH CARE EDUCATION/TRAINING PROGRAM

## 2022-06-15 PROCEDURE — 77334 RADIATION TREATMENT AID(S): CPT | Mod: TC,PN | Performed by: STUDENT IN AN ORGANIZED HEALTH CARE EDUCATION/TRAINING PROGRAM

## 2022-06-15 PROCEDURE — 97163 PT EVAL HIGH COMPLEX 45 MIN: CPT | Mod: PN

## 2022-06-15 PROCEDURE — 77263 THER RADIOLOGY TX PLNG CPLX: CPT | Mod: ,,, | Performed by: STUDENT IN AN ORGANIZED HEALTH CARE EDUCATION/TRAINING PROGRAM

## 2022-06-15 NOTE — PROGRESS NOTES
Physical Therapy Initial Evaluation     [id number]    Date: 6/15/2022  Physician: Thuan Peñaloza Jr., *  Diagnosis:   Encounter Diagnoses   Name Primary?    Debility Yes    At risk for lymphedema     Primary malignant neoplasm of hypopharynx        Patient is a 76 y.o. 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 Patient reports being diagnosed with a hypopharyngeal tumor, has been on chemo for last 3 months and now will have radiation begininng in 2 weeks. Pt has a history of tumors on his spine at T9 and L2 (which he states his cancer was caused by agent [place]. Pt reports having 4 bulging disc at L2,3,4 and 5 as well in past which in 2010 ended up with foot drop in both feet.   Fell about year ago and has not fallen since reports is very self aware with drop foot in L foot.   History of Present Illness:   HPI:  per medical records/Dr. Stout last visit with patient on 02/23/2022  Patient is 76-year-old male who was referred for evaluation of a hypopharyngeal tumor for consideration of total laryngectomy.  Patient has a history of dysphagia and odynophagia.  He was treated with steroids and reflux medication without resolution.  He was seen by Dr Maya who noted a lesion involving the left oropharynx and hypopharynx.  He was taken to the operating room where he underwent endoscopy and biopsy.  The mass was found to be involving the left hypopharynx extending up to the lateral pharyngeal wall and down to the level of the cricopharyngeus.  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 His case was presented at multidisciplinary head neck tumor Board and it was recommended that he undergo adjuvant chemotherapy with consideration for total laryngectomy.   Patient reports that he has difficulty swallowing pills and therefore he was you  taking liquid pain medicine which alleviates the pain.  He also complains of thick mucus in the back of his throat and thick postnasal drip.    Patient has a history of Burkitt's lymphoma for which he was treated with chemotherapy only by Dr. Rahman who is also involved it the care of this patient..  Radiation: Will start June 27th and chemo will be next day once a week.   Chemotherapy:underwent chemo and completed 3 weeks ago, and will begin another round of chemo after June 27th.  Previous Lymphedema Treatment: no  PT for back pain and after chemo in [id number]    Past Medical History:   Diagnosis Date    Allergic rhinitis, cause unspecified 8/4/2015    Cancer 2005    Rakesh's lymphoma     Encounter for blood transfusion     Foot drop     Neuropathy     Spinal stenosis      Current Outpatient Medications   Medication Sig    (Magic mouthwash) 1:1:1 diphenhydramine(Benadryl) 12.5mg/5ml liq, aluminum & magnesium hydroxide-simethicone (Maalox), LIDOcaine viscous 2% Swish and spit 10 mLs every 4 (four) hours as needed (gargle for 10 seconds and spit). gargle for 10 seconds and spit (Patient not taking: No sig reported)    fluticasone (FLONASE) 50 mcg/actuation nasal spray SHAKE LIQUID AND USE 2 SPRAYS(100 MCG) IN EACH NOSTRIL EVERY DAY (Patient not taking: No sig reported)    gabapentin (NEURONTIN) 300 MG capsule Take 1 capsule (300 mg total) by mouth 3 (three) times daily.    HYDROcodone-acetaminophen (NORCO) 7.5-325 mg per tablet Take 1 tablet by mouth every 6 (six) hours as needed for Pain. (Patient not taking: No sig reported)    HYDROcodone-acetaminophen (NORCO) 7.5-325 mg per tablet Take 1 tablet by mouth every 6 (six) hours as needed for Pain.    hydrocodone/acetam/diet.sup.11 (HYDROCODONE-ACETAMINOPH-SUPP11 ORAL)     ibuprofen (ADVIL,MOTRIN) 200 MG tablet Take 400 mg by mouth 2 (two) times daily as needed for Pain.    LIDOCAINE VISCOUS 2 % solution     ondansetron (ZOFRAN-ODT) 8 MG TbDL DISSOLVE 1  TABLET ON THE TONGUE THREE TIMES DAILY AS NEEDED FOR NAUSEA    valACYclovir (VALTREX) 1000 MG tablet Take 1,000 mg by mouth 3 (three) times daily.     No current facility-administered medications for this visit.     Review of patient's allergies indicates:  No Known Allergies    Precautions: cancer, Fall risk    Social History: Lives with wife, 2 story home, master bedroom on first floor. His office upstairs.  Environmental barriers: none  Abuse/Neglect: none  Nutritional status: has been difficult due to swallowing, states tries to stick with soft food.   Educational needs: signs and symptoms of lymphedema which could be associated with radiation treatments.  Spiritual/Cultural: included in POC  Fall risk: yes, bilateral foot drop, committed to using his cane 6-7 yrs ago.     MD orders:  Electronically signed by: Bere Stout MD on 02/23/22 1512 Status: Active   Ordering user: Bere Stout MD 02/23/22 1512 Authorized by: Bere Stout MD   Ordering mode: Standard   Frequency:  02/23/22 -     Diagnoses   Primary malignant neoplasm of hypopharynx [C13.9]     Questionnaire    Question Answer   Post Surgical? Yes   Eval and Treat Yes   Type of Therapy Outpatient Therapy   Order comments: primary chemorads Please evaluate and treat Thanks Lymphedema and PT       Subjective     Arthur Viveroslincoln rates pain at a 5/10 where 0= no pain and 10 = maximal pain. Taking gabapentin and advil.  Patient's goals are as follows: build my strength   Nerve ending pain in stomach area from shingles comes in waves.8-9/10   Objective     Amount of Swelling: no observable to cervical region, palpable swelling to B LE's stage 2  Location of Swelling: bilateral lower legs/ankles  Skin Integrity: intact, dryness noted to LE's  Palpation/Texture: soft pliable around neck.  Circulation: intact radial  Posture: kyphotic, forward head, protracted scapula, slouched posture    Range of Motion - UE at eval 2/25/2022  (R) shoulder flexion 125, abduction  "130, ER/IR WFL, elbow flexion/extension WNL  (L) shoulder flexion 130, abduction 130, ER/IR WFL, elbow flexion/extension WNL  Range of Motion - UE at eval 06/15/2022  (R) shoulder flexion 132, abduction 145, ER/IR WFL, elbow flexion/extension WNL  (L) shoulder flexion 128, abduction 140, ER/IR WFL, elbow flexion/extension WNL    Upper Extremity strength:6/15/2022  R UE grossly 4-/5 shoulders, 4/5 elbow flex/ext  L UE grossly 4-/5 shoulders, 4/5 elbow flex/ext    Lower Extremity Strength: eval 2/25/2022  B LE hip flexion 4-/5, hip abd 4-/5, knee extension 4+/5, knee flexion 4-/5, ankle DF 3-/5  EVAL 6/15/2022  B LE hip flexion 4-/5, hip abd 4-/5, knee extension 4+/5, knee flexion 4/5, ankle L 2+/5, R 3-/5  Limited mobility in ankle and toes B.     Current Functional Status:  Gait: ambulated with walking stick, high steppage gait to compensate for bilateral foot drop, increased ALEXANDRE  Transfers: mod I  Bed Mobility: mod I    Girth Measurements (in centimeters)  LANDMARK LEFT LE EVAL 2/25/2022 RIGHT LE EVAL 2/25/2022 LEFT LE EVAL   06/15/2022  RIGHT LE EVAL  06/15/2022    Cervical flexion  WNL  WNL    Cervical Extension 15 deg past neutral / forward head  10 deg past neutral with forward head    Rotation  65 deg 65 deg 50 deg 85 deg   Cervical SB 20 deg 25 deg 28 deg with compensatory motion 40 deg          Girth 2" below earlobe 41.7 cm  39.1cm    Girth 3" below earlobe 39.6 cm  35.5 cm    Girth 4" below earlobe 37.0 cm  35.7 cm    Manibular Depression 35 mm  33.5 cm    Reduction in girth of neck circumference, however pt with reduction in weight as well.    Sensation: intact  Functional Test:  6 min walk test completed full 6 minutes with cane and use of hand occasionally on wall total of 780 feet  TUG test: 17.49 sec  Sit to stand test: 6 reps with no control of descension noted.  Treatment Evaluation, Pt rec'd education on etiology of lymphedema, signs and symptoms of Lymphedema which could be a side effect with " radiation treatment. Discussed with patient possible need for bilateral AFO's to assist with foot drop and prevent falls. Discussed with patient future visits to address education on self manual lymph drainage, overall strengthening and endurance program. Provided pt with education regarding skin care for B LE as he has dry flaky skin with hemosiderin staining present. Discussed use of LE compression as well to assist with swelling and reduce weight of legs with walking. Pt also reporting after eval that he has much difficulty moving his toes. Will assess at next tx.     Evaluation Date: 02/25/2022  Authorization Period Expiration: pending  Plan of Care Certification Period: 10 visits/ 30 days  Visit #/Visits authorized: pending    Time In: 3:00 PM  Time Out: 4:00 PM    This patient is in agreement to participate in PT/Lymphedema treatment.    Assessment     This patient presents with diagnosis of primary malignant neoplasm of hypopharynx, referred to therapy PT/Lymphedema as patient reports decreased strength and mobility.  Patient also has a history of Burkitts lymphoma with complaints of residual side effects of weakness from chemo in the past. Base line measurements of ROM and cervical girth compared with last initial eval when pt first came to therapy 4 months ago. Pt was educated on signs and symptoms of lymphedema.     Patient presents with overall postural, UE and LE weakness, LE edema and decreased safety with lack of control in L foot with walking. Functionally patient reports limitations with endurance in community, house hold chores such as managing his own lawn, safety with ambulation. Also pt with need for AFO for L LE and also compression socks with wide band present at top. Recommending Jobst Sports sock 15-20 mmHg and use of donning aid as well to assist.      This pt would benefit from skilled therapy services to address the above impairments.    The following goals were discussed with the patient  and patient is in agreement with them as to be addressed in the treatment plan.     Short Term Goals: (4 weeks)  1. Patient to demonstrate improved postural awareness and sitting tolerance.  2. Patient will demonstrate 100% knowledge of lymphedema precautions and signs of infection.   3. Patient will perform self lymph drainage techniques independently.  4. Patient will increase B UE strength 1/3 grade.   5. Patient will increase B LE strength 1/3 grade for improved functional tranfers and decrease fall risk.   6. Measure girth of B LE in next 2 visits provide recommendation on size of Jobst sock.     Long Term Goals: (8 weeks)  1. Patient will increase B UE strength 1/3 grade.   2. Patient will increase B LE strength 1/3 grade for improved functional tranfers and decrease fall risk.   3. Patient will perform self lymph drainage techniques  4. Pt to demonstrate improved sit to stand test to 10 reps with control over descending from standing to sitting with improved quad control.  5. Pt to receive AFO to improve safety in gait.        PLAN   Patient to be seen 2 x per week for 8 weeks for the medically necessary treatments to include: therapeutic exercises, therapeutic activities, modalities as needed, decongestive massage, intermittent compression pump, education in lymphedema precautions, self massage, self bandaging, and assistance in obtaining appropriate compression garment in future if needed.  Next visit:  initiate strengthening program, request pt to complete functional survey, measure for JOBST sports sock with wide band in 15-20 MMhg.  Need for AFO on L LE, and will assess R LE next tx.   Candi Aburto, PT, CLT  6/15/2022

## 2022-06-16 ENCOUNTER — TELEPHONE (OUTPATIENT)
Dept: GASTROENTEROLOGY | Facility: CLINIC | Age: 77
End: 2022-06-16
Payer: MEDICARE

## 2022-06-16 ENCOUNTER — TELEPHONE (OUTPATIENT)
Dept: HEMATOLOGY/ONCOLOGY | Facility: CLINIC | Age: 77
End: 2022-06-16
Payer: MEDICARE

## 2022-06-16 NOTE — TELEPHONE ENCOUNTER
----- Message from Delmis Bacon RN sent at 6/16/2022  8:40 AM CDT -----  Regarding: Patient needs PEG  Hi, we were wondering who does PEG tubes at [facility]?  Dr. Peñaloza, Rad Onc, has a patient who will need one prior to starting radiation and has put in a referral.  Can you help with this?     Thanks!

## 2022-06-16 NOTE — TELEPHONE ENCOUNTER
I spoke with Arthur and scheduled him for PT in July to finish his frequency for PT. He asked to move appt time up on 6/29. I told him I only had that time that day but would make a note to let him know if anything opened up earlier. He voiced acceptance of appts.

## 2022-06-17 ENCOUNTER — TELEPHONE (OUTPATIENT)
Dept: HEMATOLOGY/ONCOLOGY | Facility: CLINIC | Age: 77
End: 2022-06-17
Payer: MEDICARE

## 2022-06-17 DIAGNOSIS — C13.9 PRIMARY MALIGNANT NEOPLASM OF HYPOPHARYNX: Primary | ICD-10-CM

## 2022-06-17 NOTE — TELEPHONE ENCOUNTER
----- Message from Dora Andrews RN sent at 6/17/2022  2:15 PM CDT -----  Regarding: RE: Help with PEG  I am at the [facility] clinic, but I will send the info to Dr. Cobb's office to see if he can place.    Komal    ----- Message -----  From: Delmis Bacon RN  Sent: 6/17/2022   2:11 PM CDT  To: [facility] Navigation Outpatient  Subject: Help with PEG                                    Can you help us try to get Mr. Ribeiro a PEG tube?  I tried messaging Michelle and Enoch at [phone] to see if they do them but didn't get a reply. He starts chemo (w/Dr. Rahman, not here) and XRT on 6/28 so we;d like to get it in the next 2 weeks if possible.    Thanks, neither Dr. Peñaloza nor I are very familiar with the [facility] MDs!

## 2022-06-20 ENCOUNTER — CLINICAL SUPPORT (OUTPATIENT)
Dept: REHABILITATION | Facility: HOSPITAL | Age: 77
End: 2022-06-20
Payer: MEDICARE

## 2022-06-20 ENCOUNTER — TELEPHONE (OUTPATIENT)
Dept: SURGERY | Facility: CLINIC | Age: 77
End: 2022-06-20
Payer: MEDICARE

## 2022-06-20 DIAGNOSIS — R53.81 DEBILITY: Primary | ICD-10-CM

## 2022-06-20 PROCEDURE — 97110 THERAPEUTIC EXERCISES: CPT | Mod: PN

## 2022-06-20 NOTE — PROGRESS NOTES
[facility] Outpatient Physical Therapy and Wellness Daily Note     Treatment Date: 6/20/2022    Name: Arthur Ribeiro  Clinic Number: [id number]  Diagnosis:   Encounter Diagnosis   Name Primary?    Debility Yes     Physician: Thuan Peñaloza Jr., *  Precautions: Standard, Cancer and Fall   Radiation: Will start June 27th and chemo will be next day once a week.   Chemotherapy:underwent chemo and completed 3 weeks ago, and will begin another round of chemo after June 27th.    Visit #: 2 of 16  PTA Visit #: 0  Time In: 03:00PM  Time Out: 04:00PM  Total Treatment Time 1:1: 60    Evaluation Date: 06/15/2022  Visit # authorized: 25  Authorization period: 06/14/2022- 12/31/2022  Plan of care Expiration: 10 visits/ 30 days  MD referral: PT Eval and Treat    Subjective     Pt reports: Doing ok, did have some dizziness this morning but not since then.  Still having some of the shingles.  Response to previous treatment: eval last visit, did ok    Pain, Nature, Location: 4/10 Nerve ending pain in stomach area from shingles comes in waves    Objective     Pt performed hand washing prior to start of session. Patient without compression to lower extremities. Patient states having compression socks with wide band at home he has had for [place]. Does not wear regularly. PT educated pt on need for donning socks first thing in the morning before feet hit the floor. Pt to return to next visit with compression socks on for PT to assess fit and compression level. Pt feels they are 20mmHg.    Pre-treatment: O2 sats 96%, HR 74    Arthur received therapeutic exercises to develop strength, endurance, ROM, flexibility, posture and core stabilization for 55 minutes including:  Nustep L4 for 6 minutes, required bilateral feet strapped to foot plates with use of theraband to keep from sliding off. (O2 sats 98%,  after)  Seated in chair with airex pad facing mirror for visual feedback of sitting posture. Pt  "sits slumped into PPT but able to correct with cues for upright posture, limited endurance to maintain.  -LAQ with 10 sec hold x 5 reps each  -hip abd RTB 3x 10 rep  - rows with RTB (cues to sit unsupported, upright posture) 2x10 reps  -SAP B UE YTB 2x10 reps  Supine:  -hook lying pelvic tilts 2x10 reps  -hook lying pelvic tilts with hip add ball squeeze 2x10 reps  -SLR's 2x5 reps  -supine hip abd 2x10 each  Seated edge of mat "T" robbin 10 sec hold x 10 reps    Patient and wife where provided handout of lower extremity lymphatic flow exercises and reviewed with patient. Instructed to perform daily, instructing wife to assist with ROM to toes and ankle due to bilateral drop foot. (may need to review technique in future visit, unable to demonstrate due to time constraint).     Written Home Exercises Provided: yes.  Exercises were reviewed and Arthur was able to demonstrate them prior to the end of the session.  Arthur demonstrated good  understanding of the education provided.     See EMR under Patient Instructions for exercises provided 6/20/2022.    Education provided re: importance of compression garments for chronic ongoing leg swelling as well as importance of lymphatic flow exercies performed daily.  Arthur verbalized good  understanding of education provided.     Assessment   Patient tolerated treatment well, fatigued quickly with SLR against gravity as well as hip abduction gravity eliminated. Educated pt may be sore next 24-48 hrs. Patient with significant LE swelling and need for compression. Pt to return to next visit with compression socks on for PT to assess fit and compression level. Pt feels they are 20mmHg.   Patient is highly motivated to participate in PT in order to work towards goals established, increase functional independence, strength and stability.   Arthur Is progressing well towards his goals.   Pt prognosis is Good.   This patient presents with diagnosis of primary malignant neoplasm of hypopharynx, " referred to therapy PT/Lymphedema as patient reports decreased strength and mobility.  Patient also has a history of Burkitts lymphoma with complaints of residual side effects of weakness from chemo in the past. Base line measurements of ROM and cervical girth compared with last initial eval when pt first came to therapy 4 months ago. Pt was educated on signs and symptoms of lymphedema.      Patient presents with overall postural, UE and LE weakness, LE edema and decreased safety with lack of control in L foot with walking. Functionally patient reports limitations with endurance in community, house hold chores such as managing his own lawn, safety with ambulation. Also pt with need for AFO for L LE and also compression socks with wide band present at top. Recommending Jobst Sports sock 15-20 mmHg and use of donning aid as well to assist.         Pt will continue to benefit from skilled outpatient physical therapy to address the deficits listed in the problem list box on initial evaluation, provide pt/family education and to maximize pt's level of independence in the home and community environment.     Pt's spiritual, cultural and educational needs considered and pt agreeable to plan of care and goals.    Anticipated barriers to physical therapy: side effects of chemo    Goals:   Short Term Goals: (4 weeks)  1. Patient to demonstrate improved postural awareness and sitting tolerance.  2. Patient will demonstrate 100% knowledge of lymphedema precautions and signs of infection.   3. Patient will perform self lymph drainage techniques independently.  4. Patient will increase B UE strength 1/3 grade.   5. Patient will increase B LE strength 1/3 grade for improved functional tranfers and decrease fall risk.   6. Measure girth of B LE in next 2 visits provide recommendation on size of Jobst sock.      Long Term Goals: (8 weeks)  1. Patient will increase B UE strength 1/3 grade.   2. Patient will increase B LE strength 1/3  grade for improved functional tranfers and decrease fall risk.   3. Patient will perform self lymph drainage techniques  4. Pt to demonstrate improved sit to stand test to 10 reps with control over descending from standing to sitting with improved quad control.  5. Pt to receive AFO to improve safety in gait.        Plan     Continue with established Plan of Care towards PT goals.    Therapist: Leticia Moses, PT  6/20/2022

## 2022-06-20 NOTE — PATIENT INSTRUCTIONS
"Lymph Drainage Exercises for Lower Extremity    Decongestive exercises are important to do to help manage your lymphedema, especially in the early phase of treatment. The exercises should be done in the following order: trunk, neck, shoulder, legs. Do 3 to 5 repetitions of each exercise. You may choose to do more, but it is important to follow them in order. It does not have to be a strong muscle contraction to work; the idea is to simply contract the muscle. If you have problems with doing these exercises, talk to your physical therapist about the changes that can be made. Your bandages or stockings should be worn during the exercises. Exercises should be done with both the affected and non-affected limb.    Abdominal Breathing Exercises      Get comfortable and relax your neck and shoulders. You can sit or lie down. Place one hand on your upper chest and place the other hand on your belly button. Use your hands to feel the movements as you breathe in and out.  Take a deep breath in through your nose and feel the hand on your stomach move out. Do not let your shoulders move up. The hand on your chest should not move.   Breathe out slow and gentle through your mouth. Pucker your lips as if you were going to whistle or blow out a candle. The hand on your stomach should move in as you breathe out. Breathe out as long as you can until all the air is gone.   To help keep the lymphatic system moving well, practice two breaths every hour using the steps for abdominal breathing exercises.      Pelvic Tilt     Lie on your back with knees slightly bent and feet flat. Using your stomach muscles "tilt" your pelvis and flatten your lower back into the floor or bed. Imagine pressing down on a deniz with the small of your back and count to ten. Release and repeat. Repeat 10 times.     Toe Curls/Toe Splays    Curl toes under, then spread toes apart. Repeat 10 times. Repeat with other foot.      Ankle Pumps    Move your foot up and " "down as if pushing down or letting up on a gas pedal in a car. Repeat 10 times. Repeat with other foot.      Ankle Inversion / Eversion    Move your foot side to side. Repeat 10 times. Repeat with other foot.      Ankle Circles    Make small circles and try to move only from the ankle. Perform [place] motion in both directions. Repeat 10 times. Repeat with other foot.      Quad Set    Lie with one leg straight and one leg bent with foot flat. With the straight leg, slowly tighten muscles on thigh. Switch leg positions and repeat on with other leg. Repeat 10 times.     Flexion    Lie with both legs straight. Slide one leg up bending the knee and slide it back down. Repeat with other leg. Repeat 10 times.     Leg Falls    Bend both knees and keep your feet flat on the floor or bed. Keep one leg in place and slowly lower your other leg out to the side. Bring your leg back to center. Repeat with the other leg. Repeat 10 times.     Leg Slides    Lie with both legs straight. Slide your leg out to the side and return it to the center. Keep your knees straight and pointing up during the exercise. Repeat with the other leg.      Gluteal Sets    Squeeze "rear end" and hold tightening you bottom. Repeat 10 times.     You may want to end your exercise session with more abdominal breathing. It may also be a good idea to continue lying down and elevate your leg on a pillow while relaxing for a few minutes.      This handout is for informational purposes only. Talk with your doctor or health care team if you have any questions about your care.  © January 27, 2022. The [facility] [place] [facility] [employer] [facility] and [employer] [place].   [email or web address]       "

## 2022-06-20 NOTE — TELEPHONE ENCOUNTER
Left pt a message to call back for scheduling    ----- Message from Swetha Arzate RN sent at 6/17/2022 12:59 PM CDT -----  Regarding: RE: Referral for PEG Insertion  Skye, Pt can see anyone from general sx in the [place] for his peg tube. He does not need to see a surg onc for peg tube.  We do not do sx in [place] we only have clinic twice a month in [place] only. All our sx are done in [place]. Since the pt want to be seen in [place] he is better off seeing someone who will do sx over there.     Thanks  Swetha      ----- Message -----  From: Aura Sierra MA  Sent: 6/17/2022  10:46 AM CDT  To: , #  Subject: Referral for PEG Insertion                       Good Morning!  A referral for Surg Onc was put in for this patient needing PEG tube insertion.  He lives on the [facility] and would like to be seen on the [facility] but the referral came to [facility].  Is this the correct department?  Can you all see him?  If not, we can call and schedule him here.  Please advise.    Thank you!  Skye

## 2022-06-22 ENCOUNTER — CLINICAL SUPPORT (OUTPATIENT)
Dept: REHABILITATION | Facility: HOSPITAL | Age: 77
End: 2022-06-22
Payer: MEDICARE

## 2022-06-22 DIAGNOSIS — R53.81 DEBILITY: Primary | ICD-10-CM

## 2022-06-22 PROCEDURE — 97110 THERAPEUTIC EXERCISES: CPT | Mod: PN

## 2022-06-22 NOTE — PATIENT INSTRUCTIONS
"Lymph Drainage Exercises for Lower Extremity    Decongestive exercises are important to do to help manage your lymphedema, especially in the early phase of treatment. The exercises should be done in the following order: trunk, neck, shoulder, legs. Do 3 to 5 repetitions of each exercise. You may choose to do more, but it is important to follow them in order. It does not have to be a strong muscle contraction to work; the idea is to simply contract the muscle. If you have problems with doing these exercises, talk to your physical therapist about the changes that can be made. Your bandages or stockings should be worn during the exercises. Exercises should be done with both the affected and non-affected limb.    Abdominal Breathing Exercises      Get comfortable and relax your neck and shoulders. You can sit or lie down. Place one hand on your upper chest and place the other hand on your belly button. Use your hands to feel the movements as you breathe in and out.  Take a deep breath in through your nose and feel the hand on your stomach move out. Do not let your shoulders move up. The hand on your chest should not move.   Breathe out slow and gentle through your mouth. Pucker your lips as if you were going to whistle or blow out a candle. The hand on your stomach should move in as you breathe out. Breathe out as long as you can until all the air is gone.   To help keep the lymphatic system moving well, practice two breaths every hour using the steps for abdominal breathing exercises.      Pelvic Tilt     Lie on your back with knees slightly bent and feet flat. Using your stomach muscles "tilt" your pelvis and flatten your lower back into the floor or bed. Imagine pressing down on a deniz with the small of your back and count to ten. Release and repeat. Repeat 10 times.     Toe Curls/Toe Splays    Curl toes under, then spread toes apart. Repeat 10 times. Repeat with other foot.      Ankle Pumps    Move your foot up and " "down as if pushing down or letting up on a gas pedal in a car. Repeat 10 times. Repeat with other foot.      Ankle Inversion / Eversion    Move your foot side to side. Repeat 10 times. Repeat with other foot.      Ankle Circles    Make small circles and try to move only from the ankle. Perform [place] motion in both directions. Repeat 10 times. Repeat with other foot.      Quad Set    Lie with one leg straight and one leg bent with foot flat. With the straight leg, slowly tighten muscles on thigh. Switch leg positions and repeat on with other leg. Repeat 10 times.     Flexion    Lie with both legs straight. Slide one leg up bending the knee and slide it back down. Repeat with other leg. Repeat 10 times.     Leg Falls    Bend both knees and keep your feet flat on the floor or bed. Keep one leg in place and slowly lower your other leg out to the side. Bring your leg back to center. Repeat with the other leg. Repeat 10 times.     Leg Slides    Lie with both legs straight. Slide your leg out to the side and return it to the center. Keep your knees straight and pointing up during the exercise. Repeat with the other leg.      Gluteal Sets    Squeeze "rear end" and hold tightening you bottom. Repeat 10 times.     You may want to end your exercise session with more abdominal breathing. It may also be a good idea to continue lying down and elevate your leg on a pillow while relaxing for a few minutes.      This handout is for informational purposes only. Talk with your doctor or health care team if you have any questions about your care.  © January 27, 2022. The [facility] [place] [facility] [employer] [facility] and [employer] [place].   [email or web address]       "

## 2022-06-22 NOTE — PROGRESS NOTES
[facility] Outpatient Physical Therapy and Wellness Daily Note     Treatment Date: 6/22/2022    Name: Arthur Ribeiro  Clinic Number: [id number]  Diagnosis:   Encounter Diagnosis   Name Primary?    Debility Yes     Physician: Thuan Peñaloza Jr., *  Precautions: Standard, Cancer and Fall   Radiation: Will start June 27th and chemo will be next day once a week.   Chemotherapy:underwent chemo and completed 3 weeks ago, and will begin another round of chemo after June 27th.    Visit #: 3 of 16  PTA Visit #: 0  Time In: 03:00PM  Time Out: 04:00PM  Total Treatment Time 1:1: 60    Evaluation Date: 06/15/2022  Visit # authorized: 25  Authorization period: 06/14/2022- 12/31/2022  Plan of care Expiration: 10 visits/ 30 days  MD referral: PT Eval and Treat    Subjective     Pt reports: Doing ok  Response to previous treatment: good    Pain, Nature, Location: 4/10 Nerve ending pain in stomach area from shingles comes in waves    Objective     Pt performed hand washing prior to start of session. Patient with compression to lower extremities with socks that created bottleneck on R LE and socks come up to shin level only,.  Pre-treatment: O2 sats 98%, HR 84  Recommendation to obtain Jobst Sport socks at local [facility] in 15-20 mmHg.  Arthur received therapeutic exercises to develop strength, endurance, ROM, flexibility, posture and core stabilization for 55 minutes including:  Nustep L4 for 10 minutes, required bilateral feet strapped to foot plates with use of theraband to keep from sliding off. (O2 sats 96%,  after)  Lymphatic flow series exercises see pt instructions.   Sit to stand ex from elevated mat x 10 reps  Sit to stand ex with ball squeeze for trunk activation x 15 reps  Applied g  F to R LE and tg  E to left Le with insturctions provided on how to edie and to wear while out of bed until he obtains the sport socks.   deferred  Seated in chair with airex pad facing mirror for visual feedback of  "sitting posture. Pt sits slumped into PPT but able to correct with cues for upright posture, limited endurance to maintain.  -LAQ with 10 sec hold x 5 reps each  -hip abd RTB 3x 10 rep  - rows with RTB (cues to sit unsupported, upright posture) 2x10 reps  -SAP B UE YTB 2x10 reps  Supine:  -hook lying pelvic tilts 2x10 reps  -hook lying pelvic tilts with hip add ball squeeze 2x10 reps  -SLR's 2x5 reps  -supine hip abd 2x10 each  Seated edge of mat "T" robbin 10 sec hold x 10 reps    Patient and wife where provided handout of lower extremity lymphatic flow exercises and reviewed with patient. Instructed to perform daily, instructing wife to assist with ROM to toes and ankle due to bilateral drop foot. (may need to review technique in future visit, unable to demonstrate due to time constraint).     Written Home Exercises Provided: yes.  Exercises were reviewed and Arthur was able to demonstrate them prior to the end of the session.  Arthur demonstrated good  understanding of the education provided.     See EMR under Patient Instructions for exercises provided 6/20/2022.    Education provided re: importance of compression garments for chronic ongoing leg swelling as well as importance of lymphatic flow exercies performed daily.  Arthur verbalized good  understanding of education provided.     Assessment   Patient tolerated treatment well. LE edema noted with poor choice of  compression sock wear. Good understanding of reasoning for proper socks.  Educated pt may be sore next 24-48 hrs. Patient with significant LE swelling and need for compression.   Patient is highly motivated to participate in PT in order to work towards goals established, increase functional independence, strength and stability.   Arthur Is progressing well towards his goals.   Pt prognosis is Good.   This patient presents with diagnosis of primary malignant neoplasm of hypopharynx, referred to therapy PT/Lymphedema as patient reports decreased strength and " mobility.  Patient also has a history of Burkitts lymphoma with complaints of residual side effects of weakness from chemo in the past. Base line measurements of ROM and cervical girth compared with last initial eval when pt first came to therapy 4 months ago. Pt was educated on signs and symptoms of lymphedema.      Patient presents with overall postural, UE and LE weakness, LE edema and decreased safety with lack of control in L foot with walking. Functionally patient reports limitations with endurance in community, house hold chores such as managing his own lawn, safety with ambulation. Also pt with need for AFO for L LE and also compression socks with wide band present at top. Recommending Jobst Sports sock 15-20 mmHg and use of donning aid as well to assist.         Pt will continue to benefit from skilled outpatient physical therapy to address the deficits listed in the problem list box on initial evaluation, provide pt/family education and to maximize pt's level of independence in the home and community environment.     Pt's spiritual, cultural and educational needs considered and pt agreeable to plan of care and goals.    Anticipated barriers to physical therapy: side effects of chemo    Goals:   Short Term Goals: (4 weeks) in progress  1. Patient to demonstrate improved postural awareness and sitting tolerance.  2. Patient will demonstrate 100% knowledge of lymphedema precautions and signs of infection.   3. Patient will perform self lymph drainage techniques independently.  4. Patient will increase B UE strength 1/3 grade.   5. Patient will increase B LE strength 1/3 grade for improved functional tranfers and decrease fall risk.   6. Measure girth of B LE in next 2 visits provide recommendation on size of Jobst sock.      Long Term Goals: (8 weeks)  1. Patient will increase B UE strength 1/3 grade.   2. Patient will increase B LE strength 1/3 grade for improved functional tranfers and decrease fall risk.    3. Patient will perform self lymph drainage techniques  4. Pt to demonstrate improved sit to stand test to 10 reps with control over descending from standing to sitting with improved quad control.  5. Pt to receive AFO to improve safety in gait.        Plan     Continue with established Plan of Care towards PT goals.  Assess compression sock fit next tx.  Therapist: Candi Aburto, PT, CLT  6/22/2022

## 2022-06-24 ENCOUNTER — TELEPHONE (OUTPATIENT)
Dept: HEMATOLOGY/ONCOLOGY | Facility: CLINIC | Age: 77
End: 2022-06-24
Payer: MEDICARE

## 2022-06-24 PROCEDURE — 77301 RADIOTHERAPY DOSE PLAN IMRT: CPT | Mod: 26,,, | Performed by: STUDENT IN AN ORGANIZED HEALTH CARE EDUCATION/TRAINING PROGRAM

## 2022-06-24 PROCEDURE — 77301 PR  INTEN MOD RADIOTHER PLAN W/DOSE VOL HIST: ICD-10-PCS | Mod: 26,,, | Performed by: STUDENT IN AN ORGANIZED HEALTH CARE EDUCATION/TRAINING PROGRAM

## 2022-06-24 PROCEDURE — 77301 RADIOTHERAPY DOSE PLAN IMRT: CPT | Mod: TC,PN | Performed by: STUDENT IN AN ORGANIZED HEALTH CARE EDUCATION/TRAINING PROGRAM

## 2022-06-24 NOTE — TELEPHONE ENCOUNTER
I spoke with Arthur and he changed his next weeks appt to Thursday at 2 to accommodate his schedule. He voiced acceptance.       ----- Message from Juan A Lion MA sent at 6/22/2022  3:20 PM CDT -----  Regarding: PT waitlist  Is anything opens up next week add appt on and move 6/29 appt time up

## 2022-06-25 PROCEDURE — 77470 SPECIAL RADIATION TREATMENT: CPT | Mod: 26,59,, | Performed by: STUDENT IN AN ORGANIZED HEALTH CARE EDUCATION/TRAINING PROGRAM

## 2022-06-25 PROCEDURE — 77300 RADIATION THERAPY DOSE PLAN: CPT | Mod: TC,PN | Performed by: STUDENT IN AN ORGANIZED HEALTH CARE EDUCATION/TRAINING PROGRAM

## 2022-06-25 PROCEDURE — 77338 DESIGN MLC DEVICE FOR IMRT: CPT | Mod: TC,PN | Performed by: STUDENT IN AN ORGANIZED HEALTH CARE EDUCATION/TRAINING PROGRAM

## 2022-06-25 PROCEDURE — 77470 PR  SPECIAL RADIATION TREATMENT: ICD-10-PCS | Mod: 26,59,, | Performed by: STUDENT IN AN ORGANIZED HEALTH CARE EDUCATION/TRAINING PROGRAM

## 2022-06-25 PROCEDURE — 77338 DESIGN MLC DEVICE FOR IMRT: CPT | Mod: 26,,, | Performed by: STUDENT IN AN ORGANIZED HEALTH CARE EDUCATION/TRAINING PROGRAM

## 2022-06-25 PROCEDURE — 77338 PR  MLC IMRT DESIGN & CONSTRUCTION PER IMRT PLAN: ICD-10-PCS | Mod: 26,,, | Performed by: STUDENT IN AN ORGANIZED HEALTH CARE EDUCATION/TRAINING PROGRAM

## 2022-06-25 PROCEDURE — 77300 PR RADIATION THERAPY,DOSIMETRY PLAN: ICD-10-PCS | Mod: 26,,, | Performed by: STUDENT IN AN ORGANIZED HEALTH CARE EDUCATION/TRAINING PROGRAM

## 2022-06-25 PROCEDURE — 77300 RADIATION THERAPY DOSE PLAN: CPT | Mod: 26,,, | Performed by: STUDENT IN AN ORGANIZED HEALTH CARE EDUCATION/TRAINING PROGRAM

## 2022-06-25 PROCEDURE — 77470 SPECIAL RADIATION TREATMENT: CPT | Mod: 59,TC,PN | Performed by: STUDENT IN AN ORGANIZED HEALTH CARE EDUCATION/TRAINING PROGRAM

## 2022-06-28 ENCOUNTER — DOCUMENTATION ONLY (OUTPATIENT)
Dept: RADIATION ONCOLOGY | Facility: CLINIC | Age: 77
End: 2022-06-28
Payer: MEDICARE

## 2022-06-28 DIAGNOSIS — R13.13 PHARYNGEAL DYSPHAGIA: Primary | ICD-10-CM

## 2022-06-28 DIAGNOSIS — R13.10 DYSPHAGIA, UNSPECIFIED TYPE: ICD-10-CM

## 2022-06-28 PROCEDURE — 77386 HC IMRT, COMPLEX: CPT | Mod: PN | Performed by: STUDENT IN AN ORGANIZED HEALTH CARE EDUCATION/TRAINING PROGRAM

## 2022-06-28 PROCEDURE — 77014 PR  CT GUIDANCE PLACEMENT RAD THERAPY FIELDS: ICD-10-PCS | Mod: 26,,, | Performed by: STUDENT IN AN ORGANIZED HEALTH CARE EDUCATION/TRAINING PROGRAM

## 2022-06-28 PROCEDURE — 77014 PR  CT GUIDANCE PLACEMENT RAD THERAPY FIELDS: CPT | Mod: 26,,, | Performed by: STUDENT IN AN ORGANIZED HEALTH CARE EDUCATION/TRAINING PROGRAM

## 2022-06-28 PROCEDURE — 77014 HC CT GUIDANCE RADIATION THERAPY FLDS PLACEMENT: CPT | Mod: TC,PN | Performed by: STUDENT IN AN ORGANIZED HEALTH CARE EDUCATION/TRAINING PROGRAM

## 2022-06-28 NOTE — PLAN OF CARE
.Pt here for 1 of 35 out patient radiation treatment . Pt no complaints and in stable condition.

## 2022-06-29 PROCEDURE — 77014 PR  CT GUIDANCE PLACEMENT RAD THERAPY FIELDS: CPT | Mod: 26,,, | Performed by: STUDENT IN AN ORGANIZED HEALTH CARE EDUCATION/TRAINING PROGRAM

## 2022-06-29 PROCEDURE — 77386 HC IMRT, COMPLEX: CPT | Mod: PN | Performed by: STUDENT IN AN ORGANIZED HEALTH CARE EDUCATION/TRAINING PROGRAM

## 2022-06-29 PROCEDURE — 77014 HC CT GUIDANCE RADIATION THERAPY FLDS PLACEMENT: CPT | Mod: TC,PN | Performed by: STUDENT IN AN ORGANIZED HEALTH CARE EDUCATION/TRAINING PROGRAM

## 2022-06-29 PROCEDURE — 77014 PR  CT GUIDANCE PLACEMENT RAD THERAPY FIELDS: ICD-10-PCS | Mod: 26,,, | Performed by: STUDENT IN AN ORGANIZED HEALTH CARE EDUCATION/TRAINING PROGRAM

## 2022-06-30 ENCOUNTER — CLINICAL SUPPORT (OUTPATIENT)
Dept: REHABILITATION | Facility: HOSPITAL | Age: 77
End: 2022-06-30
Payer: MEDICARE

## 2022-06-30 DIAGNOSIS — R53.81 DEBILITY: Primary | ICD-10-CM

## 2022-06-30 PROCEDURE — 77386 HC IMRT, COMPLEX: CPT | Mod: PN | Performed by: STUDENT IN AN ORGANIZED HEALTH CARE EDUCATION/TRAINING PROGRAM

## 2022-06-30 PROCEDURE — 77014 PR  CT GUIDANCE PLACEMENT RAD THERAPY FIELDS: ICD-10-PCS | Mod: 26,,, | Performed by: STUDENT IN AN ORGANIZED HEALTH CARE EDUCATION/TRAINING PROGRAM

## 2022-06-30 PROCEDURE — 77014 PR  CT GUIDANCE PLACEMENT RAD THERAPY FIELDS: CPT | Mod: 26,,, | Performed by: STUDENT IN AN ORGANIZED HEALTH CARE EDUCATION/TRAINING PROGRAM

## 2022-06-30 PROCEDURE — 97110 THERAPEUTIC EXERCISES: CPT | Mod: PN,CQ

## 2022-06-30 PROCEDURE — 77014 HC CT GUIDANCE RADIATION THERAPY FLDS PLACEMENT: CPT | Mod: TC,PN | Performed by: STUDENT IN AN ORGANIZED HEALTH CARE EDUCATION/TRAINING PROGRAM

## 2022-06-30 NOTE — PROGRESS NOTES
"  [facility] Outpatient Physical Therapy and Wellness Daily Note     Treatment Date: 6/30/2022    Name: Arthur Ribeiro  Clinic Number: [id number]  Diagnosis:   Encounter Diagnosis   Name Primary?    Debility Yes     Physician: Thuan Peñaloza Jr., *  Precautions: Standard, Cancer and Fall   Radiation: Will start June 27th and chemo will be next day once a week.   Chemotherapy:underwent chemo and completed 3 weeks ago, and will begin another round of chemo after June 27th.    Visit #: 4 of 16  PTA Visit #: 1  Time In: 03:00 PM  Time Out: 03:55 PM  Total Treatment Time 1:1: 55    Evaluation Date: 06/15/2022  Visit # authorized: 25  Authorization period: 06/14/2022- 12/31/2022  Plan of care Expiration: 10 visits/ 30 days  MD referral: PT Eval and Treat    Subjective     Pt reports: Doing ok, got some new compression socks. Like the way they fit, but didn't like how much they cost. Pt reports that he has been doing his home exercises. "I have problems with my balance."  Response to previous treatment: good    Pain, Nature, Location: 4/10 Nerve ending pain in stomach area from shingles comes in waves    Objective     Pt performed hand washing prior to start of session.   Pre-treatment: O2 sats 98%, HR 76    Arthur received therapeutic exercises to develop strength, endurance, ROM, flexibility, posture and core stabilization for 55 minutes including:  Nustep L4 for 10 minutes, 850 steps (no straps needed to keep feet on plates)  (O2 sats 97%, HR 99 after)  Lymphatic flow series exercises see pt instructions.   Sit to stand ex from elevated mat x 10 reps  Sit to stand ex with ball squeeze for trunk activation x 15 reps    Seated in chair with airex pad facing mirror for visual feedback of sitting posture. Pt sits slumped into PPT but able to correct with cues for upright posture, limited endurance to maintain.  -LAQ with 10 sec hold x 5 reps each  -hip abd RTB 3x 10 rep  - rows with RTB (cues to sit " "unsupported, upright posture) 2x10 reps  -SAP B UE YTB 2x10 reps  Supine:  -hook lying pelvic tilts 2x10 reps  -hook lying pelvic tilts with hip add ball squeeze 2x10 reps  -SLR's 2x5 reps  -supine hip abd 2x10 each  Seated edge of mat "T" robbin 10 sec hold x 10 reps    Patient and wife where provided handout of lower extremity lymphatic flow exercises and reviewed with patient. Instructed to perform daily, instructing wife to assist with ROM to toes and ankle due to bilateral drop foot. (reviewed)    Written Home Exercises Provided: yes.  Exercises were reviewed and Arthur was able to demonstrate them prior to the end of the session.  Arthur demonstrated good  understanding of the education provided.     See EMR under Patient Instructions for exercises provided 6/20/2022.    Education provided re: importance of compression garments for chronic ongoing leg swelling as well as importance of lymphatic flow exercies performed daily.  Arthur verbalized good  understanding of education provided.     Assessment   Patient did well today, presents with well fitting compression socks. Pt required cues to slow down. Will add some neuromuscular training next session.   Patient is highly motivated to participate in PT in order to work towards goals established, increase functional independence, strength and stability.   Arthur Is progressing well towards his goals.   Pt prognosis is Good.   This patient presents with diagnosis of primary malignant neoplasm of hypopharynx, referred to therapy PT/Lymphedema as patient reports decreased strength and mobility.  Patient also has a history of Burkitts lymphoma with complaints of residual side effects of weakness from chemo in the past. Base line measurements of ROM and cervical girth compared with last initial eval when pt first came to therapy 4 months ago. Pt was educated on signs and symptoms of lymphedema.      Patient presents with overall postural, UE and LE weakness, LE edema and " decreased safety with lack of control in L foot with walking. Functionally patient reports limitations with endurance in community, house hold chores such as managing his own lawn, safety with ambulation. Also pt with need for AFO for L LE and also compression socks with wide band present at top. Recommending Jobst Sports sock 15-20 mmHg and use of donning aid as well to assist.         Pt will continue to benefit from skilled outpatient physical therapy to address the deficits listed in the problem list box on initial evaluation, provide pt/family education and to maximize pt's level of independence in the home and community environment.     Pt's spiritual, cultural and educational needs considered and pt agreeable to plan of care and goals.    Anticipated barriers to physical therapy: side effects of chemo    Goals:   Short Term Goals: (4 weeks) in progress  1. Patient to demonstrate improved postural awareness and sitting tolerance.  2. Patient will demonstrate 100% knowledge of lymphedema precautions and signs of infection.   3. Patient will perform self lymph drainage techniques independently.  4. Patient will increase B UE strength 1/3 grade.   5. Patient will increase B LE strength 1/3 grade for improved functional tranfers and decrease fall risk.   6. Measure girth of B LE in next 2 visits provide recommendation on size of Jobst sock.      Long Term Goals: (8 weeks)  1. Patient will increase B UE strength 1/3 grade.   2. Patient will increase B LE strength 1/3 grade for improved functional tranfers and decrease fall risk.   3. Patient will perform self lymph drainage techniques  4. Pt to demonstrate improved sit to stand test to 10 reps with control over descending from standing to sitting with improved quad control.  5. Pt to receive AFO to improve safety in gait.        Plan     Continue with established Plan of Care towards PT goals.    Therapist: Malou Lam, PTA, CLT  6/30/2022

## 2022-07-01 ENCOUNTER — HOSPITAL ENCOUNTER (OUTPATIENT)
Dept: RADIATION THERAPY | Facility: HOSPITAL | Age: 77
Discharge: HOME | End: 2022-07-01
Attending: STUDENT IN AN ORGANIZED HEALTH CARE EDUCATION/TRAINING PROGRAM
Payer: MEDICARE

## 2022-07-01 PROCEDURE — 77386 HC IMRT, COMPLEX: CPT | Mod: PN | Performed by: STUDENT IN AN ORGANIZED HEALTH CARE EDUCATION/TRAINING PROGRAM

## 2022-07-01 PROCEDURE — 77014 PR  CT GUIDANCE PLACEMENT RAD THERAPY FIELDS: CPT | Mod: 26,,, | Performed by: STUDENT IN AN ORGANIZED HEALTH CARE EDUCATION/TRAINING PROGRAM

## 2022-07-01 PROCEDURE — 77014 HC CT GUIDANCE RADIATION THERAPY FLDS PLACEMENT: CPT | Mod: TC,PN | Performed by: STUDENT IN AN ORGANIZED HEALTH CARE EDUCATION/TRAINING PROGRAM

## 2022-07-01 PROCEDURE — 77014 PR  CT GUIDANCE PLACEMENT RAD THERAPY FIELDS: ICD-10-PCS | Mod: 26,,, | Performed by: STUDENT IN AN ORGANIZED HEALTH CARE EDUCATION/TRAINING PROGRAM

## 2022-07-05 PROCEDURE — 77014 PR  CT GUIDANCE PLACEMENT RAD THERAPY FIELDS: CPT | Mod: 26,,, | Performed by: STUDENT IN AN ORGANIZED HEALTH CARE EDUCATION/TRAINING PROGRAM

## 2022-07-05 PROCEDURE — 77386 HC IMRT, COMPLEX: CPT | Mod: PN | Performed by: STUDENT IN AN ORGANIZED HEALTH CARE EDUCATION/TRAINING PROGRAM

## 2022-07-05 PROCEDURE — 77336 RADIATION PHYSICS CONSULT: CPT | Mod: PN | Performed by: STUDENT IN AN ORGANIZED HEALTH CARE EDUCATION/TRAINING PROGRAM

## 2022-07-05 PROCEDURE — 77014 PR  CT GUIDANCE PLACEMENT RAD THERAPY FIELDS: ICD-10-PCS | Mod: 26,,, | Performed by: STUDENT IN AN ORGANIZED HEALTH CARE EDUCATION/TRAINING PROGRAM

## 2022-07-05 PROCEDURE — 77014 HC CT GUIDANCE RADIATION THERAPY FLDS PLACEMENT: CPT | Mod: TC,PN | Performed by: STUDENT IN AN ORGANIZED HEALTH CARE EDUCATION/TRAINING PROGRAM

## 2022-07-07 ENCOUNTER — DOCUMENTATION ONLY (OUTPATIENT)
Dept: RADIATION ONCOLOGY | Facility: CLINIC | Age: 77
End: 2022-07-07
Payer: MEDICARE

## 2022-07-07 PROCEDURE — 77014 PR  CT GUIDANCE PLACEMENT RAD THERAPY FIELDS: ICD-10-PCS | Mod: 26,,, | Performed by: STUDENT IN AN ORGANIZED HEALTH CARE EDUCATION/TRAINING PROGRAM

## 2022-07-07 PROCEDURE — 77014 PR  CT GUIDANCE PLACEMENT RAD THERAPY FIELDS: CPT | Mod: 26,,, | Performed by: STUDENT IN AN ORGANIZED HEALTH CARE EDUCATION/TRAINING PROGRAM

## 2022-07-07 PROCEDURE — 77014 HC CT GUIDANCE RADIATION THERAPY FLDS PLACEMENT: CPT | Mod: TC,PN | Performed by: STUDENT IN AN ORGANIZED HEALTH CARE EDUCATION/TRAINING PROGRAM

## 2022-07-07 PROCEDURE — 77386 HC IMRT, COMPLEX: CPT | Mod: PN | Performed by: STUDENT IN AN ORGANIZED HEALTH CARE EDUCATION/TRAINING PROGRAM

## 2022-07-07 NOTE — PLAN OF CARE
.Pt here for 6 of 35  out patient radiation treatment . Pt no complaints and in stable condition.

## 2022-07-08 ENCOUNTER — DOCUMENTATION ONLY (OUTPATIENT)
Dept: RADIATION ONCOLOGY | Facility: CLINIC | Age: 77
End: 2022-07-08
Payer: MEDICARE

## 2022-07-08 PROCEDURE — 77386 HC IMRT, COMPLEX: CPT | Mod: PN | Performed by: STUDENT IN AN ORGANIZED HEALTH CARE EDUCATION/TRAINING PROGRAM

## 2022-07-08 PROCEDURE — 77014 HC CT GUIDANCE RADIATION THERAPY FLDS PLACEMENT: CPT | Mod: TC,PN | Performed by: STUDENT IN AN ORGANIZED HEALTH CARE EDUCATION/TRAINING PROGRAM

## 2022-07-08 PROCEDURE — 77014 PR  CT GUIDANCE PLACEMENT RAD THERAPY FIELDS: CPT | Mod: 26,,, | Performed by: STUDENT IN AN ORGANIZED HEALTH CARE EDUCATION/TRAINING PROGRAM

## 2022-07-08 PROCEDURE — 77014 PR  CT GUIDANCE PLACEMENT RAD THERAPY FIELDS: ICD-10-PCS | Mod: 26,,, | Performed by: STUDENT IN AN ORGANIZED HEALTH CARE EDUCATION/TRAINING PROGRAM

## 2022-07-08 NOTE — PLAN OF CARE
.Pt here for 7  of  35 out patient radiation treatment . Pt no complaints and in stable condition.

## 2022-07-11 PROCEDURE — 77014 PR  CT GUIDANCE PLACEMENT RAD THERAPY FIELDS: CPT | Mod: 26,,, | Performed by: STUDENT IN AN ORGANIZED HEALTH CARE EDUCATION/TRAINING PROGRAM

## 2022-07-11 PROCEDURE — 77386 HC IMRT, COMPLEX: CPT | Mod: PN | Performed by: STUDENT IN AN ORGANIZED HEALTH CARE EDUCATION/TRAINING PROGRAM

## 2022-07-11 PROCEDURE — 77014 HC CT GUIDANCE RADIATION THERAPY FLDS PLACEMENT: CPT | Mod: TC,PN | Performed by: STUDENT IN AN ORGANIZED HEALTH CARE EDUCATION/TRAINING PROGRAM

## 2022-07-11 PROCEDURE — 77014 PR  CT GUIDANCE PLACEMENT RAD THERAPY FIELDS: ICD-10-PCS | Mod: 26,,, | Performed by: STUDENT IN AN ORGANIZED HEALTH CARE EDUCATION/TRAINING PROGRAM

## 2022-07-12 PROCEDURE — 77014 PR  CT GUIDANCE PLACEMENT RAD THERAPY FIELDS: CPT | Mod: 26,,, | Performed by: STUDENT IN AN ORGANIZED HEALTH CARE EDUCATION/TRAINING PROGRAM

## 2022-07-12 PROCEDURE — 77014 HC CT GUIDANCE RADIATION THERAPY FLDS PLACEMENT: CPT | Mod: TC,PN | Performed by: STUDENT IN AN ORGANIZED HEALTH CARE EDUCATION/TRAINING PROGRAM

## 2022-07-12 PROCEDURE — 77386 HC IMRT, COMPLEX: CPT | Mod: PN | Performed by: STUDENT IN AN ORGANIZED HEALTH CARE EDUCATION/TRAINING PROGRAM

## 2022-07-12 PROCEDURE — 77014 PR  CT GUIDANCE PLACEMENT RAD THERAPY FIELDS: ICD-10-PCS | Mod: 26,,, | Performed by: STUDENT IN AN ORGANIZED HEALTH CARE EDUCATION/TRAINING PROGRAM

## 2022-07-13 ENCOUNTER — CLINICAL SUPPORT (OUTPATIENT)
Dept: REHABILITATION | Facility: HOSPITAL | Age: 77
End: 2022-07-13
Attending: STUDENT IN AN ORGANIZED HEALTH CARE EDUCATION/TRAINING PROGRAM
Payer: MEDICARE

## 2022-07-13 DIAGNOSIS — R53.81 DEBILITY: Primary | ICD-10-CM

## 2022-07-13 DIAGNOSIS — Z91.89 AT RISK FOR LYMPHEDEMA: ICD-10-CM

## 2022-07-13 PROCEDURE — 77014 PR  CT GUIDANCE PLACEMENT RAD THERAPY FIELDS: ICD-10-PCS | Mod: 26,,, | Performed by: STUDENT IN AN ORGANIZED HEALTH CARE EDUCATION/TRAINING PROGRAM

## 2022-07-13 PROCEDURE — 77386 HC IMRT, COMPLEX: CPT | Mod: PN | Performed by: STUDENT IN AN ORGANIZED HEALTH CARE EDUCATION/TRAINING PROGRAM

## 2022-07-13 PROCEDURE — 77014 HC CT GUIDANCE RADIATION THERAPY FLDS PLACEMENT: CPT | Mod: TC,PN | Performed by: STUDENT IN AN ORGANIZED HEALTH CARE EDUCATION/TRAINING PROGRAM

## 2022-07-13 PROCEDURE — 77336 RADIATION PHYSICS CONSULT: CPT | Mod: PN | Performed by: STUDENT IN AN ORGANIZED HEALTH CARE EDUCATION/TRAINING PROGRAM

## 2022-07-13 PROCEDURE — 97110 THERAPEUTIC EXERCISES: CPT | Mod: PN

## 2022-07-13 PROCEDURE — 77014 PR  CT GUIDANCE PLACEMENT RAD THERAPY FIELDS: CPT | Mod: 26,,, | Performed by: STUDENT IN AN ORGANIZED HEALTH CARE EDUCATION/TRAINING PROGRAM

## 2022-07-13 NOTE — PATIENT INSTRUCTIONS
Ankle Foot Orthosis      Will benefit from bilateral AFOs to support weakness in anterior tibialis. Recommend getting prescription for AFOs and visit [facility] to assist in using insurance.    May benefit from rolling walker for support     Compact Mini Seated Elliptical Desk Exercise Equipment

## 2022-07-13 NOTE — PROGRESS NOTES
[facility] Outpatient Physical Therapy Re-Assessment and Wellness Daily Note     Treatment Date: 7/13/2022    Name: Arthur Ribeiro  Clinic Number: [id number]  Diagnosis:   Encounter Diagnoses   Name Primary?    Debility Yes    At risk for lymphedema      Physician: Thuan Peñaloza Jr., *  Precautions: Standard, Cancer and Fall   Radiation: Will start June 27th and chemo will be next day once a week.   Chemotherapy:underwent chemo and completed 3 weeks ago, and will begin another round of chemo after June 27th.    Visit #: 5 of 16  PTA Visit #: 1  Time In: 03:00 PM  Time Out: 04:00 PM  Total Treatment Time 1:1: 55    Evaluation Date: 06/15/2022  Re-assessment date: 7/13/2022  Visit # authorized: 25  Authorization period: 06/14/2022- 12/31/2022  Plan of care Expiration: 10 visits/ 30 days  MD referral: PT Eval and Treat    Subjective     Pt reports: reports a fall about 2 weeks ago when he got hung up on the end of a couch, fell onto carpet.  Like the way they fit, but didn't like how much they cost. Pt reports he has not been so diligent with his home exercises.   Response to previous treatment: good    Pain, Nature, Location: 4/10 Nerve ending pain in stomach area from shingles comes in waves    Objective     Pt performed hand washing prior to start of session.   Pre-treatment: O2 sats 98%, HR 76  Pt demonstrates unsteady gait pattern with ambulation into clinic with use of stick in R hand.    Range of Motion - UE at eval 2/25/2022  (R) shoulder flexion 125, abduction 130, ER/IR WFL, elbow flexion/extension WNL  (L) shoulder flexion 130, abduction 130, ER/IR WFL, elbow flexion/extension WNL    Range of Motion - UE at eval 06/15/2022  (R) shoulder flexion 132, abduction 145, ER/IR WFL, elbow flexion/extension WNL  (L) shoulder flexion 128, abduction 140, ER/IR WFL, elbow flexion/extension WNL   ROM UE Reassess: 7/13/2022  (R) shoulder flexion 150, abduction 150, ER/IR WFL, elbow flexion/extension  "WNL  (L) shoulder flexion 145, abduction 150, ER/IR WFL, elbow flexion/extension WNL    Upper Extremity strength:6/15/2022; no changes in UE strength at reassess on 7/13/2022  R UE grossly 4-/5 shoulders, 4/5 elbow flex/ext  L UE grossly 4-/5 shoulders, 4/5 elbow flex/ext     Lower Extremity Strength: eval 2/25/2022  B LE hip flexion 4-/5, hip abd 4-/5, knee extension 4+/5, knee flexion 4-/5, ankle DF 3-/5  EVAL 6/15/2022 no changes in LE strength at reassess on 7/13/2022  B LE hip flexion 4-/5, hip abd 4-/5, knee extension 4+/5, knee flexion 4/5, ankle L 2+/5, R 3-/5  Limited mobility in ankle and toes B.      Current Functional Status:  Gait: ambulated with walking stick, high steppage gait to compensate for bilateral foot drop, increased ALEXANDRE, unsteady gait pattern with holding onto walls intermittently  Transfers: mod I  Bed Mobility: mod I     Girth Measurements (in centimeters)  LANDMARK LEFT LE EVAL 2/25/2022 RIGHT LE EVAL 2/25/2022 LEFT LE EVAL   06/15/2022  RIGHT LE EVAL  06/15/2022  LEFT LE RE-ASSESS   7/13/2022 RIGHT LE RE-ASSESS   7/13/2022   Cervical flexion  WNL   WNL   wnl    Cervical Extension 15 deg past neutral / forward head   10 deg past neutral with forward head   45 deg    Rotation  65 deg 65 deg 50 deg 85 deg 80 deg 73 deg   Cervical SB 20 deg 25 deg 28 deg with compensatory motion 40 deg 40 deg 35 deg                 Girth 2" below earlobe 41.7 cm   39.1cm   39.1 cm    Girth 3" below earlobe 39.6 cm   35.5 cm   35.8 cm    Girth 4" below earlobe 37.0 cm   35.7 cm   34.8 cm    Manibular Depression 35 mm   33.5 cm       Reduction in girth of neck circumference, however pt with reduction in weight as well.     Sensation: intact  EVAL 6/15/2022 Functional Test:  6 min walk test completed full 6 minutes with cane and use of hand occasionally on wall total of 780 feet  TUG test: 17.49 sec  Sit to stand test: 6 reps with no control of descension noted.    RE-Assess 7/13/2022  6 min walk test completed " "full 6 minutes with cane and use of hand frequently on wall total of 302 feet  TUG test: 17.36 sec  Sit to stand test: 5 reps with poor control of LE when transitioning from sitting to standing with patient holding onto chair with posterior lean in stance.  Arthur received therapeutic exercises to develop strength, endurance, ROM, flexibility, posture and core stabilization for 55 minutes including:  Nustep L4 for 12 minutes,  (no straps needed to keep feet on plates)  (O2 sats 97%, HR 88 after)  Sit to stand ex from seat x 5 reps  6 min walk test with use of cane.  Recommendations made: AFO B LE, advised to see [facility] to obtain, and request MD for order. Also recommending pt to obtain a stationary sitting elliptical for exercise at home that does not take too much concentration. Advised pt that he would benefit from a rolling walker on his weaker days to improve safety.     deferredSeated in chair with airex pad facing mirror for visual feedback of sitting posture. Pt sits slumped into PPT but able to correct with cues for upright posture, limited endurance to maintain.  -LAQ with 10 sec hold x 5 reps each  -hip abd RTB 3x 10 rep  - rows with RTB (cues to sit unsupported, upright posture) 2x10 reps  -SAP B UE YTB 2x10 reps  Supine:  -hook lying pelvic tilts 2x10 reps  -hook lying pelvic tilts with hip add ball squeeze 2x10 reps  -SLR's 2x5 reps  -supine hip abd 2x10 each  Seated edge of mat "T" robbin 10 sec hold x 10 reps    Patient and wife where provided handout of lower extremity lymphatic flow exercises and reviewed with patient. Instructed to perform daily, instructing wife to assist with ROM to toes and ankle due to bilateral drop foot. (reviewed)    Written Home Exercises Provided: yes.  Exercises were reviewed and Arthur was able to demonstrate them prior to the end of the session.  Arthur demonstrated good  understanding of the education provided.     See EMR under Patient Instructions for " exercises provided 6/20/2022.    Education provided re: importance of compression garments for chronic ongoing leg swelling as well as importance of lymphatic flow exercies performed daily.  Arthur verbalized good  understanding of education provided.     Assessment   Patient with reduction in his 6 MWT distance with reduction in jasen for 6 minutes and only able to complete 5 min of the 6 min, likely due to pt being on chem treatments and having less energy. Will add some neuromuscular training next session. He has poor proprioception in his feet and this places him in decreased control over stance posture. Recommending use of RW for his safety. As well as B AFO to clear his feet with less difficulty. Patient is highly motivated to participate in PT in order to work towards goals established, increase functional independence, strength and stability.   Arthur Is progressing well towards his goals.   Pt prognosis is Good.   This patient presents with diagnosis of primary malignant neoplasm of hypopharynx, referred to therapy PT/Lymphedema as patient reports decreased strength and mobility.  Patient also has a history of Burkitts lymphoma with complaints of residual side effects of weakness from chemo in the past. Base line measurements of ROM and cervical girth compared with last initial eval when pt first came to therapy 4 months ago. Pt was educated on signs and symptoms of lymphedema.      Patient presents with overall postural, UE and LE weakness, LE edema and decreased safety with lack of control in L foot with walking. Functionally patient reports limitations with endurance in community, house hold chores such as managing his own lawn, safety with ambulation. Also pt with need for AFO for L LE and also compression socks with wide band present at top. Recommending [employer] sock 15-20 mmHg and use of donning aid as well to assist.         Pt will continue to benefit from skilled outpatient physical therapy to  address the deficits listed in the problem list box on initial evaluation, provide pt/family education and to maximize pt's level of independence in the home and community environment.     Pt's spiritual, cultural and educational needs considered and pt agreeable to plan of care and goals.    Anticipated barriers to physical therapy: side effects of chemo    Goals:   Short Term Goals: (4 weeks) in progress  1. Patient to demonstrate improved postural awareness and sitting tolerance. In progress 7/13/2022  2. Patient will demonstrate 100% knowledge of lymphedema precautions and signs of infection. Met 7/13/2022  3. Patient will perform self lymph drainage techniques independently.  4. Patient will increase B UE strength 1/3 grade. NOT MET7/13/2022  5. Patient will increase B LE strength 1/3 grade for improved functional tranfers and decrease fall risk. NOT MET7/13/2022  6. Measure girth of B LE in next 2 visits provide recommendation on size of Jobst sock. GOAL MET 7/13/2022     Long Term Goals: (8 weeks)  1. Patient will increase B UE strength 1/3 grade.   2. Patient will increase B LE strength 1/3 grade for improved functional tranfers and decrease fall risk.   3. Patient will perform self lymph drainage techniques  4. Pt to demonstrate improved sit to stand test to 10 reps with control over descending from standing to sitting with improved quad control.  5. Pt to receive AFO to improve safety in gait.        Plan   Work on neuro re-ed, assist with stretching gastroc and ankle mobility manual techniques as well as working on strengthening overall.  Continue with established Plan of Care towards PT goals.    Therapist: Candi Aburto, PT, CLT  7/13/2022

## 2022-07-14 ENCOUNTER — DOCUMENTATION ONLY (OUTPATIENT)
Dept: RADIATION ONCOLOGY | Facility: CLINIC | Age: 77
End: 2022-07-14
Payer: MEDICARE

## 2022-07-14 DIAGNOSIS — C13.9 HYPOPHARYNGEAL CANCER: ICD-10-CM

## 2022-07-14 DIAGNOSIS — C13.9 PRIMARY MALIGNANT NEOPLASM OF HYPOPHARYNX: Primary | ICD-10-CM

## 2022-07-14 PROCEDURE — 77386 HC IMRT, COMPLEX: CPT | Mod: PN | Performed by: STUDENT IN AN ORGANIZED HEALTH CARE EDUCATION/TRAINING PROGRAM

## 2022-07-14 PROCEDURE — 77014 PR  CT GUIDANCE PLACEMENT RAD THERAPY FIELDS: CPT | Mod: 26,,, | Performed by: STUDENT IN AN ORGANIZED HEALTH CARE EDUCATION/TRAINING PROGRAM

## 2022-07-14 PROCEDURE — 77014 PR  CT GUIDANCE PLACEMENT RAD THERAPY FIELDS: ICD-10-PCS | Mod: 26,,, | Performed by: STUDENT IN AN ORGANIZED HEALTH CARE EDUCATION/TRAINING PROGRAM

## 2022-07-14 PROCEDURE — 77014 HC CT GUIDANCE RADIATION THERAPY FLDS PLACEMENT: CPT | Mod: TC,PN | Performed by: STUDENT IN AN ORGANIZED HEALTH CARE EDUCATION/TRAINING PROGRAM

## 2022-07-14 NOTE — PLAN OF CARE
.Pt here for 11  of 35 out patient radiation treatment . Pt no complaints and in stable condition.

## 2022-07-15 ENCOUNTER — CLINICAL SUPPORT (OUTPATIENT)
Dept: REHABILITATION | Facility: HOSPITAL | Age: 77
End: 2022-07-15
Attending: STUDENT IN AN ORGANIZED HEALTH CARE EDUCATION/TRAINING PROGRAM
Payer: MEDICARE

## 2022-07-15 DIAGNOSIS — Z91.89 AT RISK FOR LYMPHEDEMA: ICD-10-CM

## 2022-07-15 DIAGNOSIS — R53.81 DEBILITY: Primary | ICD-10-CM

## 2022-07-15 PROCEDURE — 77386 HC IMRT, COMPLEX: CPT | Mod: PN | Performed by: STUDENT IN AN ORGANIZED HEALTH CARE EDUCATION/TRAINING PROGRAM

## 2022-07-15 PROCEDURE — 77014 PR  CT GUIDANCE PLACEMENT RAD THERAPY FIELDS: ICD-10-PCS | Mod: 26,,, | Performed by: STUDENT IN AN ORGANIZED HEALTH CARE EDUCATION/TRAINING PROGRAM

## 2022-07-15 PROCEDURE — 77014 HC CT GUIDANCE RADIATION THERAPY FLDS PLACEMENT: CPT | Mod: TC,PN | Performed by: STUDENT IN AN ORGANIZED HEALTH CARE EDUCATION/TRAINING PROGRAM

## 2022-07-15 PROCEDURE — 77014 PR  CT GUIDANCE PLACEMENT RAD THERAPY FIELDS: CPT | Mod: 26,,, | Performed by: STUDENT IN AN ORGANIZED HEALTH CARE EDUCATION/TRAINING PROGRAM

## 2022-07-15 PROCEDURE — 97112 NEUROMUSCULAR REEDUCATION: CPT | Mod: PN,CQ

## 2022-07-15 PROCEDURE — 97110 THERAPEUTIC EXERCISES: CPT | Mod: PN,CQ

## 2022-07-15 NOTE — PROGRESS NOTES
"  [facility] Outpatient Physical Therapy and Wellness Daily Note     Treatment Date: 7/15/2022    Name: Arthur Ribeiro  Clinic Number: [id number]  Diagnosis:   Encounter Diagnoses   Name Primary?    Debility Yes    At risk for lymphedema      Physician: Thuan Peñaloza Jr., *  Precautions: Standard, Cancer and Fall   Radiation: Will start June 27th and chemo will be next day once a week.   Chemotherapy:underwent chemo and completed 3 weeks ago, and will begin another round of chemo after June 27th.    Visit #: 6 of 16  PTA Visit #: 1  Time In: 02:06 PM  Time Out: 03:00 PM  Total Treatment Time 1:1: 54    Evaluation Date: 06/15/2022  Re-assessment date: 7/13/2022  Visit # authorized: 25  Authorization period: 06/14/2022- 12/31/2022  Plan of care Expiration: 10 visits/ 30 days  MD referral: PT Eval and Treat    Subjective     Pt reports: that he has a wedding to go to next June and hopes to be dancing by then. "I can't pick my heels or toes up when I am standing." Pt reports some back pain today that "comes and goes. I think it's from the chemo." Pt is still having pain on his abdomen from shingles, "Do you think acupuncture could help me?"  Response to previous treatment: good    Pain, Nature, Location: 5/10 Nerve ending pain in stomach area from shingles comes in waves; back pain today    Objective     Pt performed hand washing prior to start of session.   Pre-treatment: O2 sats 98%, HR 76  Pt demonstrates unsteady gait pattern with ambulation into clinic with use of stick in R hand.  Last session:  Recommendations made: EDDIE MONROY, advised to see [facility] to obtain, and request MD for order. Also recommending pt to obtain a stationary sitting elliptical for exercise at home that does not take too much concentration. Advised pt that he would benefit from a rolling walker on his weaker days to improve safety.     Nustep L4 x 12' (940 steps; O2 sats 98% p. 100)  Seated in chair with airex " "pad facing mirror for visual feedback of sitting posture. Pt sits slumped into PPT but able to correct with cues for upright posture, limited endurance to maintain.  -sit to stand 2 x 10 reps  -LAQ with 10 sec hold x 5 reps each  -hip abd RTB 3x 10 rep  - rows with RTB (cues to sit unsupported, upright posture) 2x10 reps  -SAP B UE YTB 2x10 reps  -seated ankle PF/DF x 30 reps  Supine:  -hook lying pelvic tilts 2x10 reps  -hook lying pelvic tilts with hip add ball squeeze 2x10 reps  -SLR's 2x10 reps  -supine hip abd 2x10 each  -HSS with strap 20" x 3 ea  Seated edge of mat "T" robbin 10 sec hold x 10 reps    Pt received neuromuscular training as follows:  Weight shifting on Airex pad (min assist to maintain balance)  Marches on Airex pad x 10 (min assist to maintain balance)  Static standing on Airex pad (mod assist to maintain balance)    Written Home Exercises Provided: yes. Issued red theraband, and encouraged patient to perform seated ankle pumps daily  Exercises were reviewed and Arthur was able to demonstrate them prior to the end of the session.  Arthur demonstrated good  understanding of the education provided.     See EMR under Patient Instructions for exercises provided 6/20/2022.    Education provided re: importance of compression garments for chronic ongoing leg swelling as well as importance of lymphatic flow exercies performed daily.  Arthur verbalized good  understanding of education provided.     Assessment   Pt did well today, but is having new onset of LBP. He is unable to lift heels or toes against gravity in weightbearing, but can perform seated. Encouraged pt to perform seated ankle pumps several times a day. Message sent to [facility] to see if he is a candidate for acupuncture.   He has poor proprioception in his feet and this places him in decreased control over stance posture. Recommending use of RW for his safety. As well as B AFO to clear his feet with less difficulty. Patient is highly " motivated to participate in PT in order to work towards goals established, increase functional independence, strength and stability.   Arthur Is progressing well towards his goals.   Pt prognosis is Good.   This patient presents with diagnosis of primary malignant neoplasm of hypopharynx, referred to therapy PT/Lymphedema as patient reports decreased strength and mobility.  Patient also has a history of Burkitts lymphoma with complaints of residual side effects of weakness from chemo in the past. Base line measurements of ROM and cervical girth compared with last initial eval when pt first came to therapy 4 months ago. Pt was educated on signs and symptoms of lymphedema.      Patient presents with overall postural, UE and LE weakness, LE edema and decreased safety with lack of control in L foot with walking. Functionally patient reports limitations with endurance in community, house hold chores such as managing his own lawn, safety with ambulation. Also pt with need for AFO for L LE and also compression socks with wide band present at top. Recommending [employer] sock 15-20 mmHg and use of donning aid as well to assist.         Pt will continue to benefit from skilled outpatient physical therapy to address the deficits listed in the problem list box on initial evaluation, provide pt/family education and to maximize pt's level of independence in the home and community environment.     Pt's spiritual, cultural and educational needs considered and pt agreeable to plan of care and goals.    Anticipated barriers to physical therapy: side effects of chemo    Goals:   Short Term Goals: (4 weeks) in progress  1. Patient to demonstrate improved postural awareness and sitting tolerance. In progress 7/13/2022  2. Patient will demonstrate 100% knowledge of lymphedema precautions and signs of infection. Met 7/13/2022  3. Patient will perform self lymph drainage techniques independently.  4. Patient will increase B UE strength  1/3 grade. NOT MET7/13/2022  5. Patient will increase B LE strength 1/3 grade for improved functional tranfers and decrease fall risk. NOT MET7/13/2022  6. Measure girth of B LE in next 2 visits provide recommendation on size of Jobst sock. GOAL MET 7/13/2022     Long Term Goals: (8 weeks)  1. Patient will increase B UE strength 1/3 grade.   2. Patient will increase B LE strength 1/3 grade for improved functional tranfers and decrease fall risk.   3. Patient will perform self lymph drainage techniques  4. Pt to demonstrate improved sit to stand test to 10 reps with control over descending from standing to sitting with improved quad control.  5. Pt to receive AFO to improve safety in gait.        Plan   Work on neuro re-ed, assist with stretching gastroc and ankle mobility manual techniques as well as working on strengthening overall.  Continue with established Plan of Care towards PT goals.    Therapist: Malou Lam PTA, CLT  7/15/2022  PT/PTA met face to face to discuss patient's treatment plan and progress towards established goals.   Patient will be seen by physical therapist every sixth visit and minimally once per month.

## 2022-07-18 PROCEDURE — 77014 PR  CT GUIDANCE PLACEMENT RAD THERAPY FIELDS: ICD-10-PCS | Mod: 26,,, | Performed by: STUDENT IN AN ORGANIZED HEALTH CARE EDUCATION/TRAINING PROGRAM

## 2022-07-18 PROCEDURE — 77014 PR  CT GUIDANCE PLACEMENT RAD THERAPY FIELDS: CPT | Mod: 26,,, | Performed by: STUDENT IN AN ORGANIZED HEALTH CARE EDUCATION/TRAINING PROGRAM

## 2022-07-18 PROCEDURE — 77014 HC CT GUIDANCE RADIATION THERAPY FLDS PLACEMENT: CPT | Mod: TC,PN | Performed by: STUDENT IN AN ORGANIZED HEALTH CARE EDUCATION/TRAINING PROGRAM

## 2022-07-18 PROCEDURE — 77386 HC IMRT, COMPLEX: CPT | Mod: PN | Performed by: STUDENT IN AN ORGANIZED HEALTH CARE EDUCATION/TRAINING PROGRAM

## 2022-07-19 PROCEDURE — 77386 HC IMRT, COMPLEX: CPT | Mod: PN | Performed by: STUDENT IN AN ORGANIZED HEALTH CARE EDUCATION/TRAINING PROGRAM

## 2022-07-19 PROCEDURE — 77014 HC CT GUIDANCE RADIATION THERAPY FLDS PLACEMENT: CPT | Mod: TC,PN | Performed by: STUDENT IN AN ORGANIZED HEALTH CARE EDUCATION/TRAINING PROGRAM

## 2022-07-19 PROCEDURE — 77014 PR  CT GUIDANCE PLACEMENT RAD THERAPY FIELDS: ICD-10-PCS | Mod: 26,,, | Performed by: STUDENT IN AN ORGANIZED HEALTH CARE EDUCATION/TRAINING PROGRAM

## 2022-07-19 PROCEDURE — 77014 PR  CT GUIDANCE PLACEMENT RAD THERAPY FIELDS: CPT | Mod: 26,,, | Performed by: STUDENT IN AN ORGANIZED HEALTH CARE EDUCATION/TRAINING PROGRAM

## 2022-07-20 ENCOUNTER — CLINICAL SUPPORT (OUTPATIENT)
Dept: REHABILITATION | Facility: HOSPITAL | Age: 77
End: 2022-07-20
Attending: STUDENT IN AN ORGANIZED HEALTH CARE EDUCATION/TRAINING PROGRAM
Payer: MEDICARE

## 2022-07-20 ENCOUNTER — DOCUMENTATION ONLY (OUTPATIENT)
Dept: RADIATION ONCOLOGY | Facility: CLINIC | Age: 77
End: 2022-07-20
Payer: MEDICARE

## 2022-07-20 DIAGNOSIS — R53.81 DEBILITY: Primary | ICD-10-CM

## 2022-07-20 PROCEDURE — 77014 PR  CT GUIDANCE PLACEMENT RAD THERAPY FIELDS: ICD-10-PCS | Mod: 26,,, | Performed by: STUDENT IN AN ORGANIZED HEALTH CARE EDUCATION/TRAINING PROGRAM

## 2022-07-20 PROCEDURE — 97112 NEUROMUSCULAR REEDUCATION: CPT | Mod: PN

## 2022-07-20 PROCEDURE — 77386 HC IMRT, COMPLEX: CPT | Mod: PN | Performed by: STUDENT IN AN ORGANIZED HEALTH CARE EDUCATION/TRAINING PROGRAM

## 2022-07-20 PROCEDURE — 77014 HC CT GUIDANCE RADIATION THERAPY FLDS PLACEMENT: CPT | Mod: TC,PN | Performed by: STUDENT IN AN ORGANIZED HEALTH CARE EDUCATION/TRAINING PROGRAM

## 2022-07-20 PROCEDURE — 77336 RADIATION PHYSICS CONSULT: CPT | Mod: PN | Performed by: STUDENT IN AN ORGANIZED HEALTH CARE EDUCATION/TRAINING PROGRAM

## 2022-07-20 PROCEDURE — 77014 PR  CT GUIDANCE PLACEMENT RAD THERAPY FIELDS: CPT | Mod: 26,,, | Performed by: STUDENT IN AN ORGANIZED HEALTH CARE EDUCATION/TRAINING PROGRAM

## 2022-07-20 PROCEDURE — 97140 MANUAL THERAPY 1/> REGIONS: CPT | Mod: PN

## 2022-07-20 PROCEDURE — 97110 THERAPEUTIC EXERCISES: CPT | Mod: PN

## 2022-07-20 NOTE — PLAN OF CARE
Completed 15 of 35 outpatient radiation treatments.  Patient has still been tolerating oral intake and has not yet started tube feedings.  Weight is decreasing.  Patient also feels fatigued.  All questions/concerns addressed by MD this visit.

## 2022-07-20 NOTE — PROGRESS NOTES
[facility] Outpatient Physical Therapy and Wellness Daily Note     Treatment Date: 7/20/2022    Name: Arthur Ribeiro  Clinic Number: [id number]  Diagnosis:   No diagnosis found.  Physician: Thuan Peñaloza Jr., *  Precautions: Standard, Cancer and Fall   Radiation: Will start June 27th and chemo will be next day once a week.   Chemotherapy:underwent chemo and completed 3 weeks ago, and will begin another round of chemo after June 27th.    Visit #: 7 of 16  PTA Visit #: 0  Time In: 11:13 AM late arrival from other medical appt  Time Out: 12:10 PM  Total Treatment Time 1:1: 57    Evaluation Date: 06/15/2022  Re-assessment date: 7/13/2022  Visit # authorized: 25  Authorization period: 06/14/2022- 12/31/2022  Plan of care Expiration: 10 visits/ 30 days  MD referral: PT Eval and Treat    Subjective     Pt reports: took medicine prior to coming to help with stomach nauseau. Wife present reports they tried to go to O&P and were told they needed appt, they set that up to look into getting afos. Pt reports feeling not so great today, from chemo tx.   Response to previous treatment: good    Pain, Nature, Location: 5/10 Nerve ending pain in stomach area from shingles comes in waves; back pain today    Objective     Pt performed hand washing prior to start of session.   Pre-treatment: O2 sats 98%, HR 76  Pt demonstrates unsteady gait pattern with ambulation into clinic with use of stick in R hand.  Prior session:  Recommendations made: EDDIE MONROY, advised to see [facility] to obtain, and request MD for order. Also recommending pt to obtain a stationary sitting elliptical for exercise at home that does not take too much concentration. Advised pt that he would benefit from a rolling walker on his weaker days to improve safety.     Manual techniques 15 min worked on talocrural jt mob and lateral fib head to facilitate increase in dorsiflexion available range as well as manual gastroc stretch with contract  "relax. This did allow pt to have improved available dorsiflexion B.   Exercises x 15 minutes: worked on instrinsics for B feet with Kleenex series A/B with tactile cues, toe flexion ex, toe extension ex, alternating gastroc and ant tib B LE with manual resistance for gastroc to work opp side of activation of ant tib. Standing gluteal sets 5 sec hold 10 reps, isometric hold x 10 reps each with hand on barre.  Neuro-re-ed: 40 min: pt stands with center of gravity in posterior position with very poor sensation/ proprioception in B feet with tightness in hip flexors and uses  Upper trunk to comes forward.  Worked on transitioning COG forward to midline of feet with mat behind him, moving head with cervical flexion to neutral with eyes on floor then eyes straight. Worked with cga/min assist to bend knees and perform wavering ant /post.  Next in front of mirror for feedback perf wavers for COG correction, followed by lateral trunk motion with upper trunk rotation  Deferred   Nustep L4 x 12' (940 steps; O2 sats 98% p. 100)  Seated in chair with airex pad facing mirror for visual feedback of sitting posture. Pt sits slumped into PPT but able to correct with cues for upright posture, limited endurance to maintain.  -sit to stand 2 x 10 reps  -LAQ with 10 sec hold x 5 reps each  -hip abd RTB 3x 10 rep  - rows with RTB (cues to sit unsupported, upright posture) 2x10 reps  -SAP B UE YTB 2x10 reps  -seated ankle PF/DF x 30 reps  Supine:  -hook lying pelvic tilts 2x10 reps  -hook lying pelvic tilts with hip add ball squeeze 2x10 reps  -SLR's 2x10 reps  -supine hip abd 2x10 each  -HSS with strap 20" x 3 ea  Seated edge of mat "T" robbin 10 sec hold x 10 reps    Pt received neuromuscular training as follows:  Weight shifting on Airex pad (min assist to maintain balance)  Marches on Airex pad x 10 (min assist to maintain balance)  Static standing on Airex pad (mod assist to maintain balance)    Written Home Exercises Provided: yes. " Issued red theraband, and encouraged patient to perform seated ankle pumps daily  Exercises were reviewed and Arthur was able to demonstrate them prior to the end of the session.  Arthur demonstrated good  understanding of the education provided.     See EMR under Patient Instructions for exercises provided 6/20/2022.    Education provided re: importance of compression garments for chronic ongoing leg swelling as well as importance of lymphatic flow exercies performed daily.  Arthur verbalized good  understanding of education provided.     Assessment   Pt reporting fatigue today from chemo tx, altered session to accomodate and focused on ankle ROM/ recruiting ant tib and intrinsic musculature as well as balance / neuro re-ed. Pt demonstrated improvement in toe motion and ant tib recruitment after session.   He has poor proprioception in his feet and this places him in decreased control over stance posture. Recommending use of RW for his safety. As well as B AFO to clear his feet with less difficulty. Patient is highly motivated to participate in PT in order to work towards goals established, increase functional independence, strength and stability.   Arthur Is progressing well towards his goals.   Pt prognosis is Good.   This patient presents with diagnosis of primary malignant neoplasm of hypopharynx, referred to therapy PT/Lymphedema as patient reports decreased strength and mobility.  Patient also has a history of Burkitts lymphoma with complaints of residual side effects of weakness from chemo in the past. Base line measurements of ROM and cervical girth compared with last initial eval when pt first came to therapy 4 months ago. Pt was educated on signs and symptoms of lymphedema.      Patient presents with overall postural, UE and LE weakness, LE edema and decreased safety with lack of control in L foot with walking. Functionally patient reports limitations with endurance in community, house hold chores such as  managing his own lawn, safety with ambulation. Also pt with need for AFO for L LE and also compression socks with wide band present at top. Recommending [employer] sock 15-20 mmHg and use of donning aid as well to assist.         Pt will continue to benefit from skilled outpatient physical therapy to address the deficits listed in the problem list box on initial evaluation, provide pt/family education and to maximize pt's level of independence in the home and community environment.     Pt's spiritual, cultural and educational needs considered and pt agreeable to plan of care and goals.    Anticipated barriers to physical therapy: side effects of chemo    Goals:   Short Term Goals: (4 weeks) in progress  1. Patient to demonstrate improved postural awareness and sitting tolerance. In progress 7/13/2022  2. Patient will demonstrate 100% knowledge of lymphedema precautions and signs of infection. Met 7/13/2022  3. Patient will perform self lymph drainage techniques independently.  4. Patient will increase B UE strength 1/3 grade. NOT MET7/13/2022  5. Patient will increase B LE strength 1/3 grade for improved functional tranfers and decrease fall risk. NOT MET7/13/2022  6. Measure girth of B LE in next 2 visits provide recommendation on size of Jobst sock. GOAL MET 7/13/2022     Long Term Goals: (8 weeks) IN PROGRESS  1. Patient will increase B UE strength 1/3 grade.   2. Patient will increase B LE strength 1/3 grade for improved functional tranfers and decrease fall risk.   3. Patient will perform self lymph drainage techniques  4. Pt to demonstrate improved sit to stand test to 10 reps with control over descending from standing to sitting with improved quad control.  5. Pt to receive AFO to improve safety in gait.        Plan   Continue strengthening as tolerated, work on neuro re-ed, assist with stretching gastroc and ankle mobility manual techniques as well as working on strengthening overall.  Continue with  established Plan of Care towards PT goals.    Therapist: Candi Aburto PT, CLT  7/20/2022  PT/PTA met face to face to discuss patient's treatment plan and progress towards established goals.   Patient will be seen by physical therapist every sixth visit and minimally once per month.

## 2022-07-21 PROCEDURE — 77014 HC CT GUIDANCE RADIATION THERAPY FLDS PLACEMENT: CPT | Mod: TC,PN | Performed by: STUDENT IN AN ORGANIZED HEALTH CARE EDUCATION/TRAINING PROGRAM

## 2022-07-21 PROCEDURE — 77014 PR  CT GUIDANCE PLACEMENT RAD THERAPY FIELDS: ICD-10-PCS | Mod: 26,,, | Performed by: STUDENT IN AN ORGANIZED HEALTH CARE EDUCATION/TRAINING PROGRAM

## 2022-07-21 PROCEDURE — 77386 HC IMRT, COMPLEX: CPT | Mod: PN | Performed by: STUDENT IN AN ORGANIZED HEALTH CARE EDUCATION/TRAINING PROGRAM

## 2022-07-21 PROCEDURE — 77014 PR  CT GUIDANCE PLACEMENT RAD THERAPY FIELDS: CPT | Mod: 26,,, | Performed by: STUDENT IN AN ORGANIZED HEALTH CARE EDUCATION/TRAINING PROGRAM

## 2022-07-22 ENCOUNTER — CLINICAL SUPPORT (OUTPATIENT)
Dept: REHABILITATION | Facility: HOSPITAL | Age: 77
End: 2022-07-22
Attending: STUDENT IN AN ORGANIZED HEALTH CARE EDUCATION/TRAINING PROGRAM
Payer: MEDICARE

## 2022-07-22 DIAGNOSIS — R53.81 DEBILITY: Primary | ICD-10-CM

## 2022-07-22 PROCEDURE — 77386 HC IMRT, COMPLEX: CPT | Mod: PN | Performed by: STUDENT IN AN ORGANIZED HEALTH CARE EDUCATION/TRAINING PROGRAM

## 2022-07-22 PROCEDURE — 97112 NEUROMUSCULAR REEDUCATION: CPT | Mod: PN,CQ

## 2022-07-22 PROCEDURE — 77014 PR  CT GUIDANCE PLACEMENT RAD THERAPY FIELDS: ICD-10-PCS | Mod: 26,,, | Performed by: STUDENT IN AN ORGANIZED HEALTH CARE EDUCATION/TRAINING PROGRAM

## 2022-07-22 PROCEDURE — 77014 HC CT GUIDANCE RADIATION THERAPY FLDS PLACEMENT: CPT | Mod: TC,PN | Performed by: STUDENT IN AN ORGANIZED HEALTH CARE EDUCATION/TRAINING PROGRAM

## 2022-07-22 PROCEDURE — 77014 PR  CT GUIDANCE PLACEMENT RAD THERAPY FIELDS: CPT | Mod: 26,,, | Performed by: STUDENT IN AN ORGANIZED HEALTH CARE EDUCATION/TRAINING PROGRAM

## 2022-07-22 PROCEDURE — 97110 THERAPEUTIC EXERCISES: CPT | Mod: PN,CQ

## 2022-07-22 NOTE — PROGRESS NOTES
"  [facility] Outpatient Physical Therapy and Wellness Daily Note     Treatment Date: 7/22/2022    Name: Arthur Ribeiro  Clinic Number: [id number]  Diagnosis:   Encounter Diagnosis   Name Primary?    Debility Yes     Physician: Thuan Peñaloza Jr., *  Precautions: Standard, Cancer and Fall   Radiation: Will start June 27th and chemo will be next day once a week.   Chemotherapy:underwent chemo and completed 3 weeks ago, and will begin another round of chemo after June 27th.    Visit #: 8 of 16  PTA Visit #: 1  Time In: 2:00 PM  Time Out: 3:00 PM  Total Treatment Time 1:1: 57    Evaluation Date: 06/15/2022  Re-assessment date: 7/13/2022  Visit # authorized: 25  Authorization period: 06/14/2022- 12/31/2022  Plan of care Expiration: 10 visits/ 30 days  MD referral: PT Eval and Treat    Subjective     Pt reports: Feeling much better than last session. Still having the pain on his abdomen from the shingles that "comes and goes."    Response to previous treatment: good    Pain, Nature, Location: 5/10 Nerve ending pain in stomach area from shingles comes in waves; back pain today    Objective       Pre-treatment: O2 sats 98%, HR 73  Pt demonstrates unsteady gait pattern with ambulation into clinic with use of stick in R hand. Pt compliant with wearing compression socks to bilateral lower extremities.   Prior session:  Recommendations made: EDDIE MONROY, advised to see [facility] to obtain, and request MD for order. Also recommending pt to obtain a stationary sitting elliptical for exercise at home that does not take too much concentration. Advised pt that he would benefit from a rolling walker on his weaker days to improve safety.     Nustep L4 x 10'   gastroc stretch on wedge 20" x 3  Recumbent bike L2 x 5' (O2 sats 100% p. 90) Feet came out of pedals several times  -sit to stand 2 x 10 reps  -LAQ with 10 sec hold x 5 reps each  -hip abd RTB 3x 10 rep  - rows with RTB (cues to sit unsupported, upright " "posture) 2x10 reps  -SAP B UE YTB 2x10 reps  -seated ankle PF/DF x 30 reps  Supine:  -hook lying pelvic tilts 2x10 reps  -hook lying pelvic tilts with hip add ball squeeze 2x10 reps  -SLR's 2x10 reps  -supine hip abd 2x10 each  -HSS with strap 20" x 3 ea  Seated edge of mat "T" robbin 10 sec hold x 10 reps    Pt received neuromuscular training as follows:  Weight shifting on Airex pad (min assist to maintain balance)  Marches on Airex pad x 10 (mod assist to maintain balance)  Static standing on Airex pad (min assist to maintain balance)        Deferred   Manual techniques 15 min worked on talocrural jt mob and lateral fib head to facilitate increase in dorsiflexion available range as well as manual gastroc stretch with contract relax. This did allow pt to have improved available dorsiflexion B.   Exercises x 15 minutes: worked on instrinsics for B feet with Kleenex series A/B with tactile cues, toe flexion ex, toe extension ex, alternating gastroc and ant tib B LE with manual resistance for gastroc to work opp side of activation of ant tib. Standing gluteal sets 5 sec hold 10 reps, isometric hold x 10 reps each with hand on barre.  Neuro-re-ed: 40 min: pt stands with center of gravity in posterior position with very poor sensation/ proprioception in B feet with tightness in hip flexors and uses  Upper trunk to comes forward.  Worked on transitioning COG forward to midline of feet with mat behind him, moving head with cervical flexion to neutral with eyes on floor then eyes straight. Worked with cga/min assist to bend knees and perform wavering ant /post.  Next in front of mirror for feedback perf wavers for COG correction, followed by lateral trunk motion with upper trunk rotation      Written Home Exercises Provided:  Issued red theraband, and encouraged patient to perform seated ankle pumps daily (prior session)  Exercises were reviewed and Arthur was able to demonstrate them prior to the end of the session.  Arthur " demonstrated good  understanding of the education provided.     See EMR under Patient Instructions for exercises provided 6/20/2022.    Education provided re: importance of compression garments for chronic ongoing leg swelling as well as importance of lymphatic flow exercies performed daily.  Arthur verbalized good  understanding of education provided.     Assessment   Pt was feeling better today and did well with his exercises and balance activities. In static standing on Airex pad, he tends to lean back and with weight shifting and marches tends to lean forward.   He has poor proprioception in his feet and limited PF/DF and this places him in decreased control over stance posture. Recommending use of RW for his safety. As well as B AFO to clear his feet with less difficulty. Patient is highly motivated to participate in PT in order to work towards goals established, increase functional independence, strength and stability.   Arthur Is progressing well towards his goals.   Pt prognosis is Good.   This patient presents with diagnosis of primary malignant neoplasm of hypopharynx, referred to therapy PT/Lymphedema as patient reports decreased strength and mobility.  Patient also has a history of Burkitts lymphoma with complaints of residual side effects of weakness from chemo in the past. Base line measurements of ROM and cervical girth compared with last initial eval when pt first came to therapy 4 months ago. Pt was educated on signs and symptoms of lymphedema.      Patient presents with overall postural, UE and LE weakness, LE edema and decreased safety with lack of control in L foot with walking. Functionally patient reports limitations with endurance in community, house hold chores such as managing his own lawn, safety with ambulation. Also pt with need for AFO for L LE and also compression socks with wide band present at top. Recommending [employer] sock 15-20 mmHg and use of donning aid as well to assist.          Pt will continue to benefit from skilled outpatient physical therapy to address the deficits listed in the problem list box on initial evaluation, provide pt/family education and to maximize pt's level of independence in the home and community environment.     Pt's spiritual, cultural and educational needs considered and pt agreeable to plan of care and goals.    Anticipated barriers to physical therapy: side effects of chemo    Goals:   Short Term Goals: (4 weeks) in progress  1. Patient to demonstrate improved postural awareness and sitting tolerance. In progress 7/13/2022  2. Patient will demonstrate 100% knowledge of lymphedema precautions and signs of infection. Met 7/13/2022  3. Patient will perform self lymph drainage techniques independently.  4. Patient will increase B UE strength 1/3 grade. NOT MET7/13/2022  5. Patient will increase B LE strength 1/3 grade for improved functional tranfers and decrease fall risk. NOT MET7/13/2022  6. Measure girth of B LE in next 2 visits provide recommendation on size of Jobst sock. GOAL MET 7/13/2022     Long Term Goals: (8 weeks) IN PROGRESS  1. Patient will increase B UE strength 1/3 grade.   2. Patient will increase B LE strength 1/3 grade for improved functional tranfers and decrease fall risk.   3. Patient will perform self lymph drainage techniques  4. Pt to demonstrate improved sit to stand test to 10 reps with control over descending from standing to sitting with improved quad control.  5. Pt to receive AFO to improve safety in gait.        Plan   Continue strengthening as tolerated, work on neuro re-ed, assist with stretching gastroc and ankle mobility manual techniques as well as working on strengthening overall.  Continue with established Plan of Care towards PT goals.    Therapist: Malou Lam PTA, CLT  7/22/2022  PT/PTA met face to face to discuss patient's treatment plan and progress towards established goals.   Patient will be seen by physical  therapist every sixth visit and minimally once per month.

## 2022-07-25 PROCEDURE — 77014 HC CT GUIDANCE RADIATION THERAPY FLDS PLACEMENT: CPT | Mod: TC,PN | Performed by: STUDENT IN AN ORGANIZED HEALTH CARE EDUCATION/TRAINING PROGRAM

## 2022-07-25 PROCEDURE — 77014 PR  CT GUIDANCE PLACEMENT RAD THERAPY FIELDS: CPT | Mod: 26,,, | Performed by: STUDENT IN AN ORGANIZED HEALTH CARE EDUCATION/TRAINING PROGRAM

## 2022-07-25 PROCEDURE — 77386 HC IMRT, COMPLEX: CPT | Mod: PN | Performed by: STUDENT IN AN ORGANIZED HEALTH CARE EDUCATION/TRAINING PROGRAM

## 2022-07-25 PROCEDURE — 77014 PR  CT GUIDANCE PLACEMENT RAD THERAPY FIELDS: ICD-10-PCS | Mod: 26,,, | Performed by: STUDENT IN AN ORGANIZED HEALTH CARE EDUCATION/TRAINING PROGRAM

## 2022-07-26 ENCOUNTER — TELEPHONE (OUTPATIENT)
Dept: HEMATOLOGY/ONCOLOGY | Facility: CLINIC | Age: 77
End: 2022-07-26
Payer: MEDICARE

## 2022-07-26 PROCEDURE — 77014 HC CT GUIDANCE RADIATION THERAPY FLDS PLACEMENT: CPT | Mod: TC,PN | Performed by: STUDENT IN AN ORGANIZED HEALTH CARE EDUCATION/TRAINING PROGRAM

## 2022-07-26 PROCEDURE — 77014 PR  CT GUIDANCE PLACEMENT RAD THERAPY FIELDS: ICD-10-PCS | Mod: 26,,, | Performed by: STUDENT IN AN ORGANIZED HEALTH CARE EDUCATION/TRAINING PROGRAM

## 2022-07-26 PROCEDURE — 77014 PR  CT GUIDANCE PLACEMENT RAD THERAPY FIELDS: CPT | Mod: 26,,, | Performed by: STUDENT IN AN ORGANIZED HEALTH CARE EDUCATION/TRAINING PROGRAM

## 2022-07-26 PROCEDURE — 77386 HC IMRT, COMPLEX: CPT | Mod: PN | Performed by: STUDENT IN AN ORGANIZED HEALTH CARE EDUCATION/TRAINING PROGRAM

## 2022-07-26 NOTE — TELEPHONE ENCOUNTER
Spoke with Arthur to see if he wanted to come in today for PT instead of tomorrow and he voiced he already has something else today.

## 2022-07-27 ENCOUNTER — CLINICAL SUPPORT (OUTPATIENT)
Dept: REHABILITATION | Facility: HOSPITAL | Age: 77
End: 2022-07-27
Attending: STUDENT IN AN ORGANIZED HEALTH CARE EDUCATION/TRAINING PROGRAM
Payer: MEDICARE

## 2022-07-27 ENCOUNTER — DOCUMENTATION ONLY (OUTPATIENT)
Dept: RADIATION ONCOLOGY | Facility: CLINIC | Age: 77
End: 2022-07-27
Payer: MEDICARE

## 2022-07-27 DIAGNOSIS — C13.9 PRIMARY MALIGNANT NEOPLASM OF HYPOPHARYNX: Primary | ICD-10-CM

## 2022-07-27 DIAGNOSIS — K21.9 GASTROESOPHAGEAL REFLUX DISEASE, UNSPECIFIED WHETHER ESOPHAGITIS PRESENT: ICD-10-CM

## 2022-07-27 DIAGNOSIS — R53.81 DEBILITY: Primary | ICD-10-CM

## 2022-07-27 PROCEDURE — 97140 MANUAL THERAPY 1/> REGIONS: CPT | Mod: PN

## 2022-07-27 PROCEDURE — 77014 PR  CT GUIDANCE PLACEMENT RAD THERAPY FIELDS: CPT | Mod: 26,,, | Performed by: STUDENT IN AN ORGANIZED HEALTH CARE EDUCATION/TRAINING PROGRAM

## 2022-07-27 PROCEDURE — 97110 THERAPEUTIC EXERCISES: CPT | Mod: PN

## 2022-07-27 PROCEDURE — 77014 HC CT GUIDANCE RADIATION THERAPY FLDS PLACEMENT: CPT | Mod: TC,PN | Performed by: STUDENT IN AN ORGANIZED HEALTH CARE EDUCATION/TRAINING PROGRAM

## 2022-07-27 PROCEDURE — 97116 GAIT TRAINING THERAPY: CPT | Mod: PN

## 2022-07-27 PROCEDURE — 77014 PR  CT GUIDANCE PLACEMENT RAD THERAPY FIELDS: ICD-10-PCS | Mod: 26,,, | Performed by: STUDENT IN AN ORGANIZED HEALTH CARE EDUCATION/TRAINING PROGRAM

## 2022-07-27 PROCEDURE — 77386 HC IMRT, COMPLEX: CPT | Mod: PN | Performed by: STUDENT IN AN ORGANIZED HEALTH CARE EDUCATION/TRAINING PROGRAM

## 2022-07-27 RX ORDER — PANTOPRAZOLE SODIUM 20 MG/1
20 TABLET, DELAYED RELEASE ORAL DAILY
Qty: 30 TABLET | Refills: 2 | Status: SHIPPED | OUTPATIENT
Start: 2022-07-27 | End: 2022-08-10

## 2022-07-27 NOTE — PLAN OF CARE
Completed 20 of 35 outpatient radiation treatments without difficulty.  No new problems noted.  Patient will start supplemental TF this week but is still able to eat and drink orally at this time.  All questions and concerns addressed by MD this visit.

## 2022-07-27 NOTE — PROGRESS NOTES
"  [facility] Outpatient Physical Therapy and Wellness Daily Note     Treatment Date: 7/27/2022    Name: Arthur Ribeiro  Clinic Number: [id number]  Diagnosis:   Encounter Diagnosis   Name Primary?    Debility Yes     Physician: Thuan Peñaloza Jr., *  Precautions: Standard, Cancer and Fall   Radiation: Will start June 27th and chemo will be next day once a week.   Chemotherapy:underwent chemo and completed 3 weeks ago, and will begin another round of chemo after June 27th.    Visit #: 9 of 16  PTA Visit #: 1  Time In: 2:00 PM  Time Out: 3:00 PM  Total Treatment Time 1:1: 57    Evaluation Date: 06/15/2022  Re-assessment date: 7/13/2022  Visit # authorized: 25  Authorization period: 06/14/2022- 12/31/2022  Plan of care Expiration: 10 visits/ 30 days  MD referral: PT Eval and Treat    Subjective     Pt reports: Pt reports met with O&P and feel he will get the left AFO, wife states he walked much steadier with it on and pt said it was comfortable. Will take 4-6 weeks to get in.     Response to previous treatment: good    Pain, Nature, Location: 5/10 Nerve ending pain in stomach area from shingles comes in waves; back pain today    Objective     Pre-treatment: O2 sat monitor unable to  O2 sat, HR   Pt demonstrates unsteady gait pattern with ambulation into clinic with use of stick in R hand. Pt compliant with wearing compression socks to bilateral lower extremities.   Prior session:  Recommendations made: EDDIE MONROY, advised to see [facility] to obtain, and request MD for order. Also recommending pt to obtain a stationary sitting elliptical for exercise at home that does not take too much concentration. Advised pt that he would benefit from a rolling walker on his weaker days to improve safety.   Performed therapeutic exercises for 35 min including  Nustep L4 x 10'   gastroc stretch on wedge 20" x 3  Recumbent bike L2 x 7'  Ant tib exercises   in supine B gastroc stretches with and without " "sheet.   Side lye hip abduction 10 reps x 2 trials B LE   Side lye clams 10 reps x 3 trials B LE     Gait training 10 min: working on heel toe gait pattern with use of cane, and with use of hand on barre, pt with min assist needed at times, overall improved activation of R ant tib in gait with improved steadiness of R. L LE will benefit from AFO and recommending rolling walker for safety.    Manual techniques 15 min worked on talocrural jt mob and lateral fib head to facilitate increase in dorsiflexion available range as well as manual gastroc stretch with contract relax. This did allow pt to have improved available dorsiflexion B.   defer-sit to stand 2 x 10 reps  -LAQ with 10 sec hold x 5 reps each  -hip abd RTB 3x 10 rep  - rows with RTB (cues to sit unsupported, upright posture) 2x10 reps  -SAP B UE YTB 2x10 reps  -seated ankle PF/DF x 30 reps  Supine:  -hook lying pelvic tilts 2x10 reps  -hook lying pelvic tilts with hip add ball squeeze 2x10 reps  -SLR's 2x10 reps  -supine hip abd 2x10 each  -HSS with strap 20" x 3 ea  Seated edge of mat "T" robbin 10 sec hold x 10 reps    Pt received neuromuscular training as follows:  Standing wavers on level floor  for correction of posterior cog with legs straight and then knees bent    Deferred:  Weight shifting on Airex pad (min assist to maintain balance)  Marches on Airex pad x 10 (mod assist to maintain balance)  Static standing on Airex pad (min assist to maintain balance)        Deferred     Exercises x 15 minutes: worked on instrinsics for B feet with Kleenex series A/B with tactile cues, toe flexion ex, toe extension ex, alternating gastroc and ant tib B LE with manual resistance for gastroc to work opp side of activation of ant tib. Standing gluteal sets 5 sec hold 10 reps, isometric hold x 10 reps each with hand on barre.  Neuro-re-ed: 40 min: pt stands with center of gravity in posterior position with very poor sensation/ proprioception in B feet with tightness in " hip flexors and uses  Upper trunk to comes forward.  Worked on transitioning COG forward to midline of feet with mat behind him, moving head with cervical flexion to neutral with eyes on floor then eyes straight. Worked with cga/min assist to bend knees and perform wavering ant /post.  Next in front of mirror for feedback perf wavers for COG correction, followed by lateral trunk motion with upper trunk rotation      Written Home Exercises Provided:  Issued red theraband, and encouraged patient to perform seated ankle pumps daily (prior session)  Exercises were reviewed and Arthur was able to demonstrate them prior to the end of the session.  Arthur demonstrated good  understanding of the education provided.     See EMR under Patient Instructions for exercises provided 6/20/2022.    Education provided re: importance of compression garments for chronic ongoing leg swelling as well as importance of lymphatic flow exercies performed daily.  Arthur verbalized good  understanding of education provided.     Assessment   Pt with unsteadiness throughout today's session, therapist walked pt to outdoor area where he was to get in to car. Feel he would benefit from Pt was feeling better today and did well with his exercises and balance activities. In static standing on Airex pad, he tends to lean back and with weight shifting and marches tends to lean forward.   He has poor proprioception in his feet and limited PF/DF and this places him in decreased control over stance posture. Recommending use of RW for his safety. As well as B AFO to clear his feet with less difficulty. Patient is highly motivated to participate in PT in order to work towards goals established, increase functional independence, strength and stability.   Arthur Is progressing well towards his goals.   Pt prognosis is Good.   This patient presents with diagnosis of primary malignant neoplasm of hypopharynx, referred to therapy PT/Lymphedema as patient reports  decreased strength and mobility.  Patient also has a history of Burkitts lymphoma with complaints of residual side effects of weakness from chemo in the past. Base line measurements of ROM and cervical girth compared with last initial eval when pt first came to therapy 4 months ago. Pt was educated on signs and symptoms of lymphedema.      Patient presents with overall postural, UE and LE weakness, LE edema and decreased safety with lack of control in L foot with walking. Functionally patient reports limitations with endurance in community, house hold chores such as managing his own lawn, safety with ambulation. Also pt with need for AFO for L LE and also compression socks with wide band present at top. Recommending [employer] sock 15-20 mmHg and use of donning aid as well to assist.         Pt will continue to benefit from skilled outpatient physical therapy to address the deficits listed in the problem list box on initial evaluation, provide pt/family education and to maximize pt's level of independence in the home and community environment.     Pt's spiritual, cultural and educational needs considered and pt agreeable to plan of care and goals.    Anticipated barriers to physical therapy: side effects of chemo    Goals:   Short Term Goals: (4 weeks) in progress  1. Patient to demonstrate improved postural awareness and sitting tolerance. In progress 7/13/2022  2. Patient will demonstrate 100% knowledge of lymphedema precautions and signs of infection. Met 7/13/2022  3. Patient will perform self lymph drainage techniques independently.  4. Patient will increase B UE strength 1/3 grade. NOT MET7/13/2022  5. Patient will increase B LE strength 1/3 grade for improved functional tranfers and decrease fall risk. NOT MET7/13/2022  6. Measure girth of B LE in next 2 visits provide recommendation on size of Jobst sock. GOAL MET 7/13/2022     Long Term Goals: (8 weeks) IN PROGRESS  1. Patient will increase B UE strength  1/3 grade.   2. Patient will increase B LE strength 1/3 grade for improved functional tranfers and decrease fall risk.   3. Patient will perform self lymph drainage techniques  4. Pt to demonstrate improved sit to stand test to 10 reps with control over descending from standing to sitting with improved quad control.  5. Pt to receive AFO to improve safety in gait.        Plan   Continue strengthening as tolerated, work on neuro re-ed, assist with stretching gastroc and ankle mobility manual techniques as well as working on strengthening overall.  Continue with established Plan of Care towards PT goals.    Therapist: Candi Aburto, PT, CLT  7/27/2022  PT/PTA met face to face to discuss patient's treatment plan and progress towards established goals.   Patient will be seen by physical therapist every sixth visit and minimally once per month.

## 2022-07-28 ENCOUNTER — TELEPHONE (OUTPATIENT)
Dept: HEMATOLOGY/ONCOLOGY | Facility: CLINIC | Age: 77
End: 2022-07-28
Payer: MEDICARE

## 2022-07-28 PROCEDURE — 77014 HC CT GUIDANCE RADIATION THERAPY FLDS PLACEMENT: CPT | Mod: TC,PN | Performed by: STUDENT IN AN ORGANIZED HEALTH CARE EDUCATION/TRAINING PROGRAM

## 2022-07-28 PROCEDURE — 77014 PR  CT GUIDANCE PLACEMENT RAD THERAPY FIELDS: CPT | Mod: 26,,, | Performed by: STUDENT IN AN ORGANIZED HEALTH CARE EDUCATION/TRAINING PROGRAM

## 2022-07-28 PROCEDURE — 77014 PR  CT GUIDANCE PLACEMENT RAD THERAPY FIELDS: ICD-10-PCS | Mod: 26,,, | Performed by: STUDENT IN AN ORGANIZED HEALTH CARE EDUCATION/TRAINING PROGRAM

## 2022-07-28 PROCEDURE — 77336 RADIATION PHYSICS CONSULT: CPT | Mod: PN | Performed by: STUDENT IN AN ORGANIZED HEALTH CARE EDUCATION/TRAINING PROGRAM

## 2022-07-28 PROCEDURE — 77386 HC IMRT, COMPLEX: CPT | Mod: PN | Performed by: STUDENT IN AN ORGANIZED HEALTH CARE EDUCATION/TRAINING PROGRAM

## 2022-07-29 ENCOUNTER — CLINICAL SUPPORT (OUTPATIENT)
Dept: REHABILITATION | Facility: HOSPITAL | Age: 77
End: 2022-07-29
Attending: STUDENT IN AN ORGANIZED HEALTH CARE EDUCATION/TRAINING PROGRAM
Payer: MEDICARE

## 2022-07-29 ENCOUNTER — DOCUMENTATION ONLY (OUTPATIENT)
Dept: INFUSION THERAPY | Facility: HOSPITAL | Age: 77
End: 2022-07-29
Payer: MEDICARE

## 2022-07-29 DIAGNOSIS — R53.81 DEBILITY: Primary | ICD-10-CM

## 2022-07-29 PROCEDURE — 77014 HC CT GUIDANCE RADIATION THERAPY FLDS PLACEMENT: CPT | Mod: TC,PN | Performed by: STUDENT IN AN ORGANIZED HEALTH CARE EDUCATION/TRAINING PROGRAM

## 2022-07-29 PROCEDURE — 77014 PR  CT GUIDANCE PLACEMENT RAD THERAPY FIELDS: ICD-10-PCS | Mod: 26,,, | Performed by: STUDENT IN AN ORGANIZED HEALTH CARE EDUCATION/TRAINING PROGRAM

## 2022-07-29 PROCEDURE — 97110 THERAPEUTIC EXERCISES: CPT | Mod: PN,CQ

## 2022-07-29 PROCEDURE — 97116 GAIT TRAINING THERAPY: CPT | Mod: PN,CQ

## 2022-07-29 PROCEDURE — 97140 MANUAL THERAPY 1/> REGIONS: CPT | Mod: PN,CQ

## 2022-07-29 PROCEDURE — 77386 HC IMRT, COMPLEX: CPT | Mod: PN | Performed by: STUDENT IN AN ORGANIZED HEALTH CARE EDUCATION/TRAINING PROGRAM

## 2022-07-29 PROCEDURE — 77014 PR  CT GUIDANCE PLACEMENT RAD THERAPY FIELDS: CPT | Mod: 26,,, | Performed by: STUDENT IN AN ORGANIZED HEALTH CARE EDUCATION/TRAINING PROGRAM

## 2022-07-29 NOTE — PROGRESS NOTES
Nutrition Note:    RD met with patient and his wife in the lobby. His wife, Dary, states the patient had a PEG placed by Dr. Sagastume and just started using it this week. The tube feedings are supplemental as patient can eat and drink by mouth as needed. Dr. Sagastume had patient see Gertrudis Rodney RD with [facility] to determine TF rate/formula. Dary wondering if it is okay if she sees me while she is here with follow up questions. Assured patient this was fine and RD happy to help as needed. Dary appreciative and accepted nutrition check in on the same day as her weekly check in next Wednesday with Dr. Peñaloza.     Shahnaz Galo 07/29/2022   2:54 PM

## 2022-07-29 NOTE — PROGRESS NOTES
"  [facility] Outpatient Physical Therapy and Wellness Daily Note     Treatment Date: 7/29/2022    Name: Arthur Ribeiro  Clinic Number: [id number]  Diagnosis:   Encounter Diagnosis   Name Primary?    Debility Yes     Physician: Thuan Peñaloza Jr., *  Precautions: Standard, Cancer and Fall   Radiation: Will start June 27th and chemo will be next day once a week.   Chemotherapy:underwent chemo and completed 3 weeks ago, and will begin another round of chemo after June 27th.    Visit #: 10 of 16  PTA Visit #: 2  Time In: 2:00 PM  Time Out: 3:00 PM  Total Treatment Time 1:1: 57    Evaluation Date: 06/15/2022  Re-assessment date: 7/13/2022  Visit # authorized: 25  Authorization period: 06/14/2022- 12/31/2022  Plan of care Expiration: 10 visits/ 30 days  MD referral: PT Eval and Treat    Subjective     Pt reports: that he is waiting for AFO; doing ok.     Response to previous treatment: good    Pain, Nature, Location: 5/10 Nerve ending pain in stomach area from shingles comes in waves; back pain today    Objective     Pre-treatment: O2 sat monitor unable to  O2 sat, HR   Pt demonstrates unsteady gait pattern with ambulation into clinic with use of stick in R hand. Pt compliant with wearing compression socks to bilateral lower extremities.   Prior session:  Recommendations made: ELMERO DODIE MONROY, advised to see [facility] to obtain, and request MD for order. Also recommending pt to obtain a stationary sitting elliptical for exercise at home that does not take too much concentration. Advised pt that he would benefit from a rolling walker on his weaker days to improve safety.   Performed therapeutic exercises for 35 min including  Nustep L4 x 10'   gastroc stretch on wedge 20" x 3  Recumbent bike L2 x 7'  Ant tib exercises   in supine B gastroc stretches with and without sheet.   Side lye hip abduction 10 reps x 2 trials B LE   Side lye clams 10 reps x 3 trials B LE     Gait training 10 min: working " "on heel toe gait pattern with use of cane, and with use of hand on barre, pt with min assist needed at times, overall improved activation of R ant tib in gait with improved steadiness of R. L LE will benefit from AFO and recommending rolling walker for safety.    Manual techniques 15 min worked on talocrural jt mob and lateral fib head to facilitate increase in dorsiflexion available range as well as manual gastroc stretch with contract relax. This did allow pt to have improved available dorsiflexion B.   defer-sit to stand 2 x 10 reps  -LAQ with 10 sec hold x 5 reps each  -hip abd RTB 3x 10 rep  - rows with RTB (cues to sit unsupported, upright posture) 2x10 reps  -SAP B UE YTB 2x10 reps  -seated ankle PF/DF x 30 reps  Supine:  -hook lying pelvic tilts 2x10 reps  -hook lying pelvic tilts with hip add ball squeeze 2x10 reps  -SLR's 2x10 reps  -supine hip abd 2x10 each  -HSS with strap 20" x 3 ea  Seated edge of mat "T" robbin 10 sec hold x 10 reps    Pt received neuromuscular training as follows:  Standing wavers on level floor  for correction of posterior cog with legs straight and then knees bent    Deferred:  Weight shifting on Airex pad (min assist to maintain balance)  Marches on Airex pad x 10 (mod assist to maintain balance)  Static standing on Airex pad (min assist to maintain balance)        Deferred     Exercises x 15 minutes: worked on instrinsics for B feet with Kleenex series A/B with tactile cues, toe flexion ex, toe extension ex, alternating gastroc and ant tib B LE with manual resistance for gastroc to work opp side of activation of ant tib. Standing gluteal sets 5 sec hold 10 reps, isometric hold x 10 reps each with hand on barre.  Neuro-re-ed: 40 min: pt stands with center of gravity in posterior position with very poor sensation/ proprioception in B feet with tightness in hip flexors and uses  Upper trunk to comes forward.  Worked on transitioning COG forward to midline of feet with mat behind him, " moving head with cervical flexion to neutral with eyes on floor then eyes straight. Worked with cga/min assist to bend knees and perform wavering ant /post.  Next in front of mirror for feedback perf wavers for COG correction, followed by lateral trunk motion with upper trunk rotation      Written Home Exercises Provided:  Issued red theraband, and encouraged patient to perform seated ankle pumps daily (prior session)  Exercises were reviewed and Arthur was able to demonstrate them prior to the end of the session.  Arthur demonstrated good  understanding of the education provided.     See EMR under Patient Instructions for exercises provided 6/20/2022.    Education provided re: importance of compression garments for chronic ongoing leg swelling as well as importance of lymphatic flow exercies performed daily.  Arthur verbalized good  understanding of education provided.     Assessment    Pt was feeling better today and did well with his exercises and balance activities. In static standing on Airex pad, he tends to lean back and with weight shifting and marches tends to lean forward.   He has poor proprioception in his feet and limited PF/DF and this places him in decreased control over stance posture. Recommending use of RW for his safety. As well as B AFO to clear his feet with less difficulty. Patient is highly motivated to participate in PT in order to work towards goals established, increase functional independence, strength and stability.   Arthur Is progressing well towards his goals.   Pt prognosis is Good.   This patient presents with diagnosis of primary malignant neoplasm of hypopharynx, referred to therapy PT/Lymphedema as patient reports decreased strength and mobility.  Patient also has a history of Burkitts lymphoma with complaints of residual side effects of weakness from chemo in the past. Base line measurements of ROM and cervical girth compared with last initial eval when pt first came to therapy 4 months  ago. Pt was educated on signs and symptoms of lymphedema.      Patient presents with overall postural, UE and LE weakness, LE edema and decreased safety with lack of control in L foot with walking. Functionally patient reports limitations with endurance in community, house hold chores such as managing his own lawn, safety with ambulation. Also pt with need for AFO for L LE and also compression socks with wide band present at top. Recommending [employer] sock 15-20 mmHg and use of donning aid as well to assist.         Pt will continue to benefit from skilled outpatient physical therapy to address the deficits listed in the problem list box on initial evaluation, provide pt/family education and to maximize pt's level of independence in the home and community environment.     Pt's spiritual, cultural and educational needs considered and pt agreeable to plan of care and goals.    Anticipated barriers to physical therapy: side effects of chemo    Goals:   Short Term Goals: (4 weeks) in progress  1. Patient to demonstrate improved postural awareness and sitting tolerance. In progress 7/13/2022  2. Patient will demonstrate 100% knowledge of lymphedema precautions and signs of infection. Met 7/13/2022  3. Patient will perform self lymph drainage techniques independently.  4. Patient will increase B UE strength 1/3 grade. NOT MET7/13/2022  5. Patient will increase B LE strength 1/3 grade for improved functional tranfers and decrease fall risk. NOT MET7/13/2022  6. Measure girth of B LE in next 2 visits provide recommendation on size of Jobst sock. GOAL MET 7/13/2022     Long Term Goals: (8 weeks) IN PROGRESS  1. Patient will increase B UE strength 1/3 grade.   2. Patient will increase B LE strength 1/3 grade for improved functional tranfers and decrease fall risk.   3. Patient will perform self lymph drainage techniques  4. Pt to demonstrate improved sit to stand test to 10 reps with control over descending from standing  to sitting with improved quad control.  5. Pt to receive AFO to improve safety in gait.        Plan   Continue strengthening as tolerated, work on neuro re-ed, assist with stretching gastroc and ankle mobility manual techniques as well as working on strengthening overall.  Continue with established Plan of Care towards PT goals.    Therapist: Malou Lam PTA, CLT  7/29/2022  PT/PTA met face to face to discuss patient's treatment plan and progress towards established goals.   Patient will be seen by physical therapist every sixth visit and minimally once per month.

## 2022-08-01 ENCOUNTER — OFFICE VISIT (OUTPATIENT)
Dept: HEMATOLOGY/ONCOLOGY | Facility: CLINIC | Age: 77
End: 2022-08-01
Payer: MEDICARE

## 2022-08-01 ENCOUNTER — HOSPITAL ENCOUNTER (OUTPATIENT)
Dept: RADIATION THERAPY | Facility: HOSPITAL | Age: 77
Discharge: HOME | End: 2022-08-01
Attending: STUDENT IN AN ORGANIZED HEALTH CARE EDUCATION/TRAINING PROGRAM
Payer: MEDICARE

## 2022-08-01 VITALS
BODY MASS INDEX: 19.88 KG/M2 | HEIGHT: 72 IN | TEMPERATURE: 98 F | DIASTOLIC BLOOD PRESSURE: 58 MMHG | OXYGEN SATURATION: 97 % | RESPIRATION RATE: 18 BRPM | HEART RATE: 80 BPM | WEIGHT: 146.81 LBS | SYSTOLIC BLOOD PRESSURE: 113 MMHG

## 2022-08-01 DIAGNOSIS — R13.12 OROPHARYNGEAL DYSPHAGIA: ICD-10-CM

## 2022-08-01 DIAGNOSIS — R63.4 WEIGHT LOSS: ICD-10-CM

## 2022-08-01 DIAGNOSIS — C13.9 PRIMARY MALIGNANT NEOPLASM OF HYPOPHARYNX: Primary | ICD-10-CM

## 2022-08-01 PROCEDURE — 1159F PR MEDICATION LIST DOCUMENTED IN MEDICAL RECORD: ICD-10-PCS | Mod: CPTII,S$GLB,, | Performed by: OTOLARYNGOLOGY

## 2022-08-01 PROCEDURE — 77014 PR  CT GUIDANCE PLACEMENT RAD THERAPY FIELDS: CPT | Mod: 26,,, | Performed by: STUDENT IN AN ORGANIZED HEALTH CARE EDUCATION/TRAINING PROGRAM

## 2022-08-01 PROCEDURE — 1101F PT FALLS ASSESS-DOCD LE1/YR: CPT | Mod: CPTII,S$GLB,, | Performed by: OTOLARYNGOLOGY

## 2022-08-01 PROCEDURE — 3288F FALL RISK ASSESSMENT DOCD: CPT | Mod: CPTII,S$GLB,, | Performed by: OTOLARYNGOLOGY

## 2022-08-01 PROCEDURE — 3078F PR MOST RECENT DIASTOLIC BLOOD PRESSURE < 80 MM HG: ICD-10-PCS | Mod: CPTII,S$GLB,, | Performed by: OTOLARYNGOLOGY

## 2022-08-01 PROCEDURE — 1126F PR PAIN SEVERITY QUANTIFIED, NO PAIN PRESENT: ICD-10-PCS | Mod: CPTII,S$GLB,, | Performed by: OTOLARYNGOLOGY

## 2022-08-01 PROCEDURE — 1160F PR REVIEW ALL MEDS BY PRESCRIBER/CLIN PHARMACIST DOCUMENTED: ICD-10-PCS | Mod: CPTII,S$GLB,, | Performed by: OTOLARYNGOLOGY

## 2022-08-01 PROCEDURE — 31575 PR LARYNGOSCOPY, FLEXIBLE; DIAGNOSTIC: ICD-10-PCS | Mod: S$GLB,,, | Performed by: OTOLARYNGOLOGY

## 2022-08-01 PROCEDURE — 99999 PR PBB SHADOW E&M-EST. PATIENT-LVL IV: CPT | Mod: PBBFAC,,, | Performed by: OTOLARYNGOLOGY

## 2022-08-01 PROCEDURE — 99213 PR OFFICE/OUTPT VISIT, EST, LEVL III, 20-29 MIN: ICD-10-PCS | Mod: 25,S$GLB,, | Performed by: OTOLARYNGOLOGY

## 2022-08-01 PROCEDURE — 3074F SYST BP LT 130 MM HG: CPT | Mod: CPTII,S$GLB,, | Performed by: OTOLARYNGOLOGY

## 2022-08-01 PROCEDURE — 3288F PR FALLS RISK ASSESSMENT DOCUMENTED: ICD-10-PCS | Mod: CPTII,S$GLB,, | Performed by: OTOLARYNGOLOGY

## 2022-08-01 PROCEDURE — 1160F RVW MEDS BY RX/DR IN RCRD: CPT | Mod: CPTII,S$GLB,, | Performed by: OTOLARYNGOLOGY

## 2022-08-01 PROCEDURE — 77386 HC IMRT, COMPLEX: CPT | Mod: PN | Performed by: STUDENT IN AN ORGANIZED HEALTH CARE EDUCATION/TRAINING PROGRAM

## 2022-08-01 PROCEDURE — 77014 PR  CT GUIDANCE PLACEMENT RAD THERAPY FIELDS: ICD-10-PCS | Mod: 26,,, | Performed by: STUDENT IN AN ORGANIZED HEALTH CARE EDUCATION/TRAINING PROGRAM

## 2022-08-01 PROCEDURE — 1101F PR PT FALLS ASSESS DOC 0-1 FALLS W/OUT INJ PAST YR: ICD-10-PCS | Mod: CPTII,S$GLB,, | Performed by: OTOLARYNGOLOGY

## 2022-08-01 PROCEDURE — 3074F PR MOST RECENT SYSTOLIC BLOOD PRESSURE < 130 MM HG: ICD-10-PCS | Mod: CPTII,S$GLB,, | Performed by: OTOLARYNGOLOGY

## 2022-08-01 PROCEDURE — 77014 HC CT GUIDANCE RADIATION THERAPY FLDS PLACEMENT: CPT | Mod: TC,PN | Performed by: STUDENT IN AN ORGANIZED HEALTH CARE EDUCATION/TRAINING PROGRAM

## 2022-08-01 PROCEDURE — 3078F DIAST BP <80 MM HG: CPT | Mod: CPTII,S$GLB,, | Performed by: OTOLARYNGOLOGY

## 2022-08-01 PROCEDURE — 31575 DIAGNOSTIC LARYNGOSCOPY: CPT | Mod: S$GLB,,, | Performed by: OTOLARYNGOLOGY

## 2022-08-01 PROCEDURE — 99213 OFFICE O/P EST LOW 20 MIN: CPT | Mod: 25,S$GLB,, | Performed by: OTOLARYNGOLOGY

## 2022-08-01 PROCEDURE — 1126F AMNT PAIN NOTED NONE PRSNT: CPT | Mod: CPTII,S$GLB,, | Performed by: OTOLARYNGOLOGY

## 2022-08-01 PROCEDURE — 1159F MED LIST DOCD IN RCRD: CPT | Mod: CPTII,S$GLB,, | Performed by: OTOLARYNGOLOGY

## 2022-08-01 PROCEDURE — 99999 PR PBB SHADOW E&M-EST. PATIENT-LVL IV: ICD-10-PCS | Mod: PBBFAC,,, | Performed by: OTOLARYNGOLOGY

## 2022-08-01 NOTE — PROGRESS NOTES
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  Patient has a history of dysphagia and odynophagia.  He was treated with steroids and reflux medication without resolution.  He was seen by Dr Maya who noted a lesion involving the left oropharynx and hypopharynx.  He was taken to the operating room where he underwent endoscopy and biopsy.  The mass was found to be involving the left hypopharynx extending up to the lateral pharyngeal wall and down to the level of the cricopharyngeus.  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 His case was presented at multidisciplinary head neck tumor Board and it was recommended that he undergo adjuvant chemotherapy with consideration for total laryngectomy.    Patient reports that he has difficulty swallowing pills and therefore he was you taking liquid pain medicine which alleviates the pain.  He also complains of thick mucus in the back of his throat and thick postnasal drip.     Patient has a history of Burkitt's lymphoma for which he was treated with chemotherapy only by Dr. Rahman who is also involved it the care of this patient.  He was seen by Dr Rahman on Thursday who has ordered chemo (tentatively set for 3/8/2022) and has an appointment to have a port placed. He has not spoken with Dr Peñaloza since he has seen Josiah.      4/18/2022  Partient is here today for F/U. Tumor board recommended neoadjuvant chemo, followed with reimaging--if responds then proceed with concurrent chemorads--if not proceed to surgery.  He is being treated by Dr Quentin Rahman.   He is S/P 3 rounds of induction chemotherapy, once a week. He had one week break  and restarted chemo last Tuesday but he is now only receiving one drug instead of tow due to SOB.   Patient is to have another dose tomorrow but according to patient Dr Rahman will substitute another drug as he had a reaction to the other.  Patient had nausea and diarrhea last week and now is taking antiemetics 3 times a day.    Patient reports that since start of therapy, his throat pain has resolved in addition to otalgia and intermittent sharp shooting pains up the left side of his head    He was seen by ST who discussed diet consistencies. According to patient he was not given swallowing exercises.  He has had no issues swallowing but he has anorexia resulting in a 16 pound weight loss since diagnosis.   He was drinking 1-2 Ensures/day but has stopped since he developed diarrhea.  Patient is able to tolerate some fruits, soups, pureed foods such as applesauce baked beans and is starting to eat Belvita bars and oatmeal    He was diagnosed with shingles last week and is taking Valtrex.  He has not had the vaccine    5/30/2022  Patient is here today for cancer surveillance.  He has a history of squamous cell carcinoma of the hypopharynx.  He is receiving induction chemotherapy by Dr. Rahman.  He received his last dose of Carboplatinum last week(Taxol was stopped due to reaction).  He is to see Dr Peñaloza, Rad Onc, tomorrow to discuss concurrent chemorads.    He has not seen ST yet; he thought that he did not need an appointment. Patient reports that he is able to swallow fried foods and soft foods was having difficulty eating pasta and hamburger.  He feels like the food is getting caught in a flap in the back of his throat.  He also reports that he has a lot of white secretions that he is coughing up.  He is not using salt baking soda gargles as instructed    8/1/2022    Patient is here today for follow-up.  He is status post induction chemotherapy and now was undergoing concurrent chemoradiation.  Day 5 of  cisplatin was given 07/26/2022.  He gets 6th chemo tomorrow and finishes XRT in 13 days (8/17/2022)  He is S/p PEG 7/6/2022. He is consuming 2 cans of Ensure/day. Patient only eats one meals/day which started recently. He has lost 5 pounds since May. According to his wife, his weight fluctuates.  He is doing dysphagia exercises 1-2 times/day.  Patient goes to PT twice weekly.    ROS: see HPI  Constitutional: Negative for activity change and appetite change, weight loss.   Eyes: Negative for discharge, visual changes.   Respiratory: Negative for difficulty breathing and wheezing   Cardiovascular: Negative for chest pain.   Gastrointestinal: Negative for abdominal distention and abdominal pain.   Endocrine: Negative for cold intolerance and heat intolerance.   Genitourinary: Negative for dysuria.   Musculoskeletal: Negative for gait problem, muscle pain and joint swelling.   Skin: Negative for color change and pallor; negative for skin lesions.   Neurological: Negative for syncope and weakness; no numbness face.   Psychiatric/Behavioral: Negative for agitation and confusion; negative for depression.    Physical Exam:      Constitutional  · General Appearance: well nourished, well-developed, alert, oriented, in no acute distress; walking with cane for balance  · Communication: ability, understanding, normal  Head and Face  · Inspection: normocephalic, atraumatic, no scars, lesions or masses   · Palpation: no stepoffs, sinus tenderness or masses  · Parotid glands: no masses, stones, swelling or tenderness  Oral Cavity / Oropharynx  · Lips: upper and lower lips pink and moist  · Teeth: good dentition  · Gingiva: healthy  · Oral Mucosa: moist, no mucosal lesions  · Floor of Mouth: normal, no lesions, salivary ducts patent  · Tongue: moist, normal mobility, no lesions  · Palate: soft and hard palates without lesions or ulcers  Oropharynx: tonsils and walls without erythema, exudate, base of tongue soft to  palpation  Nasopharynx, Hypopharynx, and Larynx  · Indirect: could not perform exam due to intolerance by patient  Neck  · Inspection and Palpation: no erythema, induration, emphysema, tenderness or masses  · Larynx and Trachea: normal position; normal crepitus  · Thyroid: no tenderness, enlargement or nodules  · Submandibular Glands: no masses or tenderness  Lymphatic:  · Anterior, Posterior, Submandibular, Submental, Supraclavicular: no lymphadenopathy present  Neurological  · Cranial Nerves: grossly intact  · General: no focal deficits  Psychiatric  · Orientation: oriented to time, place and person  · Mood and Affect: no depression, anxiety or agitation    PROCEDURES:   -------------------- LARYNGOSCOPY / NASOPHARYNGOSCOPY -------------------------     Pre-op DX: SCCA hypopharynx; dysphagia  Post-op DX: same  Anesthesia: Topical Neosynephrine / Tetracaine  Indications: to look at larynx and pharynx  Adverse Events: None        Procedure in Detail:     Flexible endoscopy with video was performed through the nasal passages. The nasal cavity, nasopharynx, oropharynx, hypopharynx and larynx were adequately visualized. The true vocal cords and arytenoids were examined during phonation and repose.        Operative Findings: no tumor seen     Nasal Cavity: Within normal limits  Nasopharynx: Within normal limits  Tongue Base: Within normal limits; moderate pooling of secretions-new  Pharyngeal Walls:no tumor seen  Epiglottis / Aryepiglottic Folds: Within normal limits  Pyriform Sinus: Within normal limits; pooling of secretions right post cricoid area--very minimal  Vocal Cords: Within normal limits;mobile bilaterally  Arytenoids: able to visualize today-normal     Test results:  Path:  Pharynx, left posterior wall, biopsies:   - Squamous cell carcinoma, invasive, moderately differentiated   - Depth of invasion (DOI): at least 0.5 cm   - p16 status (IHC): Negative   - No lymphovascular invasion identified   - No  perineural invasion identified   NOTE: Immunostain for p16 was performed and did not demonstrate   block-immunopositive staining (interpreted as negative).  Positive control   and internal negative control for immunostain was examined and was   appropriate.       Records reviewed:  Op Note  Intraoperative Findings:   1. Grade 1 view  2. Ulcerative, firm mass centered on the left aspect of posterior pharyngeal wall (hyoppharynx) with extension to the lateral wall of the oropharynx. Mass involved the posterior half of the pyriform sinus as. The esophagus is clear but the tumor extends to the level of the cricopharyngeus along the posterior wall. The base of tongue and vallecula are clear.   3. The AE fold and supraglottis were edematous but uninvolved. The cords were uninvolved.     Tumor Board recommendations:  TB recommendation for referral to H&N surgery. Given borderline T3/T4a, consideration for induction and TL/TP pending response.  There is questionable involvement of the posterior lateral thyroid ala.    Records reviewed:    Speech therapy 7/7/2022: [facility]  IMPRESSION:  The oral phase of the swallow is within functional limits. Patient may benefit from extra time for meals.  There is mild impairment of the pharyngeal phase of the swallow as evidenced by pharyngeal residue which requires modification for clearance.      RECOMMENDATIONS:  The film of the swallow study was reviewed with  and Mrs. Ribeiro.  Education was provided regarding the current function of the swallow.  A regular consistency diet and thin liquids are recommended. The patient was advised to choose smoother, softer and moist consistencies, alternate bites and sips to clear the pharyngeal residue, consume multiple small meals rather than 3 traditional larger meals.  Outpatient speech therapy was recommended and benefits were discussed with the patient and his wife. The patient expressed he is interested in persisting speech therapy for  treatment of dysphagia.        PT/OT: 7/20/2022  Assessment   Pt reporting fatigue today from chemo tx, altered session to accomodate and focused on ankle ROM/ recruiting ant tib and intrinsic musculature as well as balance / neuro re-ed. Pt demonstrated improvement in toe motion and ant tib recruitment after session.   He has poor proprioception in his feet and this places him in decreased control over stance posture. Recommending use of RW for his safety. As well as B AFO to clear his feet with less difficulty. Patient is highly motivated to participate in PT in order to work towards goals established, increase functional independence, strength and stability.   Arthur Is progressing well towards his goals.   Pt prognosis is Good.   This patient presents with diagnosis of primary malignant neoplasm of hypopharynx, referred to therapy PT/Lymphedema as patient reports decreased strength and mobility.  Patient also has a history of Burkitts lymphoma with complaints of residual side effects of weakness from chemo in the past. Base line measurements of ROM and cervical girth compared with last initial eval when pt first came to therapy 4 months ago. Pt was educated on signs and symptoms of lymphedema.      Patient presents with overall postural, UE and LE weakness, LE edema and decreased safety with lack of control in L foot with walking. Functionally patient reports limitations with endurance in community, house hold chores such as managing his own lawn, safety with ambulation. Also pt with need for AFO for L LE and also compression socks with wide band present at top. Recommending [employer] sock 15-20 mmHg and use of donning aid as well to assist.          Pt will continue to benefit from skilled outpatient physical therapy to address the deficits listed in the problem list box on initial evaluation, provide pt/family education and to maximize pt's level of independence in the home and community environment.       Pt's spiritual, cultural and educational needs considered and pt agreeable to plan of care and goals.       Assessment:   [id number] SCCA hypopharynx, S/P nduction chemo--now currently undergoing concurrent chemoradiation--tolerating it well--ANA on exam  Dysphagia-PEG placed; tolerating full liquids but not consuming enough calories; losing weight    Plan:  Continue treatment as planned  Continue out patient rehab  F/U end of October

## 2022-08-02 PROCEDURE — 77014 HC CT GUIDANCE RADIATION THERAPY FLDS PLACEMENT: CPT | Mod: TC,PN | Performed by: STUDENT IN AN ORGANIZED HEALTH CARE EDUCATION/TRAINING PROGRAM

## 2022-08-02 PROCEDURE — 77386 HC IMRT, COMPLEX: CPT | Mod: PN | Performed by: STUDENT IN AN ORGANIZED HEALTH CARE EDUCATION/TRAINING PROGRAM

## 2022-08-02 PROCEDURE — 77014 PR  CT GUIDANCE PLACEMENT RAD THERAPY FIELDS: CPT | Mod: 26,,, | Performed by: STUDENT IN AN ORGANIZED HEALTH CARE EDUCATION/TRAINING PROGRAM

## 2022-08-02 PROCEDURE — 77014 PR  CT GUIDANCE PLACEMENT RAD THERAPY FIELDS: ICD-10-PCS | Mod: 26,,, | Performed by: STUDENT IN AN ORGANIZED HEALTH CARE EDUCATION/TRAINING PROGRAM

## 2022-08-03 ENCOUNTER — NUTRITION (OUTPATIENT)
Dept: INFUSION THERAPY | Facility: HOSPITAL | Age: 77
End: 2022-08-03
Attending: OTOLARYNGOLOGY
Payer: MEDICARE

## 2022-08-03 ENCOUNTER — DOCUMENTATION ONLY (OUTPATIENT)
Dept: RADIATION ONCOLOGY | Facility: CLINIC | Age: 77
End: 2022-08-03
Payer: MEDICARE

## 2022-08-03 DIAGNOSIS — K12.33 MUCOSITIS DUE TO RADIATION THERAPY: ICD-10-CM

## 2022-08-03 DIAGNOSIS — C13.9 PRIMARY MALIGNANT NEOPLASM OF HYPOPHARYNX: Primary | ICD-10-CM

## 2022-08-03 DIAGNOSIS — C13.9 HYPOPHARYNGEAL CANCER: Primary | ICD-10-CM

## 2022-08-03 DIAGNOSIS — R13.12 OROPHARYNGEAL DYSPHAGIA: ICD-10-CM

## 2022-08-03 DIAGNOSIS — R63.4 WEIGHT LOSS: ICD-10-CM

## 2022-08-03 PROCEDURE — 77014 HC CT GUIDANCE RADIATION THERAPY FLDS PLACEMENT: CPT | Mod: TC,PN | Performed by: STUDENT IN AN ORGANIZED HEALTH CARE EDUCATION/TRAINING PROGRAM

## 2022-08-03 PROCEDURE — 77014 PR  CT GUIDANCE PLACEMENT RAD THERAPY FIELDS: ICD-10-PCS | Mod: 26,,, | Performed by: STUDENT IN AN ORGANIZED HEALTH CARE EDUCATION/TRAINING PROGRAM

## 2022-08-03 PROCEDURE — 77014 PR  CT GUIDANCE PLACEMENT RAD THERAPY FIELDS: CPT | Mod: 26,,, | Performed by: STUDENT IN AN ORGANIZED HEALTH CARE EDUCATION/TRAINING PROGRAM

## 2022-08-03 PROCEDURE — 77386 HC IMRT, COMPLEX: CPT | Mod: PN | Performed by: STUDENT IN AN ORGANIZED HEALTH CARE EDUCATION/TRAINING PROGRAM

## 2022-08-03 RX ORDER — TRIPROLIDINE/PSEUDOEPHEDRINE 2.5MG-60MG
400 TABLET ORAL EVERY 12 HOURS PRN
Qty: 473 ML | Refills: 0 | Status: SHIPPED | OUTPATIENT
Start: 2022-08-03 | End: 2022-08-10 | Stop reason: SDUPTHER

## 2022-08-03 NOTE — PLAN OF CARE
Completed 25 of 35 outpatient radiation treatments today.  Patient met with dietician as well as MD this visit.  All questions/concerns addressed.

## 2022-08-03 NOTE — PROGRESS NOTES
ONCOLOGY NUTRITION   FOLLOW UP VISIT        Arthur Ribeiro is a 76 y.o. male.  DATE: 08/03/2022        Oncology Diagnosis: Head and Neck Cancer     REFERRAL FROM:   [] Integrative Oncology   [] Med/Heme Oncology  [] Radiation Oncology  [] Surgical Oncology   [] Infusion Nurse    [x] Routine Nutrition follow up    TREATMENT PLAN:   [] Full treatment plan pending  [] Chemotherapy  [] Immunotherapy  [] Radiation  [x] Concurrent  [] Surgery  [] Treatment complete/post-treatment    ANTHROPOMETRICS:  Wt Readings from Last 10 Encounters:   08/01/22 66.6 kg (146 lb 13.2 oz)   07/06/22 68.3 kg (150 lb 9.2 oz)   07/05/22 67.1 kg (148 lb)   05/31/22 67.7 kg (149 lb 4 oz)   05/30/22 68.6 kg (151 lb 3.8 oz)   05/27/22 73.9 kg (163 lb)   04/18/22 74.1 kg (163 lb 5.8 oz)   02/23/22 81.5 kg (179 lb 10.8 oz)   02/10/22 80.9 kg (178 lb 5.6 oz)   01/24/22 81.6 kg (180 lb)      Weight Changes: 18.8% weight decrease in 6.5 months however been relatively stable for the last two months     PHYSICAL EXAM:  Muscle Wasting Observed:  [] No Deficit   [] Mild Deficit   [] Moderate   [] Severe     INTAKE:  [x] PO Intake [x] TF Intake  Current Diet: soft diet  Dietary Patterns:  Eating meals/snacks as tolerated  TF: 3 cartons Jevity 1.5 per day     SYMPTOMS/COMPLAINTS:  [] No nutritional concerns at current  [] Diarrhea                    [] Constipation           [] Nausea                 [] Vomiting                [] Indigestion                [] Reflux              [] Poor Appetite            [] Anorexia                 [x] Early Satiety         [] Gas                       [] Bloating                     [] Dry Mouth    [] Mucositis                   [] Mouth Sores           [] Poor Dentition      [] Difficulty chewing  [] Difficulty Swallowing   [] Pain with swallowing [] Change in taste      [] Change in smell   [] Pain (general)       [] Fatigue                      [] Sleep issues    [] Weight loss  [] other, please specify    Nutrition  Re-Assessment Risk: Moderate    [x] Labs reviewed   [x] Meds reviewed    Education Provided:   [] No Education Needed at this time  [] Diarrhea                                              [] Constipation                          [] Nausea/Vomiting  [] Mucositis                [] Dry Mouth    [] Dealing with changes in Taste/Smell  [] Dealing with Poor Appetite   [] Soft/moist Diet      [] Weight Loss/Gain     [] Weight Maintenance                           [] Indigestion/GERD                 [] Gas/Bloating          [] Foods High/ Low in specific nutrients [] Increasing Calories/Protein   [] Milkshake/Smoothies Recipes   [] Nutrition Supplements                        [] Increasing Fluid Intakes         [] Foods that fight cancer    [] Evidence bases resources                 [] Fermented Foods/Probiotics  [] Mediterranean/Plant Based Diet     [x] Other, specify: Tube feeding prescription                            [x] Handouts provided: Head and Neck Nutrition Guidance, Bolus or Syringe Tube Feeding Instructions      [] Samples provided     RD NOTE:  RD met with patient and his wife, Dary, during Rad Onc visit today. Pt receiving XRT here, on 25/35 treatments and getting chemotherapy with Dr. Rahman. Patient has seen this RD previously but most recently saw another outpatient dietitian.     Pt does have a PEG and his goal is to consume 6 cartons of Jevity 1.5 per day. Unfortunately patient states it has been difficult to get to goal and is currently only doing 3 per day. Pt feels it is too much liquid at one time and his stomach has shrunk. Pt declines intolerance to the formula.    RD expressed importance of adequate nutritional intake, especially during cancer treatment. Pt still does some things via PO such as oatmeal. Pt feels his severe weight loss mostly occurred at the initiation of chemotherapy and has been stable since radiation started.     RD Goals:   [x] Weight stable                  [x] Weight gain                       [] Weight Loss                               [] Continue adequate Kcal/protein   [] Increase Kcal/protein      [] Adjust Tube-feeding Rx   [x] Tolerate Tube Feedings             [x] Increase tube feedings to goal     [] Tolerate Supplements     [] Symptom Improvement   [x] Understand nutrition Education  [] Offer supportive visits   [] other, please specify    RECOMMENDATIONS:  1. Continue to increase tube feedings to goal rate- encouraged pt to get to 4 cartons by the end of this week. 1 Carton of Jevity 1.5 at 7am, 11am, 3pm, 7am  2. Continue solid foods PO as able  3. Continue to flush PEG with water before and after each feeding and to drink water PO as able to maintain proper hydration    Follow up: Next infusion or PRN throughout tx    Shahnaz Galo, MS, RD, LDN  08/03/2022  2:42 PM

## 2022-08-04 PROCEDURE — 77014 HC CT GUIDANCE RADIATION THERAPY FLDS PLACEMENT: CPT | Mod: TC,PN | Performed by: STUDENT IN AN ORGANIZED HEALTH CARE EDUCATION/TRAINING PROGRAM

## 2022-08-04 PROCEDURE — 77386 HC IMRT, COMPLEX: CPT | Mod: PN | Performed by: STUDENT IN AN ORGANIZED HEALTH CARE EDUCATION/TRAINING PROGRAM

## 2022-08-04 PROCEDURE — 77336 RADIATION PHYSICS CONSULT: CPT | Mod: PN | Performed by: STUDENT IN AN ORGANIZED HEALTH CARE EDUCATION/TRAINING PROGRAM

## 2022-08-04 PROCEDURE — 77014 PR  CT GUIDANCE PLACEMENT RAD THERAPY FIELDS: CPT | Mod: 26,,, | Performed by: STUDENT IN AN ORGANIZED HEALTH CARE EDUCATION/TRAINING PROGRAM

## 2022-08-04 PROCEDURE — 77014 PR  CT GUIDANCE PLACEMENT RAD THERAPY FIELDS: ICD-10-PCS | Mod: 26,,, | Performed by: STUDENT IN AN ORGANIZED HEALTH CARE EDUCATION/TRAINING PROGRAM

## 2022-08-05 PROCEDURE — 77014 HC CT GUIDANCE RADIATION THERAPY FLDS PLACEMENT: CPT | Mod: TC,PN | Performed by: STUDENT IN AN ORGANIZED HEALTH CARE EDUCATION/TRAINING PROGRAM

## 2022-08-05 PROCEDURE — 77014 PR  CT GUIDANCE PLACEMENT RAD THERAPY FIELDS: CPT | Mod: 26,,, | Performed by: STUDENT IN AN ORGANIZED HEALTH CARE EDUCATION/TRAINING PROGRAM

## 2022-08-05 PROCEDURE — 77386 HC IMRT, COMPLEX: CPT | Mod: PN | Performed by: STUDENT IN AN ORGANIZED HEALTH CARE EDUCATION/TRAINING PROGRAM

## 2022-08-05 PROCEDURE — 77014 PR  CT GUIDANCE PLACEMENT RAD THERAPY FIELDS: ICD-10-PCS | Mod: 26,,, | Performed by: STUDENT IN AN ORGANIZED HEALTH CARE EDUCATION/TRAINING PROGRAM

## 2022-08-08 PROCEDURE — 77014 PR  CT GUIDANCE PLACEMENT RAD THERAPY FIELDS: CPT | Mod: 26,,, | Performed by: STUDENT IN AN ORGANIZED HEALTH CARE EDUCATION/TRAINING PROGRAM

## 2022-08-08 PROCEDURE — 77386 HC IMRT, COMPLEX: CPT | Mod: PN | Performed by: STUDENT IN AN ORGANIZED HEALTH CARE EDUCATION/TRAINING PROGRAM

## 2022-08-08 PROCEDURE — 77014 PR  CT GUIDANCE PLACEMENT RAD THERAPY FIELDS: ICD-10-PCS | Mod: 26,,, | Performed by: STUDENT IN AN ORGANIZED HEALTH CARE EDUCATION/TRAINING PROGRAM

## 2022-08-08 PROCEDURE — 77014 HC CT GUIDANCE RADIATION THERAPY FLDS PLACEMENT: CPT | Mod: TC,PN | Performed by: STUDENT IN AN ORGANIZED HEALTH CARE EDUCATION/TRAINING PROGRAM

## 2022-08-09 PROCEDURE — 77014 PR  CT GUIDANCE PLACEMENT RAD THERAPY FIELDS: ICD-10-PCS | Mod: 26,,, | Performed by: STUDENT IN AN ORGANIZED HEALTH CARE EDUCATION/TRAINING PROGRAM

## 2022-08-09 PROCEDURE — 77386 HC IMRT, COMPLEX: CPT | Mod: PN | Performed by: STUDENT IN AN ORGANIZED HEALTH CARE EDUCATION/TRAINING PROGRAM

## 2022-08-09 PROCEDURE — 77014 PR  CT GUIDANCE PLACEMENT RAD THERAPY FIELDS: CPT | Mod: 26,,, | Performed by: STUDENT IN AN ORGANIZED HEALTH CARE EDUCATION/TRAINING PROGRAM

## 2022-08-09 PROCEDURE — 77014 HC CT GUIDANCE RADIATION THERAPY FLDS PLACEMENT: CPT | Mod: TC,PN | Performed by: STUDENT IN AN ORGANIZED HEALTH CARE EDUCATION/TRAINING PROGRAM

## 2022-08-10 ENCOUNTER — DOCUMENTATION ONLY (OUTPATIENT)
Dept: RADIATION ONCOLOGY | Facility: CLINIC | Age: 77
End: 2022-08-10
Payer: MEDICARE

## 2022-08-10 DIAGNOSIS — K21.9 GASTROESOPHAGEAL REFLUX DISEASE, UNSPECIFIED WHETHER ESOPHAGITIS PRESENT: Primary | ICD-10-CM

## 2022-08-10 DIAGNOSIS — K12.33 MUCOSITIS DUE TO RADIATION THERAPY: ICD-10-CM

## 2022-08-10 DIAGNOSIS — C13.9 HYPOPHARYNGEAL CANCER: ICD-10-CM

## 2022-08-10 PROCEDURE — 77014 PR  CT GUIDANCE PLACEMENT RAD THERAPY FIELDS: CPT | Mod: 26,,, | Performed by: STUDENT IN AN ORGANIZED HEALTH CARE EDUCATION/TRAINING PROGRAM

## 2022-08-10 PROCEDURE — 77014 HC CT GUIDANCE RADIATION THERAPY FLDS PLACEMENT: CPT | Mod: TC,PN | Performed by: STUDENT IN AN ORGANIZED HEALTH CARE EDUCATION/TRAINING PROGRAM

## 2022-08-10 PROCEDURE — 77336 RADIATION PHYSICS CONSULT: CPT | Mod: PN | Performed by: STUDENT IN AN ORGANIZED HEALTH CARE EDUCATION/TRAINING PROGRAM

## 2022-08-10 PROCEDURE — 77386 HC IMRT, COMPLEX: CPT | Mod: PN | Performed by: STUDENT IN AN ORGANIZED HEALTH CARE EDUCATION/TRAINING PROGRAM

## 2022-08-10 PROCEDURE — 77014 PR  CT GUIDANCE PLACEMENT RAD THERAPY FIELDS: ICD-10-PCS | Mod: 26,,, | Performed by: STUDENT IN AN ORGANIZED HEALTH CARE EDUCATION/TRAINING PROGRAM

## 2022-08-10 RX ORDER — PANTOPRAZOLE SODIUM 40 MG/1
40 FOR SUSPENSION ORAL DAILY
Qty: 30 PACKET | Refills: 1 | Status: SHIPPED | OUTPATIENT
Start: 2022-08-10 | End: 2022-11-22 | Stop reason: SDUPTHER

## 2022-08-10 RX ORDER — TRIPROLIDINE/PSEUDOEPHEDRINE 2.5MG-60MG
400 TABLET ORAL 3 TIMES DAILY
Qty: 473 ML | Refills: 1 | Status: SHIPPED | OUTPATIENT
Start: 2022-08-10 | End: 2022-08-11

## 2022-08-10 NOTE — PLAN OF CARE
30 of 35 outpatient radiation treatments completed this visit.  No new problems noted.  All questions and concerns addressed by MD this visit.

## 2022-08-11 ENCOUNTER — TELEPHONE (OUTPATIENT)
Dept: PHARMACY | Facility: CLINIC | Age: 77
End: 2022-08-11
Payer: MEDICARE

## 2022-08-11 ENCOUNTER — CLINICAL SUPPORT (OUTPATIENT)
Dept: REHABILITATION | Facility: HOSPITAL | Age: 77
End: 2022-08-11
Attending: STUDENT IN AN ORGANIZED HEALTH CARE EDUCATION/TRAINING PROGRAM
Payer: MEDICARE

## 2022-08-11 DIAGNOSIS — R53.81 DEBILITY: Primary | ICD-10-CM

## 2022-08-11 PROCEDURE — 77014 PR  CT GUIDANCE PLACEMENT RAD THERAPY FIELDS: CPT | Mod: 26,,, | Performed by: STUDENT IN AN ORGANIZED HEALTH CARE EDUCATION/TRAINING PROGRAM

## 2022-08-11 PROCEDURE — 77386 HC IMRT, COMPLEX: CPT | Mod: PN | Performed by: STUDENT IN AN ORGANIZED HEALTH CARE EDUCATION/TRAINING PROGRAM

## 2022-08-11 PROCEDURE — 77014 PR  CT GUIDANCE PLACEMENT RAD THERAPY FIELDS: ICD-10-PCS | Mod: 26,,, | Performed by: STUDENT IN AN ORGANIZED HEALTH CARE EDUCATION/TRAINING PROGRAM

## 2022-08-11 PROCEDURE — 97110 THERAPEUTIC EXERCISES: CPT | Mod: PN,CQ

## 2022-08-11 PROCEDURE — 77014 HC CT GUIDANCE RADIATION THERAPY FLDS PLACEMENT: CPT | Mod: TC,PN | Performed by: STUDENT IN AN ORGANIZED HEALTH CARE EDUCATION/TRAINING PROGRAM

## 2022-08-11 NOTE — TELEPHONE ENCOUNTER
DOCUMENTATION ONLY  Pantoprazole Sodium 40mg packets  Approval date: 8/10/2022 to 12/31/2022  Case ID# [id number]

## 2022-08-12 ENCOUNTER — NURSE TRIAGE (OUTPATIENT)
Dept: ADMINISTRATIVE | Facility: CLINIC | Age: 77
End: 2022-08-12
Payer: MEDICARE

## 2022-08-12 PROCEDURE — 77386 HC IMRT, COMPLEX: CPT | Mod: PN | Performed by: STUDENT IN AN ORGANIZED HEALTH CARE EDUCATION/TRAINING PROGRAM

## 2022-08-12 PROCEDURE — 77014 PR  CT GUIDANCE PLACEMENT RAD THERAPY FIELDS: CPT | Mod: 26,,, | Performed by: STUDENT IN AN ORGANIZED HEALTH CARE EDUCATION/TRAINING PROGRAM

## 2022-08-12 PROCEDURE — 77014 PR  CT GUIDANCE PLACEMENT RAD THERAPY FIELDS: ICD-10-PCS | Mod: 26,,, | Performed by: STUDENT IN AN ORGANIZED HEALTH CARE EDUCATION/TRAINING PROGRAM

## 2022-08-12 PROCEDURE — 77014 HC CT GUIDANCE RADIATION THERAPY FLDS PLACEMENT: CPT | Mod: TC,PN | Performed by: STUDENT IN AN ORGANIZED HEALTH CARE EDUCATION/TRAINING PROGRAM

## 2022-08-12 NOTE — PROGRESS NOTES
"  [facility] Outpatient Physical Therapy and Wellness Daily Note     Treatment Date: 8/11/2022    Name: Arthur Ribeiro  Clinic Number: [id number]  Diagnosis:   Encounter Diagnosis   Name Primary?    Debility Yes     Physician: Thuan Peñaloza Jr., *  Precautions: Standard, Cancer and Fall   Radiation: pt has 4 more radiation treatments;  chemo will once a week.   Chemotherapy:underwent chemo and completed 3 weeks ago, and will begin another round of chemo after June 27th.    Visit #: 11 of 16  PTA Visit #: 3  Time In: 2:00 PM  Time Out: 3:00 PM  Total Treatment Time 1:1: 57    Evaluation Date: 06/15/2022  Re-assessment date: 7/13/2022  Visit # authorized: 25  Authorization period: 06/14/2022- 12/31/2022  Plan of care Expiration: 10 visits/ 30 days  MD referral: PT Eval and Treat  *Dr Stout requested addition of upper extremity exercises   Subjective     Pt reports: that he is waiting for AFO; feel dizzy today. Pt reports that his blood count is a little low; will be having fluid infusions 2 x's a week. "I have 4 more radiation treatments and then I will be done." Pt reports that the radiation causes him to be hoarse. Pt states that his compression socks cause his toes to hurt, so he wears them for a couple of days and then will not wear them for a couple of days.     Response to previous treatment: good    Pain, Nature, Location: 5/10 Nerve ending pain in stomach area from shingles comes in waves; back pain today    Objective     Pre-treatment: O2 sat monitor unable to  O2 sat, HR; to gym with rollator and wearing compression socks to lower legs. BP taken due to patient's dizziness, 130/80.     Prior session:  Recommendations made: EDDIE MONROY, advised to see [facility] to obtain, and request MD for order. Also recommending pt to obtain a stationary sitting elliptical for exercise at home that does not take too much concentration. Advised pt that he would benefit from a rolling walker on " "his weaker days to improve safety.   Performed therapeutic exercises for 55 min including  Nustep L4 x 10'   gastroc stretch on wedge 20" x 3  UBE L1 x 6' (alt direction ea minute)  Seated overhead press with 2# dowel 2 x 10  Biceps curls 4# 2 x 10  Standing scapular retractions with RTB 2 x 10 (close SBA)  Straight arm pulls with YTB 2 x 10   Standing bilateral ER with YTB 2 x 10  Ant tib exercises  in supine B gastroc stretches with and without sheet.   Side lye hip abduction 10 reps x 2 trials B LE   Side lye clams 10 reps x 3 trials B LE   sit to stand  x 10 reps  Pulleys in flexion/scaption x 10 ea  LAQ with 10 sec hold x 5 reps each  SLR's 2x10 reps  Side lying hip abd x10 each (slowly)  Side lying clamshells 2 x 10    Deferred:  Pt received neuromuscular training as follows:  Standing wavers on level floor  for correction of posterior cog with legs straight and then knees bent    Weight shifting on Airex pad (min assist to maintain balance)  Marches on Airex pad x 10 (mod assist to maintain balance)  Static standing on Airex pad (min assist to maintain balance)  Recumbent bike L2 x 7'- deferred  Gait training 10 min: working on heel toe gait pattern with use of cane, and with use of hand on barre, pt with min assist needed at times, overall improved activation of R ant tib in gait with improved steadiness of R. L LE will benefit from AFO and recommending rolling walker for safety.    Manual techniques 15 min worked on talocrural jt mob and lateral fib head to facilitate increase in dorsiflexion available range as well as manual gastroc stretch with contract relax. This did allow pt to have improved available dorsiflexion B.   Exercises x 15 minutes: worked on instrinsics for B feet with Kleenex series A/B with tactile cues, toe flexion ex, toe extension ex, alternating gastroc and ant tib B LE with manual resistance for gastroc to work opp side of activation of ant tib. Standing gluteal sets 5 sec hold 10 " reps, isometric hold x 10 reps each with hand on barre.  Neuro-re-ed: 40 min: pt stands with center of gravity in posterior position with very poor sensation/ proprioception in B feet with tightness in hip flexors and uses  Upper trunk to comes forward.  Worked on transitioning COG forward to midline of feet with mat behind him, moving head with cervical flexion to neutral with eyes on floor then eyes straight. Worked with cga/min assist to bend knees and perform wavering ant /post.  Next in front of mirror for feedback perf wavers for COG correction, followed by lateral trunk motion with upper trunk rotation      Written Home Exercises Provided:  Issued red theraband, and encouraged patient to perform seated ankle pumps daily (prior session)  Exercises were reviewed and Arthur was able to demonstrate them prior to the end of the session.  Arthur demonstrated good  understanding of the education provided.     See EMR under Patient Instructions for exercises provided 6/20/2022.    Education provided re: importance of compression garments for chronic ongoing leg swelling as well as importance of lymphatic flow exercies performed daily.  Arthur verbalized good  understanding of education provided.     Assessment   Pt has four more radiation treatments and has hoarseness this session. He complains of feeling dizzy; may be due to low blood count. Blood pressure was fine, but unable to get a reading on the O2 sat monitor. Pt did fine with addition of upper extremity exercises this session. Pt more fatigued today.     He has poor proprioception in his feet and limited PF/DF and this places him in decreased control over stance posture. Recommending use of RW for his safety. As well as B AFO to clear his feet with less difficulty. Patient is highly motivated to participate in PT in order to work towards goals established, increase functional independence, strength and stability.   Arthur Is progressing well towards his goals.   Pt  prognosis is Good.   This patient presents with diagnosis of primary malignant neoplasm of hypopharynx, referred to therapy PT/Lymphedema as patient reports decreased strength and mobility.  Patient also has a history of Burkitts lymphoma with complaints of residual side effects of weakness from chemo in the past. Base line measurements of ROM and cervical girth compared with last initial eval when pt first came to therapy 4 months ago. Pt was educated on signs and symptoms of lymphedema.      Patient presents with overall postural, UE and LE weakness, LE edema and decreased safety with lack of control in L foot with walking. Functionally patient reports limitations with endurance in community, house hold chores such as managing his own lawn, safety with ambulation. Also pt with need for AFO for L LE and also compression socks with wide band present at top. Recommending [employer] sock 15-20 mmHg and use of donning aid as well to assist.         Pt will continue to benefit from skilled outpatient physical therapy to address the deficits listed in the problem list box on initial evaluation, provide pt/family education and to maximize pt's level of independence in the home and community environment.     Pt's spiritual, cultural and educational needs considered and pt agreeable to plan of care and goals.    Anticipated barriers to physical therapy: side effects of chemo    Goals:   Short Term Goals: (4 weeks) in progress  1. Patient to demonstrate improved postural awareness and sitting tolerance. In progress 7/13/2022  2. Patient will demonstrate 100% knowledge of lymphedema precautions and signs of infection. Met 7/13/2022  3. Patient will perform self lymph drainage techniques independently.  4. Patient will increase B UE strength 1/3 grade. NOT MET7/13/2022  5. Patient will increase B LE strength 1/3 grade for improved functional tranfers and decrease fall risk. NOT MET7/13/2022  6. Measure girth of B LE in next  2 visits provide recommendation on size of Jobst sock. GOAL MET 7/13/2022     Long Term Goals: (8 weeks) IN PROGRESS  1. Patient will increase B UE strength 1/3 grade.   2. Patient will increase B LE strength 1/3 grade for improved functional tranfers and decrease fall risk.   3. Patient will perform self lymph drainage techniques  4. Pt to demonstrate improved sit to stand test to 10 reps with control over descending from standing to sitting with improved quad control.  5. Pt to receive AFO to improve safety in gait.        Plan   Continue strengthening as tolerated, work on neuro re-ed, assist with stretching gastroc and ankle mobility manual techniques as well as working on strengthening overall.  Continue with established Plan of Care towards PT goals.    Therapist: Malou Lam PTA, CLT  8/11/2022    PT/PTA met face to face to discuss patient's treatment plan and progress towards established goals.   Patient will be seen by physical therapist every sixth visit and minimally once per month.

## 2022-08-13 NOTE — TELEPHONE ENCOUNTER
Pt's wife reports pt is going thru chemo and radiation for throat cancer, has had several treatments without any problems and only has 3 more left to go, but now having a fever of 101.4 orally and nauseated, Pt advised to go to the ED now per protocol, Wife/Pt encouraged to call back anytime 24/7 if needed and verbalized understanding.     Reason for Disposition   [1] Neutropenia known or suspected (e.g., recent cancer chemotherapy) AND [2] fever > 100.4 F (38.0 C)    Additional Information   Negative: Shock suspected (e.g., cold/pale/clammy skin, too weak to stand, low BP, rapid pulse)   Negative: Difficult to awaken or acting confused  (e.g., disoriented, slurred speech)   Negative: Bluish lips, tongue, or face now   Negative: New onset rash with many purple (or blood-colored) spots or dots   Negative: Sounds like a life-threatening emergency to the triager   Negative: Fever > 104 F (40 C)    Protocols used:  CANCER - FEVER-A-

## 2022-08-14 PROBLEM — D70.9 NEUTROPENIC FEVER: Status: ACTIVE | Noted: 2022-08-14

## 2022-08-14 PROBLEM — R50.81 NEUTROPENIC FEVER: Status: ACTIVE | Noted: 2022-08-14

## 2022-08-14 PROBLEM — Z71.89 ACP (ADVANCE CARE PLANNING): Status: ACTIVE | Noted: 2022-08-14

## 2022-08-14 PROBLEM — N17.9 AKI (ACUTE KIDNEY INJURY): Status: ACTIVE | Noted: 2022-08-14

## 2022-08-14 PROBLEM — E43 SEVERE MALNUTRITION: Status: ACTIVE | Noted: 2022-08-14

## 2022-08-14 PROBLEM — D61.811 DRUG-INDUCED PANCYTOPENIA: Status: ACTIVE | Noted: 2022-08-14

## 2022-08-17 ENCOUNTER — DOCUMENTATION ONLY (OUTPATIENT)
Dept: RADIATION ONCOLOGY | Facility: CLINIC | Age: 77
End: 2022-08-17
Payer: MEDICARE

## 2022-08-17 PROCEDURE — 77014 PR  CT GUIDANCE PLACEMENT RAD THERAPY FIELDS: CPT | Mod: 26,,, | Performed by: STUDENT IN AN ORGANIZED HEALTH CARE EDUCATION/TRAINING PROGRAM

## 2022-08-17 PROCEDURE — 77014 PR  CT GUIDANCE PLACEMENT RAD THERAPY FIELDS: ICD-10-PCS | Mod: 26,,, | Performed by: STUDENT IN AN ORGANIZED HEALTH CARE EDUCATION/TRAINING PROGRAM

## 2022-08-17 PROCEDURE — 77386 HC IMRT, COMPLEX: CPT | Mod: PN | Performed by: STUDENT IN AN ORGANIZED HEALTH CARE EDUCATION/TRAINING PROGRAM

## 2022-08-17 PROCEDURE — 77014 HC CT GUIDANCE RADIATION THERAPY FLDS PLACEMENT: CPT | Mod: TC,PN | Performed by: STUDENT IN AN ORGANIZED HEALTH CARE EDUCATION/TRAINING PROGRAM

## 2022-08-17 NOTE — PLAN OF CARE
Completed 33 of 35 outpatient radiation treatments this visit, resuming after missing a few due to being hospitalized.  Patient states he is feeling fatigued but overall doing okay.  All questions and concerns addressed by MD.

## 2022-08-18 PROCEDURE — 77014 PR  CT GUIDANCE PLACEMENT RAD THERAPY FIELDS: CPT | Mod: 26,,, | Performed by: STUDENT IN AN ORGANIZED HEALTH CARE EDUCATION/TRAINING PROGRAM

## 2022-08-18 PROCEDURE — 77014 PR  CT GUIDANCE PLACEMENT RAD THERAPY FIELDS: ICD-10-PCS | Mod: 26,,, | Performed by: STUDENT IN AN ORGANIZED HEALTH CARE EDUCATION/TRAINING PROGRAM

## 2022-08-18 PROCEDURE — 77386 HC IMRT, COMPLEX: CPT | Mod: PN | Performed by: STUDENT IN AN ORGANIZED HEALTH CARE EDUCATION/TRAINING PROGRAM

## 2022-08-18 PROCEDURE — 77014 HC CT GUIDANCE RADIATION THERAPY FLDS PLACEMENT: CPT | Mod: TC,PN | Performed by: STUDENT IN AN ORGANIZED HEALTH CARE EDUCATION/TRAINING PROGRAM

## 2022-08-19 ENCOUNTER — DOCUMENTATION ONLY (OUTPATIENT)
Dept: RADIATION ONCOLOGY | Facility: CLINIC | Age: 77
End: 2022-08-19
Payer: MEDICARE

## 2022-08-19 PROCEDURE — 77014 PR  CT GUIDANCE PLACEMENT RAD THERAPY FIELDS: ICD-10-PCS | Mod: 26,,, | Performed by: STUDENT IN AN ORGANIZED HEALTH CARE EDUCATION/TRAINING PROGRAM

## 2022-08-19 PROCEDURE — 77386 HC IMRT, COMPLEX: CPT | Mod: PN | Performed by: STUDENT IN AN ORGANIZED HEALTH CARE EDUCATION/TRAINING PROGRAM

## 2022-08-19 PROCEDURE — 77014 HC CT GUIDANCE RADIATION THERAPY FLDS PLACEMENT: CPT | Mod: TC,PN | Performed by: STUDENT IN AN ORGANIZED HEALTH CARE EDUCATION/TRAINING PROGRAM

## 2022-08-19 PROCEDURE — 77014 PR  CT GUIDANCE PLACEMENT RAD THERAPY FIELDS: CPT | Mod: 26,,, | Performed by: STUDENT IN AN ORGANIZED HEALTH CARE EDUCATION/TRAINING PROGRAM

## 2022-08-19 PROCEDURE — 77336 RADIATION PHYSICS CONSULT: CPT | Mod: PN | Performed by: STUDENT IN AN ORGANIZED HEALTH CARE EDUCATION/TRAINING PROGRAM

## 2022-08-19 NOTE — PLAN OF CARE
Completed all outpatient radiation treatments this visit.  Follow up information provided and patient verbalizes understanding.

## 2022-08-30 NOTE — PROGRESS NOTES
[facility] of Radiation Oncology  Follow Up Visit Note    Diagnosis:  Arthur Ribeiro is a 76 y.o. 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     Oncologic History:  He has a history of tobacco use and burkitt's lymphoma treated with chemotherapy alone with Dr. Rahman.   1/19/22:   CT Neck with heterogeneous enhancement along the lateral pharyngeal wall with involvement vs narrowing of the pyriform sinus. Crosses midline. Suspected retropharyngeal space effusion. Shotty LN bilaterally, enlarged and or necrotic LN in left lbl 2B and 3  1/26/22: DL and Biopsy:  Op note: ulcerative mass in the left posterior pharyngeal wall with extension to the lateral wall of the OPX. No involvement of esophagus but extends to the level of the cricopharyngeus along the posterior wall. No involvement of BOT or vallecula. No involvement of cords or supraglottis.  Path: L posterior wall with moderately differentiated squamous cell carcinoma, p16-, no PNI or LVI.   2/10/22: PET/CT with left pharyngeal and oropharyngeal mass, left level II nodes with avidity. Mildly avid mediastinal nodes, inflammatory etiology suggested.   5/30/22: completed induction carboplatin (did not tolerate taxol)  5/30/22: exam with H&N surgery with ANA on FNL  6/10/22: MRI neck with near complete resolution of the bilateral cervical lymph nodes and resolution of the left oropharyngeal lesions seen previously. CT chest with no interval abnormal mediastinal or hilar lymph node enlargement   6/28/22 - 8/19/22: 70 Gy in 35 fractions to the pre-chemotherapy gross disease, 61.25 Gy in 35 fractions to the gross disease with margin and the entire larynx. 56 Gy to the bilateral RP, right II-IV and left IB-V and level VI.     Interval History  The patient presents today for a toxicity check.    He is doing well but still having throat pain, limiting  his eating. He is still relying on PEG tube for 100% of his nutrition. He is using tylenol PRN. Not using opiates or magic mouthwash.  He is getting fluids with Dr. Rahman.     Review of Systems   ROS as above    Social History:  Social History     Tobacco Use    Smoking status: Former     Types: Cigarettes, Cigars     Quit date: 10/1/2005     Years since quittin.9    Smokeless tobacco: Never   Substance Use Topics    Alcohol use: Not Currently     Comment: daily    Drug use: No       Family History:  Cancer-related family history includes Cancer in his brother, father, and sister.    Exam:  Vitals:    22 1421   BP: 127/60   BP Location: Right arm   Patient Position: Sitting   Pulse: 76   Temp: 98.3 °F (36.8 °C)   SpO2: 98%   Weight: 65.4 kg (144 lb 2.9 oz)   Height: 6' (1.829 m)     Constitutional: Pleasant 76 y.o. male in no acute distress.  Well nourished. Well groomed.   HEENT:  faint thrush on tongue.   Lymph: no appreciated cervical adenopathy  Lungs: No audible wheezing.  Normal effort.   Musculoskeletal: No gross MSK deformities.ambulates with a walker  Skin: No rashes appreciated.   Psych: Alert and oriented with appropriate mood and affect.  Neuro: Grossly normal.      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  Grade 3 mucositis, still peg dependent due to pain  Weight stable from last week  ECOG: (2) Ambulatory and capable of self care, unable to carry out work activity, up and about > 50% or waking hours    Plan:  Nystatin for thrush  Encourage PO intake, to try and start using magic mouthwash to help  Post treatment PET/CT at 3 months  RTC 2 weeks for a toxicity check    Thuan Peñaloza MD  Radiation Oncology

## 2022-08-31 ENCOUNTER — OFFICE VISIT (OUTPATIENT)
Dept: RADIATION ONCOLOGY | Facility: CLINIC | Age: 77
End: 2022-08-31
Payer: MEDICARE

## 2022-08-31 VITALS
TEMPERATURE: 98 F | OXYGEN SATURATION: 98 % | HEART RATE: 76 BPM | HEIGHT: 72 IN | WEIGHT: 144.19 LBS | SYSTOLIC BLOOD PRESSURE: 127 MMHG | DIASTOLIC BLOOD PRESSURE: 60 MMHG | BODY MASS INDEX: 19.53 KG/M2

## 2022-08-31 DIAGNOSIS — K12.33 MUCOSITIS DUE TO RADIATION THERAPY: ICD-10-CM

## 2022-08-31 DIAGNOSIS — B37.0 THRUSH: ICD-10-CM

## 2022-08-31 DIAGNOSIS — C13.9 HYPOPHARYNGEAL CANCER: Primary | ICD-10-CM

## 2022-08-31 PROCEDURE — 99499 UNLISTED E&M SERVICE: CPT | Mod: S$GLB,,, | Performed by: STUDENT IN AN ORGANIZED HEALTH CARE EDUCATION/TRAINING PROGRAM

## 2022-08-31 PROCEDURE — 1126F PR PAIN SEVERITY QUANTIFIED, NO PAIN PRESENT: ICD-10-PCS | Mod: CPTII,S$GLB,, | Performed by: STUDENT IN AN ORGANIZED HEALTH CARE EDUCATION/TRAINING PROGRAM

## 2022-08-31 PROCEDURE — 3074F SYST BP LT 130 MM HG: CPT | Mod: CPTII,S$GLB,, | Performed by: STUDENT IN AN ORGANIZED HEALTH CARE EDUCATION/TRAINING PROGRAM

## 2022-08-31 PROCEDURE — 99499 NO LOS: ICD-10-PCS | Mod: S$GLB,,, | Performed by: STUDENT IN AN ORGANIZED HEALTH CARE EDUCATION/TRAINING PROGRAM

## 2022-08-31 PROCEDURE — 1126F AMNT PAIN NOTED NONE PRSNT: CPT | Mod: CPTII,S$GLB,, | Performed by: STUDENT IN AN ORGANIZED HEALTH CARE EDUCATION/TRAINING PROGRAM

## 2022-08-31 PROCEDURE — 3288F PR FALLS RISK ASSESSMENT DOCUMENTED: ICD-10-PCS | Mod: CPTII,S$GLB,, | Performed by: STUDENT IN AN ORGANIZED HEALTH CARE EDUCATION/TRAINING PROGRAM

## 2022-08-31 PROCEDURE — 3078F PR MOST RECENT DIASTOLIC BLOOD PRESSURE < 80 MM HG: ICD-10-PCS | Mod: CPTII,S$GLB,, | Performed by: STUDENT IN AN ORGANIZED HEALTH CARE EDUCATION/TRAINING PROGRAM

## 2022-08-31 PROCEDURE — 3074F PR MOST RECENT SYSTOLIC BLOOD PRESSURE < 130 MM HG: ICD-10-PCS | Mod: CPTII,S$GLB,, | Performed by: STUDENT IN AN ORGANIZED HEALTH CARE EDUCATION/TRAINING PROGRAM

## 2022-08-31 PROCEDURE — 99999 PR PBB SHADOW E&M-EST. PATIENT-LVL III: CPT | Mod: PBBFAC,,, | Performed by: STUDENT IN AN ORGANIZED HEALTH CARE EDUCATION/TRAINING PROGRAM

## 2022-08-31 PROCEDURE — 1159F MED LIST DOCD IN RCRD: CPT | Mod: CPTII,S$GLB,, | Performed by: STUDENT IN AN ORGANIZED HEALTH CARE EDUCATION/TRAINING PROGRAM

## 2022-08-31 PROCEDURE — 1101F PT FALLS ASSESS-DOCD LE1/YR: CPT | Mod: CPTII,S$GLB,, | Performed by: STUDENT IN AN ORGANIZED HEALTH CARE EDUCATION/TRAINING PROGRAM

## 2022-08-31 PROCEDURE — 1101F PR PT FALLS ASSESS DOC 0-1 FALLS W/OUT INJ PAST YR: ICD-10-PCS | Mod: CPTII,S$GLB,, | Performed by: STUDENT IN AN ORGANIZED HEALTH CARE EDUCATION/TRAINING PROGRAM

## 2022-08-31 PROCEDURE — 3078F DIAST BP <80 MM HG: CPT | Mod: CPTII,S$GLB,, | Performed by: STUDENT IN AN ORGANIZED HEALTH CARE EDUCATION/TRAINING PROGRAM

## 2022-08-31 PROCEDURE — 1159F PR MEDICATION LIST DOCUMENTED IN MEDICAL RECORD: ICD-10-PCS | Mod: CPTII,S$GLB,, | Performed by: STUDENT IN AN ORGANIZED HEALTH CARE EDUCATION/TRAINING PROGRAM

## 2022-08-31 PROCEDURE — 3288F FALL RISK ASSESSMENT DOCD: CPT | Mod: CPTII,S$GLB,, | Performed by: STUDENT IN AN ORGANIZED HEALTH CARE EDUCATION/TRAINING PROGRAM

## 2022-08-31 PROCEDURE — 99999 PR PBB SHADOW E&M-EST. PATIENT-LVL III: ICD-10-PCS | Mod: PBBFAC,,, | Performed by: STUDENT IN AN ORGANIZED HEALTH CARE EDUCATION/TRAINING PROGRAM

## 2022-08-31 RX ORDER — NYSTATIN 100000 [USP'U]/ML
6 SUSPENSION ORAL 4 TIMES DAILY
Qty: 240 ML | Refills: 0 | Status: SHIPPED | OUTPATIENT
Start: 2022-08-31 | End: 2022-09-10

## 2022-09-14 ENCOUNTER — OFFICE VISIT (OUTPATIENT)
Dept: RADIATION ONCOLOGY | Facility: CLINIC | Age: 77
End: 2022-09-14
Payer: MEDICARE

## 2022-09-14 VITALS
RESPIRATION RATE: 20 BRPM | SYSTOLIC BLOOD PRESSURE: 123 MMHG | BODY MASS INDEX: 18.99 KG/M2 | HEIGHT: 72 IN | WEIGHT: 140.19 LBS | HEART RATE: 86 BPM | OXYGEN SATURATION: 99 % | TEMPERATURE: 98 F | DIASTOLIC BLOOD PRESSURE: 68 MMHG

## 2022-09-14 DIAGNOSIS — K12.33 MUCOSITIS DUE TO RADIATION THERAPY: ICD-10-CM

## 2022-09-14 DIAGNOSIS — C13.9 HYPOPHARYNGEAL CANCER: Primary | ICD-10-CM

## 2022-09-14 PROCEDURE — 1101F PR PT FALLS ASSESS DOC 0-1 FALLS W/OUT INJ PAST YR: ICD-10-PCS | Mod: CPTII,S$GLB,, | Performed by: STUDENT IN AN ORGANIZED HEALTH CARE EDUCATION/TRAINING PROGRAM

## 2022-09-14 PROCEDURE — 3074F PR MOST RECENT SYSTOLIC BLOOD PRESSURE < 130 MM HG: ICD-10-PCS | Mod: CPTII,S$GLB,, | Performed by: STUDENT IN AN ORGANIZED HEALTH CARE EDUCATION/TRAINING PROGRAM

## 2022-09-14 PROCEDURE — 1125F AMNT PAIN NOTED PAIN PRSNT: CPT | Mod: CPTII,S$GLB,, | Performed by: STUDENT IN AN ORGANIZED HEALTH CARE EDUCATION/TRAINING PROGRAM

## 2022-09-14 PROCEDURE — 3078F PR MOST RECENT DIASTOLIC BLOOD PRESSURE < 80 MM HG: ICD-10-PCS | Mod: CPTII,S$GLB,, | Performed by: STUDENT IN AN ORGANIZED HEALTH CARE EDUCATION/TRAINING PROGRAM

## 2022-09-14 PROCEDURE — 99999 PR PBB SHADOW E&M-EST. PATIENT-LVL III: CPT | Mod: PBBFAC,,, | Performed by: STUDENT IN AN ORGANIZED HEALTH CARE EDUCATION/TRAINING PROGRAM

## 2022-09-14 PROCEDURE — 1101F PT FALLS ASSESS-DOCD LE1/YR: CPT | Mod: CPTII,S$GLB,, | Performed by: STUDENT IN AN ORGANIZED HEALTH CARE EDUCATION/TRAINING PROGRAM

## 2022-09-14 PROCEDURE — 99999 PR PBB SHADOW E&M-EST. PATIENT-LVL III: ICD-10-PCS | Mod: PBBFAC,,, | Performed by: STUDENT IN AN ORGANIZED HEALTH CARE EDUCATION/TRAINING PROGRAM

## 2022-09-14 PROCEDURE — 99499 UNLISTED E&M SERVICE: CPT | Mod: S$GLB,,, | Performed by: STUDENT IN AN ORGANIZED HEALTH CARE EDUCATION/TRAINING PROGRAM

## 2022-09-14 PROCEDURE — 99499 NO LOS: ICD-10-PCS | Mod: S$GLB,,, | Performed by: STUDENT IN AN ORGANIZED HEALTH CARE EDUCATION/TRAINING PROGRAM

## 2022-09-14 PROCEDURE — 3078F DIAST BP <80 MM HG: CPT | Mod: CPTII,S$GLB,, | Performed by: STUDENT IN AN ORGANIZED HEALTH CARE EDUCATION/TRAINING PROGRAM

## 2022-09-14 PROCEDURE — 3288F PR FALLS RISK ASSESSMENT DOCUMENTED: ICD-10-PCS | Mod: CPTII,S$GLB,, | Performed by: STUDENT IN AN ORGANIZED HEALTH CARE EDUCATION/TRAINING PROGRAM

## 2022-09-14 PROCEDURE — 1159F MED LIST DOCD IN RCRD: CPT | Mod: CPTII,S$GLB,, | Performed by: STUDENT IN AN ORGANIZED HEALTH CARE EDUCATION/TRAINING PROGRAM

## 2022-09-14 PROCEDURE — 1125F PR PAIN SEVERITY QUANTIFIED, PAIN PRESENT: ICD-10-PCS | Mod: CPTII,S$GLB,, | Performed by: STUDENT IN AN ORGANIZED HEALTH CARE EDUCATION/TRAINING PROGRAM

## 2022-09-14 PROCEDURE — 3288F FALL RISK ASSESSMENT DOCD: CPT | Mod: CPTII,S$GLB,, | Performed by: STUDENT IN AN ORGANIZED HEALTH CARE EDUCATION/TRAINING PROGRAM

## 2022-09-14 PROCEDURE — 1159F PR MEDICATION LIST DOCUMENTED IN MEDICAL RECORD: ICD-10-PCS | Mod: CPTII,S$GLB,, | Performed by: STUDENT IN AN ORGANIZED HEALTH CARE EDUCATION/TRAINING PROGRAM

## 2022-09-14 PROCEDURE — 3074F SYST BP LT 130 MM HG: CPT | Mod: CPTII,S$GLB,, | Performed by: STUDENT IN AN ORGANIZED HEALTH CARE EDUCATION/TRAINING PROGRAM

## 2022-09-14 RX ORDER — ACETAMINOPHEN 160 MG/5ML
500 LIQUID ORAL EVERY 8 HOURS PRN
Qty: 473 ML | Refills: 1 | Status: SHIPPED | OUTPATIENT
Start: 2022-09-14 | End: 2022-10-18

## 2022-09-14 NOTE — PROGRESS NOTES
[facility] of Radiation On[facility]  Follow Up Visit Note    Diagnosis:  Arthur Ribeiro is a 76 y.o. 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     Oncologic History:  He has a history of tobacco use and burkitt's lymphoma treated with chemotherapy alone with Dr. Rahman.   1/19/22:   CT Neck with heterogeneous enhancement along the lateral pharyngeal wall with involvement vs narrowing of the pyriform sinus. Crosses midline. Suspected retropharyngeal space effusion. Shotty LN bilaterally, enlarged and or necrotic LN in left lbl 2B and 3  1/26/22: DL and Biopsy:  Op note: ulcerative mass in the left posterior pharyngeal wall with extension to the lateral wall of the OPX. No involvement of esophagus but extends to the level of the cricopharyngeus along the posterior wall. No involvement of BOT or vallecula. No involvement of cords or supraglottis.  Path: L posterior wall with moderately differentiated squamous cell carcinoma, p16-, no PNI or LVI.   2/10/22: PET/CT with left pharyngeal and oropharyngeal mass, left level II nodes with avidity. Mildly avid mediastinal nodes, inflammatory etiology suggested.   5/30/22: completed induction carboplatin (did not tolerate taxol)  5/30/22: exam with H&N surgery with ANA on FNL  6/10/22: MRI neck with near complete resolution of the bilateral cervical lymph nodes and resolution of the left oropharyngeal lesions seen previously. CT chest with no interval abnormal mediastinal or hilar lymph node enlargement   6/28/22 - 8/19/22: 70 Gy in 35 fractions to the pre-chemotherapy gross disease, 61.25 Gy in 35 fractions to the gross disease with margin and the entire larynx. 56 Gy to the bilateral RP, right II-IV and left IB-V and level VI.     Interval History  The patient presents today for a toxicity check.    His throat pain is improving. He is still having dysphagia  but thinks it is more mechanical. Foods and fluid gets stuck. He has been doing his swallowing exercises. He is doing 4 cartons per day and had a little weight loss today. Denies otalgia.     Review of Systems   ROS as above    Social History:  Social History     Tobacco Use    Smoking status: Former     Types: Cigarettes, Cigars     Quit date: 10/1/2005     Years since quittin.9    Smokeless tobacco: Never   Substance Use Topics    Alcohol use: Not Currently     Comment: daily    Drug use: No       Family History:  Cancer-related family history includes Cancer in his brother, father, and sister.    Exam:  Vitals:    22 1437   BP: 123/68   BP Location: Right arm   Patient Position: Sitting   Pulse: 86   Resp: 20   Temp: 97.9 °F (36.6 °C)   SpO2: 99%   Weight: 63.6 kg (140 lb 3.4 oz)   Height: 6' (1.829 m)     Constitutional: Pleasant 76 y.o. male in no acute distress.  Well nourished. Well groomed.   HEENT: no appreciated OC or OPX lesions on direct exam. Thrush resolved but still with dry secretions.   Lymph: no appreciated cervical adenopathy. No submental edema. Skin intact  Lungs: No audible wheezing.  Normal effort.   Musculoskeletal: No gross MSK deformities.ambulates with a walker  Abdomen: PEG Tube in place without surrounding erythema or tenderness  Skin: No rashes appreciated.   Psych: Alert and oriented with appropriate mood and affect.  Neuro: Grossly normal.      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  Grade 3 mucositis, still peg dependent due to dysphagia  Weight down from . This was discussed with he and his wife  ECOG: (2) Ambulatory and capable of self care, unable to carry out work activity, up and about > 50% or waking hours    Plan:  Post treatment PET/CT at 3 months  RTC 1 month  He is set up to see Dr. Stout for H&N Surgery follow up  Will reach out to speech  therapy again  Recommend he increase his tube feeds to 5 cartons, discussed slower feeds to reduce reflux.    Thuan Peñaloza MD  Radiation Oncology

## 2022-09-19 DIAGNOSIS — C13.9 HYPOPHARYNGEAL CANCER: Primary | ICD-10-CM

## 2022-09-19 DIAGNOSIS — R13.10 DYSPHAGIA, UNSPECIFIED TYPE: ICD-10-CM

## 2022-10-18 ENCOUNTER — OFFICE VISIT (OUTPATIENT)
Dept: RADIATION ONCOLOGY | Facility: CLINIC | Age: 77
End: 2022-10-18
Payer: MEDICARE

## 2022-10-18 VITALS
HEART RATE: 74 BPM | RESPIRATION RATE: 20 BRPM | TEMPERATURE: 98 F | SYSTOLIC BLOOD PRESSURE: 135 MMHG | DIASTOLIC BLOOD PRESSURE: 65 MMHG | WEIGHT: 140.19 LBS | BODY MASS INDEX: 18.99 KG/M2 | HEIGHT: 72 IN

## 2022-10-18 DIAGNOSIS — C13.9 HYPOPHARYNGEAL CANCER: Primary | ICD-10-CM

## 2022-10-18 PROCEDURE — 3075F PR MOST RECENT SYSTOLIC BLOOD PRESS GE 130-139MM HG: ICD-10-PCS | Mod: CPTII,S$GLB,, | Performed by: STUDENT IN AN ORGANIZED HEALTH CARE EDUCATION/TRAINING PROGRAM

## 2022-10-18 PROCEDURE — 3288F FALL RISK ASSESSMENT DOCD: CPT | Mod: CPTII,S$GLB,, | Performed by: STUDENT IN AN ORGANIZED HEALTH CARE EDUCATION/TRAINING PROGRAM

## 2022-10-18 PROCEDURE — 99999 PR PBB SHADOW E&M-EST. PATIENT-LVL III: ICD-10-PCS | Mod: PBBFAC,,, | Performed by: STUDENT IN AN ORGANIZED HEALTH CARE EDUCATION/TRAINING PROGRAM

## 2022-10-18 PROCEDURE — 3288F PR FALLS RISK ASSESSMENT DOCUMENTED: ICD-10-PCS | Mod: CPTII,S$GLB,, | Performed by: STUDENT IN AN ORGANIZED HEALTH CARE EDUCATION/TRAINING PROGRAM

## 2022-10-18 PROCEDURE — 99999 PR PBB SHADOW E&M-EST. PATIENT-LVL III: CPT | Mod: PBBFAC,,, | Performed by: STUDENT IN AN ORGANIZED HEALTH CARE EDUCATION/TRAINING PROGRAM

## 2022-10-18 PROCEDURE — 99499 UNLISTED E&M SERVICE: CPT | Mod: S$GLB,,, | Performed by: STUDENT IN AN ORGANIZED HEALTH CARE EDUCATION/TRAINING PROGRAM

## 2022-10-18 PROCEDURE — 3078F DIAST BP <80 MM HG: CPT | Mod: CPTII,S$GLB,, | Performed by: STUDENT IN AN ORGANIZED HEALTH CARE EDUCATION/TRAINING PROGRAM

## 2022-10-18 PROCEDURE — 3078F PR MOST RECENT DIASTOLIC BLOOD PRESSURE < 80 MM HG: ICD-10-PCS | Mod: CPTII,S$GLB,, | Performed by: STUDENT IN AN ORGANIZED HEALTH CARE EDUCATION/TRAINING PROGRAM

## 2022-10-18 PROCEDURE — 1159F PR MEDICATION LIST DOCUMENTED IN MEDICAL RECORD: ICD-10-PCS | Mod: CPTII,S$GLB,, | Performed by: STUDENT IN AN ORGANIZED HEALTH CARE EDUCATION/TRAINING PROGRAM

## 2022-10-18 PROCEDURE — 1125F AMNT PAIN NOTED PAIN PRSNT: CPT | Mod: CPTII,S$GLB,, | Performed by: STUDENT IN AN ORGANIZED HEALTH CARE EDUCATION/TRAINING PROGRAM

## 2022-10-18 PROCEDURE — 3075F SYST BP GE 130 - 139MM HG: CPT | Mod: CPTII,S$GLB,, | Performed by: STUDENT IN AN ORGANIZED HEALTH CARE EDUCATION/TRAINING PROGRAM

## 2022-10-18 PROCEDURE — 99499 NO LOS: ICD-10-PCS | Mod: S$GLB,,, | Performed by: STUDENT IN AN ORGANIZED HEALTH CARE EDUCATION/TRAINING PROGRAM

## 2022-10-18 PROCEDURE — 1101F PR PT FALLS ASSESS DOC 0-1 FALLS W/OUT INJ PAST YR: ICD-10-PCS | Mod: CPTII,S$GLB,, | Performed by: STUDENT IN AN ORGANIZED HEALTH CARE EDUCATION/TRAINING PROGRAM

## 2022-10-18 PROCEDURE — 1159F MED LIST DOCD IN RCRD: CPT | Mod: CPTII,S$GLB,, | Performed by: STUDENT IN AN ORGANIZED HEALTH CARE EDUCATION/TRAINING PROGRAM

## 2022-10-18 PROCEDURE — 1101F PT FALLS ASSESS-DOCD LE1/YR: CPT | Mod: CPTII,S$GLB,, | Performed by: STUDENT IN AN ORGANIZED HEALTH CARE EDUCATION/TRAINING PROGRAM

## 2022-10-18 PROCEDURE — 1125F PR PAIN SEVERITY QUANTIFIED, PAIN PRESENT: ICD-10-PCS | Mod: CPTII,S$GLB,, | Performed by: STUDENT IN AN ORGANIZED HEALTH CARE EDUCATION/TRAINING PROGRAM

## 2022-10-18 RX ORDER — ACETAMINOPHEN 160 MG/5ML
500 LIQUID ORAL EVERY 8 HOURS PRN
Qty: 473 ML | Refills: 1 | Status: SHIPPED | OUTPATIENT
Start: 2022-10-18 | End: 2023-09-18

## 2022-10-18 NOTE — Clinical Note
Unfortunately still very PEG dependent and not tolerating PO. Working closely with speech and going for swallow study tomorrow

## 2022-10-18 NOTE — PROGRESS NOTES
[facility] Radiation On[facility]  Follow Up Visit Note    Diagnosis:  Arthur Ribeiro is a 76 y.o. 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     Oncologic History:  He has a history of tobacco use and burkitt's lymphoma treated with chemotherapy alone with Dr. Rahman.   1/19/22:   CT Neck with heterogeneous enhancement along the lateral pharyngeal wall with involvement vs narrowing of the pyriform sinus. Crosses midline. Suspected retropharyngeal space effusion. Shotty LN bilaterally, enlarged and or necrotic LN in left lbl 2B and 3  1/26/22: DL and Biopsy:  Op note: ulcerative mass in the left posterior pharyngeal wall with extension to the lateral wall of the OPX. No involvement of esophagus but extends to the level of the cricopharyngeus along the posterior wall. No involvement of BOT or vallecula. No involvement of cords or supraglottis.  Path: L posterior wall with moderately differentiated squamous cell carcinoma, p16-, no PNI or LVI.   2/10/22: PET/CT with left pharyngeal and oropharyngeal mass, left level II nodes with avidity. Mildly avid mediastinal nodes, inflammatory etiology suggested.   5/30/22: completed induction carboplatin (did not tolerate taxol)  5/30/22: exam with H&N surgery with ANA on FNL  6/10/22: MRI neck with near complete resolution of the bilateral cervical lymph nodes and resolution of the left oropharyngeal lesions seen previously. CT chest with no interval abnormal mediastinal or hilar lymph node enlargement   6/28/22 - 8/19/22: 70 Gy in 35 fractions to the pre-chemotherapy gross disease, 61.25 Gy in 35 fractions to the gross disease with margin and the entire larynx. 56 Gy to the bilateral RP, right II-IV and left IB-V and level VI.     Interval History  The patient presents today for a toxicity check.    He is still with pharyngeal dysphagia. He has tried  liquids and he will cough them up. Denies aspiration. No otalgia, hemoptysis, new adenopathy. No throat pain. Tolerating 5 cartons per day. Still fatigued following RT.       Review of Systems   ROS as above    Social History:  Social History     Tobacco Use    Smoking status: Former     Types: Cigarettes, Cigars     Quit date: 10/1/2005     Years since quittin.0    Smokeless tobacco: Never   Substance Use Topics    Alcohol use: Not Currently     Comment: daily    Drug use: No       Family History:  Cancer-related family history includes Cancer in his brother, father, and sister.    Exam:  Vitals:    10/18/22 1341   BP: 135/65   BP Location: Left arm   Patient Position: Sitting   Pulse: 74   Resp: 20   Temp: 98 °F (36.7 °C)   Weight: 63.6 kg (140 lb 3.4 oz)   Height: 6' (1.829 m)       Constitutional: Pleasant 76 y.o. male in no acute distress.  Well nourished. Well groomed.   HEENT: no appreciated OC or OPX lesions on direct exam. Thrush resolved but with continued thick secretions secretions.   Lymph: no appreciated cervical adenopathy. Skin intact  Lungs: No audible wheezing.  Normal effort.   Musculoskeletal: No gross MSK deformities.ambulates with a walker  Abdomen: PEG Tube in place without surrounding erythema or tenderness  Skin: No rashes appreciated.   Psych: Alert and oriented with appropriate mood and affect.  Neuro: Grossly normal.      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  ANA on exam but he remains PEG tube dependent. He has been following with speech therapy, to go for swallow study tomorrow.  Creatinine has improved  Grade 1 fatigue  TSH WNL 22  ECOG: (2) Ambulatory and capable of self care, unable to carry out work activity, up and about > 50% or waking hours    Plan:  RTC with post treatment PET/CT, scheduled for 22  No throat pain but will refill tylenol  per his request. He is using this sparingly.       Thuan Peñaloza MD  Radiation Oncology

## 2022-10-25 ENCOUNTER — PATIENT MESSAGE (OUTPATIENT)
Dept: HEMATOLOGY/ONCOLOGY | Facility: CLINIC | Age: 77
End: 2022-10-25
Payer: MEDICARE

## 2022-10-27 ENCOUNTER — TELEPHONE (OUTPATIENT)
Dept: OTOLARYNGOLOGY | Facility: CLINIC | Age: 77
End: 2022-10-27
Payer: MEDICARE

## 2022-10-27 NOTE — TELEPHONE ENCOUNTER
Team to schedule visit with me at 8am Monday for evaluation and consideration of treatment options.    ----- Message from Navin Weaver MD sent at 10/25/2022  2:48 PM CDT -----  Regarding: RE: challenging patient  Fabricio Herzog- Happy to help.If you can't get him more open from above, I can certainly see him in clinic, and discuss a repeat attempt. I usually re-try an attempt from above using ERCP wires under fluoro guidance (this often works). He may benefit from a temporarily placed, small diameter covered stent to keep this area open. If there is a true complete closure, a combo anterograde/retrograde could be attempted.          ----- Message -----  From: Kavon Hart MD  Sent: 10/25/2022   1:18 PM CDT  To: Navin Weaver MD  Subject: challenging patient                              Navin,  I was wondering if you or one of your advanced endoscopy colleagues might be willing to help me with a complex patient whom I have not yet met. This is a patient with history of hypopharyngeal cancer, s/p induction chemo and radiation completed in 8/2022. He is PEG dependent but is experiencing progressive dysphagia. 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 His laryngeal function is reportedly intact.  I am planning to meet him 1st in the office, and then probably talk with him about upper endoscopy, possible UES dilation, possible cricopharyngeal Botox injection.  However, if we proceed and if I encounter a blind pouch, I would query your team's interest in trying a retrograde approach to get him open.  Please let me know what you think.    Thanks,  Mino

## 2022-10-31 ENCOUNTER — OFFICE VISIT (OUTPATIENT)
Dept: OTOLARYNGOLOGY | Facility: CLINIC | Age: 77
End: 2022-10-31
Payer: MEDICARE

## 2022-10-31 VITALS — DIASTOLIC BLOOD PRESSURE: 71 MMHG | HEART RATE: 83 BPM | SYSTOLIC BLOOD PRESSURE: 120 MMHG

## 2022-10-31 DIAGNOSIS — R13.14 DYSPHAGIA, PHARYNGOESOPHAGEAL: ICD-10-CM

## 2022-10-31 DIAGNOSIS — C13.9 HYPOPHARYNGEAL CANCER: Primary | ICD-10-CM

## 2022-10-31 DIAGNOSIS — J39.2: ICD-10-CM

## 2022-10-31 DIAGNOSIS — K22.4 CRICOPHARYNGEUS MUSCLE DYSFUNCTION: ICD-10-CM

## 2022-10-31 PROCEDURE — 1159F MED LIST DOCD IN RCRD: CPT | Mod: CPTII,S$GLB,, | Performed by: OTOLARYNGOLOGY

## 2022-10-31 PROCEDURE — 3078F DIAST BP <80 MM HG: CPT | Mod: CPTII,S$GLB,, | Performed by: OTOLARYNGOLOGY

## 2022-10-31 PROCEDURE — 1101F PR PT FALLS ASSESS DOC 0-1 FALLS W/OUT INJ PAST YR: ICD-10-PCS | Mod: CPTII,S$GLB,, | Performed by: OTOLARYNGOLOGY

## 2022-10-31 PROCEDURE — 99999 PR PBB SHADOW E&M-EST. PATIENT-LVL IV: CPT | Mod: PBBFAC,,, | Performed by: OTOLARYNGOLOGY

## 2022-10-31 PROCEDURE — 99999 PR PBB SHADOW E&M-EST. PATIENT-LVL IV: ICD-10-PCS | Mod: PBBFAC,,, | Performed by: OTOLARYNGOLOGY

## 2022-10-31 PROCEDURE — 3288F PR FALLS RISK ASSESSMENT DOCUMENTED: ICD-10-PCS | Mod: CPTII,S$GLB,, | Performed by: OTOLARYNGOLOGY

## 2022-10-31 PROCEDURE — 1101F PT FALLS ASSESS-DOCD LE1/YR: CPT | Mod: CPTII,S$GLB,, | Performed by: OTOLARYNGOLOGY

## 2022-10-31 PROCEDURE — 3074F PR MOST RECENT SYSTOLIC BLOOD PRESSURE < 130 MM HG: ICD-10-PCS | Mod: CPTII,S$GLB,, | Performed by: OTOLARYNGOLOGY

## 2022-10-31 PROCEDURE — 1159F PR MEDICATION LIST DOCUMENTED IN MEDICAL RECORD: ICD-10-PCS | Mod: CPTII,S$GLB,, | Performed by: OTOLARYNGOLOGY

## 2022-10-31 PROCEDURE — 1160F RVW MEDS BY RX/DR IN RCRD: CPT | Mod: CPTII,S$GLB,, | Performed by: OTOLARYNGOLOGY

## 2022-10-31 PROCEDURE — 3074F SYST BP LT 130 MM HG: CPT | Mod: CPTII,S$GLB,, | Performed by: OTOLARYNGOLOGY

## 2022-10-31 PROCEDURE — 3078F PR MOST RECENT DIASTOLIC BLOOD PRESSURE < 80 MM HG: ICD-10-PCS | Mod: CPTII,S$GLB,, | Performed by: OTOLARYNGOLOGY

## 2022-10-31 PROCEDURE — 99215 PR OFFICE/OUTPT VISIT, EST, LEVL V, 40-54 MIN: ICD-10-PCS | Mod: 25,S$GLB,, | Performed by: OTOLARYNGOLOGY

## 2022-10-31 PROCEDURE — 99215 OFFICE O/P EST HI 40 MIN: CPT | Mod: 25,S$GLB,, | Performed by: OTOLARYNGOLOGY

## 2022-10-31 PROCEDURE — 1126F PR PAIN SEVERITY QUANTIFIED, NO PAIN PRESENT: ICD-10-PCS | Mod: CPTII,S$GLB,, | Performed by: OTOLARYNGOLOGY

## 2022-10-31 PROCEDURE — 1126F AMNT PAIN NOTED NONE PRSNT: CPT | Mod: CPTII,S$GLB,, | Performed by: OTOLARYNGOLOGY

## 2022-10-31 PROCEDURE — 3288F FALL RISK ASSESSMENT DOCD: CPT | Mod: CPTII,S$GLB,, | Performed by: OTOLARYNGOLOGY

## 2022-10-31 PROCEDURE — 1160F PR REVIEW ALL MEDS BY PRESCRIBER/CLIN PHARMACIST DOCUMENTED: ICD-10-PCS | Mod: CPTII,S$GLB,, | Performed by: OTOLARYNGOLOGY

## 2022-10-31 NOTE — PROGRESS NOTES
[facility]  Department of Otorhinolaryngology and Communication Sciences    Arthur Ribeiro is a 76 y.o. male who presents to the [facility] on referral from Dr. Stout regarding dysphagia.       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     Oncologic History:  He has a history of tobacco use and burkitt's lymphoma treated with chemotherapy alone with Dr. Rahman.   1/19/22:   CT Neck with heterogeneous enhancement along the lateral pharyngeal wall with involvement vs narrowing of the pyriform sinus. Crosses midline. Suspected retropharyngeal space effusion. Shotty LN bilaterally, enlarged and or necrotic LN in left lbl 2B and 3  1/26/22: DL and Biopsy:  Op note: ulcerative mass in the left posterior pharyngeal wall with extension to the lateral wall of the OPX. No involvement of esophagus but extends to the level of the cricopharyngeus along the posterior wall. No involvement of BOT or vallecula. No involvement of cords or supraglottis.  Path: L posterior wall with moderately differentiated squamous cell carcinoma, p16-, no PNI or LVI.   2/10/22: PET/CT with left pharyngeal and oropharyngeal mass, left level II nodes with avidity. Mildly avid mediastinal nodes, inflammatory etiology suggested.   5/30/22: completed induction carboplatin (did not tolerate taxol)  5/30/22: exam with H&N surgery with ANA on FNL  6/10/22: MRI neck with near complete resolution of the bilateral cervical lymph nodes and resolution of the left oropharyngeal lesions seen previously. CT chest with no interval abnormal mediastinal or hilar lymph node enlargement   6/28/22 - 8/19/22: 70 Gy in 35 fractions to the pre-chemotherapy gross disease, 61.25 Gy in 35 fractions to the gross disease with margin and the entire larynx. 56 Gy to the bilateral RP, right II-IV and left IB-V and  level VI.    He underwent SLP swallowing therapy over the course of his treatment.  Beginning 1-2 weeks after initiation of radiation therapy, he began experiencing dysphagia.  Modified barium swallow study in July demonstrated mild dysfunction but a grossly patent pharyngo esophageal segment.  Nevertheless, a feeding tube was placed in early July.  By the end of the month, he was 100% dependent on the feeding tube.  Since that time he has also experienced poor tolerance of his oral secretions.  His weight is down 35 lb since the initiation of treatment, but it has now stabilized.  There have been no respiratory illnesses.  He denies dysphonia or dyspnea.    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    Past Surgical History  He has a past surgical history that includes Skin cancer excision; Insertion of venous access port (2005); Laryngoscopy (Bilateral, 1/26/2022); and Esophagogastroduodenoscopy (N/A, 7/6/2022).    Family History  His family history includes Cancer in his brother, father, and sister; Heart disease in his mother; Hypertension in his mother; No Known Problems in his daughter and son.    Social History  He reports that he quit smoking about 17 years ago. His smoking use included cigarettes and cigars. He has never used smokeless tobacco. He reports that he does not currently use alcohol. He  reports that he does not use drugs.    Allergies  He has No Known Allergies.    Medications  He has a current medication list which includes the following prescription(s): diphenhydramine hcl, acetaminophen, gabapentin, lidocaine viscous, magic mouthwash diphen/antac/lidoc/nysta, ondansetron, and valacyclovir.    Review of Systems   Constitutional:  Negative for fever.   HENT:  Negative for sore throat.    Eyes:  Negative for visual disturbance.   Respiratory:  Negative for wheezing.    Cardiovascular:  Negative for chest pain.   Gastrointestinal:  Negative for nausea.   Musculoskeletal:  Negative for arthralgias.   Skin:  Negative for rash.   Neurological:  Negative for tremors.   Hematological:  Does not bruise/bleed easily.   Psychiatric/Behavioral:  The patient is not nervous/anxious.         Objective:     /71   Pulse 83    Physical Exam  Constitutional: comfortable, well dressed, thin  Psychiatric: appropriate affect  Respiratory: comfortably breathing, symmetric chest rise, no stridor  Voice:  Mild roughness  Cardiovascular: upper extremities non-edematous  Lymphatic: no cervical lymphadenopathy  Neurologic: alert and oriented to time, place, person, and situation; cranial nerves 3-12 grossly intact  Head: normocephalic  Eyes: conjunctivae and sclerae clear  Ears: normal pinnae, normal external auditory canals, tympanic membranes intact  Nose: mucosa pink and noncongested, no masses, no mucopurulence, no polyps  Oral cavity / oropharynx: no mucosal lesions  Neck: soft, full range of motion, mild prelaryngeal soft tissue edema, low-lying laryngotracheal complex palpable with appropriate landmarks, larynx elevates on swallowing  Indirect laryngoscopy: limited due to gag    Procedure  Flexible Laryngoscopy (29422): Laryngoscopy is indicated for assessment of upper aerodigestive structure and function. This was carried out transnasally with a distal chip videoendoscope. After verbal consent was obtained,  the patient was positioned and the nose was topically decongested with 1% phenylephrine and topically anesthetized with 4% lidocaine. The endoscope was passed through the most patent nasal cavity and positioned to image the nasopharynx, larynx, and hypopharynx in detail. The following features were examined: nasopharyngeal, laryngeal, hypopharyngeal masses; velopharyngeal strength, closure, and symmetry of motion; vocal fold range and symmetry of motion; laryngeal mucosal edema, erythema, inflammation, and hydration; salivary pooling; and gross laryngeal sensation. The equipment was removed. The patient tolerated the procedure well without complication. All findings were normal except:  - obliterated left piriform sinus  - right piriform sinus patent  - mild posterior pharyngeal wall edema  - narrow hypopharyngeal lumen, estimated at diameter of 1-2 mm, only demonstrated on air insufflation    Representative image of hypopharyngeal lumen:        Data Reviewed  See HPI      Assessment:     Arthur Ribeiro is a 76 y.o. male with profound swallowing dysfunction following chemoradiation for stage IV hypopharyngeal cancer completed in August 2022.       Plan:        I had a discussion with the patient and his wife  regarding his condition and the further workup and management options.      I counseled them regarding the complexity of his problem and the challenges of treatment for such a problem.    I recommended that he complete an updated MRI to help determine whether there is a component of mucosal soft tissue edema as well as to rule out any evidence of submucosal mass.    I spoke with them about procedural intervention, including both cricopharyngeal botulinum toxin injection and dilation of the upper esophageal sphincter.  I spoke with them about the risks thereof, with risks including but not limited to pharyngo esophageal perforation/leak/mediastinitis/spinal abscess/death.  I help them understand that, in the  situation of a complete or near-complete stenosis, collaborative procedural care in tandem with 1 of my gastroenterology colleagues was recommended.    He will follow up with me to discuss management in more detail once his MRI has been completed.      All questions were answered, and the patient is in agreement with the above.     This visit was over 45 minutes in duration, with over 50% of the time spent in direct face-to-face counseling and coordination of care regarding the issues outlined above.    Kavon Hart M.D.  [facility]  Department of Otorhinolaryngology and Communication Sciences

## 2022-10-31 NOTE — H&P (VIEW-ONLY)
[facility]  Department of Otorhinolaryngology and Communication Sciences    Arthur Ribeiro is a 76 y.o. male who presents to the [facility] on referral from Dr. Stout regarding dysphagia.       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     Oncologic History:  He has a history of tobacco use and burkitt's lymphoma treated with chemotherapy alone with Dr. Rahman.   1/19/22:   CT Neck with heterogeneous enhancement along the lateral pharyngeal wall with involvement vs narrowing of the pyriform sinus. Crosses midline. Suspected retropharyngeal space effusion. Shotty LN bilaterally, enlarged and or necrotic LN in left lbl 2B and 3  1/26/22: DL and Biopsy:  Op note: ulcerative mass in the left posterior pharyngeal wall with extension to the lateral wall of the OPX. No involvement of esophagus but extends to the level of the cricopharyngeus along the posterior wall. No involvement of BOT or vallecula. No involvement of cords or supraglottis.  Path: L posterior wall with moderately differentiated squamous cell carcinoma, p16-, no PNI or LVI.   2/10/22: PET/CT with left pharyngeal and oropharyngeal mass, left level II nodes with avidity. Mildly avid mediastinal nodes, inflammatory etiology suggested.   5/30/22: completed induction carboplatin (did not tolerate taxol)  5/30/22: exam with H&N surgery with ANA on FNL  6/10/22: MRI neck with near complete resolution of the bilateral cervical lymph nodes and resolution of the left oropharyngeal lesions seen previously. CT chest with no interval abnormal mediastinal or hilar lymph node enlargement   6/28/22 - 8/19/22: 70 Gy in 35 fractions to the pre-chemotherapy gross disease, 61.25 Gy in 35 fractions to the gross disease with margin and the entire larynx. 56 Gy to the bilateral RP, right II-IV and left IB-V and  level VI.    He underwent SLP swallowing therapy over the course of his treatment.  Beginning 1-2 weeks after initiation of radiation therapy, he began experiencing dysphagia.  Modified barium swallow study in July demonstrated mild dysfunction but a grossly patent pharyngo esophageal segment.  Nevertheless, a feeding tube was placed in early July.  By the end of the month, he was 100% dependent on the feeding tube.  Since that time he has also experienced poor tolerance of his oral secretions.  His weight is down 35 lb since the initiation of treatment, but it has now stabilized.  There have been no respiratory illnesses.  He denies dysphonia or dyspnea.    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    Past Surgical History  He has a past surgical history that includes Skin cancer excision; Insertion of venous access port (2005); Laryngoscopy (Bilateral, 1/26/2022); and Esophagogastroduodenoscopy (N/A, 7/6/2022).    Family History  His family history includes Cancer in his brother, father, and sister; Heart disease in his mother; Hypertension in his mother; No Known Problems in his daughter and son.    Social History  He reports that he quit smoking about 17 years ago. His smoking use included cigarettes and cigars. He has never used smokeless tobacco. He reports that he does not currently use alcohol. He  reports that he does not use drugs.    Allergies  He has No Known Allergies.    Medications  He has a current medication list which includes the following prescription(s): diphenhydramine hcl, acetaminophen, gabapentin, lidocaine viscous, magic mouthwash diphen/antac/lidoc/nysta, ondansetron, and valacyclovir.    Review of Systems   Constitutional:  Negative for fever.   HENT:  Negative for sore throat.    Eyes:  Negative for visual disturbance.   Respiratory:  Negative for wheezing.    Cardiovascular:  Negative for chest pain.   Gastrointestinal:  Negative for nausea.   Musculoskeletal:  Negative for arthralgias.   Skin:  Negative for rash.   Neurological:  Negative for tremors.   Hematological:  Does not bruise/bleed easily.   Psychiatric/Behavioral:  The patient is not nervous/anxious.         Objective:     /71   Pulse 83    Physical Exam  Constitutional: comfortable, well dressed, thin  Psychiatric: appropriate affect  Respiratory: comfortably breathing, symmetric chest rise, no stridor  Voice:  Mild roughness  Cardiovascular: upper extremities non-edematous  Lymphatic: no cervical lymphadenopathy  Neurologic: alert and oriented to time, place, person, and situation; cranial nerves 3-12 grossly intact  Head: normocephalic  Eyes: conjunctivae and sclerae clear  Ears: normal pinnae, normal external auditory canals, tympanic membranes intact  Nose: mucosa pink and noncongested, no masses, no mucopurulence, no polyps  Oral cavity / oropharynx: no mucosal lesions  Neck: soft, full range of motion, mild prelaryngeal soft tissue edema, low-lying laryngotracheal complex palpable with appropriate landmarks, larynx elevates on swallowing  Indirect laryngoscopy: limited due to gag    Procedure  Flexible Laryngoscopy (36251): Laryngoscopy is indicated for assessment of upper aerodigestive structure and function. This was carried out transnasally with a distal chip videoendoscope. After verbal consent was obtained,  the patient was positioned and the nose was topically decongested with 1% phenylephrine and topically anesthetized with 4% lidocaine. The endoscope was passed through the most patent nasal cavity and positioned to image the nasopharynx, larynx, and hypopharynx in detail. The following features were examined: nasopharyngeal, laryngeal, hypopharyngeal masses; velopharyngeal strength, closure, and symmetry of motion; vocal fold range and symmetry of motion; laryngeal mucosal edema, erythema, inflammation, and hydration; salivary pooling; and gross laryngeal sensation. The equipment was removed. The patient tolerated the procedure well without complication. All findings were normal except:  - obliterated left piriform sinus  - right piriform sinus patent  - mild posterior pharyngeal wall edema  - narrow hypopharyngeal lumen, estimated at diameter of 1-2 mm, only demonstrated on air insufflation    Representative image of hypopharyngeal lumen:        Data Reviewed  See HPI      Assessment:     Arthur Ribeiro is a 76 y.o. male with profound swallowing dysfunction following chemoradiation for stage IV hypopharyngeal cancer completed in August 2022.       Plan:        I had a discussion with the patient and his wife  regarding his condition and the further workup and management options.      I counseled them regarding the complexity of his problem and the challenges of treatment for such a problem.    I recommended that he complete an updated MRI to help determine whether there is a component of mucosal soft tissue edema as well as to rule out any evidence of submucosal mass.    I spoke with them about procedural intervention, including both cricopharyngeal botulinum toxin injection and dilation of the upper esophageal sphincter.  I spoke with them about the risks thereof, with risks including but not limited to pharyngo esophageal perforation/leak/mediastinitis/spinal abscess/death.  I help them understand that, in the  situation of a complete or near-complete stenosis, collaborative procedural care in tandem with 1 of my gastroenterology colleagues was recommended.    He will follow up with me to discuss management in more detail once his MRI has been completed.      All questions were answered, and the patient is in agreement with the above.     This visit was over 45 minutes in duration, with over 50% of the time spent in direct face-to-face counseling and coordination of care regarding the issues outlined above.    Kavon Hart M.D.  [facility]  Department of Otorhinolaryngology and Communication Sciences

## 2022-10-31 NOTE — Clinical Note
I think I at least found a lumen with air insufflation with the TNE scope. Hopefully that is a good sign that we can help him. I am getting an MRI first.

## 2022-11-03 DIAGNOSIS — C13.9 PRIMARY MALIGNANT NEOPLASM OF HYPOPHARYNX: ICD-10-CM

## 2022-11-03 DIAGNOSIS — R13.14 DYSPHAGIA, PHARYNGOESOPHAGEAL: ICD-10-CM

## 2022-11-03 DIAGNOSIS — J39.2: Primary | ICD-10-CM

## 2022-11-03 RX ORDER — DEXAMETHASONE SODIUM PHOSPHATE 4 MG/ML
8 INJECTION, SOLUTION INTRA-ARTICULAR; INTRALESIONAL; INTRAMUSCULAR; INTRAVENOUS; SOFT TISSUE
Status: CANCELLED | OUTPATIENT
Start: 2022-11-03

## 2022-11-03 RX ORDER — LIDOCAINE HYDROCHLORIDE 10 MG/ML
1 INJECTION, SOLUTION EPIDURAL; INFILTRATION; INTRACAUDAL; PERINEURAL ONCE
Status: CANCELLED | OUTPATIENT
Start: 2022-11-03 | End: 2022-11-03

## 2022-11-03 NOTE — PROGRESS NOTES
Reviewed MRI with patient and wife over telephone.    I offered to take him to the operating room for rigid versus flexible endoscopic assessment, possible dilation.  I anticipate that use of a guidewire would be necessary due to the severity of the stenosis.  I recommended postoperative observation afterwards.  Depending on intraoperative findings and/or progress, a nasogastric feeding tube might be left in place after the procedure as a stent.  I discussed with them that there might be need to return to the operating room within even a short interval for an additional procedure, potentially under the primary direction of the advanced endoscopy gastroenterology team.  As before, I discussed with them the complexity of his condition as well as significant potential for postoperative complications, with risks including but not limited to pharyngo esophageal perforation/leak/mediastinitis/spinal abscess/death.    I gave them the opportunity to ask questions, and I answered all them to their satisfaction.  He wishes to proceed.  We will arrange this in the coming days, with preoperative testing triage to be completed by the anesthesiology team.

## 2022-11-07 ENCOUNTER — TELEPHONE (OUTPATIENT)
Dept: OTOLARYNGOLOGY | Facility: CLINIC | Age: 77
End: 2022-11-07
Payer: MEDICARE

## 2022-11-07 ENCOUNTER — PATIENT MESSAGE (OUTPATIENT)
Dept: OTOLARYNGOLOGY | Facility: CLINIC | Age: 77
End: 2022-11-07
Payer: MEDICARE

## 2022-11-07 ENCOUNTER — TELEPHONE (OUTPATIENT)
Dept: HEMATOLOGY/ONCOLOGY | Facility: CLINIC | Age: 77
End: 2022-11-07
Payer: MEDICARE

## 2022-11-07 NOTE — PRE-PROCEDURE INSTRUCTIONS
PREOP INSTRUCTIONS:  No food,milk or milk products for 8 hours before surgery.  Clear liquids like water,gatorade,apple juice are allowed up until 2 hours before surgery.  Instructed to follow the surgeon's instructions if they differ from these.  Shower instructions as well as directions to the Surgery Center were given.  Encouraged to wear loose fitting,comfortable clothing.  Medication instructions for pm prior to and am of procedure reviewed.  Instructed to avoid taking vitamins,supplements,aspirin and ibuprofen the morning of surgery.    Patient denies any side effects or issues with anesthesia or sedation.

## 2022-11-07 NOTE — TELEPHONE ENCOUNTER
----- Message from Silva Asher sent at 11/7/2022  9:33 AM CST -----  Type: Needs Medical Advice  Who Called:  Dary(spouse)  Symptoms (please be specific):    How long has patient had these symptoms:    Pharmacy name and phone #:    Best Call Back Number: [phone]  Additional Information: Dary is requesting a call back from Iman in regards to her  appt. on 11/8 at 9:00. She stated he also has a procedure scheduled for 11/8 that evening in [place].

## 2022-11-08 ENCOUNTER — ANESTHESIA (OUTPATIENT)
Dept: SURGERY | Facility: HOSPITAL | Age: 77
End: 2022-11-08
Payer: MEDICARE

## 2022-11-08 ENCOUNTER — HOSPITAL ENCOUNTER (OUTPATIENT)
Facility: HOSPITAL | Age: 77
Discharge: HOME | End: 2022-11-08
Attending: OTOLARYNGOLOGY | Admitting: OTOLARYNGOLOGY
Payer: MEDICARE

## 2022-11-08 ENCOUNTER — ANESTHESIA EVENT (OUTPATIENT)
Dept: SURGERY | Facility: HOSPITAL | Age: 77
End: 2022-11-08
Payer: MEDICARE

## 2022-11-08 VITALS
WEIGHT: 140 LBS | HEART RATE: 59 BPM | DIASTOLIC BLOOD PRESSURE: 64 MMHG | BODY MASS INDEX: 18.96 KG/M2 | TEMPERATURE: 98 F | OXYGEN SATURATION: 97 % | HEIGHT: 72 IN | SYSTOLIC BLOOD PRESSURE: 134 MMHG | RESPIRATION RATE: 16 BRPM

## 2022-11-08 DIAGNOSIS — R13.14 DYSPHAGIA, PHARYNGOESOPHAGEAL: ICD-10-CM

## 2022-11-08 DIAGNOSIS — J39.2: Primary | ICD-10-CM

## 2022-11-08 DIAGNOSIS — J39.2: ICD-10-CM

## 2022-11-08 DIAGNOSIS — C13.9 PRIMARY MALIGNANT NEOPLASM OF HYPOPHARYNX: ICD-10-CM

## 2022-11-08 PROCEDURE — 25000003 PHARM REV CODE 250: Performed by: REGISTERED NURSE

## 2022-11-08 PROCEDURE — D9220A PRA ANESTHESIA: Mod: ANES,,, | Performed by: ANESTHESIOLOGY

## 2022-11-08 PROCEDURE — 31526 PR LARYNGOSCOPY,DIRECT,DX,OP MICROSCOP: ICD-10-PCS | Mod: ,,, | Performed by: OTOLARYNGOLOGY

## 2022-11-08 PROCEDURE — 37000008 HC ANESTHESIA 1ST 15 MINUTES: Performed by: OTOLARYNGOLOGY

## 2022-11-08 PROCEDURE — D9220A PRA ANESTHESIA: Mod: CRNA,,, | Performed by: REGISTERED NURSE

## 2022-11-08 PROCEDURE — 63600175 PHARM REV CODE 636 W HCPCS: Performed by: OTOLARYNGOLOGY

## 2022-11-08 PROCEDURE — 71000044 HC DOSC ROUTINE RECOVERY FIRST HOUR: Performed by: OTOLARYNGOLOGY

## 2022-11-08 PROCEDURE — 36000709 HC OR TIME LEV III EA ADD 15 MIN: Performed by: OTOLARYNGOLOGY

## 2022-11-08 PROCEDURE — 63600175 PHARM REV CODE 636 W HCPCS: Performed by: REGISTERED NURSE

## 2022-11-08 PROCEDURE — 71000015 HC POSTOP RECOV 1ST HR: Performed by: OTOLARYNGOLOGY

## 2022-11-08 PROCEDURE — 71000045 HC DOSC ROUTINE RECOVERY EA ADD'L HR: Performed by: OTOLARYNGOLOGY

## 2022-11-08 PROCEDURE — D9220A PRA ANESTHESIA: ICD-10-PCS | Mod: CRNA,,, | Performed by: REGISTERED NURSE

## 2022-11-08 PROCEDURE — 25000003 PHARM REV CODE 250: Performed by: OTOLARYNGOLOGY

## 2022-11-08 PROCEDURE — 37000009 HC ANESTHESIA EA ADD 15 MINS: Performed by: OTOLARYNGOLOGY

## 2022-11-08 PROCEDURE — 36000708 HC OR TIME LEV III 1ST 15 MIN: Performed by: OTOLARYNGOLOGY

## 2022-11-08 PROCEDURE — D9220A PRA ANESTHESIA: ICD-10-PCS | Mod: ANES,,, | Performed by: ANESTHESIOLOGY

## 2022-11-08 PROCEDURE — 31526 DX LARYNGOSCOPY W/OPER SCOPE: CPT | Mod: ,,, | Performed by: OTOLARYNGOLOGY

## 2022-11-08 RX ORDER — LIDOCAINE HYDROCHLORIDE 10 MG/ML
1 INJECTION, SOLUTION EPIDURAL; INFILTRATION; INTRACAUDAL; PERINEURAL ONCE
Status: DISCONTINUED | OUTPATIENT
Start: 2022-11-08 | End: 2022-11-08 | Stop reason: HOSPADM

## 2022-11-08 RX ORDER — VASOPRESSIN 20 [USP'U]/ML
INJECTION, SOLUTION INTRAMUSCULAR; SUBCUTANEOUS
Status: DISCONTINUED | OUTPATIENT
Start: 2022-11-08 | End: 2022-11-08

## 2022-11-08 RX ORDER — PROPOFOL 10 MG/ML
VIAL (ML) INTRAVENOUS
Status: DISCONTINUED | OUTPATIENT
Start: 2022-11-08 | End: 2022-11-08

## 2022-11-08 RX ORDER — DEXAMETHASONE SODIUM PHOSPHATE 4 MG/ML
8 INJECTION, SOLUTION INTRA-ARTICULAR; INTRALESIONAL; INTRAMUSCULAR; INTRAVENOUS; SOFT TISSUE
Status: DISCONTINUED | OUTPATIENT
Start: 2022-11-08 | End: 2022-11-08 | Stop reason: HOSPADM

## 2022-11-08 RX ORDER — ONDANSETRON 2 MG/ML
INJECTION INTRAMUSCULAR; INTRAVENOUS
Status: DISCONTINUED | OUTPATIENT
Start: 2022-11-08 | End: 2022-11-08

## 2022-11-08 RX ORDER — HYDROCODONE BITARTRATE AND ACETAMINOPHEN 7.5; 325 MG/15ML; MG/15ML
10 SOLUTION ORAL EVERY 6 HOURS PRN
Qty: 118 ML | Refills: 0 | Status: SHIPPED | OUTPATIENT
Start: 2022-11-08 | End: 2022-11-08 | Stop reason: SDUPTHER

## 2022-11-08 RX ORDER — HYDROCODONE BITARTRATE AND ACETAMINOPHEN 7.5; 325 MG/15ML; MG/15ML
10 SOLUTION ORAL EVERY 6 HOURS PRN
Qty: 118 ML | Refills: 0 | Status: SHIPPED | OUTPATIENT
Start: 2022-11-08 | End: 2023-03-16

## 2022-11-08 RX ORDER — ONDANSETRON 2 MG/ML
4 INJECTION INTRAMUSCULAR; INTRAVENOUS DAILY PRN
Status: DISCONTINUED | OUTPATIENT
Start: 2022-11-08 | End: 2022-11-08 | Stop reason: HOSPADM

## 2022-11-08 RX ORDER — EPINEPHRINE 1 MG/ML
INJECTION, SOLUTION, CONCENTRATE INTRAVENOUS
Status: DISCONTINUED | OUTPATIENT
Start: 2022-11-08 | End: 2022-11-08 | Stop reason: HOSPADM

## 2022-11-08 RX ORDER — SODIUM CHLORIDE 9 MG/ML
INJECTION, SOLUTION INTRAVENOUS CONTINUOUS PRN
Status: DISCONTINUED | OUTPATIENT
Start: 2022-11-08 | End: 2022-11-08

## 2022-11-08 RX ORDER — LIDOCAINE HYDROCHLORIDE 40 MG/ML
INJECTION, SOLUTION RETROBULBAR
Status: DISCONTINUED | OUTPATIENT
Start: 2022-11-08 | End: 2022-11-08 | Stop reason: HOSPADM

## 2022-11-08 RX ORDER — LIDOCAINE HYDROCHLORIDE 20 MG/ML
INJECTION INTRAVENOUS
Status: DISCONTINUED | OUTPATIENT
Start: 2022-11-08 | End: 2022-11-08

## 2022-11-08 RX ORDER — FENTANYL CITRATE 50 UG/ML
25 INJECTION, SOLUTION INTRAMUSCULAR; INTRAVENOUS EVERY 5 MIN PRN
Status: DISCONTINUED | OUTPATIENT
Start: 2022-11-08 | End: 2022-11-08 | Stop reason: HOSPADM

## 2022-11-08 RX ORDER — HALOPERIDOL 5 MG/ML
0.5 INJECTION INTRAMUSCULAR EVERY 10 MIN PRN
Status: DISCONTINUED | OUTPATIENT
Start: 2022-11-08 | End: 2022-11-08 | Stop reason: HOSPADM

## 2022-11-08 RX ORDER — HYDROCODONE BITARTRATE AND ACETAMINOPHEN 7.5; 325 MG/15ML; MG/15ML
10 SOLUTION ORAL EVERY 6 HOURS PRN
Status: DISCONTINUED | OUTPATIENT
Start: 2022-11-08 | End: 2022-11-08 | Stop reason: HOSPADM

## 2022-11-08 RX ORDER — HYDROMORPHONE HYDROCHLORIDE 1 MG/ML
0.2 INJECTION, SOLUTION INTRAMUSCULAR; INTRAVENOUS; SUBCUTANEOUS EVERY 5 MIN PRN
Status: DISCONTINUED | OUTPATIENT
Start: 2022-11-08 | End: 2022-11-08 | Stop reason: HOSPADM

## 2022-11-08 RX ORDER — FENTANYL CITRATE 50 UG/ML
INJECTION, SOLUTION INTRAMUSCULAR; INTRAVENOUS
Status: DISCONTINUED | OUTPATIENT
Start: 2022-11-08 | End: 2022-11-08

## 2022-11-08 RX ORDER — ROCURONIUM BROMIDE 10 MG/ML
INJECTION, SOLUTION INTRAVENOUS
Status: DISCONTINUED | OUTPATIENT
Start: 2022-11-08 | End: 2022-11-08

## 2022-11-08 RX ORDER — DEXAMETHASONE SODIUM PHOSPHATE 4 MG/ML
INJECTION, SOLUTION INTRA-ARTICULAR; INTRALESIONAL; INTRAMUSCULAR; INTRAVENOUS; SOFT TISSUE
Status: DISCONTINUED | OUTPATIENT
Start: 2022-11-08 | End: 2022-11-08

## 2022-11-08 RX ORDER — PHENYLEPHRINE HYDROCHLORIDE 10 MG/ML
INJECTION INTRAVENOUS
Status: DISCONTINUED | OUTPATIENT
Start: 2022-11-08 | End: 2022-11-08

## 2022-11-08 RX ADMIN — GLYCOPYRROLATE 0.2 MG: 0.2 INJECTION, SOLUTION INTRAMUSCULAR; INTRAVITREAL at 01:11

## 2022-11-08 RX ADMIN — VASOPRESSIN 0.25 UNITS: 20 INJECTION INTRAVENOUS at 01:11

## 2022-11-08 RX ADMIN — FENTANYL CITRATE 25 MCG: 50 INJECTION, SOLUTION INTRAMUSCULAR; INTRAVENOUS at 02:11

## 2022-11-08 RX ADMIN — SUGAMMADEX 200 MG: 100 INJECTION, SOLUTION INTRAVENOUS at 01:11

## 2022-11-08 RX ADMIN — DEXAMETHASONE SODIUM PHOSPHATE 10 MG: 4 INJECTION, SOLUTION INTRAMUSCULAR; INTRAVENOUS at 01:11

## 2022-11-08 RX ADMIN — VASOPRESSIN 0.4 UNITS: 20 INJECTION INTRAVENOUS at 01:11

## 2022-11-08 RX ADMIN — FENTANYL CITRATE 25 MCG: 50 INJECTION, SOLUTION INTRAMUSCULAR; INTRAVENOUS at 01:11

## 2022-11-08 RX ADMIN — PHENYLEPHRINE HYDROCHLORIDE 100 MCG: 10 INJECTION INTRAVENOUS at 01:11

## 2022-11-08 RX ADMIN — PHENYLEPHRINE HYDROCHLORIDE 200 MCG: 10 INJECTION INTRAVENOUS at 01:11

## 2022-11-08 RX ADMIN — LIDOCAINE HYDROCHLORIDE 100 MG: 20 INJECTION INTRAVENOUS at 12:11

## 2022-11-08 RX ADMIN — FENTANYL CITRATE 50 MCG: 50 INJECTION, SOLUTION INTRAMUSCULAR; INTRAVENOUS at 12:11

## 2022-11-08 RX ADMIN — ONDANSETRON 4 MG: 2 INJECTION INTRAMUSCULAR; INTRAVENOUS at 01:11

## 2022-11-08 RX ADMIN — PROPOFOL 150 MG: 10 INJECTION, EMULSION INTRAVENOUS at 12:11

## 2022-11-08 RX ADMIN — ROCURONIUM BROMIDE 50 MG: 10 INJECTION INTRAVENOUS at 12:11

## 2022-11-08 RX ADMIN — SODIUM CHLORIDE: 9 INJECTION, SOLUTION INTRAVENOUS at 12:11

## 2022-11-08 NOTE — BRIEF OP NOTE
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    DISPOSITION: Home or Self Care    FINAL DIAGNOSIS:  Hypopharyngeal stenosis    FOLLOWUP: In clinic    DISCHARGE INSTRUCTIONS:    Discharge Procedure Orders   Diet general     Call MD for:  severe uncontrolled pain     Call MD for:  difficulty breathing, headache or visual disturbances

## 2022-11-08 NOTE — ANESTHESIA PREPROCEDURE EVALUATION
2022  Arthur Ribeiro is a 76 y.o., male.  Pre-operative evaluation for Procedure(s) (LRB):  ESOPHAGOSCOPY, DILATION (N/A)    Arthur Ribeiro is a 76 y.o. male       Patient Active Problem List   Diagnosis    Allergic rhinitis    Lymphoma, Burkitt's    Debility    Gastroesophageal reflux disease without esophagitis    Primary malignant neoplasm of hypopharynx    Pharyngeal dysphagia    Anorexia    Weight loss    Herpes zoster without complication    Oropharyngeal dysphagia    At risk for lymphedema    Neutropenic fever    ACP (advance care planning)    AMANDA (acute kidney injury)    Drug-induced pancytopenia    Severe malnutrition       Past Surgical History:   Procedure Laterality Date    ESOPHAGOGASTRODUODENOSCOPY N/A 2022    Procedure: EGD (ESOPHAGOGASTRODUODENOSCOPY);  Surgeon: Daniel Sagastume MD;  Location: [facility];  Service: Endoscopy;  Laterality: N/A;    INSERTION OF VENOUS ACCESS PORT      LARYNGOSCOPY Bilateral 2022    Procedure: LARYNGOSCOPY;  Surgeon: Enrique Maya MD;  Location: [facility] OR;  Service: ENT;  Laterality: Bilateral;    SKIN CANCER EXCISION         Social History     Socioeconomic History    Marital status:    Tobacco Use    Smoking status: Former     Types: Cigarettes, Cigars     Quit date: 10/1/2005     Years since quittin.1    Smokeless tobacco: Never   Substance and Sexual Activity    Alcohol use: Not Currently     Comment: daily    Drug use: No       No current facility-administered medications on file prior to encounter.     Current Outpatient Medications on File Prior to Encounter   Medication Sig Dispense Refill    (Magic mouthwash) 1:1:1 diphenhydramine(Benadryl) 12.5mg/5ml liq, aluminum & magnesium hydroxide-simethicone (Maalox), LIDOcaine viscous 2% Swish and spit 10 mLs every 4 (four) hours as needed  (gargle for 10 seconds and spit). gargle for 10 seconds and spit 450 mL 0    acetaminophen (M-PAP) 160 mg/5 mL Liqd 15.6 mLs (499.2 mg total) by Per G Tube route every 8 (eight) hours as needed (pain). (Patient not taking: Reported on 10/31/2022) 473 mL 1    gabapentin (NEURONTIN) 300 MG capsule Take 1 capsule (300 mg total) by mouth 3 (three) times daily. 90 capsule 1    LIDOCAINE VISCOUS 2 % solution       magic mouthwash diphen/antac/lidoc/nysta Take 5 mL by mouth 4 times daily. 360 mL 0    ondansetron (ZOFRAN-ODT) 8 MG TbDL DISSOLVE 1 TABLET ON THE TONGUE THREE TIMES DAILY AS NEEDED FOR NAUSEA      valACYclovir (VALTREX) 1000 MG tablet Take 1,000 mg by mouth 3 (three) times daily.         Review of patient's allergies indicates:  No Known Allergies      CBC: No results for input(s): WBC, RBC, HGB, HCT, PLT, MCV, MCH, MCHC in the last 72 hours.    CMP: No results for input(s): NA, K, CL, CO2, BUN, CREATININE, GLU, MG, PHOS, CALCIUM, ALBUMIN, PROT, ALKPHOS, ALT, AST, BILITOT in the last 72 hours.    INR  No results for input(s): PT, INR, PROTIME, APTT in the last 72 hours.      Diagnostic Studies:    EKG:   No results found for this or any previous visit.    TTE:  No results found for this or any previous visit.  No results found for: EF   No results found for this or any previous visit.    DELFINA:  No results found for this or any previous visit.    Stress Test:  No results found for this or any previous visit.       LHC:  No results found for this or any previous visit.       PFT:  No results found for: FEV1, FVC, LOH9YFE, TLC, DLCO     ALLERGIES:   Review of patient's allergies indicates:  No Known Allergies  LDA:      Lines/Drains/Airways     Central Venous Catheter Line  Duration                PowerPort A Cath Single Lumen 08/14/22 0130 left subclavian 86 days          Drain  Duration                Gastrostomy/Enterostomy 07/06/22 0712 Percutaneous endoscopic gastrostomy (PEG) LUQ feeding 125 days                Anesthesia Evaluation      Airway   Mallampati: III  TM distance: > 6 cm  Neck ROM: Extension Decreased  Dental    (+) Intact and Prominent Incisors    Pulmonary    Cardiovascular     Rate: Normal    Neuro/Psych      GI/Hepatic/Renal    (+) hiatal hernia, GERD,     Endo/Other    Abdominal                       Pre-op Assessment    I have reviewed the Patient Summary Reports.     I have reviewed the Nursing Notes. I have reviewed the NPO Status.   I have reviewed the Medications.     Review of Systems  Anesthesia Hx:  No problems with previous Anesthesia  Denies Family Hx of Anesthesia complications.   Denies Personal Hx of Anesthesia complications.   Cardiovascular:  Cardiovascular Normal     Pulmonary:  Pulmonary Normal    Renal/:  Renal/ Normal     Hepatic/GI:   Hiatal Hernia, GERD    Neurological:  Neurology Normal    Endocrine:  Endocrine Normal        Physical Exam  General: Well nourished    Airway:  Mallampati: III / III  Mouth Opening: Small, but > 3cm  TM Distance: > 6 cm  Tongue: Normal  Neck ROM: Extension Decreased    Dental:  Intact, Prominent Incisors    Chest/Lungs:  Normal Respiratory Rate    Heart:  Rate: Normal        Anesthesia Plan  Type of Anesthesia, risks & benefits discussed:    Anesthesia Type: Gen ETT  Intra-op Monitoring Plan: Standard ASA Monitors  Post Op Pain Control Plan: multimodal analgesia and IV/PO Opioids PRN  Induction:  IV  Airway Plan: Direct, Post-Induction  Informed Consent: Informed consent signed with the Patient and all parties understand the risks and agree with anesthesia plan.  All questions answered. Patient consented to blood products? Yes  ASA Score: 3  Day of Surgery Review of History & Physical: H&P Update referred to the surgeon/provider.    Ready For Surgery From Anesthesia Perspective.     .

## 2022-11-08 NOTE — ANESTHESIA PROCEDURE NOTES
Intubation    Date/Time: 11/8/2022 12:53 PM  Performed by: Jeovanny Penaloza CRNA  Authorized by: Crow Julian III, MD     Intubation:     Induction:  Intravenous    Intubated:  Postinduction    Mask Ventilation:  Easy mask    Attempts:  1    Attempted By:  CRNA    Method of Intubation:  Video laryngoscopy    Blade:  [facility] 3    Laryngeal View Grade: Grade IIA - cords partially seen      Difficult Airway Encountered?: No      Complications:  None    Airway Device:  Oral endotracheal tube    Airway Device Size:  6.0    Style/Cuff Inflation:  Cuffed (inflated to minimal occlusive pressure)    Secured at:  The lips    Placement Verified By:  Capnometry and Revisualization with laryngoscopy    Complicating Factors:  None, overbite and narrow palate    Findings Post-Intubation:  BS equal bilateral and atraumatic/condition of teeth unchanged

## 2022-11-08 NOTE — TRANSFER OF CARE
Anesthesia Transfer of Care Note    Patient: Arthur Ribeiro    Procedure(s) Performed: Procedure(s) (LRB):  DIRECT LARYNGOSCOPY (N/A)    Patient location: [facility]    Anesthesia Type: general    Transport from OR: Transported from OR on 6-10 L/min O2 by face mask with adequate spontaneous ventilation    Post pain: adequate analgesia    Post assessment: no apparent anesthetic complications and tolerated procedure well    Post vital signs: stable    Level of consciousness: awake, alert and oriented    Nausea/Vomiting: no nausea/vomiting    Complications: none    Transfer of care protocol was followed      Last vitals:   Visit Vitals  BP (!) 142/75   Pulse 86   Temp 36.9 °C (98.5 °F) (Oral)   Resp 18   Ht 6' (1.829 m)   Wt 63.5 kg (140 lb)   SpO2 99%   BMI 18.99 kg/m²

## 2022-11-08 NOTE — OP NOTE
DATE OF SERVICE: 11/8/2022    PRE-OPERATIVE DIAGNOSIS:  Hypopharyngeal stenosis.    POST-OPERATIVE DIAGNOSIS: Complete hypopharyngeal stenosis.    PROCEDURE(S): Suspension microlaryngoscopy.    SURGEON: Kavon Hart M.D.    ASSISTANT: fOe Aguirre M.D.    ANESTHESIA: General.    BLOOD LOSS: Less than 5 mL.    SPECIMENS: None.    COMPLICATIONS: None.    FINDINGS: Hypopharyngeal stricture. Obliterated left pyriform. Complete stenosis with a blind end pouch in the postcricoid space.    CONDITION: Stable.    INDICATIONS FOR PROCEDURE:   This is a man with progressive dysphagia following chemoradiation for hypopharyngeal cancer. He presents for endoscopic evaluation and possible dilation of hypopharyngeal stenosis. I discussed with him and his wife the risks and benefits thereof, with risks including but not limited to pain; bleeding; infection; scarring; worsening of voice; recurrence; need for additional and/or more extensive procedures; oral/dental problems (pain, laceration, broken or missing teeth); jaw joint problems (pain, dislocation); and tongue problems (pain, laceration, numbness, weakness, taste disturbance); pharyngoesophageal leak/fistula/mediastinitis; spinal abscess. Any surgery on the larynx also carries with it the risks of airway obstruction necessitating tracheotomy. Also inherent in the procedure are the risks of general anesthesia, including but not limited to cardiovascular complications (heart attack, arrhythmia); pulmonary (respiratory failure); neurologic (stroke); and death. I gave the patient the opportunity to ask questions and I answered all of them. In spite of the risks of surgery, the patient desired to move forward with the procedure. Informed consent was obtained.      PROCEDURE IN DETAIL:   The patient was positively identified in the preoperative holding area, then was brought to the operating room and placed in the flat supine position. General endotracheal anesthesia was  obtained using an endotracheal tube which was secured to the left lower lip. The eyes were protected with moist sponges. The teeth and/or upper alveolar ridge was protected using a reinforced mouthguard and/or moist sponges, as appropriate. A final timeout was performed for verification purposes. First, the Dedo laryngoscope was inserted into the oral cavity and was utilized to inspect the oropharynx, larynx, and hypopharynx in detail. The patient was suspended from the [place]. Magnified laryngeal endoscopy was carried out using a 0 degree Mariee km telescope connected to a video monitor. Findings were as noted above. Photodocumentation was obtained.  With the proximal right sided hypopharyngeal stricture distended and the larynx retracted forward with the laryngoscope suspended, gentle palpation of the postcricoid space was performed with blunt instruments. No  hypopharyngeal lumen was identifiable.  As such, the procedure was brought to completion.   All equipment was removed. The patient was turned back over to the anesthesiology team for awakening and extubation, which were uneventful. The patient was escorted to the recovery room in good condition. The patient tolerated the procedure well without complications. All needle, sponge, and instrument counts were correct at the completion of the case.    DISPOSITION:   I recommended consultation with one of our gastroenterology colleagues for consideration of recanalization by a retrograde, trans-gastrostomy approach.    ATTESTATION:  As the attending of record, I was present and participated in all portions of this procedure.

## 2022-11-09 ENCOUNTER — TELEPHONE (OUTPATIENT)
Dept: HEMATOLOGY/ONCOLOGY | Facility: CLINIC | Age: 77
End: 2022-11-09
Payer: MEDICARE

## 2022-11-09 NOTE — ANESTHESIA POSTPROCEDURE EVALUATION
Anesthesia Post Evaluation    Patient: Arthur Ribeiro    Procedure(s) Performed: Procedure(s) (LRB):  DIRECT LARYNGOSCOPY (N/A)    Final Anesthesia Type: general      Patient location during evaluation: PACU  Patient participation: Yes- Able to Participate  Level of consciousness: awake and alert  Post-procedure vital signs: reviewed and stable  Pain management: adequate  Airway patency: patent    PONV status at discharge: No PONV  Anesthetic complications: no      Cardiovascular status: blood pressure returned to baseline  Respiratory status: unassisted  Hydration status: euvolemic  Follow-up not needed.          Vitals Value Taken Time   /64 11/08/22 1532   Temp 36.5 °C (97.7 °F) 11/08/22 1515   Pulse 59 11/08/22 1531   Resp 16 11/08/22 1530   SpO2 89 % 11/08/22 1531   Vitals shown include unvalidated device data.      No case tracking events are documented in the log.      Pain/Ashley Score: Ashley Score: 10 (11/8/2022  2:30 PM)

## 2022-11-09 NOTE — NURSING
Chart reviewed. Post laryngoscopy by Dr. Hart. Referred to GI. He has follow up with Dr. Stout already scheduled. Med/Onc and Rad/Onc managing care. Following for any navigation needs

## 2022-11-11 ENCOUNTER — DOCUMENTATION ONLY (OUTPATIENT)
Dept: INFUSION THERAPY | Facility: HOSPITAL | Age: 77
End: 2022-11-11
Payer: MEDICARE

## 2022-11-11 NOTE — PROGRESS NOTES
Nutrition Note:    RD received call from patient's wife, Dary, over concerns about patient's tube feeding. Pt has continued to lose weight, 6.6% in 3 months, and patient has a difficult time tolerating large volumes of tube feeding formula. Dary is wondering if there is another formula that has more calories. He is currently doing 5 cartons of Jevity 1.5 and cannot get down a sixth. Dary concerned 5 cartons is not enough calories because of weight loss. Discussed Two Asim HN. Pt currently gets his formula filled with [facility]- RD called Lui who states they do keep Two Asim HN in stock.     Estimated Nutrient Needs:  Energy- 2032 kcals (32kcals/kg CBW)  Protein- 95 grams (1.5g/kg CBW)  Fluid- 2032 mL (1mL/kcal)    Tube Feeding Prescription:  Recommend goal of 5 cartons TwoCal HN daily via PEG with 120 mL water flush before and after each carton.   This provides 2375 kcals, 100 grams protein, and 2030 mL daily.     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    Updated order faxed to [facility].     Wt Readings from Last 10 Encounters:   11/08/22 63.5 kg (140 lb)   10/18/22 63.6 kg (140 lb 3.4 oz)   09/15/22 64 kg (141 lb)   09/14/22 63.6 kg (140 lb 3.4 oz)   08/31/22 65.4 kg (144 lb 2.9 oz)   08/22/22 66.5 kg (146 lb 9.6 oz)   08/14/22 69.5 kg (153 lb 3.5 oz)   08/04/22 68.9 kg (152 lb)   08/01/22 66.6 kg (146 lb 13.2 oz)   07/06/22 68.3 kg (150 lb 9.2 oz)       All other nutrition questions/concerns addressed as appropriate. Will continue to follow and monitor throughout treatment PRN.     Shahnaz Galo, MS, RD, LDN  11/11/2022  1:54 PM

## 2022-11-14 PROBLEM — N17.9 AKI (ACUTE KIDNEY INJURY): Status: RESOLVED | Noted: 2022-08-14 | Resolved: 2022-11-14

## 2022-11-22 ENCOUNTER — OFFICE VISIT (OUTPATIENT)
Dept: HEMATOLOGY/ONCOLOGY | Facility: CLINIC | Age: 77
End: 2022-11-22
Payer: MEDICARE

## 2022-11-22 VITALS
BODY MASS INDEX: 18.93 KG/M2 | RESPIRATION RATE: 14 BRPM | OXYGEN SATURATION: 93 % | TEMPERATURE: 97 F | SYSTOLIC BLOOD PRESSURE: 115 MMHG | DIASTOLIC BLOOD PRESSURE: 62 MMHG | WEIGHT: 139.56 LBS | HEART RATE: 82 BPM

## 2022-11-22 DIAGNOSIS — J39.2: ICD-10-CM

## 2022-11-22 DIAGNOSIS — C13.9 HYPOPHARYNGEAL CANCER: ICD-10-CM

## 2022-11-22 DIAGNOSIS — K21.9 GASTROESOPHAGEAL REFLUX DISEASE, UNSPECIFIED WHETHER ESOPHAGITIS PRESENT: ICD-10-CM

## 2022-11-22 DIAGNOSIS — C13.9 PRIMARY MALIGNANT NEOPLASM OF HYPOPHARYNX: ICD-10-CM

## 2022-11-22 DIAGNOSIS — K12.33 MUCOSITIS DUE TO RADIATION THERAPY: ICD-10-CM

## 2022-11-22 DIAGNOSIS — K21.9 GASTROESOPHAGEAL REFLUX DISEASE WITHOUT ESOPHAGITIS: Primary | ICD-10-CM

## 2022-11-22 PROBLEM — E44.1 MILD PROTEIN-CALORIE MALNUTRITION: Status: ACTIVE | Noted: 2022-08-14

## 2022-11-22 PROCEDURE — 3288F PR FALLS RISK ASSESSMENT DOCUMENTED: ICD-10-PCS | Mod: CPTII,S$GLB,, | Performed by: OTOLARYNGOLOGY

## 2022-11-22 PROCEDURE — 3288F FALL RISK ASSESSMENT DOCD: CPT | Mod: CPTII,S$GLB,, | Performed by: OTOLARYNGOLOGY

## 2022-11-22 PROCEDURE — 99214 PR OFFICE/OUTPT VISIT, EST, LEVL IV, 30-39 MIN: ICD-10-PCS | Mod: S$GLB,,, | Performed by: OTOLARYNGOLOGY

## 2022-11-22 PROCEDURE — 1101F PT FALLS ASSESS-DOCD LE1/YR: CPT | Mod: CPTII,S$GLB,, | Performed by: OTOLARYNGOLOGY

## 2022-11-22 PROCEDURE — 1101F PR PT FALLS ASSESS DOC 0-1 FALLS W/OUT INJ PAST YR: ICD-10-PCS | Mod: CPTII,S$GLB,, | Performed by: OTOLARYNGOLOGY

## 2022-11-22 PROCEDURE — 99999 PR PBB SHADOW E&M-EST. PATIENT-LVL III: CPT | Mod: PBBFAC,,, | Performed by: OTOLARYNGOLOGY

## 2022-11-22 PROCEDURE — 1125F PR PAIN SEVERITY QUANTIFIED, PAIN PRESENT: ICD-10-PCS | Mod: CPTII,S$GLB,, | Performed by: OTOLARYNGOLOGY

## 2022-11-22 PROCEDURE — 3078F DIAST BP <80 MM HG: CPT | Mod: CPTII,S$GLB,, | Performed by: OTOLARYNGOLOGY

## 2022-11-22 PROCEDURE — 1125F AMNT PAIN NOTED PAIN PRSNT: CPT | Mod: CPTII,S$GLB,, | Performed by: OTOLARYNGOLOGY

## 2022-11-22 PROCEDURE — 3074F PR MOST RECENT SYSTOLIC BLOOD PRESSURE < 130 MM HG: ICD-10-PCS | Mod: CPTII,S$GLB,, | Performed by: OTOLARYNGOLOGY

## 2022-11-22 PROCEDURE — 99214 OFFICE O/P EST MOD 30 MIN: CPT | Mod: S$GLB,,, | Performed by: OTOLARYNGOLOGY

## 2022-11-22 PROCEDURE — 99999 PR PBB SHADOW E&M-EST. PATIENT-LVL III: ICD-10-PCS | Mod: PBBFAC,,, | Performed by: OTOLARYNGOLOGY

## 2022-11-22 PROCEDURE — 3074F SYST BP LT 130 MM HG: CPT | Mod: CPTII,S$GLB,, | Performed by: OTOLARYNGOLOGY

## 2022-11-22 PROCEDURE — 3078F PR MOST RECENT DIASTOLIC BLOOD PRESSURE < 80 MM HG: ICD-10-PCS | Mod: CPTII,S$GLB,, | Performed by: OTOLARYNGOLOGY

## 2022-11-22 RX ORDER — PANTOPRAZOLE SODIUM 40 MG/1
40 FOR SUSPENSION ORAL DAILY
Qty: 30 PACKET | Refills: 1 | OUTPATIENT
Start: 2022-11-22 | End: 2022-12-14

## 2022-11-22 NOTE — PATIENT INSTRUCTIONS
Navin Weaver MD--gastroenterologist--will call his office and expedite appointment  Start reflux medicine today!! Packets that other MD gave you  Re-start swallowing exercises

## 2022-11-22 NOTE — PROGRESS NOTES
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  Patient has a history of dysphagia and odynophagia.  He was treated with steroids and reflux medication without resolution.  He was seen by Dr Maya who noted a lesion involving the left oropharynx and hypopharynx.  He was taken to the operating room where he underwent endoscopy and biopsy.  The mass was found to be involving the left hypopharynx extending up to the lateral pharyngeal wall and down to the level of the cricopharyngeus.  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 His case was presented at multidisciplinary head neck tumor Board and it was recommended that he undergo adjuvant chemotherapy with consideration for total laryngectomy.    Patient reports that he has difficulty swallowing pills and therefore he was you taking liquid pain medicine which alleviates the pain.  He also complains of thick mucus in the back of his throat and thick postnasal drip.     Patient has a history of Burkitt's lymphoma for which he was treated with chemotherapy only by Dr. Rahman who is also involved it the care of this patient.  He was seen by Dr Rahman on Thursday who has ordered chemo (tentatively set for 3/8/2022) and has an appointment to have a port placed. He has not spoken with Dr Peñaloza since he has seen Josiah.      4/18/2022  Partient is here today for F/U. Tumor board recommended neoadjuvant chemo, followed with reimaging--if responds then proceed with concurrent chemorads--if not proceed to surgery.  He is being treated by Dr Quentin Rahman.   He is S/P 3 rounds of induction chemotherapy, once a week. He had one week break  and restarted chemo last Tuesday but he is now only receiving one drug instead of tow due to SOB.   Patient is to have another dose tomorrow but according to patient Dr Rahman will substitute another drug as he had a reaction to the other.  Patient had nausea and diarrhea last week and now is taking antiemetics 3 times a day.    Patient reports that since start of therapy, his throat pain has resolved in addition to otalgia and intermittent sharp shooting pains up the left side of his head    He was seen by ST who discussed diet consistencies. According to patient he was not given swallowing exercises.  He has had no issues swallowing but he has anorexia resulting in a 16 pound weight loss since diagnosis.   He was drinking 1-2 Ensures/day but has stopped since he developed diarrhea.  Patient is able to tolerate some fruits, soups, pureed foods such as applesauce baked beans and is starting to eat Belvita bars and oatmeal    He was diagnosed with shingles last week and is taking Valtrex.  He has not had the vaccine    5/30/2022  Patient is here today for cancer surveillance.  He has a history of squamous cell carcinoma of the hypopharynx.  He is receiving induction chemotherapy by Dr. Rahman.  He received his last dose of Carboplatinum last week(Taxol was stopped due to reaction).  He is to see Dr Peñaloza, Rad Onc, tomorrow to discuss concurrent chemorads.    He has not seen ST yet; he thought that he did not need an appointment. Patient reports that he is able to swallow fried foods and soft foods was having difficulty eating pasta and hamburger.  He feels like the food is getting caught in a flap in the back of his throat.  He also reports that he has a lot of white secretions that he is coughing up.  He is not using salt baking soda gargles as instructed    8/1/2022    Patient is here today for follow-up.  He is status post induction chemotherapy and now was undergoing concurrent chemoradiation.  Day 5 of  cisplatin was given 07/26/2022.  He gets 6th chemo tomorrow and finishes XRT in 13 days (8/17/2022)  He is S/p PEG 7/6/2022. He is consuming 2 cans of Ensure/day. Patient only eats one meals/day which started recently. He has lost 5 pounds since May. According to his wife, his weight fluctuates.  He is doing dysphagia exercises 1-2 times/day.  Patient goes to PT twice weekly.    11/22/2022  Patient is here today for follow-up.  Following his last visit patient had modified barium swallow which showed no passage of food through the upper esophagus.  This was reviewed by me  and the speech therapist.  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  He was then sent to Dr. Mino Hart for transnasal esophagoscopy in the office.  The exam showed a 2 mm opening in the hypopharynx with insufflation.  Patient was then taken to the operating room for dilation.  However examination in the operating room showed complete hypopharyngeal stenosis.  GI was then consulted for a dual procedure; GI will go retrograde through the PEG while Dr. Hart will expose the  hypopharynx  at which time the scar will be excised and the patient will be dilated.  This will be scheduled in the future.    Patient just started new supplement last week--total of 2300 jesse/day( was getting 1700 jesse/day). Weight has remained stable. According to his wife his wife has remained stable for 1-2 months. It was 140 when seen by Dr Rahman.  He could not tolerate other supplements because he refluxed them into his mouth. This indicates that there is a small opening in his hypopharynx as seen by Dr Hart on insufflation in office with TNE. He stopped taking Protonix granules in the past and is no longer doing dysphagia exercises    PET CT is scheduled on 11/30/2022 post treatment.    ROS: see HPI  Constitutional: Negative for activity change and appetite change, weight loss.   Eyes: Negative for discharge,  visual changes.   Respiratory: Negative for difficulty breathing and wheezing   Cardiovascular: Negative for chest pain.   Gastrointestinal: Negative for abdominal distention and abdominal pain.   Endocrine: Negative for cold intolerance and heat intolerance.   Genitourinary: Negative for dysuria.   Musculoskeletal: Negative for gait problem, muscle pain and joint swelling.   Skin: Negative for color change and pallor; negative for skin lesions.   Neurological: Negative for syncope and weakness; no numbness face.   Psychiatric/Behavioral: Negative for agitation and confusion; negative for depression.    Physical Exam:      Constitutional  General Appearance: well nourished, well-developed, alert, oriented, in no acute distress; walking with cane for balance; very thin--unchanged  Communication: ability, understanding, normal  Head and Face  Inspection: normocephalic, atraumatic, no scars, lesions or masses   Palpation: no stepoffs, sinus tenderness or masses  Parotid glands: no masses, stones, swelling or tenderness  Neurological  Cranial Nerves: grossly intact  General: no focal deficits  Psychiatric  Orientation: oriented to time, place and person  Mood and Affect: no depression, anxiety or agitation    Original  Path:  Pharynx, left posterior wall, biopsies:   - Squamous cell carcinoma, invasive, moderately differentiated   - Depth of invasion (DOI): at least 0.5 cm   - p16 status (IHC): Negative   - No lymphovascular invasion identified   - No perineural invasion identified   NOTE: Immunostain for p16 was performed and did not demonstrate   block-immunopositive staining (interpreted as negative).  Positive control   and internal negative control for immunostain was examined and was   appropriate.       Records reviewed:  Op Note  Intraoperative Findings:   Grade 1 view  Ulcerative, firm mass centered on the left aspect of posterior pharyngeal wall (hyoppharynx) with extension to the lateral wall of the  oropharynx. Mass involved the posterior half of the pyriform sinus as. The esophagus is clear but the tumor extends to the level of the cricopharyngeus along the posterior wall. The base of tongue and vallecula are clear.   The AE fold and supraglottis were edematous but uninvolved. The cords were uninvolved.     Tumor Board recommendations:  TB recommendation for referral to H&N surgery. Given borderline T3/T4a, consideration for induction and TL/TP pending response.  There is questionable involvement of the posterior lateral thyroid ala.      Images reviewed  MBSS 10/2022  EVALUATION:    was seen in radiology sitting upright affording a lateral view of the laryngeal area.  He was presented with thin and nectar liquid barium by spoon and cup and barium pudding.   A brief oral mechanism screen revealed oral motor function to be adequate as well as dentition to be adequate. Patient is missing some lower and upper molars. He did not have extractions prior to his radiation treatment.       ORAL PHASE:  There was adequate lip closure and tongue control during a cued bolus hold.  Bolus prep and transport were timely and efficient with complete oral clearing.  Initiation of the swallow with thin liquid, nectar thick liquid and pudding consistency occurred as the bolus entered the valleculae.      PHARYNGEAL PHASE:  Soft palate function was determined to be within functional limits.  Anterior hyoid excursion was partial. Laryngeal elevation was complete. Epiglottis inversion was absent. Laryngeal vestibular closure at the height of the swallow was incomplete. Tongue base retraction was reduced as evidenced by a narrow column of contrast between the posterior pharyngeal wall and the base of tongue. There was evidence of trace to a collection of residue on the posterior pharyngeal wall with pudding consistency. There was evidence of a majority of the bolus in the pyriforms with all presented consistencies. There was  evidence of trace residue with thin and nectar consistencies on the base of tongue and in the valleculae. There was evidence of penetration and aspiration with all consistencies with primary aspiration occuring from spilling of residue from the pyriforms after the initial swallow.  The patient was also observed to use multiple swallows in an effort to clear the residue.  This also resulted in aspiration from the pyriforms. The patient did exhibit a strong reflexive cough and he was successful clearing some of the aspiration and pharyngeal residue by regurgitating to his mouth. (Penetration-Aspiration Scale 6). With pudding consistency, backflow from the pyriforms to the posterior pharyngeal wall at the level of base of tongue was observed. Right and left head turn and effortful swallow were attempted to clear the residue without success.      ESOPHAGEAL PHASE: There was esophageal dysfunction as evidenced by a collection of residue above the UES.      IMPRESSION:  Oral phase of the swallow is within functional limits. Severe pharyngeal dysphagia characterized by severe retention in the pharynx and aspiration of 2 or more consistencies.      RECOMMENDATIONS:  The film of the swallow study was reviewed with the patient and his wife. Education was provided regarding the current function of the swallow.  Recommend continue with alternate means of nutrition, PEG tube.  The patient would benefit from continuing with outpatient speech therapy and follow up with referring physician.        Records reviewed: Dr Mino Hart    Procedure  Flexible Laryngoscopy (33146): Laryngoscopy is indicated for assessment of upper aerodigestive structure and function. This was carried out transnasally with a distal chip videoendoscope. After verbal consent was obtained, the patient was positioned and the nose was topically decongested with 1% phenylephrine and topically anesthetized with 4% lidocaine. The endoscope was passed through the  most patent nasal cavity and positioned to image the nasopharynx, larynx, and hypopharynx in detail. The following features were examined: nasopharyngeal, laryngeal, hypopharyngeal masses; velopharyngeal strength, closure, and symmetry of motion; vocal fold range and symmetry of motion; laryngeal mucosal edema, erythema, inflammation, and hydration; salivary pooling; and gross laryngeal sensation. The equipment was removed. The patient tolerated the procedure well without complication. All findings were normal except:  - obliterated left piriform sinus  - right piriform sinus patent  - mild posterior pharyngeal wall edema  - narrow hypopharyngeal lumen, estimated at diameter of 1-2 mm, only demonstrated on air insufflation     Representative image of hypopharyngeal lumen:         Op report Dr Hart 11/8/2022  PROCEDURE IN DETAIL:   The patient was positively identified in the preoperative holding area, then was brought to the operating room and placed in the flat supine position. General endotracheal anesthesia was obtained using an endotracheal tube which was secured to the left lower lip. The eyes were protected with moist sponges. The teeth and/or upper alveolar ridge was protected using a reinforced mouthguard and/or moist sponges, as appropriate. A final timeout was performed for verification purposes. First, the Dedo laryngoscope was inserted into the oral cavity and was utilized to inspect the oropharynx, larynx, and hypopharynx in detail. The patient was suspended from the [place]. Magnified laryngeal endoscopy was carried out using a 0 degree Mariee km telescope connected to a video monitor. Findings were as noted above. Photodocumentation was obtained.  With the proximal right sided hypopharyngeal stricture distended and the larynx retracted forward with the laryngoscope suspended, gentle palpation of the postcricoid space was performed with blunt instruments. No  hypopharyngeal lumen was identifiable.  As  such, the procedure was brought to completion.   All equipment was removed. The patient was turned back over to the anesthesiology team for awakening and extubation, which were uneventful. The patient was escorted to the recovery room in good condition. The patient tolerated the procedure well without complications. All needle, sponge, and instrument counts were correct at the completion of the case.     DISPOSITION:   I recommended consultation with one of our gastroenterology colleagues for consideration of recanalization by a retrograde, trans-gastrostomy approach.    Assessment:   [id number] SCCA hypopharynx, S/P induction chemo followed with concurrent chemoradiation--ANA on exam by Dr Hart in operating room and in clinic  Hypopharyngeal stenosis--S/P PEG  Weight loss--formula changed with increase in daily caloric intake    Plan:  Findings on office exam and OR exam and future plan discussed at length with the patient and his wife and questions were answered.  It was recommended that he restart dysphagia exercises as he had stopped them   He is to restart Protonix granules through his PEG tube daily.  He had also stop this.  A new prescription was written with refills   I will follow-up to ensure that the patient gets a more expedient referral to GI Medicine for joint procedure  PET-CT scan is scheduled   Follow-up with me in 2-3 months    Time spent with patient was 30 minute which was greater than 50% of the time spent examining him

## 2022-11-30 ENCOUNTER — HOSPITAL ENCOUNTER (OUTPATIENT)
Dept: RADIOLOGY | Facility: HOSPITAL | Age: 77
Discharge: HOME | End: 2022-11-30
Attending: STUDENT IN AN ORGANIZED HEALTH CARE EDUCATION/TRAINING PROGRAM
Payer: MEDICARE

## 2022-11-30 ENCOUNTER — OFFICE VISIT (OUTPATIENT)
Dept: RADIATION ONCOLOGY | Facility: CLINIC | Age: 77
End: 2022-11-30
Payer: MEDICARE

## 2022-11-30 ENCOUNTER — DOCUMENTATION ONLY (OUTPATIENT)
Dept: INFUSION THERAPY | Facility: HOSPITAL | Age: 77
End: 2022-11-30
Payer: MEDICARE

## 2022-11-30 VITALS
BODY MASS INDEX: 18.84 KG/M2 | WEIGHT: 139.13 LBS | SYSTOLIC BLOOD PRESSURE: 117 MMHG | OXYGEN SATURATION: 98 % | HEIGHT: 72 IN | TEMPERATURE: 98 F | DIASTOLIC BLOOD PRESSURE: 59 MMHG | RESPIRATION RATE: 18 BRPM | HEART RATE: 77 BPM

## 2022-11-30 DIAGNOSIS — C13.9 HYPOPHARYNGEAL CANCER: ICD-10-CM

## 2022-11-30 DIAGNOSIS — J39.2: ICD-10-CM

## 2022-11-30 DIAGNOSIS — C13.9 PRIMARY MALIGNANT NEOPLASM OF HYPOPHARYNX: Primary | ICD-10-CM

## 2022-11-30 DIAGNOSIS — Z92.3 HISTORY OF HEAD AND NECK RADIATION: ICD-10-CM

## 2022-11-30 DIAGNOSIS — K12.33 MUCOSITIS DUE TO RADIATION THERAPY: ICD-10-CM

## 2022-11-30 LAB — GLUCOSE SERPL-MCNC: 102 MG/DL (ref 70–110)

## 2022-11-30 PROCEDURE — 78815 PET IMAGE W/CT SKULL-THIGH: CPT | Mod: TC,PS,PN

## 2022-11-30 PROCEDURE — 1125F AMNT PAIN NOTED PAIN PRSNT: CPT | Mod: CPTII,S$GLB,, | Performed by: STUDENT IN AN ORGANIZED HEALTH CARE EDUCATION/TRAINING PROGRAM

## 2022-11-30 PROCEDURE — 1159F MED LIST DOCD IN RCRD: CPT | Mod: CPTII,S$GLB,, | Performed by: STUDENT IN AN ORGANIZED HEALTH CARE EDUCATION/TRAINING PROGRAM

## 2022-11-30 PROCEDURE — 3078F DIAST BP <80 MM HG: CPT | Mod: CPTII,S$GLB,, | Performed by: STUDENT IN AN ORGANIZED HEALTH CARE EDUCATION/TRAINING PROGRAM

## 2022-11-30 PROCEDURE — 99213 PR OFFICE/OUTPT VISIT, EST, LEVL III, 20-29 MIN: ICD-10-PCS | Mod: S$GLB,,, | Performed by: STUDENT IN AN ORGANIZED HEALTH CARE EDUCATION/TRAINING PROGRAM

## 2022-11-30 PROCEDURE — 99213 OFFICE O/P EST LOW 20 MIN: CPT | Mod: S$GLB,,, | Performed by: STUDENT IN AN ORGANIZED HEALTH CARE EDUCATION/TRAINING PROGRAM

## 2022-11-30 PROCEDURE — 1125F PR PAIN SEVERITY QUANTIFIED, PAIN PRESENT: ICD-10-PCS | Mod: CPTII,S$GLB,, | Performed by: STUDENT IN AN ORGANIZED HEALTH CARE EDUCATION/TRAINING PROGRAM

## 2022-11-30 PROCEDURE — 3074F SYST BP LT 130 MM HG: CPT | Mod: CPTII,S$GLB,, | Performed by: STUDENT IN AN ORGANIZED HEALTH CARE EDUCATION/TRAINING PROGRAM

## 2022-11-30 PROCEDURE — 3078F PR MOST RECENT DIASTOLIC BLOOD PRESSURE < 80 MM HG: ICD-10-PCS | Mod: CPTII,S$GLB,, | Performed by: STUDENT IN AN ORGANIZED HEALTH CARE EDUCATION/TRAINING PROGRAM

## 2022-11-30 PROCEDURE — 78815 PET IMAGE W/CT SKULL-THIGH: CPT | Mod: 26,PS,, | Performed by: RADIOLOGY

## 2022-11-30 PROCEDURE — 78815 NM PET CT ROUTINE: ICD-10-PCS | Mod: 26,PS,, | Performed by: RADIOLOGY

## 2022-11-30 PROCEDURE — 1159F PR MEDICATION LIST DOCUMENTED IN MEDICAL RECORD: ICD-10-PCS | Mod: CPTII,S$GLB,, | Performed by: STUDENT IN AN ORGANIZED HEALTH CARE EDUCATION/TRAINING PROGRAM

## 2022-11-30 PROCEDURE — 3074F PR MOST RECENT SYSTOLIC BLOOD PRESSURE < 130 MM HG: ICD-10-PCS | Mod: CPTII,S$GLB,, | Performed by: STUDENT IN AN ORGANIZED HEALTH CARE EDUCATION/TRAINING PROGRAM

## 2022-11-30 PROCEDURE — 99999 PR PBB SHADOW E&M-EST. PATIENT-LVL III: CPT | Mod: PBBFAC,,, | Performed by: STUDENT IN AN ORGANIZED HEALTH CARE EDUCATION/TRAINING PROGRAM

## 2022-11-30 PROCEDURE — 99999 PR PBB SHADOW E&M-EST. PATIENT-LVL III: ICD-10-PCS | Mod: PBBFAC,,, | Performed by: STUDENT IN AN ORGANIZED HEALTH CARE EDUCATION/TRAINING PROGRAM

## 2022-11-30 NOTE — PROGRESS NOTES
[facility] [place] Department of Radiation Oncology  Follow Up Visit Note    Diagnosis:  Arthur Ribeiro is a 77 y.o. 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     Oncologic History:  He has a history of tobacco use and burkitt's lymphoma treated with chemotherapy alone with Dr. Rahman.   1/19/22:   CT Neck with heterogeneous enhancement along the lateral pharyngeal wall with involvement vs narrowing of the pyriform sinus. Crosses midline. Suspected retropharyngeal space effusion. Shotty LN bilaterally, enlarged and or necrotic LN in left lbl 2B and 3  1/26/22: DL and Biopsy:  Op note: ulcerative mass in the left posterior pharyngeal wall with extension to the lateral wall of the OPX. No involvement of esophagus but extends to the level of the cricopharyngeus along the posterior wall. No involvement of BOT or vallecula. No involvement of cords or supraglottis.  Path: L posterior wall with moderately differentiated squamous cell carcinoma, p16-, no PNI or LVI.   2/10/22: PET/CT with left pharyngeal and oropharyngeal mass, left level II nodes with avidity. Mildly avid mediastinal nodes, inflammatory etiology suggested.   5/30/22: completed induction carboplatin (did not tolerate taxol)  5/30/22: exam with H&N surgery with ANA on FNL  6/10/22: MRI neck with near complete resolution of the bilateral cervical lymph nodes and resolution of the left oropharyngeal lesions seen previously. CT chest with no interval abnormal mediastinal or hilar lymph node enlargement   6/28/22 - 8/19/22: 70 Gy in 35 fractions to the pre-chemotherapy gross disease, 61.25 Gy in 35 fractions to the gross disease with margin and the entire larynx. 56 Gy to the bilateral RP, right II-IV and left IB-V and level VI.  11/1/22: MRI soft tissue neck with ANA     Interval History  The patient presents today with post treatment PET/CT    He is  still with pharyngeal dysphagia.  Met with Dr. Hart with suspension microlaryngoscopy demonstrating complete hypopharyngeal stenosis. No evidence of tumor was noted at that time     No throat pain, otalgia, voice changes, neck masses. Having some constipation. Following closely with nutrition and heme onc. Taking protonix but having trouble with suspension getting caught in peg.       Review of Systems   ROS as above    Social History:  Social History     Tobacco Use    Smoking status: Former     Types: Cigarettes, Cigars     Quit date: 10/1/2005     Years since quittin.1    Smokeless tobacco: Never   Substance Use Topics    Alcohol use: Not Currently     Comment: daily    Drug use: No       Family History:  Cancer-related family history includes Cancer in his brother, father, and sister.    Exam:  Vitals:    22 1258   BP: (!) 117/59   BP Location: Left arm   Patient Position: Sitting   BP Method: Medium (Automatic)   Pulse: 77   Resp: 18   Temp: 98.2 °F (36.8 °C)   TempSrc: Temporal   SpO2: 98%   Weight: 63.1 kg (139 lb 1.8 oz)   Height: 6' (1.829 m)     Constitutional: Pleasant 77 y.o. male in no acute distress.  Well nourished. Well groomed.   HEENT: no appreciated OC or OPX lesions on direct exam.   Lymph: no appreciated cervical adenopathy. Skin intact  Lungs: No audible wheezing.  Normal effort.   Musculoskeletal: No gross MSK deformities.ambulates with a walker  Abdomen: PEG Tube in place without surrounding erythema or tenderness.  Skin: No rashes appreciated.   Psych: Alert and oriented with appropriate mood and affect.  Neuro: Grossly normal.    Data Review  PET/CT from today was personally reviewed with Mr. Ribeiro    Assessment:  stage RENO, [id number], p16- squamous cell carcinoma of the hypopharynx s/p induction chemotherapy followed by 70 Gy in 35 fractions, completed 22.  complete hypopharyngeal stenosis  PET/CT today with good response in the treated HPX. There is some avidity in  post cricoid area, possible physiologic vs associated with stenosis? ANA on MRI and and recent laryngoscopy. Pretracheal node and R hilar grossly stably on personal review.  Creatinine has improved  TSH WNL 9/28/22  ECOG: (2) Ambulatory and capable of self care, unable to carry out work activity, up and about > 50% or waking hours    Plan:  Working with Dr. Hart with plan for joint procedure with GI with scar excision and dilation. He is to see GI tomorrow.   RTC in 3 months, and will follow up PET/CT results.  Will see Dr. Stout in 2 months.   Continue to follow with dentistry.   Nursing gave instructions about using protonix. Can try famotidine if this continues to be an issue.      Thuan Peñaloza MD  Radiation Oncology      Addendum:     Impression:     1. Resolved hypermetabolic activity of the left side of the 5th oropharynx as well as resolved hypermetabolic activity of the level 2 lymph node.  Likely physiologic activity in the musculature of the soft tissues of the neck.  2. Persistent hypermetabolic lymph node in the right paratracheal region and now in the right hilum.  This may suggest metastatic disease.    Will plan to review at thoracic tumor board.

## 2022-11-30 NOTE — PROGRESS NOTES
Oncology Nutrition   Chemotherapy Infusion Visit    Nutrition Follow Up   RD met with patient and his wife Dary after follow up appointment with Dr. Peñaloza. Pt happy with switch from Jevity 1.5 to TwoCalHN however it has lead to some constipation issues. RD let them know TwoCalHN does have less fiber and less water in it. Encouraged increasing water flushes before and after each feeding. Discussed fiber supplement, HyFiber, as well as an option that can go down PEG and where to purchase. Provided sample and showed them where tube feeding instructions were on the back. If patient does not like sample, encouraged pt to ask pharmacist about another fiber supplement, such as metamucil, and if it is safe to go down PEG.     Wt Readings from Last 10 Encounters:   11/30/22 63.1 kg (139 lb 1.8 oz)   11/22/22 63.3 kg (139 lb 8.8 oz)   11/08/22 63.5 kg (140 lb)   10/18/22 63.6 kg (140 lb 3.4 oz)   09/15/22 64 kg (141 lb)   09/14/22 63.6 kg (140 lb 3.4 oz)   08/31/22 65.4 kg (144 lb 2.9 oz)   08/22/22 66.5 kg (146 lb 9.6 oz)   08/14/22 69.5 kg (153 lb 3.5 oz)   08/04/22 68.9 kg (152 lb)       All other nutrition questions/concerns addressed as appropriate. Will continue to follow and monitor PRN.     Shahnaz Galo, MS, RD, LDN  11/30/2022  3:38 PM

## 2022-11-30 NOTE — PROGRESS NOTES
PET Imaging Questionnaire    Are you a Diabetic? Recent Blood Sugar level? No    Are you anemic? Bone Marrow Stimulation Meds? No    Have you had a CT Scan, if so when & where was your last one? Yes -     Have you had a PET Scan, if so when & where was your last one? Yes -     Chemotherapy or currently on Chemotherapy? Yes    Radiation therapy? Yes    Surgical History:   Past Surgical History:   Procedure Laterality Date    DIRECT LARYNGOSCOPY N/A 11/8/2022    Procedure: DIRECT LARYNGOSCOPY;  Surgeon: Kavon Hart MD;  Location: [address];  Service: ENT;  Laterality: N/A;  Telescopes, tower, airway basic, diverticuloscopes, pulmonary balloons/jagwire, pediatric gastroscope, daniel NGT, [place] sump NGT, FLUORO    ESOPHAGOGASTRODUODENOSCOPY N/A 7/6/2022    Procedure: EGD (ESOPHAGOGASTRODUODENOSCOPY);  Surgeon: Daniel Sagastume MD;  Location: [facility];  Service: Endoscopy;  Laterality: N/A;    INSERTION OF VENOUS ACCESS PORT  2005    LARYNGOSCOPY Bilateral 1/26/2022    Procedure: LARYNGOSCOPY;  Surgeon: Enrique Maya MD;  Location: [facility];  Service: ENT;  Laterality: Bilateral;    SKIN CANCER EXCISION          Have you been fasting for at least 6 hours? Yes    Is there any chance you may be pregnant or breastfeeding? No    Assay: 12.53 MCi@:11:19   Injection Site:RT AC    Residual: .788 mCi@: 11:20   Technologist: Imtiaz Laguna Injected:11.74mCi

## 2022-12-01 ENCOUNTER — OFFICE VISIT (OUTPATIENT)
Dept: GASTROENTEROLOGY | Facility: CLINIC | Age: 77
End: 2022-12-01
Payer: MEDICARE

## 2022-12-01 VITALS — WEIGHT: 139.13 LBS | BODY MASS INDEX: 18.87 KG/M2

## 2022-12-01 DIAGNOSIS — K22.2 ESOPHAGEAL STRICTURE: Primary | ICD-10-CM

## 2022-12-01 PROCEDURE — 1125F PR PAIN SEVERITY QUANTIFIED, PAIN PRESENT: ICD-10-PCS | Mod: CPTII,S$GLB,, | Performed by: INTERNAL MEDICINE

## 2022-12-01 PROCEDURE — 1159F PR MEDICATION LIST DOCUMENTED IN MEDICAL RECORD: ICD-10-PCS | Mod: CPTII,S$GLB,, | Performed by: INTERNAL MEDICINE

## 2022-12-01 PROCEDURE — 1160F RVW MEDS BY RX/DR IN RCRD: CPT | Mod: CPTII,S$GLB,, | Performed by: INTERNAL MEDICINE

## 2022-12-01 PROCEDURE — 1160F PR REVIEW ALL MEDS BY PRESCRIBER/CLIN PHARMACIST DOCUMENTED: ICD-10-PCS | Mod: CPTII,S$GLB,, | Performed by: INTERNAL MEDICINE

## 2022-12-01 PROCEDURE — 99204 OFFICE O/P NEW MOD 45 MIN: CPT | Mod: S$GLB,,, | Performed by: INTERNAL MEDICINE

## 2022-12-01 PROCEDURE — 1101F PT FALLS ASSESS-DOCD LE1/YR: CPT | Mod: CPTII,S$GLB,, | Performed by: INTERNAL MEDICINE

## 2022-12-01 PROCEDURE — 1101F PR PT FALLS ASSESS DOC 0-1 FALLS W/OUT INJ PAST YR: ICD-10-PCS | Mod: CPTII,S$GLB,, | Performed by: INTERNAL MEDICINE

## 2022-12-01 PROCEDURE — 3288F PR FALLS RISK ASSESSMENT DOCUMENTED: ICD-10-PCS | Mod: CPTII,S$GLB,, | Performed by: INTERNAL MEDICINE

## 2022-12-01 PROCEDURE — 99204 PR OFFICE/OUTPT VISIT, NEW, LEVL IV, 45-59 MIN: ICD-10-PCS | Mod: S$GLB,,, | Performed by: INTERNAL MEDICINE

## 2022-12-01 PROCEDURE — 1159F MED LIST DOCD IN RCRD: CPT | Mod: CPTII,S$GLB,, | Performed by: INTERNAL MEDICINE

## 2022-12-01 PROCEDURE — 99999 PR PBB SHADOW E&M-EST. PATIENT-LVL III: ICD-10-PCS | Mod: PBBFAC,,, | Performed by: INTERNAL MEDICINE

## 2022-12-01 PROCEDURE — 1125F AMNT PAIN NOTED PAIN PRSNT: CPT | Mod: CPTII,S$GLB,, | Performed by: INTERNAL MEDICINE

## 2022-12-01 PROCEDURE — 99999 PR PBB SHADOW E&M-EST. PATIENT-LVL III: CPT | Mod: PBBFAC,,, | Performed by: INTERNAL MEDICINE

## 2022-12-01 PROCEDURE — 3288F FALL RISK ASSESSMENT DOCD: CPT | Mod: CPTII,S$GLB,, | Performed by: INTERNAL MEDICINE

## 2022-12-01 NOTE — PROGRESS NOTES
Advanced Endoscopy / Pancreaticobiliary Service    Reason for visit (Chief Complaint): esophageal stricture    Referring provider/PCP: Bere Stout MD  [address] [place] [address]    History of Present Illness: Patient presents for esoph dysphagia/stricture. 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 Developed dysphagia over the course of his treatment, with severe dysphagia and inability to tolerate oral secretions a few months ago. Swallow study in Oct shows near complete stenosis at level of esophageal inlet. Currenlty PEG dependent for all his hydration and nutrition.    Seen by Dr. Hart recently and attempt made to access across stenosis in the OR under direct laryngoscopy, but this was unsuccessful.      Physical Exam:  General: Well-developed, well-appearing, no acute distress  Abdomen: soft, non-tender - PEG site looks clean with no erythema.      Laboratory:       Imaging:  Reviewed contrast imaging from swallow study.    Assessment/Plan:  1- Esophageal stricture- High grade on prior imaging. Discussed my approach to this. Will attempt antegrade EGD wire access under fluoro. If that fails, can attempt at retrograde wire advancement endoscopically through the g-tube. If wire access cannot be achieved, needle puncture under fluoro with simultaneous anterograde and retrograde elndoscopic views can be considered. Pt would like to proceed with this. Will schedule.        Navin Weaver MD, CHUY   - Department of Gastroenterology  [facility] [place]

## 2022-12-04 DIAGNOSIS — R13.10 DYSPHAGIA, UNSPECIFIED TYPE: Primary | ICD-10-CM

## 2022-12-06 ENCOUNTER — TUMOR BOARD CONFERENCE (OUTPATIENT)
Dept: HEMATOLOGY/ONCOLOGY | Facility: CLINIC | Age: 77
End: 2022-12-06
Payer: MEDICARE

## 2022-12-06 ENCOUNTER — TELEPHONE (OUTPATIENT)
Dept: INFUSION THERAPY | Facility: HOSPITAL | Age: 77
End: 2022-12-06
Payer: MEDICARE

## 2022-12-06 NOTE — TELEPHONE ENCOUNTER
CHINMAY called patient to let him know that [facility] reached out to CHINMAY and Dr. Stout's office to report that TwoCalHN, the tube feeding formula the patient is currently receiving, is now on nationwide backorder. [facility] is able to order another 2kcal/mL formula, Nutren 2.0, for patient to receive. New order sent to [facility]. Updated patient on all of the above and encouraged him to reach back out if this new formula causes him any issues. Pt VU.     New tube feeding prescription reads as follows:     5 cartons Nutren 2.0 daily via PEG. Flush 120 mL water before and after each carton. Together this provides 2500 kcals, 105 grams protein, and 2065 mL water daily.     Shahnaz Galo 12/06/2022   11:24 AM

## 2022-12-06 NOTE — PROGRESS NOTES
[facility]  [facility] [place] [facility]      THORACIC MULTIDISCIPLINARY TUMOR BOARD  PATIENT REVIEW FORM  ___________________________________________________________________    CLINIC #: [id number]  DATE: 12/06/2022    TUMOR SITE:   Cancer Type: Head and neck cancer     Cancer Site: HYPOPHARYNX         Presenting Hospital / Clinic: [facility]     Virtual Tumor Board Conference: In person       PRESENTER:   Presenter: Dr. Thuan Peñaloza     Specialties Present: Medical Oncology; Radiation Oncology; Surgical Oncology; Pathology; Navigation; Radiology; Pulmonology       PATIENT SUMMARY:   He has a history of tobacco use and burkitt's lymphoma treated with chemotherapy alone with Dr. Rahman.   1/19/22:   CT Neck with heterogeneous enhancement along the lateral pharyngeal wall with involvement vs narrowing of the pyriform sinus. Crosses midline. Suspected retropharyngeal space effusion. Shotty LN bilaterally, enlarged and or necrotic LN in left lbl 2B and 3  1/26/22: DL and Biopsy:  Op note: ulcerative mass in the left posterior pharyngeal wall with extension to the lateral wall of the OPX. No involvement of esophagus but extends to the level of the cricopharyngeus along the posterior wall. No involvement of BOT or vallecula. No involvement of cords or supraglottis.  Path: L posterior wall with moderately differentiated squamous cell carcinoma, p16-, no PNI or LVI.   2/10/22: PET/CT with left pharyngeal and oropharyngeal mass, left level II nodes with avidity. Mildly avid mediastinal nodes, inflammatory etiology suggested.   5/30/22: completed induction carboplatin (did not tolerate taxol)  5/30/22: exam with H&N surgery with ANA on FNL  6/10/22: MRI neck with near complete resolution of the bilateral cervical lymph nodes and resolution of the left oropharyngeal lesions seen previously. CT chest with no interval abnormal mediastinal or hilar lymph node enlargement   6/28/22 -  8/19/22: 70 Gy in 35 fractions to the pre-chemotherapy gross disease, 61.25 Gy in 35 fractions to the gross disease with margin and the entire larynx. 56 Gy to the bilateral RP, right II-IV and left IB-V and level VI.  11/1/22: MRI soft tissue neck with ANA       He is still with pharyngeal dysphagia.  Met with Dr. Hart with suspension microlaryngoscopy demonstrating complete hypopharyngeal stenosis. No evidence of tumor was noted at that time      No throat pain, otalgia, voice changes, neck masses. Having some constipation. Following closely with nutrition and heme onc. Taking protonix but having trouble with suspension getting caught in peg.     Background Information  Patient Status: a current patient (Anne lPersistent hypermetabolic lymph node in the right paratracheal region and now in the right hilum)  Treatment to Date: Adjuvant Radiation; Neoadjuvant Chemotherapy         BOARD RECOMMENDATIONS:   Recommended Plan (General)  Recommended Plan Note: Monitor and Follow up repeat PET scan in 3 months         CONSULT NEEDED:     [] Surgery    [] Hem/Onc    [] Rad/Onc    [] Dietary                 [] Social Service    [] Psychology       [] Pulmonology    Cancer Staging   Primary malignant neoplasm of hypopharynx  Staging form: Pharynx - Hypopharynx, AJCC 8th Edition  - Clinical stage from 2/17/2022: Stage RENO (cT3, cN2b, cM0) - Signed by Evon Galo NP on 2/18/2022       GROUP STAGE:       [] O    [] 1A    [] IB    [] IIA    [] IIB     [] IIIA     [] IIIB     [] IIIC [] IV     [] Local recurrence     [] Regional recurrence     [] Distant recurrence   [] NSCLC     [] SCLC     Tumor type         [x] Ayala'l Treatment Guidelines reviewed and care planned is consistent with guidelines.         (i.e., NCCN, NCI, PD, ACO, AUA, etc.)    PRESENTATION AT CANCER CONFERENCE:   Presentation at Cancer Conference: Prospective       CLINICAL TRIAL ELIGIBILITY:   Clinical Trial Eligibility  Clinical Trial Eligibility: Not  [place]

## 2022-12-07 DIAGNOSIS — R59.0 MEDIASTINAL LYMPHADENOPATHY: ICD-10-CM

## 2022-12-07 DIAGNOSIS — C13.9 PRIMARY MALIGNANT NEOPLASM OF HYPOPHARYNX: Primary | ICD-10-CM

## 2022-12-09 ENCOUNTER — PATIENT MESSAGE (OUTPATIENT)
Dept: ENDOSCOPY | Facility: HOSPITAL | Age: 77
End: 2022-12-09
Payer: MEDICARE

## 2022-12-09 ENCOUNTER — TELEPHONE (OUTPATIENT)
Dept: ENDOSCOPY | Facility: HOSPITAL | Age: 77
End: 2022-12-09
Payer: MEDICARE

## 2022-12-09 NOTE — TELEPHONE ENCOUNTER
Telephoned pt to schedule EGD.  Spoke with pt and procedure scheduled for 1/3/23 at 10:45am.  Reviewed medical history and medications.  Instructed on procedure and preparation.  Tube feedings to stop at midnight.  Pt verbalized understanding.  Copy of instructions sent via patient portal.

## 2022-12-09 NOTE — TELEPHONE ENCOUNTER
----- Message from Sandie Turner RN sent at 12/9/2022 12:25 PM CST -----  Regarding: FW: Pt Advice  Contact: pt [phone]    ----- Message -----  From: Harper Benton  Sent: 12/9/2022  12:23 PM CST  To: [facility] Staff  Subject: Pt Advice                                        Pt is calling he seen Dr. Weaver last week and is scheduled for procedure 1/3/2023. He would like to know why Dr. Weaver is not doing the procedure. He does not want to see Dr. Silver does not know who he is, he stated he was told by Dr. Weaver that he was able to do the procedure. Please call

## 2022-12-14 DIAGNOSIS — K21.9 GASTROESOPHAGEAL REFLUX DISEASE, UNSPECIFIED WHETHER ESOPHAGITIS PRESENT: ICD-10-CM

## 2022-12-14 DIAGNOSIS — J39.2: ICD-10-CM

## 2022-12-14 DIAGNOSIS — C13.9 PRIMARY MALIGNANT NEOPLASM OF HYPOPHARYNX: Primary | ICD-10-CM

## 2022-12-14 RX ORDER — FAMOTIDINE 40 MG/5ML
40 POWDER, FOR SUSPENSION ORAL DAILY
Qty: 150 ML | Refills: 3 | Status: SHIPPED | OUTPATIENT
Start: 2022-12-14 | End: 2023-09-18

## 2022-12-29 ENCOUNTER — TELEPHONE (OUTPATIENT)
Dept: INFUSION THERAPY | Facility: HOSPITAL | Age: 77
End: 2022-12-29
Payer: MEDICARE

## 2022-12-29 NOTE — TELEPHONE ENCOUNTER
RD received call from patient's wife, Dary, regarding tube feeding prescription. Pt's script has gotten changed a couple times in the last month d/t needing higher calories and a nationwide backorders. Pt's wife requesting an order for both Jevity 1.5 and Nutren 2.0 so she can go back and forth. Pt needs the higher calorie option however cannot tolerate them all day. Dary said she spoke with [facility] (Kamila) about above and it is possible to have them both open. RD let Dary know she will call [facility] and have it taken care of. Dary very appreciative.

## 2022-12-29 NOTE — TELEPHONE ENCOUNTER
CHINMAY called Kamila at [facility] to confirm above and see exactly what orders were needed from Dr. Stout's office. Forms filled out and faxed over.

## 2023-01-03 ENCOUNTER — ANESTHESIA EVENT (OUTPATIENT)
Dept: ENDOSCOPY | Facility: HOSPITAL | Age: 78
End: 2023-01-03
Payer: MEDICARE

## 2023-01-03 ENCOUNTER — HOSPITAL ENCOUNTER (OUTPATIENT)
Facility: HOSPITAL | Age: 78
Discharge: HOME | End: 2023-01-03
Attending: INTERNAL MEDICINE | Admitting: INTERNAL MEDICINE
Payer: MEDICARE

## 2023-01-03 ENCOUNTER — ANESTHESIA (OUTPATIENT)
Dept: ENDOSCOPY | Facility: HOSPITAL | Age: 78
End: 2023-01-03
Payer: MEDICARE

## 2023-01-03 VITALS
SYSTOLIC BLOOD PRESSURE: 144 MMHG | TEMPERATURE: 97 F | BODY MASS INDEX: 19.23 KG/M2 | OXYGEN SATURATION: 100 % | WEIGHT: 142 LBS | DIASTOLIC BLOOD PRESSURE: 69 MMHG | HEIGHT: 72 IN | RESPIRATION RATE: 10 BRPM | HEART RATE: 69 BPM

## 2023-01-03 DIAGNOSIS — R13.10 DYSPHAGIA, UNSPECIFIED TYPE: Primary | ICD-10-CM

## 2023-01-03 DIAGNOSIS — R13.19 ESOPHAGEAL DYSPHAGIA: ICD-10-CM

## 2023-01-03 DIAGNOSIS — J39.2: Primary | ICD-10-CM

## 2023-01-03 PROCEDURE — 25000003 PHARM REV CODE 250: Performed by: NURSE ANESTHETIST, CERTIFIED REGISTERED

## 2023-01-03 PROCEDURE — C1874 STENT, COATED/COV W/DEL SYS: HCPCS | Performed by: INTERNAL MEDICINE

## 2023-01-03 PROCEDURE — C1769 GUIDE WIRE: HCPCS | Performed by: INTERNAL MEDICINE

## 2023-01-03 PROCEDURE — 37000009 HC ANESTHESIA EA ADD 15 MINS: Performed by: INTERNAL MEDICINE

## 2023-01-03 PROCEDURE — D9220A PRA ANESTHESIA: ICD-10-PCS | Mod: CRNA,,, | Performed by: NURSE ANESTHETIST, CERTIFIED REGISTERED

## 2023-01-03 PROCEDURE — 25500020 PHARM REV CODE 255: Performed by: INTERNAL MEDICINE

## 2023-01-03 PROCEDURE — 37000008 HC ANESTHESIA 1ST 15 MINUTES: Performed by: INTERNAL MEDICINE

## 2023-01-03 PROCEDURE — 43212 ESOPHAGOSCOP STENT PLACEMENT: CPT | Mod: ,,, | Performed by: INTERNAL MEDICINE

## 2023-01-03 PROCEDURE — 27202304 HC CANNULA, ERCP: Performed by: INTERNAL MEDICINE

## 2023-01-03 PROCEDURE — 63600175 PHARM REV CODE 636 W HCPCS: Performed by: NURSE ANESTHETIST, CERTIFIED REGISTERED

## 2023-01-03 PROCEDURE — 43212 PR ESOPHAGOSCOPY, FLEX, TRANSORAL; WITH PLACEMNT OF ENDOSCOPIC STENT: ICD-10-PCS | Mod: ,,, | Performed by: INTERNAL MEDICINE

## 2023-01-03 PROCEDURE — 74360 X-RAY GUIDE GI DILATION: CPT | Mod: 26,,, | Performed by: INTERNAL MEDICINE

## 2023-01-03 PROCEDURE — D9220A PRA ANESTHESIA: Mod: ANES,,, | Performed by: ANESTHESIOLOGY

## 2023-01-03 PROCEDURE — D9220A PRA ANESTHESIA: Mod: CRNA,,, | Performed by: NURSE ANESTHETIST, CERTIFIED REGISTERED

## 2023-01-03 PROCEDURE — 74360 PR  X-RAY GUIDE, GI DILATION: ICD-10-PCS | Mod: 26,,, | Performed by: INTERNAL MEDICINE

## 2023-01-03 PROCEDURE — D9220A PRA ANESTHESIA: ICD-10-PCS | Mod: ANES,,, | Performed by: ANESTHESIOLOGY

## 2023-01-03 PROCEDURE — 43212 ESOPHAGOSCOP STENT PLACEMENT: CPT | Performed by: INTERNAL MEDICINE

## 2023-01-03 PROCEDURE — 74360 X-RAY GUIDE GI DILATION: CPT | Mod: TC | Performed by: INTERNAL MEDICINE

## 2023-01-03 DEVICE — STENT SYSTEM RMV
Type: IMPLANTABLE DEVICE | Site: ESOPHAGUS | Status: NON-FUNCTIONAL
Brand: WALLFLEX BILIARY
Removed: 2023-01-12

## 2023-01-03 RX ORDER — LIDOCAINE HYDROCHLORIDE 20 MG/ML
INJECTION INTRAVENOUS
Status: DISCONTINUED | OUTPATIENT
Start: 2023-01-03 | End: 2023-01-03

## 2023-01-03 RX ORDER — SODIUM CHLORIDE 9 MG/ML
INJECTION, SOLUTION INTRAVENOUS CONTINUOUS
Status: DISCONTINUED | OUTPATIENT
Start: 2023-01-03 | End: 2023-01-03 | Stop reason: HOSPADM

## 2023-01-03 RX ORDER — ONDANSETRON 2 MG/ML
4 INJECTION INTRAMUSCULAR; INTRAVENOUS DAILY PRN
Status: DISCONTINUED | OUTPATIENT
Start: 2023-01-03 | End: 2023-01-03 | Stop reason: HOSPADM

## 2023-01-03 RX ORDER — SODIUM CHLORIDE 0.9 % (FLUSH) 0.9 %
10 SYRINGE (ML) INJECTION
Status: DISCONTINUED | OUTPATIENT
Start: 2023-01-03 | End: 2023-01-03 | Stop reason: HOSPADM

## 2023-01-03 RX ORDER — ROCURONIUM BROMIDE 10 MG/ML
INJECTION, SOLUTION INTRAVENOUS
Status: DISCONTINUED | OUTPATIENT
Start: 2023-01-03 | End: 2023-01-03

## 2023-01-03 RX ORDER — PROCHLORPERAZINE EDISYLATE 5 MG/ML
5 INJECTION INTRAMUSCULAR; INTRAVENOUS EVERY 30 MIN PRN
Status: DISCONTINUED | OUTPATIENT
Start: 2023-01-03 | End: 2023-01-03 | Stop reason: HOSPADM

## 2023-01-03 RX ORDER — DEXAMETHASONE SODIUM PHOSPHATE 4 MG/ML
INJECTION, SOLUTION INTRA-ARTICULAR; INTRALESIONAL; INTRAMUSCULAR; INTRAVENOUS; SOFT TISSUE
Status: DISCONTINUED | OUTPATIENT
Start: 2023-01-03 | End: 2023-01-03

## 2023-01-03 RX ORDER — PROPOFOL 10 MG/ML
VIAL (ML) INTRAVENOUS
Status: DISCONTINUED | OUTPATIENT
Start: 2023-01-03 | End: 2023-01-03

## 2023-01-03 RX ORDER — PHENYLEPHRINE HYDROCHLORIDE 10 MG/ML
INJECTION INTRAVENOUS
Status: DISCONTINUED | OUTPATIENT
Start: 2023-01-03 | End: 2023-01-03

## 2023-01-03 RX ORDER — FENTANYL CITRATE 50 UG/ML
INJECTION, SOLUTION INTRAMUSCULAR; INTRAVENOUS
Status: DISCONTINUED | OUTPATIENT
Start: 2023-01-03 | End: 2023-01-03

## 2023-01-03 RX ORDER — ONDANSETRON 2 MG/ML
INJECTION INTRAMUSCULAR; INTRAVENOUS
Status: DISCONTINUED | OUTPATIENT
Start: 2023-01-03 | End: 2023-01-03

## 2023-01-03 RX ADMIN — PROPOFOL 120 MG: 10 INJECTION, EMULSION INTRAVENOUS at 10:01

## 2023-01-03 RX ADMIN — PHENYLEPHRINE HYDROCHLORIDE 100 MCG: 10 INJECTION INTRAVENOUS at 10:01

## 2023-01-03 RX ADMIN — SODIUM CHLORIDE: 9 INJECTION, SOLUTION INTRAVENOUS at 10:01

## 2023-01-03 RX ADMIN — DEXAMETHASONE SODIUM PHOSPHATE 4 MG: 4 INJECTION, SOLUTION INTRAMUSCULAR; INTRAVENOUS at 10:01

## 2023-01-03 RX ADMIN — ROCURONIUM BROMIDE 50 MG: 10 INJECTION INTRAVENOUS at 10:01

## 2023-01-03 RX ADMIN — SODIUM CHLORIDE: 9 INJECTION, SOLUTION INTRAVENOUS at 11:01

## 2023-01-03 RX ADMIN — FENTANYL CITRATE 50 MCG: 50 INJECTION, SOLUTION INTRAMUSCULAR; INTRAVENOUS at 10:01

## 2023-01-03 RX ADMIN — LIDOCAINE HYDROCHLORIDE 60 MG: 20 INJECTION INTRAVENOUS at 10:01

## 2023-01-03 RX ADMIN — PHENYLEPHRINE HYDROCHLORIDE 100 MCG: 10 INJECTION INTRAVENOUS at 11:01

## 2023-01-03 RX ADMIN — ONDANSETRON 4 MG: 2 INJECTION INTRAMUSCULAR; INTRAVENOUS at 11:01

## 2023-01-03 NOTE — PLAN OF CARE
Patient stable and alert. No complications. Pt on room air. Vital signs stable. IV intact. Continuing to monitor.

## 2023-01-03 NOTE — ANESTHESIA PREPROCEDURE EVALUATION
01/03/2023  Arthur Ribeiro is a 77 y.o., male.      Pre-op Assessment    I have reviewed the Patient Summary Reports.     I have reviewed the Nursing Notes. I have reviewed the NPO Status.   I have reviewed the Medications.     Review of Systems  Anesthesia Hx:  No problems with previous Anesthesia  Denies Family Hx of Anesthesia complications.   Denies Personal Hx of Anesthesia complications.   Hematology/Oncology:         -- Anemia: --  Cancer in past history:  radiation  Oncology Comments: SCC of hypopharynx     Cardiovascular:   Exercise tolerance: good    Pulmonary:  Pulmonary Normal    Renal/:  Renal/ Normal     Hepatic/GI:   Hiatal Hernia, GERD, well controlled    Neurological:  Neurology Normal    Endocrine:  Endocrine Normal        Physical Exam  General: Well nourished, Cooperative and Alert    Airway:  Mallampati: III   Mouth Opening: Small, but > 3cm  TM Distance: Normal  Tongue: Normal  Neck ROM: Normal ROM    Dental:    Chest/Lungs:  Normal Respiratory Rate    Heart:  Rate: Normal        Anesthesia Plan  Type of Anesthesia, risks & benefits discussed:    Anesthesia Type: Gen ETT  Intra-op Monitoring Plan: Standard ASA Monitors  Post Op Pain Control Plan: multimodal analgesia and IV/PO Opioids PRN  Induction:  IV  Airway Plan: Video, Post-Induction  Informed Consent: Informed consent signed with the Patient and all parties understand the risks and agree with anesthesia plan.  All questions answered.   ASA Score: 3  Day of Surgery Review of History & Physical: H&P Update referred to the surgeon/provider.    Ready For Surgery From Anesthesia Perspective.     .

## 2023-01-03 NOTE — H&P
Short Stay Endoscopy History and Physical    PCP - Zohaib Pryor MD  Referring Physician - Bere Stout MD  [address] [place] [address]    Procedure - EGD dilation / possible PEG replacement / possible retrograde EGD  ASA - per anesthesia  Mallampati - per anesthesia  History of Anesthesia problems - no  Family history Anesthesia problems -  no   Plan of anesthesia - General    HPI:  This is a 77 y.o. male here for evaluation of: esophageal dysphagia    Reflux - no  Dysphagia - no  Abdominal pain - no  Diarrhea - no    ROS:  Constitutional: No fevers, chills, No weight loss  CV: No chest pain  Pulm: No cough, No shortness of breath  GI: see HPI    Medical History:  has a past medical history of Allergic rhinitis, cause unspecified (08/04/2015), Cancer (2005), Cancer of overlapping sites of hypopharynx (01/17/2022), Encounter for blood transfusion, Foot drop, Foot drop, left, Hiatal hernia, Male erectile disorder (03/19/2018), Melanoma in situ of upper arm, right (09/23/2022), Neuropathy, Personal history of non-Hodgkin lymphomas (09/08/2016), and Spinal stenosis.    Surgical History:  has a past surgical history that includes Skin cancer excision; Insertion of venous access port (2005); Laryngoscopy (Bilateral, 1/26/2022); Esophagogastroduodenoscopy (N/A, 7/6/2022); and Direct laryngoscopy (N/A, 11/8/2022).    Family History: family history includes Cancer in his brother, father, and sister; Heart disease in his mother; Hypertension in his mother; No Known Problems in his daughter and son..    Social History:  reports that he quit smoking about 17 years ago. His smoking use included cigarettes and cigars. He has never used smokeless tobacco. He reports that he does not currently use alcohol. He reports that he does not use drugs.    Review of patient's allergies indicates:  No Known Allergies    Medications:   Medications Prior to Admission   Medication Sig Dispense Refill Last Dose    famotidine  (PEPCID) 40 mg/5 mL (8 mg/mL) suspension Take 5 mLs (40 mg total) by mouth once daily. 150 mL 3 1/2/2023    gabapentin (NEURONTIN) 300 MG capsule Take 1 capsule (300 mg total) by mouth 3 (three) times daily. 90 capsule 1 1/2/2023    (Magic mouthwash) 1:1:1 diphenhydramine(Benadryl) 12.5mg/5ml liq, aluminum & magnesium hydroxide-simethicone (Maalox), LIDOcaine viscous 2% Swish and spit 10 mLs every 4 (four) hours as needed (gargle for 10 seconds and spit). gargle for 10 seconds and spit (Patient not taking: Reported on 12/1/2022) 450 mL 0 More than a month    acetaminophen (M-PAP) 160 mg/5 mL Liqd 15.6 mLs (499.2 mg total) by Per G Tube route every 8 (eight) hours as needed (pain). (Patient not taking: Reported on 12/1/2022) 473 mL 1 More than a month    hydrocodone-acetaminophen (HYCET) solution 7.5-325 mg/15mL Take 10 mLs by mouth every 6 (six) hours as needed for Pain. (Patient not taking: Reported on 12/1/2022) 118 mL 0 Unknown    LIDOCAINE VISCOUS 2 % solution    Unknown    magic mouthwash diphen/antac/lidoc/nysta Take 5 mL by mouth 4 times daily. (Patient not taking: Reported on 12/1/2022) 360 mL 0 More than a month    ondansetron (ZOFRAN-ODT) 8 MG TbDL DISSOLVE 1 TABLET ON THE TONGUE THREE TIMES DAILY AS NEEDED FOR NAUSEA   More than a month    valACYclovir (VALTREX) 1000 MG tablet Take 1,000 mg by mouth 3 (three) times daily.   More than a month       Physical Exam:    Vital Signs:   Vitals:    01/03/23 0946   BP: 135/68   Pulse: 77   Resp: 16   Temp: 98.1 °F (36.7 °C)       General Appearance: Well appearing in no acute distress  Eyes:    No scleral icterus  Lungs: no labored breathing  Heart:  Regular  Abdomen: non tender    Labs:  Lab Results   Component Value Date    WBC 3.26 (L) 12/08/2022    HGB 9.3 (L) 12/08/2022    HCT 26.9 (L) 12/08/2022     12/08/2022    CHOL 153 09/28/2022    TRIG 157 (H) 09/28/2022    HDL 29 (L) 09/28/2022    ALT 56 (H) 12/08/2022    AST 47 12/08/2022     12/08/2022     K 4.4 12/08/2022    CL 97 12/08/2022    CREATININE 1.37 12/08/2022    BUN 75 (H) 12/08/2022    CO2 33 (H) 12/08/2022    TSH 3.090 09/28/2022    PSA 0.38 09/28/2022       I have explained the risks and benefits of this endoscopic procedure to the patient including but not limited to bleeding, inflammation, infection, perforation, and death.      Navin Weaver MD

## 2023-01-03 NOTE — ANESTHESIA PROCEDURE NOTES
Intubation    Date/Time: 1/3/2023 10:30 AM  Performed by: Nilesh Schmidt CRNA  Authorized by: Waldo Lozano MD     Intubation:     Induction:  Intravenous    Intubated:  Postinduction    Mask Ventilation:  Easy mask    Attempts:  1    Attempted By:  CRNA    Method of Intubation:  Video laryngoscopy    Blade:  [facility] 3    Laryngeal View Grade: Grade IIA - cords partially seen      Difficult Airway Encountered?: No      Complications:  None    Airway Device:  Oral endotracheal tube    Airway Device Size:  7.0    Style/Cuff Inflation:  Cuffed (inflated to minimal occlusive pressure)    Tube secured:  21    Secured at:  The lips    Placement Verified By:  Capnometry and Fiber optic visualization    Complicating Factors:  None    Findings Post-Intubation:  BS equal bilateral and atraumatic/condition of teeth unchanged

## 2023-01-03 NOTE — PLAN OF CARE
Patients spouse at bedside. Doctor at bedside. Discharge instructions given and understood. Preparing patient for discharge.

## 2023-01-03 NOTE — TRANSFER OF CARE
Anesthesia Transfer of Care Note    Patient: Arthur Ribeiro    Procedure(s) Performed: Procedure(s) (LRB):  EGD (ESOPHAGOGASTRODUODENOSCOPY) (N/A)    Patient location: PACU    Anesthesia Type: general    Transport from OR: Transported from OR on 6-10 L/min O2 by face mask with adequate spontaneous ventilation    Post pain: adequate analgesia    Post assessment: no apparent anesthetic complications and tolerated procedure well    Post vital signs: stable    Level of consciousness: awake    Nausea/Vomiting: no nausea/vomiting    Complications: none    Transfer of care protocol was followed      Last vitals:   Visit Vitals  /68 (BP Location: Left arm, Patient Position: Lying)   Pulse 77   Temp 36.7 °C (98.1 °F) (Temporal)   Resp 16   Ht 6' (1.829 m)   Wt 64.4 kg (142 lb)   SpO2 98%   BMI 19.26 kg/m²

## 2023-01-03 NOTE — PROVATION PATIENT INSTRUCTIONS
Discharge Summary/Instructions after an Endoscopic Procedure  Patient Name: Arthur Ribeiro  Patient MRN: [id number]  Patient YOB: 1945  Tuesday, January 3, 2023  Navin Weaver MD  Dear patient,  As a result of recent federal legislation (The Federal Cures Act), you may   receive lab or pathology results from your procedure in your [facility]   account before your physician is able to contact you. Your physician or   their representative will relay the results to you with their   recommendations at their soonest availability.  Thank you,  RESTRICTIONS:  During your procedure today, you received medications for sedation.  These   medications may affect your judgment, balance and coordination.  Therefore,   for 24 hours, you have the following restrictions:   - DO NOT drive a car, operate machinery, make legal/financial decisions,   sign important papers or drink alcohol.    ACTIVITY:  Today: no heavy lifting, straining or running due to procedural   sedation/anesthesia.  The following day: return to full activity including work.  DIET:  Eat and drink normally unless instructed otherwise.     TREATMENT FOR COMMON SIDE EFFECTS:  - Mild abdominal pain, nausea, belching, bloating or excessive gas:  rest,   eat lightly and use a heating pad.  - Sore Throat: treat with throat lozenges and/or gargle with warm salt   water.  - Because air was used during the procedure, expelling large amounts of air   from your rectum or belching is normal.  - If a bowel prep was taken, you may not have a bowel movement for 1-3 days.    This is normal.  SYMPTOMS TO WATCH FOR AND REPORT TO YOUR PHYSICIAN:  1. Abdominal pain or bloating, other than gas cramps.  2. Chest pain.  3. Back pain.  4. Signs of infection such as: chills or fever occurring within 24 hours   after the procedure.  5. Rectal bleeding, which would show as bright red, maroon, or black stools.   (A tablespoon of blood from the rectum is not serious, especially if    hemorrhoids are present.)  6. Vomiting.  7. Weakness or dizziness.  GO DIRECTLY TO THE NEAREST EMERGENCY ROOM IF YOU HAVE ANY OF THE FOLLOWING:      Difficulty breathing              Chills and/or fever over 101 F   Persistent vomiting and/or vomiting blood   Severe abdominal pain   Severe chest pain   Black, tarry stools   Bleeding- more than one tablespoon   Any other symptom or condition that you feel may need urgent attention  Your doctor recommends these additional instructions:  If any biopsies were taken, your doctors clinic will contact you in 1 to 2   weeks with any results.  - Discharge patient to home (ambulatory).   - Resume previous diet; Discharge to home (ambulatory); Resume outpatient   medications  - Return to primary care physician as previously scheduled.   - Continue use of PEG tube for all hydration/nutrition for now. No oral   intake at this time.  - Repeat EGD with me under fluoroscopy in next 1-2 weeks for stent removal   and dilation.  For questions, problems or results please call your physician - Navin Weaver MD at Work:  [phone].  [facility], EMERGENCY ROOM PHONE NUMBER: [phone]  IF A COMPLICATION OR EMERGENCY SITUATION ARISES AND YOU ARE UNABLE TO REACH   YOUR PHYSICIAN - GO DIRECTLY TO THE EMERGENCY ROOM.  Navin Weaver MD  1/3/2023 11:33:05 AM  This report has been verified and signed electronically.  Dear patient,  As a result of recent federal legislation (The Federal Cures Act), you may   receive lab or pathology results from your procedure in your [facility]   account before your physician is able to contact you. Your physician or   their representative will relay the results to you with their   recommendations at their soonest availability.  Thank you,  PROVATION

## 2023-01-05 ENCOUNTER — TELEPHONE (OUTPATIENT)
Dept: ENDOSCOPY | Facility: HOSPITAL | Age: 78
End: 2023-01-05
Payer: MEDICARE

## 2023-01-05 ENCOUNTER — PATIENT MESSAGE (OUTPATIENT)
Dept: ENDOSCOPY | Facility: HOSPITAL | Age: 78
End: 2023-01-05
Payer: MEDICARE

## 2023-01-05 NOTE — TELEPHONE ENCOUNTER
Telephoned pt to schedule EGD.  Spoke with pt and procedure scheduled for 1/12/23 at 7:30am.  Reviewed medical history and medications.  Instructed on procedure and preparation.  Pt verbalized understanding.  Copy of instructions sent via patient portal.

## 2023-01-11 ENCOUNTER — ANESTHESIA EVENT (OUTPATIENT)
Dept: ENDOSCOPY | Facility: HOSPITAL | Age: 78
End: 2023-01-11
Payer: MEDICARE

## 2023-01-12 ENCOUNTER — ANESTHESIA (OUTPATIENT)
Dept: ENDOSCOPY | Facility: HOSPITAL | Age: 78
End: 2023-01-12
Payer: MEDICARE

## 2023-01-12 ENCOUNTER — HOSPITAL ENCOUNTER (OUTPATIENT)
Facility: HOSPITAL | Age: 78
Discharge: HOME | End: 2023-01-12
Attending: INTERNAL MEDICINE | Admitting: INTERNAL MEDICINE
Payer: MEDICARE

## 2023-01-12 VITALS
BODY MASS INDEX: 18.96 KG/M2 | WEIGHT: 140 LBS | SYSTOLIC BLOOD PRESSURE: 135 MMHG | TEMPERATURE: 98 F | OXYGEN SATURATION: 97 % | RESPIRATION RATE: 18 BRPM | HEIGHT: 72 IN | DIASTOLIC BLOOD PRESSURE: 71 MMHG | HEART RATE: 69 BPM

## 2023-01-12 DIAGNOSIS — R13.19 ESOPHAGEAL DYSPHAGIA: ICD-10-CM

## 2023-01-12 DIAGNOSIS — J39.2: Primary | ICD-10-CM

## 2023-01-12 PROCEDURE — 43248 EGD GUIDE WIRE INSERTION: CPT | Mod: 59,,, | Performed by: INTERNAL MEDICINE

## 2023-01-12 PROCEDURE — 37000009 HC ANESTHESIA EA ADD 15 MINS: Performed by: INTERNAL MEDICINE

## 2023-01-12 PROCEDURE — D9220A PRA ANESTHESIA: Mod: CRNA,,, | Performed by: NURSE ANESTHETIST, CERTIFIED REGISTERED

## 2023-01-12 PROCEDURE — 37000008 HC ANESTHESIA 1ST 15 MINUTES: Performed by: INTERNAL MEDICINE

## 2023-01-12 PROCEDURE — 27201014 HC GRASPER DEVICE: Performed by: INTERNAL MEDICINE

## 2023-01-12 PROCEDURE — 63600175 PHARM REV CODE 636 W HCPCS: Performed by: NURSE ANESTHETIST, CERTIFIED REGISTERED

## 2023-01-12 PROCEDURE — D9220A PRA ANESTHESIA: Mod: ANES,,, | Performed by: ANESTHESIOLOGY

## 2023-01-12 PROCEDURE — 43248 EGD GUIDE WIRE INSERTION: CPT | Mod: 59 | Performed by: INTERNAL MEDICINE

## 2023-01-12 PROCEDURE — 25000003 PHARM REV CODE 250: Performed by: INTERNAL MEDICINE

## 2023-01-12 PROCEDURE — 43248 PR EGD, FLEX, W/DILATION OVER GUIDEWIRE: ICD-10-PCS | Mod: 59,,, | Performed by: INTERNAL MEDICINE

## 2023-01-12 PROCEDURE — C1769 GUIDE WIRE: HCPCS | Performed by: INTERNAL MEDICINE

## 2023-01-12 PROCEDURE — D9220A PRA ANESTHESIA: ICD-10-PCS | Mod: CRNA,,, | Performed by: NURSE ANESTHETIST, CERTIFIED REGISTERED

## 2023-01-12 PROCEDURE — 25000003 PHARM REV CODE 250: Performed by: NURSE ANESTHETIST, CERTIFIED REGISTERED

## 2023-01-12 PROCEDURE — 43247 EGD REMOVE FOREIGN BODY: CPT | Performed by: INTERNAL MEDICINE

## 2023-01-12 PROCEDURE — D9220A PRA ANESTHESIA: ICD-10-PCS | Mod: ANES,,, | Performed by: ANESTHESIOLOGY

## 2023-01-12 PROCEDURE — 43247 PR EGD, FLEX, W/REMOVAL, FOREIGN BODY: ICD-10-PCS | Mod: ,,, | Performed by: INTERNAL MEDICINE

## 2023-01-12 PROCEDURE — 43247 EGD REMOVE FOREIGN BODY: CPT | Mod: ,,, | Performed by: INTERNAL MEDICINE

## 2023-01-12 RX ORDER — ONDANSETRON 2 MG/ML
4 INJECTION INTRAMUSCULAR; INTRAVENOUS ONCE AS NEEDED
Status: DISCONTINUED | OUTPATIENT
Start: 2023-01-12 | End: 2023-01-12 | Stop reason: HOSPADM

## 2023-01-12 RX ORDER — LIDOCAINE HYDROCHLORIDE 20 MG/ML
INJECTION, SOLUTION EPIDURAL; INFILTRATION; INTRACAUDAL; PERINEURAL
Status: DISCONTINUED | OUTPATIENT
Start: 2023-01-12 | End: 2023-01-12

## 2023-01-12 RX ORDER — SODIUM CHLORIDE 0.9 % (FLUSH) 0.9 %
10 SYRINGE (ML) INJECTION
Status: DISCONTINUED | OUTPATIENT
Start: 2023-01-12 | End: 2023-01-12 | Stop reason: HOSPADM

## 2023-01-12 RX ORDER — SODIUM CHLORIDE 9 MG/ML
INJECTION, SOLUTION INTRAVENOUS CONTINUOUS
Status: DISCONTINUED | OUTPATIENT
Start: 2023-01-12 | End: 2023-01-12 | Stop reason: HOSPADM

## 2023-01-12 RX ORDER — ONDANSETRON 2 MG/ML
INJECTION INTRAMUSCULAR; INTRAVENOUS
Status: DISCONTINUED | OUTPATIENT
Start: 2023-01-12 | End: 2023-01-12

## 2023-01-12 RX ORDER — PROPOFOL 10 MG/ML
VIAL (ML) INTRAVENOUS
Status: DISCONTINUED | OUTPATIENT
Start: 2023-01-12 | End: 2023-01-12

## 2023-01-12 RX ADMIN — GLYCOPYRROLATE 0.2 MG: 0.2 INJECTION, SOLUTION INTRAMUSCULAR; INTRAVENOUS at 07:01

## 2023-01-12 RX ADMIN — ONDANSETRON 4 MG: 2 INJECTION INTRAMUSCULAR; INTRAVENOUS at 07:01

## 2023-01-12 RX ADMIN — LIDOCAINE HYDROCHLORIDE 100 MG: 20 INJECTION, SOLUTION EPIDURAL; INFILTRATION; INTRACAUDAL; PERINEURAL at 07:01

## 2023-01-12 RX ADMIN — PROPOFOL 90 MG: 10 INJECTION, EMULSION INTRAVENOUS at 07:01

## 2023-01-12 RX ADMIN — PROPOFOL 50 MG: 10 INJECTION, EMULSION INTRAVENOUS at 07:01

## 2023-01-12 RX ADMIN — SODIUM CHLORIDE: 9 INJECTION, SOLUTION INTRAVENOUS at 07:01

## 2023-01-12 NOTE — TRANSFER OF CARE
Anesthesia Transfer of Care Note    Patient: Arthur Ribeiro    Procedure(s) Performed: Procedure(s) (LRB):  EGD (ESOPHAGOGASTRODUODENOSCOPY) (N/A)    Patient location: [facility]    Anesthesia Type: general    Transport from OR: Transported from OR on 2-3 L/min O2 by NC with adequate spontaneous ventilation    Post pain: adequate analgesia    Post assessment: no apparent anesthetic complications and tolerated procedure well    Post vital signs: stable    Level of consciousness: awake    Nausea/Vomiting: no nausea/vomiting    Complications: none    Transfer of care protocol was followed      Last vitals:   Visit Vitals  BP (!) 95/53 (BP Location: Left arm, Patient Position: Lying)   Pulse 81   Temp 36.5 °C (97.7 °F) (Temporal)   Resp 16   Ht 6' (1.829 m)   Wt 63.5 kg (140 lb)   SpO2 100%   BMI 18.99 kg/m²

## 2023-01-12 NOTE — PLAN OF CARE
Patient states ready for discharge, VSS, no complaints of pain or nausea. Discharge instructions reviewed.

## 2023-01-12 NOTE — PROVATION PATIENT INSTRUCTIONS
Discharge Summary/Instructions after an Endoscopic Procedure  Patient Name: Arthur Ribeiro  Patient MRN: [id number]  Patient YOB: 1945 Thursday, January 12, 2023  Navin Weaver MD  Dear patient,  As a result of recent federal legislation (The Federal Cures Act), you may   receive lab or pathology results from your procedure in your [facility]   account before your physician is able to contact you. Your physician or   their representative will relay the results to you with their   recommendations at their soonest availability.  Thank you,  RESTRICTIONS:  During your procedure today, you received medications for sedation.  These   medications may affect your judgment, balance and coordination.  Therefore,   for 24 hours, you have the following restrictions:   - DO NOT drive a car, operate machinery, make legal/financial decisions,   sign important papers or drink alcohol.    ACTIVITY:  Today: no heavy lifting, straining or running due to procedural   sedation/anesthesia.  The following day: return to full activity including work.  DIET:  Eat and drink normally unless instructed otherwise.     TREATMENT FOR COMMON SIDE EFFECTS:  - Mild abdominal pain, nausea, belching, bloating or excessive gas:  rest,   eat lightly and use a heating pad.  - Sore Throat: treat with throat lozenges and/or gargle with warm salt   water.  - Because air was used during the procedure, expelling large amounts of air   from your rectum or belching is normal.  - If a bowel prep was taken, you may not have a bowel movement for 1-3 days.    This is normal.  SYMPTOMS TO WATCH FOR AND REPORT TO YOUR PHYSICIAN:  1. Abdominal pain or bloating, other than gas cramps.  2. Chest pain.  3. Back pain.  4. Signs of infection such as: chills or fever occurring within 24 hours   after the procedure.  5. Rectal bleeding, which would show as bright red, maroon, or black stools.   (A tablespoon of blood from the rectum is not serious, especially  if   hemorrhoids are present.)  6. Vomiting.  7. Weakness or dizziness.  GO DIRECTLY TO THE NEAREST EMERGENCY ROOM IF YOU HAVE ANY OF THE FOLLOWING:      Difficulty breathing              Chills and/or fever over 101 F   Persistent vomiting and/or vomiting blood   Severe abdominal pain   Severe chest pain   Black, tarry stools   Bleeding- more than one tablespoon   Any other symptom or condition that you feel may need urgent attention  Your doctor recommends these additional instructions:  If any biopsies were taken, your doctors clinic will contact you in 1 to 2   weeks with any results.  - Discharge patient to home (ambulatory).   - Resume previous diet; Discharge to home (ambulatory); Resume outpatient   medications  - Return to referring physician as previously scheduled.   - OK to try liquid intake. Limit to liquids only for now.  - Repeat upper endoscopy in 2 weeks for retreatment.  For questions, problems or results please call your physician - Navin Weaver MD at Work:  [phone].  [facility], EMERGENCY ROOM PHONE NUMBER: [phone]  IF A COMPLICATION OR EMERGENCY SITUATION ARISES AND YOU ARE UNABLE TO REACH   YOUR PHYSICIAN - GO DIRECTLY TO THE EMERGENCY ROOM.  Navin Weaver MD  1/12/2023 8:21:59 AM  This report has been verified and signed electronically.  Dear patient,  As a result of recent federal legislation (The Federal Cures Act), you may   receive lab or pathology results from your procedure in your [facility]   account before your physician is able to contact you. Your physician or   their representative will relay the results to you with their   recommendations at their soonest availability.  Thank you,  PROVATION

## 2023-01-12 NOTE — ANESTHESIA PREPROCEDURE EVALUATION
[facility]  Anesthesia Pre-Operative Evaluation       Patient Name: Arthur Ribeiro  YOB: 1945  MRN: [id number]  [facility]: [id number]      Code Status: Prior   Date of Procedure: 1/12/2023  Anesthesia: General Procedure: Procedure(s) (LRB):  EGD (ESOPHAGOGASTRODUODENOSCOPY) (N/A)  Pre-Operative Diagnosis: Dysphagia, unspecified type [R13.10]  Proceduralist: Surgeon(s) and Role:     * Navin Weaver MD - Primary        SUBJECTIVE:   Arthur Ribeiro is a 77 y.o. 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 No notes on file    Intubation    Mask Difficulty: easy mask   Airway Type: oral endotracheal tube   ETT Size(mm): 6.0   Attempts: 1       No current facility-administered medications for this encounter.       he has a current medication list which includes the following long-term medication(s): famotidine, gabapentin, and valacyclovir.   ALLERGIES:   Review of patient's allergies indicates:  No Known Allergies  LDA:      Lines/Drains/Airways     Central Venous Catheter Line  Duration                PowerPort A Cath Single Lumen 08/14/22 0130 left subclavian 151 days          Drain  Duration                Gastrostomy/Enterostomy 07/06/22 0712 Percutaneous endoscopic gastrostomy (PEG) LUQ feeding 190 days              MEDICATIONS:     Antibiotics (From admission, onward)    None        VTE Risk Mitigation (From admission, onward)    None        No current facility-administered medications for this encounter.          History:   There are no hospital problems to display for this patient.    Surgical History:    has a  past surgical history that includes Skin cancer excision; Insertion of venous access port (2005); Laryngoscopy (Bilateral, 1/26/2022); Esophagogastroduodenoscopy (N/A, 7/6/2022); Direct laryngoscopy (N/A, 11/8/2022); and Esophagogastroduodenoscopy (N/A, 1/3/2023).   Social History:    has no history on file for sexual activity.  reports that he quit smoking about 17 years ago. His smoking use included cigarettes and cigars. He has never used smokeless tobacco. He reports that he does not currently use alcohol. He reports that he does not use drugs.     OBJECTIVE:     Vital Signs (Most Recent):    Vital Signs Range (Last 24H):          There is no height or weight on file to calculate BMI.   Wt Readings from Last 4 Encounters:   01/03/23 64.4 kg (142 lb)   12/01/22 63.1 kg (139 lb 1.8 oz)   11/30/22 63.1 kg (139 lb 1.8 oz)   11/22/22 63.3 kg (139 lb 8.8 oz)     Significant Labs:  Lab Results   Component Value Date    WBC 3.26 (L) 12/08/2022    HGB 9.3 (L) 12/08/2022    HCT 26.9 (L) 12/08/2022     12/08/2022     12/08/2022    K 4.4 12/08/2022    CL 97 12/08/2022    CREATININE 1.37 12/08/2022    BUN 75 (H) 12/08/2022    CO2 33 (H) 12/08/2022     12/08/2022    CALCIUM 9.6 12/08/2022    MG 1.3 (L) 08/15/2022    ALKPHOS 107 12/08/2022    ALT 56 (H) 12/08/2022    AST 47 12/08/2022    ALBUMIN 4.0 12/08/2022     No LMP for male patient.  No results found for this or any previous visit (from the past 72 hour(s)).    EKG:   No results found for this or any previous visit.    TTE:  No results found for this or any previous visit.  No results found for: EF   No results found for this or any previous visit.  DELFINA:  No results found for this or any previous visit.  Stress Test:  No results found for this or any previous visit.     LHC:  No results found for this or any previous visit.     PFT:  No results found for: FEV1, FVC, WQV2GMH, TLC, DLCO   ASSESSMENT/PLAN:         Pre-op Assessment    I have reviewed the  Patient Summary Reports.     I have reviewed the Nursing Notes.    I have reviewed the Medications.     Review of Systems  Anesthesia Hx:  No problems with previous Anesthesia    Hematology/Oncology:  Hematology Normal   Oncology Normal     EENT/Dental:EENT/Dental Normal   Cardiovascular:  Cardiovascular Normal Exercise tolerance: good     Pulmonary:  Pulmonary Normal    Renal/:  Renal/ Normal     Hepatic/GI:   Hiatal Hernia, GERD    Musculoskeletal:  Musculoskeletal Normal    Neurological:  Neurology Normal    Endocrine:  Endocrine Normal    Dermatological:  Skin Normal    Psych:  Psychiatric Normal           Physical Exam  General: Well nourished    Airway:  Mallampati: III / II  Mouth Opening: Normal  TM Distance: Normal  Tongue: Normal  Neck ROM: Normal ROM    Dental:  Intact        Anesthesia Plan  Type of Anesthesia, risks & benefits discussed:    Anesthesia Type: Gen ETT, Gen Natural Airway  Intra-op Monitoring Plan: Standard ASA Monitors  Post Op Pain Control Plan: multimodal analgesia and IV/PO Opioids PRN  Induction:  IV  Airway Plan: Direct and Video, Post-Induction  Informed Consent: Informed consent signed with the Patient and all parties understand the risks and agree with anesthesia plan.  All questions answered.   ASA Score: 2  Day of Surgery Review of History & Physical: H&P completed by Anesthesiologist.  Anesthesia Plan Notes: Chart reviewed, patient interviewed and examined.  The anesthetic plan was explained.  Risks, benefits, and alternatives were discussed. Questions were answered and the consent was signed.        KEVIN Nelson M.D.         Ready For Surgery From Anesthesia Perspective.     .

## 2023-01-12 NOTE — H&P
Short Stay Endoscopy History and Physical    PCP - Zohaib Pryor MD  Referring Physician - Bere Stout MD  [address] [place] [address]    Procedure - EGD with dilation  ASA - per anesthesia  Mallampati - per anesthesia  History of Anesthesia problems - no  Family history Anesthesia problems -  no   Plan of anesthesia - General    HPI:  This is a 77 y.o. 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    Surgical History:  has a past surgical history that includes Skin cancer excision; Insertion of venous access port (2005); Laryngoscopy (Bilateral, 1/26/2022); Esophagogastroduodenoscopy (N/A, 7/6/2022); Direct laryngoscopy (N/A, 11/8/2022); and Esophagogastroduodenoscopy (N/A, 1/3/2023).    Family History: family history includes Cancer in his brother, father, and sister; Heart disease in his mother; Hypertension in his mother; No Known Problems in his daughter and son..    Social History:  reports that he quit smoking about 17 years ago. His smoking use included cigarettes and cigars. He has never used smokeless tobacco. He reports that he does not currently use alcohol. He reports that he does not use drugs.    Review of patient's allergies indicates:  No Known Allergies    Medications:   Medications Prior to Admission   Medication Sig Dispense Refill Last Dose    famotidine  (PEPCID) 40 mg/5 mL (8 mg/mL) suspension Take 5 mLs (40 mg total) by mouth once daily. 150 mL 3 1/11/2023    gabapentin (NEURONTIN) 300 MG capsule Take 1 capsule (300 mg total) by mouth 3 (three) times daily. 90 capsule 1 1/11/2023    (Magic mouthwash) 1:1:1 diphenhydramine(Benadryl) 12.5mg/5ml liq, aluminum & magnesium hydroxide-simethicone (Maalox), LIDOcaine viscous 2% Swish and spit 10 mLs every 4 (four) hours as needed (gargle for 10 seconds and spit). gargle for 10 seconds and spit (Patient not taking: Reported on 12/1/2022) 450 mL 0     acetaminophen (M-PAP) 160 mg/5 mL Liqd 15.6 mLs (499.2 mg total) by Per G Tube route every 8 (eight) hours as needed (pain). (Patient not taking: Reported on 12/1/2022) 473 mL 1     hydrocodone-acetaminophen (HYCET) solution 7.5-325 mg/15mL Take 10 mLs by mouth every 6 (six) hours as needed for Pain. (Patient not taking: Reported on 12/1/2022) 118 mL 0     LIDOCAINE VISCOUS 2 % solution        magic mouthwash diphen/antac/lidoc/nysta Take 5 mL by mouth 4 times daily. (Patient not taking: Reported on 12/1/2022) 360 mL 0     ondansetron (ZOFRAN-ODT) 8 MG TbDL DISSOLVE 1 TABLET ON THE TONGUE THREE TIMES DAILY AS NEEDED FOR NAUSEA       valACYclovir (VALTREX) 1000 MG tablet Take 1,000 mg by mouth 3 (three) times daily.          Physical Exam:    Vital Signs:   Vitals:    01/12/23 0658   BP: 131/63   Pulse: 81   Resp: 16   Temp: 98.1 °F (36.7 °C)       General Appearance: Well appearing in no acute distress  Eyes:    No scleral icterus  Lungs: no labored breathing  Heart:  Regular  Abdomen: non tender    Labs:  Lab Results   Component Value Date    WBC 3.26 (L) 12/08/2022    HGB 9.3 (L) 12/08/2022    HCT 26.9 (L) 12/08/2022     12/08/2022    CHOL 153 09/28/2022    TRIG 157 (H) 09/28/2022    HDL 29 (L) 09/28/2022    ALT 56 (H) 12/08/2022    AST 47 12/08/2022     12/08/2022    K 4.4 12/08/2022    CL 97 12/08/2022    CREATININE 1.37 12/08/2022    BUN 75 (H) 12/08/2022     CO2 33 (H) 12/08/2022    TSH 3.090 09/28/2022    PSA 0.38 09/28/2022       I have explained the risks and benefits of this endoscopic procedure to the patient including but not limited to bleeding, inflammation, infection, perforation, and death.      Navin Weaver MD

## 2023-01-14 NOTE — ANESTHESIA POSTPROCEDURE EVALUATION
Anesthesia Post Evaluation    Patient: Arthur Ribeiro    Procedure(s) Performed: Procedure(s) (LRB):  EGD (ESOPHAGOGASTRODUODENOSCOPY) (N/A)    Final Anesthesia Type: general      Patient location during evaluation: PACU  Patient participation: Yes- Able to Participate  Level of consciousness: awake and alert  Post-procedure vital signs: reviewed and stable  Pain management: adequate  Airway patency: patent    PONV status at discharge: No PONV  Anesthetic complications: no      Cardiovascular status: blood pressure returned to baseline  Respiratory status: unassisted  Hydration status: euvolemic  Follow-up not needed.          Vitals Value Taken Time   /71 01/12/23 0846   Temp 36.5 °C (97.7 °F) 01/12/23 0807   Pulse 66 01/12/23 0852   Resp 26 01/12/23 0852   SpO2 96 % 01/12/23 0852   Vitals shown include unvalidated device data.      No case tracking events are documented in the log.      Pain/Ashley Score: Ashley Score: 9 (1/12/2023  8:15 AM)

## 2023-01-17 DIAGNOSIS — R13.10 DYSPHAGIA, UNSPECIFIED TYPE: Primary | ICD-10-CM

## 2023-01-18 ENCOUNTER — TELEPHONE (OUTPATIENT)
Dept: ENDOSCOPY | Facility: HOSPITAL | Age: 78
End: 2023-01-18
Payer: MEDICARE

## 2023-01-18 ENCOUNTER — PATIENT MESSAGE (OUTPATIENT)
Dept: ENDOSCOPY | Facility: HOSPITAL | Age: 78
End: 2023-01-18
Payer: MEDICARE

## 2023-01-18 NOTE — TELEPHONE ENCOUNTER
----- Message from Cecy Duarte sent at 1/18/2023  9:03 AM CST -----  Contact: pt wife  Pt wife requesting call back RE: would like to schedule post op following pt procedure      Confirmed contact below:  Contact Name:Arthur Ribeiro  Phone Number: [phone]

## 2023-01-18 NOTE — TELEPHONE ENCOUNTER
Pt is scheduled for EGD on 1/25/23.  Reviewed medical history and instructions with pt.  Pt verbalized understanding.  Instructions sent via portal & email.

## 2023-01-25 ENCOUNTER — HOSPITAL ENCOUNTER (OUTPATIENT)
Facility: HOSPITAL | Age: 78
Discharge: HOME | End: 2023-01-25
Attending: INTERNAL MEDICINE | Admitting: INTERNAL MEDICINE
Payer: MEDICARE

## 2023-01-25 ENCOUNTER — ANESTHESIA (OUTPATIENT)
Dept: ENDOSCOPY | Facility: HOSPITAL | Age: 78
End: 2023-01-25
Payer: MEDICARE

## 2023-01-25 ENCOUNTER — ANESTHESIA EVENT (OUTPATIENT)
Dept: ENDOSCOPY | Facility: HOSPITAL | Age: 78
End: 2023-01-25
Payer: MEDICARE

## 2023-01-25 VITALS
HEIGHT: 72 IN | TEMPERATURE: 98 F | BODY MASS INDEX: 19.1 KG/M2 | DIASTOLIC BLOOD PRESSURE: 66 MMHG | OXYGEN SATURATION: 100 % | SYSTOLIC BLOOD PRESSURE: 131 MMHG | RESPIRATION RATE: 16 BRPM | WEIGHT: 141 LBS | HEART RATE: 61 BPM

## 2023-01-25 DIAGNOSIS — J39.2: Primary | ICD-10-CM

## 2023-01-25 DIAGNOSIS — R13.19 ESOPHAGEAL DYSPHAGIA: ICD-10-CM

## 2023-01-25 PROCEDURE — 25000003 PHARM REV CODE 250: Performed by: INTERNAL MEDICINE

## 2023-01-25 PROCEDURE — 43248 EGD GUIDE WIRE INSERTION: CPT | Mod: ,,, | Performed by: INTERNAL MEDICINE

## 2023-01-25 PROCEDURE — D9220A PRA ANESTHESIA: ICD-10-PCS | Mod: CRNA,,, | Performed by: NURSE ANESTHETIST, CERTIFIED REGISTERED

## 2023-01-25 PROCEDURE — 37000009 HC ANESTHESIA EA ADD 15 MINS: Performed by: INTERNAL MEDICINE

## 2023-01-25 PROCEDURE — D9220A PRA ANESTHESIA: ICD-10-PCS | Mod: ANES,,, | Performed by: ANESTHESIOLOGY

## 2023-01-25 PROCEDURE — 43248 EGD GUIDE WIRE INSERTION: CPT | Performed by: INTERNAL MEDICINE

## 2023-01-25 PROCEDURE — 63600175 PHARM REV CODE 636 W HCPCS: Performed by: NURSE ANESTHETIST, CERTIFIED REGISTERED

## 2023-01-25 PROCEDURE — D9220A PRA ANESTHESIA: Mod: ANES,,, | Performed by: ANESTHESIOLOGY

## 2023-01-25 PROCEDURE — 43248 PR EGD, FLEX, W/DILATION OVER GUIDEWIRE: ICD-10-PCS | Mod: ,,, | Performed by: INTERNAL MEDICINE

## 2023-01-25 PROCEDURE — 37000008 HC ANESTHESIA 1ST 15 MINUTES: Performed by: INTERNAL MEDICINE

## 2023-01-25 PROCEDURE — 25000003 PHARM REV CODE 250: Performed by: NURSE ANESTHETIST, CERTIFIED REGISTERED

## 2023-01-25 PROCEDURE — D9220A PRA ANESTHESIA: Mod: CRNA,,, | Performed by: NURSE ANESTHETIST, CERTIFIED REGISTERED

## 2023-01-25 PROCEDURE — C1769 GUIDE WIRE: HCPCS | Performed by: INTERNAL MEDICINE

## 2023-01-25 RX ORDER — SODIUM CHLORIDE 0.9 % (FLUSH) 0.9 %
10 SYRINGE (ML) INJECTION
Status: DISCONTINUED | OUTPATIENT
Start: 2023-01-25 | End: 2023-01-25 | Stop reason: HOSPADM

## 2023-01-25 RX ORDER — SODIUM CHLORIDE 9 MG/ML
INJECTION, SOLUTION INTRAVENOUS CONTINUOUS
Status: DISCONTINUED | OUTPATIENT
Start: 2023-01-25 | End: 2023-01-25 | Stop reason: HOSPADM

## 2023-01-25 RX ORDER — LIDOCAINE HYDROCHLORIDE 10 MG/ML
INJECTION, SOLUTION INTRAVENOUS
Status: DISCONTINUED | OUTPATIENT
Start: 2023-01-25 | End: 2023-01-25

## 2023-01-25 RX ORDER — PROPOFOL 10 MG/ML
VIAL (ML) INTRAVENOUS
Status: DISCONTINUED | OUTPATIENT
Start: 2023-01-25 | End: 2023-01-25

## 2023-01-25 RX ORDER — ONDANSETRON 2 MG/ML
4 INJECTION INTRAMUSCULAR; INTRAVENOUS DAILY PRN
Status: DISCONTINUED | OUTPATIENT
Start: 2023-01-25 | End: 2023-01-25 | Stop reason: HOSPADM

## 2023-01-25 RX ADMIN — SODIUM CHLORIDE: 0.9 INJECTION, SOLUTION INTRAVENOUS at 10:01

## 2023-01-25 RX ADMIN — PROPOFOL 70 MG: 10 INJECTION, EMULSION INTRAVENOUS at 10:01

## 2023-01-25 RX ADMIN — LIDOCAINE HYDROCHLORIDE 50 MG: 10 INJECTION, SOLUTION INTRAVENOUS at 10:01

## 2023-01-25 RX ADMIN — SODIUM CHLORIDE: 9 INJECTION, SOLUTION INTRAVENOUS at 10:01

## 2023-01-25 RX ADMIN — PROPOFOL 75 MCG/KG/MIN: 10 INJECTION, EMULSION INTRAVENOUS at 10:01

## 2023-01-25 NOTE — PROVATION PATIENT INSTRUCTIONS
Discharge Summary/Instructions after an Endoscopic Procedure  Patient Name: Arthur Ribeiro  Patient MRN: [id number]  Patient YOB: 1945 Wednesday, January 25, 2023  Navin Weaver MD  Dear patient,  As a result of recent federal legislation (The Federal Cures Act), you may   receive lab or pathology results from your procedure in your [facility]   account before your physician is able to contact you. Your physician or   their representative will relay the results to you with their   recommendations at their soonest availability.  Thank you,  RESTRICTIONS:  During your procedure today, you received medications for sedation.  These   medications may affect your judgment, balance and coordination.  Therefore,   for 24 hours, you have the following restrictions:   - DO NOT drive a car, operate machinery, make legal/financial decisions,   sign important papers or drink alcohol.    ACTIVITY:  Today: no heavy lifting, straining or running due to procedural   sedation/anesthesia.  The following day: return to full activity including work.  DIET:  Eat and drink normally unless instructed otherwise.     TREATMENT FOR COMMON SIDE EFFECTS:  - Mild abdominal pain, nausea, belching, bloating or excessive gas:  rest,   eat lightly and use a heating pad.  - Sore Throat: treat with throat lozenges and/or gargle with warm salt   water.  - Because air was used during the procedure, expelling large amounts of air   from your rectum or belching is normal.  - If a bowel prep was taken, you may not have a bowel movement for 1-3 days.    This is normal.  SYMPTOMS TO WATCH FOR AND REPORT TO YOUR PHYSICIAN:  1. Abdominal pain or bloating, other than gas cramps.  2. Chest pain.  3. Back pain.  4. Signs of infection such as: chills or fever occurring within 24 hours   after the procedure.  5. Rectal bleeding, which would show as bright red, maroon, or black stools.   (A tablespoon of blood from the rectum is not serious, especially  if   hemorrhoids are present.)  6. Vomiting.  7. Weakness or dizziness.  GO DIRECTLY TO THE NEAREST EMERGENCY ROOM IF YOU HAVE ANY OF THE FOLLOWING:      Difficulty breathing              Chills and/or fever over 101 F   Persistent vomiting and/or vomiting blood   Severe abdominal pain   Severe chest pain   Black, tarry stools   Bleeding- more than one tablespoon   Any other symptom or condition that you feel may need urgent attention  Your doctor recommends these additional instructions:  If any biopsies were taken, your doctors clinic will contact you in 1 to 2   weeks with any results.  - Discharge patient to home (ambulatory).   - Resume previous diet; Discharge to home (ambulatory); Resume outpatient   medications  - Return to referring physician as previously scheduled.   - Consider repeat speech and swallow evaluation to help patient determine   appropriate consistency for oral intake.  - Repeat upper endoscopy in 3-4 weeks for retreatment and for surveillance.     For questions, problems or results please call your physician - Navin Weaver MD at Work:  [phone].  [facility], EMERGENCY ROOM PHONE NUMBER: [phone]  IF A COMPLICATION OR EMERGENCY SITUATION ARISES AND YOU ARE UNABLE TO REACH   YOUR PHYSICIAN - GO DIRECTLY TO THE EMERGENCY ROOM.  Navin Weaver MD  1/25/2023 11:13:47 AM  This report has been verified and signed electronically.  Dear patient,  As a result of recent federal legislation (The Federal Cures Act), you may   receive lab or pathology results from your procedure in your [facility]   account before your physician is able to contact you. Your physician or   their representative will relay the results to you with their   recommendations at their soonest availability.  Thank you,  PROVATION

## 2023-01-25 NOTE — PROGRESS NOTES
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  Patient has a history of dysphagia and odynophagia.  He was treated with steroids and reflux medication without resolution.  He was seen by Dr Maya who noted a lesion involving the left oropharynx and hypopharynx.  He was taken to the operating room where he underwent endoscopy and biopsy.  The mass was found to be involving the left hypopharynx extending up to the lateral pharyngeal wall and down to the level of the cricopharyngeus.  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 His case was presented at multidisciplinary head neck tumor Board and it was recommended that he undergo adjuvant chemotherapy with consideration for total laryngectomy.    Patient reports that he has difficulty swallowing pills and therefore he was you taking liquid pain medicine which alleviates the pain.  He also complains of thick mucus in the back of his throat and thick postnasal drip.     Patient has a history of Burkitt's lymphoma for which he was treated with chemotherapy only by Dr. Rahman who is also involved it the care of this patient.  He was seen by Dr Rahman on Thursday who has ordered chemo (tentatively set for 3/8/2022) and has an appointment to have a port placed. He has not spoken with Dr Peñaloza since he has seen Josiah.      4/18/2022  Partient is here today for F/U. Tumor board recommended neoadjuvant chemo, followed with reimaging--if responds then proceed with concurrent chemorads--if not proceed to surgery.  He is being treated by Dr Quentin Rahman.   He is S/P 3 rounds of induction chemotherapy, once a week. He had one  week break and restarted chemo last Tuesday but he is now only receiving one drug instead of tow due to SOB.   Patient is to have another dose tomorrow but according to patient Dr Rahman will substitute another drug as he had a reaction to the other.  Patient had nausea and diarrhea last week and now is taking antiemetics 3 times a day.    Patient reports that since start of therapy, his throat pain has resolved in addition to otalgia and intermittent sharp shooting pains up the left side of his head    He was seen by ST who discussed diet consistencies. According to patient he was not given swallowing exercises.  He has had no issues swallowing but he has anorexia resulting in a 16 pound weight loss since diagnosis.   He was drinking 1-2 Ensures/day but has stopped since he developed diarrhea.  Patient is able to tolerate some fruits, soups, pureed foods such as applesauce baked beans and is starting to eat Belvita bars and oatmeal    He was diagnosed with shingles last week and is taking Valtrex.  He has not had the vaccine    5/30/2022  Patient is here today for cancer surveillance.  He has a history of squamous cell carcinoma of the hypopharynx.  He is receiving induction chemotherapy by Dr. Rahman.  He received his last dose of Carboplatinum last week(Taxol was stopped due to reaction).  He is to see Dr Peñaloza, Rad Onc, tomorrow to discuss concurrent chemorads.    He has not seen ST yet; he thought that he did not need an appointment. Patient reports that he is able to swallow fried foods and soft foods was having difficulty eating pasta and hamburger.  He feels like the food is getting caught in a flap in the back of his throat.  He also reports that he has a lot of white secretions that he is coughing up.  He is not using salt baking soda gargles as instructed    8/1/2022    Patient is here today for follow-up.  He is status post induction chemotherapy and now was undergoing concurrent chemoradiation.  Day 5  of cisplatin was given 07/26/2022.  He gets 6th chemo tomorrow and finishes XRT in 13 days (8/17/2022)  He is S/p PEG 7/6/2022. He is consuming 2 cans of Ensure/day. Patient only eats one meals/day which started recently. He has lost 5 pounds since May. According to his wife, his weight fluctuates.  He is doing dysphagia exercises 1-2 times/day.  Patient goes to PT twice weekly.    11/22/2022  Patient is here today for follow-up.  Following his last visit patient had modified barium swallow which showed no passage of food through the upper esophagus.  This was reviewed by me  and the speech therapist.  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  He was then sent to Dr. Mino Hart for transnasal esophagoscopy in the office.  The exam showed a 2 mm opening in the hypopharynx with insufflation.  Patient was then taken to the operating room for dilation.  However examination in the operating room showed complete hypopharyngeal stenosis.  GI was then consulted for a dual procedure; GI will go retrograde through the PEG while Dr. Hart will expose the  hypopharynx  at which time the scar will be excised and the patient will be dilated.  This will be scheduled in the future.    Patient just started new supplement last week--total of 2300 jesse/day( was getting 1700 jesse/day). Weight has remained stable. According to his wife his wife has remained stable for 1-2 months. It was 140 when seen by Dr Rahman.  He could not tolerate other supplements because he refluxed them into his mouth. This indicates that there is a small opening in his hypopharynx as seen by Dr Hart on insufflation in office with T[facility]. He stopped taking Protonix granules in the past and is no longer doing dysphagia exercises    PET CT is scheduled on 11/30/2022 post treatment.    1/26/2023  Patient is here today for post treatment follow-up.  He was seen by GI Medicine.  He has undergone 2 dilations for  stricture and severe dysphagia, last one yesterday. He was dilated to 12 mm.  Patient attempted teach scrambled eggs this morning but food got hung up.  His last PET-CT scan showed a hypermetabolic lymph node in the paratracheal area and hilum.TB recommended repeat PET CT in 3 months to monitor paratracheal and hilar nodes.    ROS: see HPI  Constitutional: Negative for activity change and appetite change, weight loss.   Eyes: Negative for discharge, visual changes.   Respiratory: Negative for difficulty breathing and wheezing   Cardiovascular: Negative for chest pain.   Gastrointestinal: Negative for abdominal distention and abdominal pain.   Endocrine: Negative for cold intolerance and heat intolerance.   Genitourinary: Negative for dysuria.   Musculoskeletal: Negative for gait problem, muscle pain and joint swelling.   Skin: Negative for color change and pallor; negative for skin lesions.   Neurological: Negative for syncope and weakness; no numbness face.   Psychiatric/Behavioral: Negative for agitation and confusion; negative for depression.    Physical Exam:      Constitutional  General Appearance: well nourished, well-developed, alert, oriented, in no acute distress; walking with cane for balance; very thin--unchanged  Communication: ability, understanding, normal  Head and Face  Inspection: normocephalic, atraumatic, no scars, lesions or masses   Palpation: no stepoffs, sinus tenderness or masses  Parotid glands: no masses, stones, swelling or tenderness  Eyes:            PERRLA; EOMI  Ears:            right and left EAC's and TM's normal  Nose:            No external deformity or intranasal lesions  Oc/OP:         No lesions, no trismus; tongue normal mobility  IDL:              patient could not tolerate  Heart:          Carotid pulses 2+ and symmetrical  Lungs:        Normal excursion  Neurological  Cranial Nerves: grossly intact  General: no focal deficits  Mood and Affect: no depression, anxiety or  agitation  Extremities:   FROM  Psychiatric  Orientation: oriented to time, place and person    PROCEDURES:   -------------------- LARYNGOSCOPY / NASOPHARYNGOSCOPY -------------------------     Pre-op DX:  History of hypopharyngeal cancer; hypopharyngeal stenosis  Post-op DX: same  Anesthesia: Topical Neosynephrine / Tetracaine  Indications: to look at lesion  Adverse Events: None        Procedure in Detail:     Flexible endoscopy with video was performed through the nasal passages. The nasal cavity, nasopharynx, oropharynx, hypopharynx and larynx were adequately visualized. The true vocal cords and arytenoids were examined during phonation and repose.        Operative Findings: diffuse radiation chages     Nasal Cavity: Within normal limits  Nasopharynx: Within normal limits  Tongue Base: Within normal limits; pooling fo secretion valleculae  Pharyngeal Walls: Within normal limits  Epiglottis / Aryepiglottic Folds: Within normal limits  Pyriform Sinus: Within normal limits  Vocal Cords: Within normal limits; decrease abduction  Arytenoids: Within normal limits      Original  Path:  Pharynx, left posterior wall, biopsies:   - Squamous cell carcinoma, invasive, moderately differentiated   - Depth of invasion (DOI): at least 0.5 cm   - p16 status (IHC): Negative   - No lymphovascular invasion identified   - No perineural invasion identified   NOTE: Immunostain for p16 was performed and did not demonstrate   block-immunopositive staining (interpreted as negative).  Positive control   and internal negative control for immunostain was examined and was   appropriate.     Tumor Board recommendations:  TB recommendation for referral to H&N surgery. Given borderline T3/T4a, consideration for induction and TL/TP pending response.  There is questionable involvement of the posterior lateral thyroid ala.    PET CT: 11/30/2022  Head and Neck:     Brain demonstrates normal metabolism.  However, FDG-PET has an approximate 60%  sensitivity for brain metastases which are best detected by MRI with gadolinium.  Physiologic uptake is in the neck.  Physiologic activity is identified within the musculature of the neck without evidence for adenopathy.     Chest:     No FDG avid pulmonary lesions are present.  Within the right paratracheal region and no the right hilar region new hypermetabolic lymph nodes are identified.  The right paratracheal lymph node this has a maximum SUV of 3.8 and measures 1.2 cm.  Image number [id number]     Abdomen and Pelvis:     Physiologic uptake is in the liver, spleen, urinary system and bowel. No enlarged or FDG avid lymph nodes are in the abdomen or pelvis. Adrenal glands are normal.     Musculoskeletal:     No FDG avid osseous lesions are present.     Impression:     1. Resolved hypermetabolic activity of the left side of the 5th oropharynx as well as resolved hypermetabolic activity of the level 2 lymph node.  Likely physiologic activity in the musculature of the soft tissues of the neck.  2. Persistent hypermetabolic lymph node in the right paratracheal region and now in the right hilum.  This may suggest metastatic disease.       Assessment:   [id number] SCCA hypopharynx, S/P induction chemo followed with concurrent chemoradiation- ANA  Hypermetabolic node right paratracheal area and hilum--questionable significance  Hypopharyngeal stenosis--S/P PEG and dilation up to 12 mm    Plan:  Order sent for modified barium swallow  Patient is to return to speech therapy for dysphagia therapy   He was encouraged to start full liquids today. He is able to swallow water  2/27/2023 for repeat PET CT For re-eval abnormalities

## 2023-01-25 NOTE — ANESTHESIA POSTPROCEDURE EVALUATION
Anesthesia Post Evaluation    Patient: Arthur Ribeiro    Procedure(s) Performed: Procedure(s) (LRB):  EGD (ESOPHAGOGASTRODUODENOSCOPY) (N/A)    Final Anesthesia Type: general      Patient location during evaluation: PACU  Patient participation: Yes- Able to Participate  Level of consciousness: awake and alert  Post-procedure vital signs: reviewed and stable  Pain management: adequate  Airway patency: patent    PONV status at discharge: No PONV  Anesthetic complications: no      Cardiovascular status: blood pressure returned to baseline  Respiratory status: unassisted  Hydration status: euvolemic  Follow-up not needed.          Vitals Value Taken Time   /66 01/25/23 1131   Temp 36.5 °C (97.7 °F) 01/25/23 1106   Pulse 61 01/25/23 1137   Resp 16 01/25/23 1130   SpO2 100 % 01/25/23 1137   Vitals shown include unvalidated device data.      No case tracking events are documented in the log.      Pain/Ashley Score: Ashley Score: 10 (1/25/2023 11:40 AM)

## 2023-01-25 NOTE — TRANSFER OF CARE
Anesthesia Transfer of Care Note    Patient: Arthur Ribeiro    Procedure(s) Performed: Procedure(s) (LRB):  EGD (ESOPHAGOGASTRODUODENOSCOPY) (N/A)    Patient location: [facility]    Anesthesia Type: general    Transport from OR: Transported from OR on 2-3 L/min O2 by NC with adequate spontaneous ventilation    Post pain: adequate analgesia    Post assessment: no apparent anesthetic complications and tolerated procedure well    Post vital signs: stable    Level of consciousness: sedated    Nausea/Vomiting: no nausea/vomiting    Complications: none    Transfer of care protocol was followed      Last vitals:   Visit Vitals  BP (!) 86/52 (BP Location: Right arm, Patient Position: Lying)   Pulse 68   Temp 36.5 °C (97.7 °F) (Temporal)   Resp 16   Ht 6' (1.829 m)   Wt 64 kg (141 lb)   SpO2 100%   BMI 19.12 kg/m²

## 2023-01-25 NOTE — PLAN OF CARE
Patient stable and alert. Vital signs stable. Wife at bedside. Discharge instructions given and understood. No complications.

## 2023-01-25 NOTE — H&P
Short Stay Endoscopy History and Physical    PCP - Zohaib Pryor MD  Referring Physician - Bere Stout MD  [address] [place] [address]    Procedure - EGD  ASA - per anesthesia  Mallampati - per anesthesia  History of Anesthesia problems - no  Family history Anesthesia problems -  no   Plan of anesthesia - General    HPI:  This is a 77 y.o. male here for evaluation of: esophageal dysphagia    Reflux - no  Dysphagia - POS  Abdominal pain - no  Diarrhea - no    ROS:  Constitutional: No fevers, chills, No weight loss  CV: No chest pain  Pulm: No cough, No shortness of breath  GI: see HPI    Medical History:  has a past medical history of Allergic rhinitis, cause unspecified (08/04/2015), Cancer (2005), Cancer of overlapping sites of hypopharynx (01/17/2022), Encounter for blood transfusion, Foot drop, Foot drop, left, Hiatal hernia, Male erectile disorder (03/19/2018), Melanoma in situ of upper arm, right (09/23/2022), Neuropathy, Personal history of non-Hodgkin lymphomas (09/08/2016), and Spinal stenosis.    Surgical History:  has a past surgical history that includes Skin cancer excision; Insertion of venous access port (2005); Laryngoscopy (Bilateral, 1/26/2022); Esophagogastroduodenoscopy (N/A, 7/6/2022); Direct laryngoscopy (N/A, 11/8/2022); Esophagogastroduodenoscopy (N/A, 1/3/2023); and Esophagogastroduodenoscopy (N/A, 1/12/2023).    Family History: family history includes Cancer in his brother, father, and sister; Heart disease in his mother; Hypertension in his mother; No Known Problems in his daughter and son..    Social History:  reports that he quit smoking about 17 years ago. His smoking use included cigarettes and cigars. He has never used smokeless tobacco. He reports that he does not currently use alcohol. He reports that he does not use drugs.    Review of patient's allergies indicates:  No Known Allergies    Medications:   Medications Prior to Admission   Medication Sig Dispense  Refill Last Dose    famotidine (PEPCID) 40 mg/5 mL (8 mg/mL) suspension Take 5 mLs (40 mg total) by mouth once daily. 150 mL 3 1/24/2023    gabapentin (NEURONTIN) 300 MG capsule Take 1 capsule (300 mg total) by mouth 3 (three) times daily. 90 capsule 1 1/24/2023    (Magic mouthwash) 1:1:1 diphenhydramine(Benadryl) 12.5mg/5ml liq, aluminum & magnesium hydroxide-simethicone (Maalox), LIDOcaine viscous 2% Swish and spit 10 mLs every 4 (four) hours as needed (gargle for 10 seconds and spit). gargle for 10 seconds and spit (Patient not taking: Reported on 12/1/2022) 450 mL 0     acetaminophen (M-PAP) 160 mg/5 mL Liqd 15.6 mLs (499.2 mg total) by Per G Tube route every 8 (eight) hours as needed (pain). (Patient not taking: Reported on 12/1/2022) 473 mL 1     hydrocodone-acetaminophen (HYCET) solution 7.5-325 mg/15mL Take 10 mLs by mouth every 6 (six) hours as needed for Pain. (Patient not taking: Reported on 12/1/2022) 118 mL 0     LIDOCAINE VISCOUS 2 % solution        magic mouthwash diphen/antac/lidoc/nysta Take 5 mL by mouth 4 times daily. (Patient not taking: Reported on 12/1/2022) 360 mL 0     ondansetron (ZOFRAN-ODT) 8 MG TbDL DISSOLVE 1 TABLET ON THE TONGUE THREE TIMES DAILY AS NEEDED FOR NAUSEA       valACYclovir (VALTREX) 1000 MG tablet Take 1,000 mg by mouth 3 (three) times daily.          Physical Exam:    Vital Signs:   Vitals:    01/25/23 1031   BP: (!) 149/86   Pulse: 76   Resp: 16   Temp: 98.1 °F (36.7 °C)       General Appearance: Well appearing in no acute distress  Eyes:    No scleral icterus  Lungs: no labored breathing  Heart:  Regular  Abdomen: non tender    Labs:  Lab Results   Component Value Date    WBC 4.36 01/16/2023    HGB 9.6 (L) 01/16/2023    HCT 28.2 (L) 01/16/2023     01/16/2023    CHOL 153 09/28/2022    TRIG 157 (H) 09/28/2022    HDL 29 (L) 09/28/2022    ALT 24 01/16/2023    AST 27 01/16/2023     01/16/2023    K 4.1 01/16/2023    CL 99 01/16/2023    CREATININE 1.43 (H)  01/16/2023    BUN 55 (H) 01/16/2023    CO2 35 (H) 01/16/2023    TSH 3.090 09/28/2022    PSA 0.38 09/28/2022       I have explained the risks and benefits of this endoscopic procedure to the patient including but not limited to bleeding, inflammation, infection, perforation, and death.      Navin Weaver MD

## 2023-01-25 NOTE — ANESTHESIA PREPROCEDURE EVALUATION
01/25/2023  Arthur Ribeiro is a 77 y.o., male here for egd and possible dilation.    Pre-operative evaluation for Procedure(s) (LRB):  EGD (ESOPHAGOGASTRODUODENOSCOPY) (N/A)        Patient Active Problem List   Diagnosis    Allergic rhinitis    Lymphoma, Burkitt's    Debility    Gastroesophageal reflux disease without esophagitis    Primary malignant neoplasm of hypopharynx    Pharyngeal dysphagia    Anorexia    Weight loss    Herpes zoster without complication    Oropharyngeal dysphagia    At risk for lymphedema    Neutropenic fever    ACP (advance care planning)    Drug-induced pancytopenia    Mild protein-calorie malnutrition    Hypopharyngeal stenosis       Review of patient's allergies indicates:  No Known Allergies    No current facility-administered medications on file prior to encounter.     Current Outpatient Medications on File Prior to Encounter   Medication Sig Dispense Refill    famotidine (PEPCID) 40 mg/5 mL (8 mg/mL) suspension Take 5 mLs (40 mg total) by mouth once daily. 150 mL 3    gabapentin (NEURONTIN) 300 MG capsule Take 1 capsule (300 mg total) by mouth 3 (three) times daily. 90 capsule 1    (Magic mouthwash) 1:1:1 diphenhydramine(Benadryl) 12.5mg/5ml liq, aluminum & magnesium hydroxide-simethicone (Maalox), LIDOcaine viscous 2% Swish and spit 10 mLs every 4 (four) hours as needed (gargle for 10 seconds and spit). gargle for 10 seconds and spit (Patient not taking: Reported on 12/1/2022) 450 mL 0    acetaminophen (M-PAP) 160 mg/5 mL Liqd 15.6 mLs (499.2 mg total) by Per G Tube route every 8 (eight) hours as needed (pain). (Patient not taking: Reported on 12/1/2022) 473 mL 1    hydrocodone-acetaminophen (HYCET) solution 7.5-325 mg/15mL Take 10 mLs by mouth every 6 (six) hours as needed for Pain. (Patient not taking: Reported on 12/1/2022) 118 mL 0    LIDOCAINE  VISCOUS 2 % solution       magic mouthwash diphen/antac/lidoc/nysta Take 5 mL by mouth 4 times daily. (Patient not taking: Reported on 12/1/2022) 360 mL 0    ondansetron (ZOFRAN-ODT) 8 MG TbDL DISSOLVE 1 TABLET ON THE TONGUE THREE TIMES DAILY AS NEEDED FOR NAUSEA      valACYclovir (VALTREX) 1000 MG tablet Take 1,000 mg by mouth 3 (three) times daily.         Past Surgical History:   Procedure Laterality Date    DIRECT LARYNGOSCOPY N/A 11/8/2022    Procedure: DIRECT LARYNGOSCOPY;  Surgeon: Kavon Hart MD;  Location: [address];  Service: ENT;  Laterality: N/A;  Telescopes, tower, airway basic, diverticuloscopes, pulmonary balloons/jagwire, pediatric gastroscope, daniel NGT, salem sump NGT, FLUORO    ESOPHAGOGASTRODUODENOSCOPY N/A 7/6/2022    Procedure: EGD (ESOPHAGOGASTRODUODENOSCOPY);  Surgeon: Daniel Sagastume MD;  Location: [facility];  Service: Endoscopy;  Laterality: N/A;    ESOPHAGOGASTRODUODENOSCOPY N/A 1/3/2023    Procedure: EGD (ESOPHAGOGASTRODUODENOSCOPY);  Surgeon: Navin Weaver MD;  [facility] ([address] [place] [address]);  Service: Endoscopy;  Laterality: N/A;  MD Ira Almonte MA  Caller: Unspecified (3 days ago,  3:14 PM)  Clinic pt seen at [place]. Will need EGD under fluoro with me. [facility] [place] [place] only. Complex stricture that may require retrograde EGD through PEG tube. Schedule as a 90min case with gen    ESOPHAGOGASTRODUODENOSCOPY N/A 1/12/2023    Procedure: EGD (ESOPHAGOGASTRODUODENOSCOPY);  Surgeon: Navin Weaver MD;  [facility] ([address] [place] [address]);  Service: Endoscopy;  Laterality: N/A;  Pt done today. I need to do him next week. Will need EGD under fluoro. 45min case OK. Please try to find a time for me to do his case. I can come in for a 7am start, if needed.    Tube feeds off at midnight-DS  1/5/23-Instructions via portal-DS    INSERTION OF VENOUS ACCESS PORT  2005    LARYNGOSCOPY Bilateral 1/26/2022    Procedure: LARYNGOSCOPY;  Surgeon: Enrique Maya MD;  [facility]: [facility]  OR;  Service: ENT;  Laterality: Bilateral;    SKIN CANCER EXCISION         Social History     Socioeconomic History    Marital status:    Tobacco Use    Smoking status: Former     Types: Cigarettes, Cigars     Quit date: 10/1/2005     Years since quittin.3    Smokeless tobacco: Never   Substance and Sexual Activity    Alcohol use: Not Currently     Comment: daily    Drug use: No         CBC: No results for input(s): WBC, RBC, HGB, HCT, PLT, MCV, MCH, MCHC in the last 72 hours.    CMP: No results for input(s): NA, K, CL, CO2, BUN, CREATININE, GLU, MG, PHOS, CALCIUM, ALBUMIN, PROT, ALKPHOS, ALT, AST, BILITOT in the last 72 hours.    INR  No results for input(s): PT, INR, PROTIME, APTT in the last 72 hours.              2D Echo:  No results found for this or any previous visit.        Pre-op Assessment    I have reviewed the Patient Summary Reports.     I have reviewed the Nursing Notes.    I have reviewed the Medications.     Review of Systems  Anesthesia Hx:  No problems with previous Anesthesia    Hematology/Oncology:  Hematology Normal   Oncology Normal     EENT/Dental:EENT/Dental Normal   Cardiovascular:  Cardiovascular Normal     Pulmonary:  Pulmonary Normal    Renal/:  Renal/ Normal     Hepatic/GI:   Hiatal Hernia, GERD    Musculoskeletal:  Musculoskeletal Normal    Neurological:  Neurology Normal    Endocrine:  Endocrine Normal    Dermatological:  Skin Normal    Psych:  Psychiatric Normal           Physical Exam  General: Well nourished    Airway:  Mallampati: II   Mouth Opening: Normal  TM Distance: Normal  Tongue: Normal  Neck ROM: Normal ROM    Dental:  Intact    Chest/Lungs:  Clear to auscultation, Normal Respiratory Rate    Heart:  Rate: Normal  Rhythm: Regular Rhythm  Sounds: Normal        Anesthesia Plan  Type of Anesthesia, risks & benefits discussed:    Anesthesia Type: Gen Natural Airway  Intra-op Monitoring Plan: Standard ASA Monitors  Post Op Pain Control Plan: multimodal  analgesia  Induction:  IV  Informed Consent: Informed consent signed with the Patient and all parties understand the risks and agree with anesthesia plan.  All questions answered.   ASA Score: 3    Ready For Surgery From Anesthesia Perspective.     .

## 2023-01-26 ENCOUNTER — OFFICE VISIT (OUTPATIENT)
Dept: HEMATOLOGY/ONCOLOGY | Facility: CLINIC | Age: 78
End: 2023-01-26
Payer: MEDICARE

## 2023-01-26 ENCOUNTER — TELEPHONE (OUTPATIENT)
Dept: ENDOSCOPY | Facility: HOSPITAL | Age: 78
End: 2023-01-26
Payer: MEDICARE

## 2023-01-26 VITALS
HEART RATE: 80 BPM | DIASTOLIC BLOOD PRESSURE: 63 MMHG | SYSTOLIC BLOOD PRESSURE: 125 MMHG | TEMPERATURE: 98 F | HEIGHT: 72 IN | RESPIRATION RATE: 18 BRPM | OXYGEN SATURATION: 94 % | WEIGHT: 148.81 LBS | BODY MASS INDEX: 20.16 KG/M2

## 2023-01-26 DIAGNOSIS — R13.10 DYSPHAGIA, UNSPECIFIED TYPE: Primary | ICD-10-CM

## 2023-01-26 DIAGNOSIS — J39.2: Primary | ICD-10-CM

## 2023-01-26 DIAGNOSIS — R13.13 PHARYNGEAL DYSPHAGIA: ICD-10-CM

## 2023-01-26 DIAGNOSIS — R13.10 DYSPHAGIA, UNSPECIFIED TYPE: ICD-10-CM

## 2023-01-26 DIAGNOSIS — C13.9 PRIMARY MALIGNANT NEOPLASM OF HYPOPHARYNX: ICD-10-CM

## 2023-01-26 PROCEDURE — 1101F PR PT FALLS ASSESS DOC 0-1 FALLS W/OUT INJ PAST YR: ICD-10-PCS | Mod: CPTII,S$GLB,, | Performed by: OTOLARYNGOLOGY

## 2023-01-26 PROCEDURE — 1160F PR REVIEW ALL MEDS BY PRESCRIBER/CLIN PHARMACIST DOCUMENTED: ICD-10-PCS | Mod: CPTII,S$GLB,, | Performed by: OTOLARYNGOLOGY

## 2023-01-26 PROCEDURE — 3288F PR FALLS RISK ASSESSMENT DOCUMENTED: ICD-10-PCS | Mod: CPTII,S$GLB,, | Performed by: OTOLARYNGOLOGY

## 2023-01-26 PROCEDURE — 1125F AMNT PAIN NOTED PAIN PRSNT: CPT | Mod: CPTII,S$GLB,, | Performed by: OTOLARYNGOLOGY

## 2023-01-26 PROCEDURE — 31575 DIAGNOSTIC LARYNGOSCOPY: CPT | Mod: S$GLB,,, | Performed by: OTOLARYNGOLOGY

## 2023-01-26 PROCEDURE — 3078F PR MOST RECENT DIASTOLIC BLOOD PRESSURE < 80 MM HG: ICD-10-PCS | Mod: CPTII,S$GLB,, | Performed by: OTOLARYNGOLOGY

## 2023-01-26 PROCEDURE — 3078F DIAST BP <80 MM HG: CPT | Mod: CPTII,S$GLB,, | Performed by: OTOLARYNGOLOGY

## 2023-01-26 PROCEDURE — 99214 PR OFFICE/OUTPT VISIT, EST, LEVL IV, 30-39 MIN: ICD-10-PCS | Mod: 25,S$GLB,, | Performed by: OTOLARYNGOLOGY

## 2023-01-26 PROCEDURE — 3074F PR MOST RECENT SYSTOLIC BLOOD PRESSURE < 130 MM HG: ICD-10-PCS | Mod: CPTII,S$GLB,, | Performed by: OTOLARYNGOLOGY

## 2023-01-26 PROCEDURE — 1159F MED LIST DOCD IN RCRD: CPT | Mod: CPTII,S$GLB,, | Performed by: OTOLARYNGOLOGY

## 2023-01-26 PROCEDURE — 99999 PR PBB SHADOW E&M-EST. PATIENT-LVL III: ICD-10-PCS | Mod: PBBFAC,,, | Performed by: OTOLARYNGOLOGY

## 2023-01-26 PROCEDURE — 99214 OFFICE O/P EST MOD 30 MIN: CPT | Mod: 25,S$GLB,, | Performed by: OTOLARYNGOLOGY

## 2023-01-26 PROCEDURE — 3288F FALL RISK ASSESSMENT DOCD: CPT | Mod: CPTII,S$GLB,, | Performed by: OTOLARYNGOLOGY

## 2023-01-26 PROCEDURE — 1125F PR PAIN SEVERITY QUANTIFIED, PAIN PRESENT: ICD-10-PCS | Mod: CPTII,S$GLB,, | Performed by: OTOLARYNGOLOGY

## 2023-01-26 PROCEDURE — 1101F PT FALLS ASSESS-DOCD LE1/YR: CPT | Mod: CPTII,S$GLB,, | Performed by: OTOLARYNGOLOGY

## 2023-01-26 PROCEDURE — 1159F PR MEDICATION LIST DOCUMENTED IN MEDICAL RECORD: ICD-10-PCS | Mod: CPTII,S$GLB,, | Performed by: OTOLARYNGOLOGY

## 2023-01-26 PROCEDURE — 31575 PR LARYNGOSCOPY, FLEXIBLE; DIAGNOSTIC: ICD-10-PCS | Mod: S$GLB,,, | Performed by: OTOLARYNGOLOGY

## 2023-01-26 PROCEDURE — 1160F RVW MEDS BY RX/DR IN RCRD: CPT | Mod: CPTII,S$GLB,, | Performed by: OTOLARYNGOLOGY

## 2023-01-26 PROCEDURE — 3074F SYST BP LT 130 MM HG: CPT | Mod: CPTII,S$GLB,, | Performed by: OTOLARYNGOLOGY

## 2023-01-26 PROCEDURE — 99999 PR PBB SHADOW E&M-EST. PATIENT-LVL III: CPT | Mod: PBBFAC,,, | Performed by: OTOLARYNGOLOGY

## 2023-01-30 ENCOUNTER — TELEPHONE (OUTPATIENT)
Dept: ENDOSCOPY | Facility: HOSPITAL | Age: 78
End: 2023-01-30
Payer: MEDICARE

## 2023-01-30 ENCOUNTER — PATIENT MESSAGE (OUTPATIENT)
Dept: ENDOSCOPY | Facility: HOSPITAL | Age: 78
End: 2023-01-30
Payer: MEDICARE

## 2023-01-30 NOTE — TELEPHONE ENCOUNTER
Telephoned pt to schedule EGD.  Spoke with pt and procedure scheduled for 2/24/23 at 10:30am.  Reviewed history and medications.  Instructed on procedure and preparation.  Pt verbalized understanding.  Instructions sent via patient portal.

## 2023-02-10 ENCOUNTER — TELEPHONE (OUTPATIENT)
Dept: HEMATOLOGY/ONCOLOGY | Facility: CLINIC | Age: 78
End: 2023-02-10
Payer: MEDICARE

## 2023-02-10 DIAGNOSIS — Z91.89 AT RISK FOR LYMPHEDEMA: ICD-10-CM

## 2023-02-10 DIAGNOSIS — C13.9 PRIMARY MALIGNANT NEOPLASM OF HYPOPHARYNX: Primary | ICD-10-CM

## 2023-02-10 NOTE — TELEPHONE ENCOUNTER
I spoke with patient to schedule a new PT assessment pre referral. He asked to call back later when he can make appt.

## 2023-02-24 ENCOUNTER — HOSPITAL ENCOUNTER (OUTPATIENT)
Facility: HOSPITAL | Age: 78
Discharge: HOME | End: 2023-02-24
Attending: INTERNAL MEDICINE | Admitting: INTERNAL MEDICINE
Payer: MEDICARE

## 2023-02-24 ENCOUNTER — ANESTHESIA EVENT (OUTPATIENT)
Dept: ENDOSCOPY | Facility: HOSPITAL | Age: 78
End: 2023-02-24
Payer: MEDICARE

## 2023-02-24 ENCOUNTER — ANESTHESIA (OUTPATIENT)
Dept: ENDOSCOPY | Facility: HOSPITAL | Age: 78
End: 2023-02-24
Payer: MEDICARE

## 2023-02-24 VITALS
HEIGHT: 72 IN | RESPIRATION RATE: 10 BRPM | WEIGHT: 143 LBS | OXYGEN SATURATION: 99 % | DIASTOLIC BLOOD PRESSURE: 68 MMHG | TEMPERATURE: 98 F | HEART RATE: 63 BPM | SYSTOLIC BLOOD PRESSURE: 126 MMHG | BODY MASS INDEX: 19.37 KG/M2

## 2023-02-24 DIAGNOSIS — R13.19 ESOPHAGEAL DYSPHAGIA: ICD-10-CM

## 2023-02-24 PROCEDURE — D9220A PRA ANESTHESIA: Mod: ANES,,, | Performed by: STUDENT IN AN ORGANIZED HEALTH CARE EDUCATION/TRAINING PROGRAM

## 2023-02-24 PROCEDURE — 43248 PR EGD, FLEX, W/DILATION OVER GUIDEWIRE: ICD-10-PCS | Mod: ,,, | Performed by: INTERNAL MEDICINE

## 2023-02-24 PROCEDURE — C1769 GUIDE WIRE: HCPCS | Performed by: INTERNAL MEDICINE

## 2023-02-24 PROCEDURE — 25000003 PHARM REV CODE 250: Performed by: INTERNAL MEDICINE

## 2023-02-24 PROCEDURE — 43248 EGD GUIDE WIRE INSERTION: CPT | Mod: ,,, | Performed by: INTERNAL MEDICINE

## 2023-02-24 PROCEDURE — 43248 EGD GUIDE WIRE INSERTION: CPT | Performed by: INTERNAL MEDICINE

## 2023-02-24 PROCEDURE — 63600175 PHARM REV CODE 636 W HCPCS: Performed by: NURSE ANESTHETIST, CERTIFIED REGISTERED

## 2023-02-24 PROCEDURE — D9220A PRA ANESTHESIA: ICD-10-PCS | Mod: CRNA,,, | Performed by: NURSE ANESTHETIST, CERTIFIED REGISTERED

## 2023-02-24 PROCEDURE — 25000003 PHARM REV CODE 250: Performed by: NURSE ANESTHETIST, CERTIFIED REGISTERED

## 2023-02-24 PROCEDURE — D9220A PRA ANESTHESIA: ICD-10-PCS | Mod: ANES,,, | Performed by: STUDENT IN AN ORGANIZED HEALTH CARE EDUCATION/TRAINING PROGRAM

## 2023-02-24 PROCEDURE — 37000008 HC ANESTHESIA 1ST 15 MINUTES: Performed by: INTERNAL MEDICINE

## 2023-02-24 PROCEDURE — D9220A PRA ANESTHESIA: Mod: CRNA,,, | Performed by: NURSE ANESTHETIST, CERTIFIED REGISTERED

## 2023-02-24 PROCEDURE — 37000009 HC ANESTHESIA EA ADD 15 MINS: Performed by: INTERNAL MEDICINE

## 2023-02-24 RX ORDER — SODIUM CHLORIDE 9 MG/ML
INJECTION, SOLUTION INTRAVENOUS CONTINUOUS
Status: DISCONTINUED | OUTPATIENT
Start: 2023-02-24 | End: 2023-02-24 | Stop reason: HOSPADM

## 2023-02-24 RX ORDER — SODIUM CHLORIDE 0.9 % (FLUSH) 0.9 %
10 SYRINGE (ML) INJECTION
Status: DISCONTINUED | OUTPATIENT
Start: 2023-02-24 | End: 2023-02-24 | Stop reason: HOSPADM

## 2023-02-24 RX ORDER — PROPOFOL 10 MG/ML
VIAL (ML) INTRAVENOUS CONTINUOUS PRN
Status: DISCONTINUED | OUTPATIENT
Start: 2023-02-24 | End: 2023-02-24

## 2023-02-24 RX ORDER — PROPOFOL 10 MG/ML
VIAL (ML) INTRAVENOUS
Status: DISCONTINUED | OUTPATIENT
Start: 2023-02-24 | End: 2023-02-24

## 2023-02-24 RX ORDER — LIDOCAINE HYDROCHLORIDE 20 MG/ML
INJECTION, SOLUTION EPIDURAL; INFILTRATION; INTRACAUDAL; PERINEURAL
Status: DISCONTINUED | OUTPATIENT
Start: 2023-02-24 | End: 2023-02-24

## 2023-02-24 RX ADMIN — PROPOFOL 50 MG: 10 INJECTION, EMULSION INTRAVENOUS at 10:02

## 2023-02-24 RX ADMIN — SODIUM CHLORIDE: 9 INJECTION, SOLUTION INTRAVENOUS at 10:02

## 2023-02-24 RX ADMIN — LIDOCAINE HYDROCHLORIDE 100 MG: 20 INJECTION, SOLUTION EPIDURAL; INFILTRATION; INTRACAUDAL; PERINEURAL at 10:02

## 2023-02-24 RX ADMIN — Medication 150 MCG/KG/MIN: at 10:02

## 2023-02-24 NOTE — PLAN OF CARE
POC discussed with pt. Understanding verbalized  
Patient tolerating oral liquids without difficulty. No apparent s&s of distress noted at this time, no complaints voiced at this time. Discharge instructions reviewed with patient/family/friend with good verbal feedback received. Patient ready for discharge    
Arm band on

## 2023-02-24 NOTE — PROGRESS NOTES
Placed call to Endo, requested they notify Dr Weaver pt ready for d/c, awaiting MD to come to patient bedside.

## 2023-02-24 NOTE — ANESTHESIA PREPROCEDURE EVALUATION
02/24/2023    Pre-operative evaluation for Procedure(s) (LRB):  EGD (ESOPHAGOGASTRODUODENOSCOPY) (N/A)    Arthur Ribeiro is a 77 y.o. male w/ hx of burkitt's lymphoma, tumor of the hypopharynx, dysphagia, who presents for above procedure.       Prev airway (1/3/2023):   Mask Ventilation:  Easy mask    Attempts:  1    Attempted By:  CRNA    Method of Intubation:  Video laryngoscopy    Blade:  [facility] 3    Laryngeal View Grade: Grade IIA - cords partially seen      Difficult Airway Encountered?: No      Complications:  None    Airway Device:  Oral endotracheal tube    Airway Device Size:  7.0    Style/Cuff Inflation:  Cuffed (inflated to minimal occlusive pressure)    Tube secured:  21    Secured at:  The lips      EKG: none on record      2D Echo: none on record    Patient Active Problem List   Diagnosis    Allergic rhinitis    Lymphoma, Burkitt's    Debility    Gastroesophageal reflux disease without esophagitis    Primary malignant neoplasm of hypopharynx    Pharyngeal dysphagia    Anorexia    Weight loss    Herpes zoster without complication    Oropharyngeal dysphagia    At risk for lymphedema    Neutropenic fever    ACP (advance care planning)    Drug-induced pancytopenia    Mild protein-calorie malnutrition    Hypopharyngeal stenosis       Review of patient's allergies indicates:  No Known Allergies     No current facility-administered medications on file prior to encounter.     Current Outpatient Medications on File Prior to Encounter   Medication Sig Dispense Refill    (Magic mouthwash) 1:1:1 diphenhydramine(Benadryl) 12.5mg/5ml liq, aluminum & magnesium hydroxide-simethicone (Maalox), LIDOcaine viscous 2% Swish and spit 10 mLs every 4 (four) hours as needed (gargle for 10 seconds and spit). gargle for 10 seconds and spit 450 mL 0    acetaminophen (M-PAP) 160 mg/5 mL Liqd 15.6 mLs  (499.2 mg total) by Per G Tube route every 8 (eight) hours as needed (pain). (Patient not taking: Reported on 12/1/2022) 473 mL 1    famotidine (PEPCID) 40 mg/5 mL (8 mg/mL) suspension Take 5 mLs (40 mg total) by mouth once daily. 150 mL 3    hydrocodone-acetaminophen (HYCET) solution 7.5-325 mg/15mL Take 10 mLs by mouth every 6 (six) hours as needed for Pain. (Patient not taking: Reported on 12/1/2022) 118 mL 0    LIDOCAINE VISCOUS 2 % solution       magic mouthwash diphen/antac/lidoc/nysta Take 5 mL by mouth 4 times daily. (Patient not taking: Reported on 12/1/2022) 360 mL 0    ondansetron (ZOFRAN-ODT) 8 MG TbDL DISSOLVE 1 TABLET ON THE TONGUE THREE TIMES DAILY AS NEEDED FOR NAUSEA      valACYclovir (VALTREX) 1000 MG tablet Take 1,000 mg by mouth 3 (three) times daily.         Past Surgical History:   Procedure Laterality Date    DIRECT LARYNGOSCOPY N/A 11/8/2022    Procedure: DIRECT LARYNGOSCOPY;  Surgeon: Kavon Hart MD;  Location: [facility] OR [address] [place] [address];  Service: ENT;  Laterality: N/A;  Telescopes, tower, airway basic, diverticuloscopes, pulmonary balloons/jagwire, pediatric gastroscope, daniel NGT, salem sump NGT, FLUORO    ESOPHAGOGASTRODUODENOSCOPY N/A 7/6/2022    Procedure: EGD (ESOPHAGOGASTRODUODENOSCOPY);  Surgeon: Daniel Sagastume MD;  Location: [facility];  Service: Endoscopy;  Laterality: N/A;    ESOPHAGOGASTRODUODENOSCOPY N/A 1/3/2023    Procedure: EGD (ESOPHAGOGASTRODUODENOSCOPY);  Surgeon: Navin Weaver MD;  Location: [facility] ([address] [place] [address]);  Service: Endoscopy;  Laterality: N/A;  MD Ira Almonte MA  Caller: Unspecified (3 days ago,  3:14 PM)  Clinic pt seen at [place]. Will need EGD under fluoro with me. [facility] only. Complex stricture that may require retrograde EGD through PEG tube. Schedule as a 90min case with gen    ESOPHAGOGASTRODUODENOSCOPY N/A 1/12/2023    Procedure: EGD (ESOPHAGOGASTRODUODENOSCOPY);  Surgeon: Navin Weaver MD;  Location: [facility] ([address] [place] [address]);  Service:  Endoscopy;  Laterality: N/A;  Pt done today. I need to do him next week. Will need EGD under fluoro. 45min case OK. Please try to find a time for me to do his case. I can come in for a 7am start, if needed.    Tube feeds off at midnight-DS  23-Instructions via portal-DS    ESOPHAGOGASTRODUODENOSCOPY N/A 2023    Procedure: EGD (ESOPHAGOGASTRODUODENOSCOPY);  Surgeon: Navin Weaver MD;  Location: [facility] ([address] [place] [address]);  Service: Endoscopy;  Laterality: N/A;  Repeat upper endoscopy in 2 weeks for retreatment  inst via portal & email-MS    INSERTION OF VENOUS ACCESS PORT      LARYNGOSCOPY Bilateral 2022    Procedure: LARYNGOSCOPY;  Surgeon: Enrique Maya MD;  Location: [facility] OR;  Service: ENT;  Laterality: Bilateral;    SKIN CANCER EXCISION         Social History     Socioeconomic History    Marital status:    Tobacco Use    Smoking status: Former     Types: Cigarettes, Cigars     Quit date: 10/1/2005     Years since quittin.4    Smokeless tobacco: Never   Substance and Sexual Activity    Alcohol use: Not Currently     Comment: daily    Drug use: No         Vital Signs Range (Last 24H):         CBC:   Recent Labs     23  1347   WBC 3.60*   RBC 2.82*   HGB 9.5*   HCT 28.4*      *   MCH 33.7*   MCHC 33.5       CMP:   Recent Labs     23  1347      K 3.8   CL 98   CO2 35*   BUN 57*   CREATININE 1.42*   GLU 91   CALCIUM 9.2   ALBUMIN 4.2   PROT 7.0   ALKPHOS 96   ALT 30   AST 30   BILITOT 0.3       INR  No results for input(s): PT, INR, PROTIME, APTT in the last 72 hours.              Pre-op Assessment    I have reviewed the Patient Summary Reports.     I have reviewed the Nursing Notes. I have reviewed the NPO Status.   I have reviewed the Medications.     Review of Systems  Anesthesia Hx:  No problems with previous Anesthesia   Denies Personal Hx of Anesthesia complications.   Hematology/Oncology:        Current/Recent Cancer.   Cardiovascular:    Exercise tolerance: good    Pulmonary:  Pulmonary Normal    Hepatic/GI:   GERD    Neurological:  Neurology Normal    Endocrine:  Endocrine Normal        Physical Exam  General: Alert and Oriented    Airway:  Mallampati: II   Mouth Opening: Normal  TM Distance: Normal  Tongue: Normal    Dental:  Intact    Chest/Lungs:  Clear to auscultation, Normal Respiratory Rate    Heart:  Rate: Normal  Rhythm: Regular Rhythm        Anesthesia Plan  Type of Anesthesia, risks & benefits discussed:    Anesthesia Type: Gen Natural Airway  Intra-op Monitoring Plan: Standard ASA Monitors  Post Op Pain Control Plan: IV/PO Opioids PRN and multimodal analgesia  Induction:  IV  Airway Plan: Direct and Video  Informed Consent: Informed consent signed with the Patient and all parties understand the risks and agree with anesthesia plan.  All questions answered.   ASA Score: 3  Day of Surgery Review of History & Physical: H&P Update referred to the surgeon/provider.    Ready For Surgery From Anesthesia Perspective.     .

## 2023-02-24 NOTE — H&P
Short Stay Endoscopy History and Physical    PCP - Zohaib Pryor MD  Referring Physician - Bere Stout MD  [address] [place] [address]    Procedure - EGD  ASA - per anesthesia  Mallampati - per anesthesia  History of Anesthesia problems - no  Family history Anesthesia problems -  no   Plan of anesthesia - General    HPI:  This is a 77 y.o. 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    Surgical History:  has a past surgical history that includes Skin cancer excision; Insertion of venous access port (2005); Laryngoscopy (Bilateral, 01/26/2022); Esophagogastroduodenoscopy (N/A, 07/06/2022); Direct laryngoscopy (N/A, 11/08/2022); Esophagogastroduodenoscopy (N/A, 01/03/2023); Esophagogastroduodenoscopy (N/A, 01/12/2023); Esophagogastroduodenoscopy (N/A, 01/25/2023); and Tonsillectomy.    Family History: family history includes Cancer in his brother, father, and sister; Heart disease in his mother; Hypertension in his mother; No Known Problems in his daughter and son..    Social History:  reports that he quit smoking about 17 years ago. His smoking use included cigarettes and cigars. He has never used smokeless tobacco. He reports that he does not currently use alcohol. He reports that he does not use drugs.    Review of patient's allergies indicates:  No Known  Allergies    Medications:   Medications Prior to Admission   Medication Sig Dispense Refill Last Dose    (Magic mouthwash) 1:1:1 diphenhydramine(Benadryl) 12.5mg/5ml liq, aluminum & magnesium hydroxide-simethicone (Maalox), LIDOcaine viscous 2% Swish and spit 10 mLs every 4 (four) hours as needed (gargle for 10 seconds and spit). gargle for 10 seconds and spit 450 mL 0 Past Month    acetaminophen (M-PAP) 160 mg/5 mL Liqd 15.6 mLs (499.2 mg total) by Per G Tube route every 8 (eight) hours as needed (pain). 473 mL 1 Past Month    famotidine (PEPCID) 40 mg/5 mL (8 mg/mL) suspension Take 5 mLs (40 mg total) by mouth once daily. 150 mL 3 Past Month    gabapentin (NEURONTIN) 300 MG capsule TAKE ONE CAPSULE BY MOUTH THREE TIMES DAILY 90 capsule 1 2/24/2023 at 0745    hydrocodone-acetaminophen (HYCET) solution 7.5-325 mg/15mL Take 10 mLs by mouth every 6 (six) hours as needed for Pain. 118 mL 0 Past Month    LIDOCAINE VISCOUS 2 % solution    Past Month    magic mouthwash diphen/antac/lidoc/nysta Take 5 mL by mouth 4 times daily. 360 mL 0 Past Month    ondansetron (ZOFRAN-ODT) 8 MG TbDL DISSOLVE 1 TABLET ON THE TONGUE THREE TIMES DAILY AS NEEDED FOR NAUSEA   Past Month    valACYclovir (VALTREX) 1000 MG tablet Take 1,000 mg by mouth 3 (three) times daily.   Past Month       Physical Exam:    Vital Signs:   Vitals:    02/24/23 1030   BP: (!) 140/67   Pulse:    Resp:    Temp:        General Appearance: Well appearing in no acute distress  Eyes:    No scleral icterus  Lungs: no labored breathing  Heart:  Regular  Abdomen: non tender    Labs:  Lab Results   Component Value Date    WBC 3.60 (L) 02/23/2023    HGB 9.5 (L) 02/23/2023    HCT 28.4 (L) 02/23/2023     02/23/2023    CHOL 153 09/28/2022    TRIG 157 (H) 09/28/2022    HDL 29 (L) 09/28/2022    ALT 30 02/23/2023    AST 30 02/23/2023     02/23/2023    K 3.8 02/23/2023    CL 98 02/23/2023    CREATININE 1.42 (H) 02/23/2023    BUN 57 (H) 02/23/2023    CO2 35 (H)  02/23/2023    TSH 3.090 09/28/2022    PSA 0.38 09/28/2022       I have explained the risks and benefits of this endoscopic procedure to the patient including but not limited to bleeding, inflammation, infection, perforation, and death.      Navin Weaver MD

## 2023-02-24 NOTE — TRANSFER OF CARE
Anesthesia Transfer of Care Note    Patient: Arthur Ribeiro    Procedure(s) Performed: Procedure(s) (LRB):  EGD (ESOPHAGOGASTRODUODENOSCOPY) (N/A)    Patient location: [facility]    Anesthesia Type: general    Transport from OR: Transported from OR on room air with adequate spontaneous ventilation    Post pain: adequate analgesia    Post assessment: no apparent anesthetic complications    Post vital signs: stable    Level of consciousness: awake    Nausea/Vomiting: no nausea/vomiting    Complications: none    Transfer of care protocol was followed      Last vitals:   Visit Vitals  BP (!) 102/52 (BP Location: Left arm, Patient Position: Lying)   Pulse 66   Temp 36.5 °C (97.7 °F) (Temporal)   Resp 20   Ht 6' (1.829 m)   Wt 64.9 kg (143 lb)   SpO2 98%   BMI 19.39 kg/m²

## 2023-02-24 NOTE — PROVATION PATIENT INSTRUCTIONS
Discharge Summary/Instructions after an Endoscopic Procedure  Patient Name: Arthur Ribeiro  Patient MRN: [id number]  Patient YOB: 1945 Friday, February 24, 2023  Navin Weaver MD  Dear patient,  As a result of recent federal legislation (The Federal Cures Act), you may   receive lab or pathology results from your procedure in your [facility]   account before your physician is able to contact you. Your physician or   their representative will relay the results to you with their   recommendations at their soonest availability.  Thank you,  RESTRICTIONS:  During your procedure today, you received medications for sedation.  These   medications may affect your judgment, balance and coordination.  Therefore,   for 24 hours, you have the following restrictions:   - DO NOT drive a car, operate machinery, make legal/financial decisions,   sign important papers or drink alcohol.    ACTIVITY:  Today: no heavy lifting, straining or running due to procedural   sedation/anesthesia.  The following day: return to full activity including work.  DIET:  Eat and drink normally unless instructed otherwise.     TREATMENT FOR COMMON SIDE EFFECTS:  - Mild abdominal pain, nausea, belching, bloating or excessive gas:  rest,   eat lightly and use a heating pad.  - Sore Throat: treat with throat lozenges and/or gargle with warm salt   water.  - Because air was used during the procedure, expelling large amounts of air   from your rectum or belching is normal.  - If a bowel prep was taken, you may not have a bowel movement for 1-3 days.    This is normal.  SYMPTOMS TO WATCH FOR AND REPORT TO YOUR PHYSICIAN:  1. Abdominal pain or bloating, other than gas cramps.  2. Chest pain.  3. Back pain.  4. Signs of infection such as: chills or fever occurring within 24 hours   after the procedure.  5. Rectal bleeding, which would show as bright red, maroon, or black stools.   (A tablespoon of blood from the rectum is not serious, especially  if   hemorrhoids are present.)  6. Vomiting.  7. Weakness or dizziness.  GO DIRECTLY TO THE NEAREST EMERGENCY ROOM IF YOU HAVE ANY OF THE FOLLOWING:      Difficulty breathing              Chills and/or fever over 101 F   Persistent vomiting and/or vomiting blood   Severe abdominal pain   Severe chest pain   Black, tarry stools   Bleeding- more than one tablespoon   Any other symptom or condition that you feel may need urgent attention  Your doctor recommends these additional instructions:  If any biopsies were taken, your doctors clinic will contact you in 1 to 2   weeks with any results.  - Discharge patient to home (ambulatory).   - Resume previous diet; Discharge to home (ambulatory); Resume outpatient   medications  - Return to referring physician as previously scheduled.   - Repeat upper endoscopy PRN for retreatment.   - If patient is able to maintain adequate hydration and nutrition over a 2-3   month period, may consider removal of PEG tube.  For questions, problems or results please call your physician - Navin Weaver MD at Work:  [phone].  [facility], EMERGENCY ROOM PHONE NUMBER: [phone]  IF A COMPLICATION OR EMERGENCY SITUATION ARISES AND YOU ARE UNABLE TO REACH   YOUR PHYSICIAN - GO DIRECTLY TO THE EMERGENCY ROOM.  Navin Weaver MD  2/24/2023 11:32:38 AM  This report has been verified and signed electronically.  Dear patient,  As a result of recent federal legislation (The Federal Cures Act), you may   receive lab or pathology results from your procedure in your [facility]   account before your physician is able to contact you. Your physician or   their representative will relay the results to you with their   recommendations at their soonest availability.  Thank you,  PROVATION

## 2023-02-27 ENCOUNTER — HOSPITAL ENCOUNTER (OUTPATIENT)
Dept: RADIOLOGY | Facility: HOSPITAL | Age: 78
Discharge: HOME | End: 2023-02-27
Attending: STUDENT IN AN ORGANIZED HEALTH CARE EDUCATION/TRAINING PROGRAM
Payer: MEDICARE

## 2023-02-27 DIAGNOSIS — C13.9 PRIMARY MALIGNANT NEOPLASM OF HYPOPHARYNX: ICD-10-CM

## 2023-02-27 DIAGNOSIS — R59.0 MEDIASTINAL LYMPHADENOPATHY: ICD-10-CM

## 2023-02-27 LAB — GLUCOSE SERPL-MCNC: 94 MG/DL (ref 70–110)

## 2023-02-27 PROCEDURE — A9552 F18 FDG: HCPCS | Mod: PN

## 2023-02-27 PROCEDURE — 78815 NM PET CT ROUTINE: ICD-10-PCS | Mod: 26,PS,, | Performed by: RADIOLOGY

## 2023-02-27 PROCEDURE — 78815 PET IMAGE W/CT SKULL-THIGH: CPT | Mod: 26,PS,, | Performed by: RADIOLOGY

## 2023-02-27 NOTE — ANESTHESIA POSTPROCEDURE EVALUATION
Anesthesia Post Evaluation    Patient: Arthur Ribeiro    Procedure(s) Performed: Procedure(s) (LRB):  EGD (ESOPHAGOGASTRODUODENOSCOPY) (N/A)    Final Anesthesia Type: general      Patient location during evaluation: PACU  Patient participation: Yes- Able to Participate  Level of consciousness: awake  Post-procedure vital signs: reviewed and stable  Pain management: adequate  Airway patency: patent    PONV status at discharge: No PONV  Anesthetic complications: no      Cardiovascular status: blood pressure returned to baseline  Respiratory status: unassisted, spontaneous ventilation and room air            Vitals Value Taken Time   /68 02/24/23 1217   Temp 36.6 °C (97.9 °F) 02/24/23 1215   Pulse 64 02/24/23 1221   Resp 13 02/24/23 1221   SpO2 100 % 02/24/23 1221   Vitals shown include unvalidated device data.      No case tracking events are documented in the log.      Pain/Ashley Score: No data recorded

## 2023-02-27 NOTE — PROGRESS NOTES
PET Imaging Questionnaire    Are you a Diabetic? Recent Blood Sugar level? No    Are you anemic? Bone Marrow Stimulation Meds? No    Have you had a CT Scan, if so when & where was your last one? Yes -     Have you had a PET Scan, if so when & where was your last one? Yes -     Chemotherapy or currently on Chemotherapy? Yes    Radiation therapy? Yes    Surgical History:   Past Surgical History:   Procedure Laterality Date    DIRECT LARYNGOSCOPY N/A 11/08/2022    Procedure: DIRECT LARYNGOSCOPY;  Surgeon: Kavon Hart MD;  [facility];  Service: ENT;  Laterality: N/A;  Telescopes, tower, airway basic, diverticuloscopes, pulmonary balloons/jagwire, pediatric gastroscope, daniel NGT, salem sump NGT, FLUORO    ESOPHAGOGASTRODUODENOSCOPY N/A 07/06/2022    Procedure: EGD (ESOPHAGOGASTRODUODENOSCOPY);  Surgeon: Daniel Sagastume MD;  Location: [facility];  Service: Endoscopy;  Laterality: N/A;    ESOPHAGOGASTRODUODENOSCOPY N/A 01/03/2023    Procedure: EGD (ESOPHAGOGASTRODUODENOSCOPY);  Surgeon: Navin Weaver MD;  [facility]  Service: Endoscopy;  Laterality: N/A;  MD Ira Almonte MA  Caller: Unspecified (3 days ago,  3:14 PM)  Clinic pt seen at [place]. Will need EGD under fluoro with me. [facility] only. Complex stricture that may require retrograde EGD through PEG tube. Schedule as a 90min case with gen    ESOPHAGOGASTRODUODENOSCOPY N/A 01/12/2023    Procedure: EGD (ESOPHAGOGASTRODUODENOSCOPY);  Surgeon: Navin Weaver MD;  [facility]  Service: Endoscopy;  Laterality: N/A;  Pt done today. I need to do him next week. Will need EGD under fluoro. 45min case OK. Please try to find a time for me to do his case. I can come in for a 7am start, if needed.    Tube feeds off at midnight-DS  1/5/23-Instructions via portal-DS    ESOPHAGOGASTRODUODENOSCOPY N/A 01/25/2023    Procedure: EGD (ESOPHAGOGASTRODUODENOSCOPY);  Surgeon: Navin Weaver MD;  Location: [facility]  (2ND FLR);  Service: Endoscopy;  Laterality: N/A;  Repeat upper endoscopy in 2 weeks for retreatment  inst via portal & email-MS    ESOPHAGOGASTRODUODENOSCOPY N/A 2/24/2023    Procedure: EGD (ESOPHAGOGASTRODUODENOSCOPY);  Surgeon: Navin Weaver MD;  Location: [facility] (2ND FLR);  Service: Endoscopy;  Laterality: N/A;  1/30/23-Instructions via portal-DS    INSERTION OF VENOUS ACCESS PORT  2005    LARYNGOSCOPY Bilateral 01/26/2022    Procedure: LARYNGOSCOPY;  Surgeon: Enrique Maya MD;  Location: [facility];  Service: ENT;  Laterality: Bilateral;    SKIN CANCER EXCISION      TONSILLECTOMY          Have you been fasting for at least 6 hours? Yes    Is there any chance you may be pregnant or breastfeeding? No    Assay: 11.86 MCi@:9.04   Injection Site:rt hand     Residual: .541 mCi@: 9.06   Technologist: Nancy Laguna Injected:11.31mCi

## 2023-03-01 ENCOUNTER — OFFICE VISIT (OUTPATIENT)
Dept: RADIATION ONCOLOGY | Facility: CLINIC | Age: 78
End: 2023-03-01
Payer: MEDICARE

## 2023-03-01 VITALS
RESPIRATION RATE: 16 BRPM | TEMPERATURE: 98 F | HEART RATE: 83 BPM | BODY MASS INDEX: 20.04 KG/M2 | DIASTOLIC BLOOD PRESSURE: 68 MMHG | WEIGHT: 147.94 LBS | HEIGHT: 72 IN | SYSTOLIC BLOOD PRESSURE: 124 MMHG | OXYGEN SATURATION: 96 %

## 2023-03-01 DIAGNOSIS — C13.9 PRIMARY MALIGNANT NEOPLASM OF HYPOPHARYNX: Primary | ICD-10-CM

## 2023-03-01 DIAGNOSIS — R59.0 MEDIASTINAL ADENOPATHY: ICD-10-CM

## 2023-03-01 PROCEDURE — 3078F PR MOST RECENT DIASTOLIC BLOOD PRESSURE < 80 MM HG: ICD-10-PCS | Mod: CPTII,S$GLB,, | Performed by: STUDENT IN AN ORGANIZED HEALTH CARE EDUCATION/TRAINING PROGRAM

## 2023-03-01 PROCEDURE — 99213 OFFICE O/P EST LOW 20 MIN: CPT | Mod: S$GLB,,, | Performed by: STUDENT IN AN ORGANIZED HEALTH CARE EDUCATION/TRAINING PROGRAM

## 2023-03-01 PROCEDURE — 99999 PR PBB SHADOW E&M-EST. PATIENT-LVL III: CPT | Mod: PBBFAC,,, | Performed by: STUDENT IN AN ORGANIZED HEALTH CARE EDUCATION/TRAINING PROGRAM

## 2023-03-01 PROCEDURE — 1101F PT FALLS ASSESS-DOCD LE1/YR: CPT | Mod: CPTII,S$GLB,, | Performed by: STUDENT IN AN ORGANIZED HEALTH CARE EDUCATION/TRAINING PROGRAM

## 2023-03-01 PROCEDURE — 1125F PR PAIN SEVERITY QUANTIFIED, PAIN PRESENT: ICD-10-PCS | Mod: CPTII,S$GLB,, | Performed by: STUDENT IN AN ORGANIZED HEALTH CARE EDUCATION/TRAINING PROGRAM

## 2023-03-01 PROCEDURE — 1101F PR PT FALLS ASSESS DOC 0-1 FALLS W/OUT INJ PAST YR: ICD-10-PCS | Mod: CPTII,S$GLB,, | Performed by: STUDENT IN AN ORGANIZED HEALTH CARE EDUCATION/TRAINING PROGRAM

## 2023-03-01 PROCEDURE — 1125F AMNT PAIN NOTED PAIN PRSNT: CPT | Mod: CPTII,S$GLB,, | Performed by: STUDENT IN AN ORGANIZED HEALTH CARE EDUCATION/TRAINING PROGRAM

## 2023-03-01 PROCEDURE — 3074F PR MOST RECENT SYSTOLIC BLOOD PRESSURE < 130 MM HG: ICD-10-PCS | Mod: CPTII,S$GLB,, | Performed by: STUDENT IN AN ORGANIZED HEALTH CARE EDUCATION/TRAINING PROGRAM

## 2023-03-01 PROCEDURE — 99213 PR OFFICE/OUTPT VISIT, EST, LEVL III, 20-29 MIN: ICD-10-PCS | Mod: S$GLB,,, | Performed by: STUDENT IN AN ORGANIZED HEALTH CARE EDUCATION/TRAINING PROGRAM

## 2023-03-01 PROCEDURE — 1159F MED LIST DOCD IN RCRD: CPT | Mod: CPTII,S$GLB,, | Performed by: STUDENT IN AN ORGANIZED HEALTH CARE EDUCATION/TRAINING PROGRAM

## 2023-03-01 PROCEDURE — 3078F DIAST BP <80 MM HG: CPT | Mod: CPTII,S$GLB,, | Performed by: STUDENT IN AN ORGANIZED HEALTH CARE EDUCATION/TRAINING PROGRAM

## 2023-03-01 PROCEDURE — 3288F FALL RISK ASSESSMENT DOCD: CPT | Mod: CPTII,S$GLB,, | Performed by: STUDENT IN AN ORGANIZED HEALTH CARE EDUCATION/TRAINING PROGRAM

## 2023-03-01 PROCEDURE — 1159F PR MEDICATION LIST DOCUMENTED IN MEDICAL RECORD: ICD-10-PCS | Mod: CPTII,S$GLB,, | Performed by: STUDENT IN AN ORGANIZED HEALTH CARE EDUCATION/TRAINING PROGRAM

## 2023-03-01 PROCEDURE — 99999 PR PBB SHADOW E&M-EST. PATIENT-LVL III: ICD-10-PCS | Mod: PBBFAC,,, | Performed by: STUDENT IN AN ORGANIZED HEALTH CARE EDUCATION/TRAINING PROGRAM

## 2023-03-01 PROCEDURE — 3288F PR FALLS RISK ASSESSMENT DOCUMENTED: ICD-10-PCS | Mod: CPTII,S$GLB,, | Performed by: STUDENT IN AN ORGANIZED HEALTH CARE EDUCATION/TRAINING PROGRAM

## 2023-03-01 PROCEDURE — 3074F SYST BP LT 130 MM HG: CPT | Mod: CPTII,S$GLB,, | Performed by: STUDENT IN AN ORGANIZED HEALTH CARE EDUCATION/TRAINING PROGRAM

## 2023-03-01 NOTE — PROGRESS NOTES
[facility] Department of Radiation Oncology  Follow Up Visit Note    Diagnosis:  Arthur Ribeiro is a 77 y.o. 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     Oncologic History:  He has a history of tobacco use and burkitt's lymphoma treated with chemotherapy alone with Dr. Rahman.   1/19/22:   CT Neck with heterogeneous enhancement along the lateral pharyngeal wall with involvement vs narrowing of the pyriform sinus. Crosses midline. Suspected retropharyngeal space effusion. Shotty LN bilaterally, enlarged and or necrotic LN in left lbl 2B and 3  1/26/22: DL and Biopsy:  Op note: ulcerative mass in the left posterior pharyngeal wall with extension to the lateral wall of the OPX. No involvement of esophagus but extends to the level of the cricopharyngeus along the posterior wall. No involvement of BOT or vallecula. No involvement of cords or supraglottis.  Path: L posterior wall with moderately differentiated squamous cell carcinoma, p16-, no PNI or LVI.   2/10/22: PET/CT with left pharyngeal and oropharyngeal mass, left level II nodes with avidity. Mildly avid mediastinal nodes, inflammatory etiology suggested.   5/30/22: completed induction carboplatin (did not tolerate taxol)  5/30/22: exam with H&N surgery with ANA on FNL  6/10/22: MRI neck with near complete resolution of the bilateral cervical lymph nodes and resolution of the left oropharyngeal lesions seen previously. CT chest with no interval abnormal mediastinal or hilar lymph node enlargement   6/28/22 - 8/19/22: 70 Gy in 35 fractions to the pre-chemotherapy gross disease, 61.25 Gy in 35 fractions to the gross disease with margin and the entire larynx. 56 Gy to the bilateral RP, right II-IV and left IB-V and level VI.  11/1/22: MRI soft tissue neck with ANA     Interval History  The patient presents today for follow up.     Since last visit,  he underwent EGD on 1/3/23, with benign appearing severe intrinsic stenosis noted in the upper third of the esophagus. Stented. He has had several EGD, with dilation most recently on 23.  He met with H&N surgery on     He underwent PET/Ct on 23 with ANA in the H&N. He has persistent mildly hypermetabolic lymph nodes in the lower right paratracheal region and right hilum with max SUV values not higher than mediastinal blood pool.  The lack of significant change suggests that these are reactive nodes rather than metastatic disease.    He is now eating, had an omlette. Tolerating softs but sill 90% via PEG. They are counting calories. No otalgia, throat pain.      Review of Systems   ROS as above    Social History:  Social History     Tobacco Use    Smoking status: Former     Types: Cigarettes, Cigars     Quit date: 10/1/2005     Years since quittin.4    Smokeless tobacco: Never   Substance Use Topics    Alcohol use: Not Currently     Comment: daily    Drug use: No       Family History:  Cancer-related family history includes Cancer in his brother, father, and sister.    Exam:  Vitals:    23 1356   BP: 124/68   BP Location: Right arm   Patient Position: Sitting   Pulse: 83   Resp: 16   Temp: 98 °F (36.7 °C)   SpO2: 96%   Weight: 67.1 kg (147 lb 14.9 oz)   Height: 6' (1.829 m)       Constitutional: Pleasant 77 y.o. male in no acute distress.  Well nourished. Well groomed.   HEENT: no appreciated OC or OPX lesions on direct exam.   Lymph: no appreciated cervical adenopathy. +submental edema  Lungs: No audible wheezing.  Normal effort.   Musculoskeletal: No gross MSK deformities.ambulates with a walker  Abdomen: PEG Tube in place without surrounding erythema or tenderness.  Skin: No rashes appreciated.   Psych: Alert and oriented with appropriate mood and affect.  Neuro: Grossly normal.  FNL deferred per pt preference and FNL last month with ANA. Just had endoscopy    Data review    Information  obtained via chart review and discussion with Mr. 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  hypopharyngeal stenosis s/p dilation  TSH WNL 9/28/22  lymphedema  ECOG: (2) Ambulatory and capable of self care, unable to carry out work activity, up and about > 50% or waking hours    Plan:  RTC 3 months or earlier PRN  To see PT for lymphedema  Will tentatively plan for ~6 month CT chest for surveillance of thoracic LN, but stability on pet is reassuring.   TSH with PCP  Close continued dental follow up      Thuan Peñaloza MD  Radiation Oncology

## 2023-03-01 NOTE — Clinical Note
I think PET looks good. Suspect those mediastinal are reactive, maybe from his prior Burkitt's but given stability dont suspect M1 disease or recurrence of burkitts. Will plan for a CT in ~6 months and if stable can space out further.

## 2023-03-16 PROBLEM — K40.90 LEFT INGUINAL HERNIA: Status: ACTIVE | Noted: 2023-03-16

## 2023-03-16 PROBLEM — B02.29 POST HERPETIC NEURALGIA: Status: ACTIVE | Noted: 2023-03-16

## 2023-03-24 PROBLEM — I89.0 LYMPHEDEMA DUE TO RADIATION: Status: ACTIVE | Noted: 2023-03-24

## 2023-04-12 NOTE — ANESTHESIA POSTPROCEDURE EVALUATION
Anesthesia Post Evaluation    Patient: Arthur Ribeiro    Procedure(s) Performed: Procedure(s) (LRB):  EGD (ESOPHAGOGASTRODUODENOSCOPY) (N/A)    Final Anesthesia Type: general      Patient location during evaluation: [facility]  Patient participation: Yes- Able to Participate  Level of consciousness: awake and alert  Post-procedure vital signs: reviewed and stable  Pain management: adequate  Airway patency: patent  MARCELO mitigation strategies: Multimodal analgesia  PONV status at discharge: No PONV  Anesthetic complications: no      Cardiovascular status: stable  Respiratory status: unassisted and spontaneous ventilation  Hydration status: euvolemic  Follow-up not needed.          Vitals Value Taken Time   /69 01/03/23 1202   Temp 36.3 °C (97.3 °F) 01/03/23 1202   Pulse 69 01/03/23 1216   Resp 27 01/03/23 1216   SpO2 100 % 01/03/23 1215   Vitals shown include unvalidated device data.      No case tracking events are documented in the log.      Pain/Ashley Score: Ashley Score: 10 (1/3/2023 11:42 AM)        
yes

## 2023-04-25 DIAGNOSIS — R53.1 GENERALIZED WEAKNESS: Primary | ICD-10-CM

## 2023-04-27 ENCOUNTER — OFFICE VISIT (OUTPATIENT)
Dept: HEMATOLOGY/ONCOLOGY | Facility: CLINIC | Age: 78
End: 2023-04-27
Payer: MEDICARE

## 2023-04-27 VITALS
OXYGEN SATURATION: 95 % | WEIGHT: 140 LBS | TEMPERATURE: 97 F | HEIGHT: 72 IN | SYSTOLIC BLOOD PRESSURE: 113 MMHG | HEART RATE: 84 BPM | BODY MASS INDEX: 18.96 KG/M2 | DIASTOLIC BLOOD PRESSURE: 65 MMHG | RESPIRATION RATE: 18 BRPM

## 2023-04-27 DIAGNOSIS — R13.12 OROPHARYNGEAL DYSPHAGIA: ICD-10-CM

## 2023-04-27 DIAGNOSIS — R63.4 WEIGHT LOSS: ICD-10-CM

## 2023-04-27 DIAGNOSIS — I89.0 LYMPHEDEMA DUE TO RADIATION: ICD-10-CM

## 2023-04-27 DIAGNOSIS — J39.2: ICD-10-CM

## 2023-04-27 DIAGNOSIS — C13.9 PRIMARY MALIGNANT NEOPLASM OF HYPOPHARYNX: Primary | ICD-10-CM

## 2023-04-27 PROCEDURE — 3074F PR MOST RECENT SYSTOLIC BLOOD PRESSURE < 130 MM HG: ICD-10-PCS | Mod: CPTII,S$GLB,, | Performed by: OTOLARYNGOLOGY

## 2023-04-27 PROCEDURE — 3078F DIAST BP <80 MM HG: CPT | Mod: CPTII,S$GLB,, | Performed by: OTOLARYNGOLOGY

## 2023-04-27 PROCEDURE — 1159F MED LIST DOCD IN RCRD: CPT | Mod: CPTII,S$GLB,, | Performed by: OTOLARYNGOLOGY

## 2023-04-27 PROCEDURE — 3078F PR MOST RECENT DIASTOLIC BLOOD PRESSURE < 80 MM HG: ICD-10-PCS | Mod: CPTII,S$GLB,, | Performed by: OTOLARYNGOLOGY

## 2023-04-27 PROCEDURE — 31575 PR LARYNGOSCOPY, FLEXIBLE; DIAGNOSTIC: ICD-10-PCS | Mod: S$GLB,,, | Performed by: OTOLARYNGOLOGY

## 2023-04-27 PROCEDURE — 3288F FALL RISK ASSESSMENT DOCD: CPT | Mod: CPTII,S$GLB,, | Performed by: OTOLARYNGOLOGY

## 2023-04-27 PROCEDURE — 1101F PT FALLS ASSESS-DOCD LE1/YR: CPT | Mod: CPTII,S$GLB,, | Performed by: OTOLARYNGOLOGY

## 2023-04-27 PROCEDURE — 1160F PR REVIEW ALL MEDS BY PRESCRIBER/CLIN PHARMACIST DOCUMENTED: ICD-10-PCS | Mod: CPTII,S$GLB,, | Performed by: OTOLARYNGOLOGY

## 2023-04-27 PROCEDURE — 3288F PR FALLS RISK ASSESSMENT DOCUMENTED: ICD-10-PCS | Mod: CPTII,S$GLB,, | Performed by: OTOLARYNGOLOGY

## 2023-04-27 PROCEDURE — 99999 PR PBB SHADOW E&M-EST. PATIENT-LVL III: CPT | Mod: PBBFAC,,, | Performed by: OTOLARYNGOLOGY

## 2023-04-27 PROCEDURE — 1159F PR MEDICATION LIST DOCUMENTED IN MEDICAL RECORD: ICD-10-PCS | Mod: CPTII,S$GLB,, | Performed by: OTOLARYNGOLOGY

## 2023-04-27 PROCEDURE — 31575 DIAGNOSTIC LARYNGOSCOPY: CPT | Mod: S$GLB,,, | Performed by: OTOLARYNGOLOGY

## 2023-04-27 PROCEDURE — 99214 OFFICE O/P EST MOD 30 MIN: CPT | Mod: 25,S$GLB,, | Performed by: OTOLARYNGOLOGY

## 2023-04-27 PROCEDURE — 1160F RVW MEDS BY RX/DR IN RCRD: CPT | Mod: CPTII,S$GLB,, | Performed by: OTOLARYNGOLOGY

## 2023-04-27 PROCEDURE — 1101F PR PT FALLS ASSESS DOC 0-1 FALLS W/OUT INJ PAST YR: ICD-10-PCS | Mod: CPTII,S$GLB,, | Performed by: OTOLARYNGOLOGY

## 2023-04-27 PROCEDURE — 1125F AMNT PAIN NOTED PAIN PRSNT: CPT | Mod: CPTII,S$GLB,, | Performed by: OTOLARYNGOLOGY

## 2023-04-27 PROCEDURE — 99214 PR OFFICE/OUTPT VISIT, EST, LEVL IV, 30-39 MIN: ICD-10-PCS | Mod: 25,S$GLB,, | Performed by: OTOLARYNGOLOGY

## 2023-04-27 PROCEDURE — 99999 PR PBB SHADOW E&M-EST. PATIENT-LVL III: ICD-10-PCS | Mod: PBBFAC,,, | Performed by: OTOLARYNGOLOGY

## 2023-04-27 PROCEDURE — 3074F SYST BP LT 130 MM HG: CPT | Mod: CPTII,S$GLB,, | Performed by: OTOLARYNGOLOGY

## 2023-04-27 PROCEDURE — 1125F PR PAIN SEVERITY QUANTIFIED, PAIN PRESENT: ICD-10-PCS | Mod: CPTII,S$GLB,, | Performed by: OTOLARYNGOLOGY

## 2023-04-27 NOTE — PROGRESS NOTES
Date of Encounter: 2/23/2022  Provider: Bere Stout MD  Rad Onc: Mehran Peñaloza MD  Med Onc: Zenaida Jones MD; Quentin Rahman MD  ENT: Enrique Maya MD  Dentist: BO Pitts; Cecilio Banks [facility]  GI Medicine: Michelle Weaver MD      CC: [id number] SCCA left hypopharynx, p 16 negative       HPI:    Patient is 76-year-old male who was referred for evaluation of a hypopharyngeal tumor for consideration of total laryngectomy.  Patient has a history of dysphagia and odynophagia.  He was treated with steroids and reflux medication without resolution.  He was seen by Dr Maya who noted a lesion involving the left oropharynx and hypopharynx.  He was taken to the operating room where he underwent endoscopy and biopsy.  The mass was found to be involving the left hypopharynx extending up to the lateral pharyngeal wall and down to the level of the cricopharyngeus.  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 His case was presented at multidisciplinary head neck tumor Board and it was recommended that he undergo adjuvant chemotherapy with consideration for total laryngectomy.    Patient reports that he has difficulty swallowing pills and therefore he was you taking liquid pain medicine which alleviates the pain.  He also complains of thick mucus in the back of his throat and thick postnasal drip.     Patient has a history of Burkitt's lymphoma for which he was treated with chemotherapy only by Dr. Rahman who is also involved it the care of this patient.  He was seen by Dr Rahman on Thursday who has ordered chemo (tentatively set for 3/8/2022) and has an appointment to have a port placed. He has not spoken with Dr Peñaloza since he has seen Josiah.      4/18/2022  Partient is here today for F/U. Tumor board recommended neoadjuvant chemo, followed with reimaging--if responds then proceed with concurrent chemorads--if not proceed to surgery.  He is being treated by Dr Quentin Rahman.   He is S/P 3 rounds of induction chemotherapy, once a week. He had one  week break and restarted chemo last Tuesday but he is now only receiving one drug instead of tow due to SOB.   Patient is to have another dose tomorrow but according to patient Dr Rahman will substitute another drug as he had a reaction to the other.  Patient had nausea and diarrhea last week and now is taking antiemetics 3 times a day.    Patient reports that since start of therapy, his throat pain has resolved in addition to otalgia and intermittent sharp shooting pains up the left side of his head    He was seen by ST who discussed diet consistencies. According to patient he was not given swallowing exercises.  He has had no issues swallowing but he has anorexia resulting in a 16 pound weight loss since diagnosis.   He was drinking 1-2 Ensures/day but has stopped since he developed diarrhea.  Patient is able to tolerate some fruits, soups, pureed foods such as applesauce baked beans and is starting to eat Belvita bars and oatmeal    He was diagnosed with shingles last week and is taking Valtrex.  He has not had the vaccine    5/30/2022  Patient is here today for cancer surveillance.  He has a history of squamous cell carcinoma of the hypopharynx.  He is receiving induction chemotherapy by Dr. Rahman.  He received his last dose of Carboplatinum last week(Taxol was stopped due to reaction).  He is to see Dr Peñaloza, Rad Onc, tomorrow to discuss concurrent chemorads.    He has not seen ST yet; he thought that he did not need an appointment. Patient reports that he is able to swallow fried foods and soft foods was having difficulty eating pasta and hamburger.  He feels like the food is getting caught in a flap in the back of his throat.  He also reports that he has a lot of white secretions that he is coughing up.  He is not using salt baking soda gargles as instructed    8/1/2022    Patient is here today for follow-up.  He is status post induction chemotherapy and now was undergoing concurrent chemoradiation.  Day 5  of cisplatin was given 07/26/2022.  He gets 6th chemo tomorrow and finishes XRT in 13 days (8/17/2022)  He is S/p PEG 7/6/2022. He is consuming 2 cans of Ensure/day. Patient only eats one meals/day which started recently. He has lost 5 pounds since May. According to his wife, his weight fluctuates.  He is doing dysphagia exercises 1-2 times/day.  Patient goes to PT twice weekly.    11/22/2022  Patient is here today for follow-up.  Following his last visit patient had modified barium swallow which showed no passage of food through the upper esophagus.  This was reviewed by me  and the speech therapist.  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  He was then sent to Dr. Mino Hart for transnasal esophagoscopy in the office.  The exam showed a 2 mm opening in the hypopharynx with insufflation.  Patient was then taken to the operating room for dilation.  However examination in the operating room showed complete hypopharyngeal stenosis.  GI was then consulted for a dual procedure; GI will go retrograde through the PEG while Dr. Hart will expose the  hypopharynx  at which time the scar will be excised and the patient will be dilated.  This will be scheduled in the future.    Patient just started new supplement last week--total of 2300 jesse/day( was getting 1700 jesse/day). Weight has remained stable. According to his wife his wife has remained stable for 1-2 months. It was 140 when seen by Dr Rahman.  He could not tolerate other supplements because he refluxed them into his mouth. This indicates that there is a small opening in his hypopharynx as seen by Dr Hart on insufflation in office with TNE. He stopped taking Protonix granules in the past and is no longer doing dysphagia exercises    PET CT is scheduled on 11/30/2022 post treatment.    1/26/2023  Patient is here today for post treatment follow-up.  He was seen by GI Medicine.  He has undergone 2 dilations for  stricture and severe dysphagia, last one yesterday. He was dilated to 12 mm.  Patient attempted teach scrambled eggs this morning but food got hung up.  His last PET-CT scan showed a hypermetabolic lymph node in the paratracheal area and hilum.TB recommended repeat PET CT in 3 months to monitor paratracheal and hilar nodes.    4/27/2023  Patient is here today for routine cancer surveillance.  Since he was last seen he has undergone dilatations for hypopharyngeal stenosis.  Patient reports that he is eating everything but meets at this time.  He feels stronger and wishes to restart his workout routine at the facility associated with [employer].  A referral has been sent    ROS: see HPI  Constitutional: Negative for activity change and appetite change, weight loss.   Eyes: Negative for discharge, visual changes.   Respiratory: Negative for difficulty breathing and wheezing   Cardiovascular: Negative for chest pain.   Gastrointestinal: Negative for abdominal distention and abdominal pain.   Endocrine: Negative for cold intolerance and heat intolerance.   Genitourinary: Negative for dysuria.   Musculoskeletal: Negative for gait problem, muscle pain and joint swelling.   Skin: Negative for color change and pallor; negative for skin lesions.   Neurological: Negative for syncope and weakness; no numbness face.   Psychiatric/Behavioral: Negative for agitation and confusion; negative for depression.    Physical Exam:      Constitutional  General Appearance: well nourished, well-developed, alert, oriented, in no acute distress; walking with cane for balance; very thin--unchanged; neck is flexed forward  Communication: ability, understanding, normal  Head and Face  Inspection: normocephalic, atraumatic, no scars, lesions or masses   Palpation: no stepoffs, sinus tenderness or masses  Parotid glands: no masses, stones, swelling or tenderness  Eyes:            PERRLA; EOMI  Ears:            right and left EAC's and  TM's normal  Nose:            No external deformity or intranasal lesions  Oc/OP:         No lesions, no trismus; tongue normal mobility  IDL:              patient could not tolerate  Neck:           Flexed forward; submental lymphedema  Lymph nodes:  No palpable lymphadenopathy  Heart:          Carotid pulses 2+ and symmetrical  Lungs:        Normal excursion  Neurological  Cranial Nerves: grossly intact  General: no focal deficits  Mood and Affect: no depression, anxiety or agitation  Extremities:   FROM; poor posture  Psychiatric  Orientation: oriented to time, place and person    PROCEDURES:   -------------------- LARYNGOSCOPY / NASOPHARYNGOSCOPY -------------------------     Pre-op DX:  History of hypopharyngeal cancer; hypopharyngeal stenosis  Post-op DX: same  Anesthesia: Topical Neosynephrine / Tetracaine  Indications: to look at lesion  Adverse Events: None        Procedure in Detail:     Flexible endoscopy with video was performed through the nasal passages. The nasal cavity, nasopharynx, oropharynx, hypopharynx and larynx were adequately visualized. The true vocal cords and arytenoids were examined during phonation and repose.        Operative Findings: diffuse radiation chages     Nasal Cavity: Within normal limits  Nasopharynx: Within normal limits  Tongue Base: Within normal limits; pooling fo secretion valleculae-unchanged  Pharyngeal Walls: Within normal limits  Epiglottis / Aryepiglottic Folds: Within normal limits  Pyriform Sinus: Within normal limits  Vocal Cords: Within normal limits; decrease abduction  Arytenoids: Within normal limits      Original  Path:  Pharynx, left posterior wall, biopsies:   - Squamous cell carcinoma, invasive, moderately differentiated   - Depth of invasion (DOI): at least 0.5 cm   - p16 status (IHC): Negative   - No lymphovascular invasion identified   - No perineural invasion identified   NOTE: Immunostain for p16 was performed and did not demonstrate    block-immunopositive staining (interpreted as negative).  Positive control   and internal negative control for immunostain was examined and was   appropriate.     Tumor Board recommendations:  TB recommendation for referral to H&N surgery. Given borderline T3/T4a, consideration for induction and TL/TP pending response.  There is questionable involvement of the posterior lateral thyroid ala.    PET CT: 2/2023  FINDINGS:  Mediastinal blood pool max SUV: 3.7   Head/neck:   No significant abnormal hypermetabolic foci are identified in the head and neck region.  No lymphadenopathy is present.  There are no findings to suggest residual or recurrent neoplasm.     Chest:   A mildly hypermetabolic nonenlarged lymph node is again identified within the lower right paratracheal region and a mildly hypermetabolic lymph node is again identified within the right hilum.  These nodes are difficult to measure due to obscuration by adjacent vascular structures.  The nodes are not hypermetabolic beyond mediastinal blood pool and are of doubtful significance.  The max SUV of the lower right paratracheal node is 3.4 compared to 3.8 previously.  The max SUV of the right hilar node is 3.5 compared to 3.2 previously.  No new abnormal hypermetabolic foci have developed within the chest since the prior study.  No hypermetabolic pulmonary nodules are present.     Abdomen/pelvis:   No significant abnormal hypermetabolic foci are identified within the abdomen and pelvis.  No lymphadenopathy is present.  The adrenal glands are normal.   Skeleton:   No significant abnormal hypermetabolic foci are identified within the skeleton.  There are no findings to suggest osseous metastatic disease.     Impression:   Persistent mildly hypermetabolic lymph nodes in the lower right paratracheal region and right hilum with max SUV values not higher than mediastinal blood pool.  The lack of significant change suggests that these are reactive nodes rather than  "metastatic disease.    Records reviewed:    ST 4/2023  ASSESSMENT:  Pt reports he has been tolerating a soft to regular diet, eating regular meals- three times a day. He has not used his PEG tube except for taking his Gabapentin. Pt reports this is currently the only medication he takes and he is hesitant regarding his ability to swallow a capsule. He reports he is tolerating oral intake with no significant difficulty swallowing.   Pt tolerated 1 Tic Tac swallowed as a pill. Pt swallowed pill dry and followed with sip of water. Pt reported "pill" went down easily.  Pt reports he is not completing his swallow exercises.     Arthur has met his goals and has returned to soft diet and is meeting nutritional needs by mouth at this time.      Anticipated Barriers to Treatment: none     Pt's spiritual, cultural and educational needs considered and pt agreeable to plan of care and goals.     Medical necessity is demonstrated by the following:  Without skilled ST to treat Pt's swallow deficits, Pt is at risk for dependence on alternative nutrition, associated risks of dysphagia, increased social isolation, decreased QOL, and increased caregiver burden.      PLAN:  Goals met. Discharge from  at this time.  Instructed pt and his wife to call with any further questions or concerns regarding swallow function.    PT 4/23  Assessment   Improving pliability and reduction in anterior swelling noted as compared to last visit. Patient has been compliant with wearing night time compression garment, and performing facial exercises. Feels swelling comes and goes, some days better than others, states can feel mohan throughout the day. Discussed with pt at length regarding the forward position of his head and the pocketing of fluid being restricted with draining vs when he is reclined in his recliner as his head may be in a more aligned position.     Has received theraband for resistive exercises for home which he reports he does.. PT " reviewed with pt self mld techniques and encouraged pt to perform daily for best results in managing symptoms. Pt has decided he is not interested in Flexitouch pneumatic compression pump for home at this time, will continue self management with self mld, compression, elevation and exercises. Recommend tapering visits from 2x week to 1x a week for 2 weeks to monitor pt compliance and any concerns regarding home program. Discussed with patient possible upcoming discharge with his maintenance program. Pt verbalized understanding. PT also discussed with pt recommendation of extending PT to focus on functional PT, postural strengthening for improved cervical alignment, scapular stabilization due to extensive kyphotic/forward head posture contributing to pooling of lymph fluid at anterior neck/submental region. Patient to consider.    Arthur Is progressing well towards his goals.     Pt prognosis is Good.   Improved edema in anterior neck, improved pliability noted as well, Pt compliant with wearing compression garment as best he can keep it on at night.   Arthur is a 77 y.o. male referred to outpatient physical therapy with a medical diagnosis of Primary malignant neoplasm of hypopharynx, lymphedema. Patient presents with stage 2 secondary lymphedema due to history of radiation treatment resulting in increased swelling to anterior neck, mild left mandibular swelling, increased difficulty with swallowing, increased secretions more prevelent in am,  increased stiffness in the neck with lateral flexion and rotation. Patient would benefit from MLD as well as need for instruction on self MLD, education on lymphatic flow exercises for home management, compression. Girth measurements were taken for baseline measures as well as pictures. Radiation tx comes with increase risk of lymphedema and this places pt at higher risk of infection. This pt would benefit from skilled therapy services to address the above  impairments.    Assessment:   Hypopharyngeal stenosis--S/P PEG and dilation up to 12 mm--tolerating a diet--everything except meats  Poor posture with hunched shoulders and flexed neck  [id number] SCCA hypopharynx, S/P induction chemo followed with concurrent chemoradiation- ANA  Hypermetabolic node right paratracheal area and hilum--reactive nodes on PET CT  Xerostomia due to radiation therapy    Plan:  Referred to physical therapy program associated with his health [facility]  Continue lymphedema therapy  Stressed to the patient that his posture is very important and he needs to hold his head up with walking or it will become fibrosed in that position  Encouraged to continue p.o. intake  Follow up in 3-4 months

## 2023-05-23 ENCOUNTER — PATIENT MESSAGE (OUTPATIENT)
Dept: RESEARCH | Facility: HOSPITAL | Age: 78
End: 2023-05-23
Payer: MEDICARE

## 2023-06-06 ENCOUNTER — PATIENT MESSAGE (OUTPATIENT)
Dept: DERMATOLOGY | Facility: CLINIC | Age: 78
End: 2023-06-06
Payer: MEDICARE

## 2023-06-06 NOTE — PROGRESS NOTES
[facility] Rad[facility]  Follow Up Visit Note    Diagnosis:  Arthur Ribeiro is a 77 y.o. 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     Oncologic History:  He has a history of tobacco use and burkitt's lymphoma treated with chemotherapy alone with Dr. Rahman.   1/26/22: DL and Biopsy:  Path: L posterior wall with moderately differentiated squamous cell carcinoma, p16-, no PNI or LVI.   2/10/22: PET/CT with left pharyngeal and oropharyngeal mass, left level II nodes with avidity. Mildly avid mediastinal nodes, inflammatory etiology suggested.   5/30/22: completed induction carboplatin (did not tolerate taxol)  5/30/22: exam with H&N surgery with ANA on FNL  6/10/22: MRI neck with near complete resolution of the bilateral cervical lymph nodes and resolution of the left oropharyngeal lesions seen previously. CT chest with no interval abnormal mediastinal or hilar lymph node enlargement   6/28/22 - 8/19/22: 70 Gy in 35 fractions to the pre-chemotherapy gross disease, 61.25 Gy in 35 fractions to the gross disease with margin and the entire larynx. 56 Gy to the bilateral RP, right II-IV and left IB-V and level VI.  11/1/22: MRI soft tissue neck with ANA  2/17/23: Post treatment PET/CT with ANA.      Interval History  The patient presents today for follow up.     Since last visit met with H&N surgery on 4/27 with ANA on exam.    No otalgia, throat pain, voice changes, difficulty breathing, hemoptysis, neck masses. Hasnt used PEG in over 2 months, has been referred for removal. He is tolerating almost a full diet, including steak but must cut it small and chew it well. He is using the boost and ensure seldomly.  Activity continues to improve.      Review of Systems   ROS as above    Social History:  Social History     Tobacco Use    Smoking status: Former     Types: Cigarettes,  Cigars     Quit date: 10/1/2005     Years since quittin.6    Smokeless tobacco: Never   Substance Use Topics    Alcohol use: Not Currently     Comment: daily    Drug use: No       Family History:  Cancer-related family history includes Cancer in his brother, father, and sister.    Exam:  Vitals:    23 1402   BP: (!) 111/57   BP Location: Left arm   Patient Position: Sitting   Pulse: 72   Resp: 20   Temp: 98 °F (36.7 °C)   SpO2: 95%   Weight: 61.1 kg (134 lb 11.2 oz)   Height: 6' (1.829 m)         Constitutional: Pleasant 77 y.o. male in no acute distress.  Well nourished. Well groomed.   HEENT: no appreciated OC or OPX lesions on direct exam.   Lymph: no appreciated cervical adenopathy. +submental edema  Lungs: No audible wheezing.  Normal effort.   Musculoskeletal: No gross MSK deformities.ambulates with a walker  Abdomen: PEG Tube in place without surrounding erythema or tenderness.  Skin: No rashes appreciated.   Psych: Alert and oriented with appropriate mood and affect.  Neuro: Grossly normal.    PROCEDURE:  Flexible nasolaryngoscopy  INDICATION:  evaluate hypopharynx tumor, gag reflex  PROCEDURE DETAILS:  After verbal consent was obtained and a time-out was performed, the nasal cavity was topically anesthetized.  A flexible nasolaryngoscope was introduced through the right nasal cavity.  There were no complications.  Findings:  no posterior pharygneal wall or BOT lesions. No supraglottic edema. Has pooling of secretions, mostly in the left vallecula and HPX, otherwise no hypopharyngeal or laryngeal lesions appreciated. 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  hypopharyngeal stenosis s/p dilation  TSH WNL   ECOG:  (1)    Plan:  RTC 3 months or earlier PRN  Working with for lymphedema  Will plan for surveillance CT chest and will include neck as well at next visit.       Thuan Peñaloza MD  Radiation Oncology

## 2023-06-07 ENCOUNTER — OFFICE VISIT (OUTPATIENT)
Dept: RADIATION ONCOLOGY | Facility: CLINIC | Age: 78
End: 2023-06-07
Payer: MEDICARE

## 2023-06-07 VITALS
TEMPERATURE: 98 F | WEIGHT: 134.69 LBS | DIASTOLIC BLOOD PRESSURE: 57 MMHG | HEART RATE: 72 BPM | RESPIRATION RATE: 20 BRPM | SYSTOLIC BLOOD PRESSURE: 111 MMHG | HEIGHT: 72 IN | OXYGEN SATURATION: 95 % | BODY MASS INDEX: 18.24 KG/M2

## 2023-06-07 DIAGNOSIS — C13.9 PRIMARY MALIGNANT NEOPLASM OF HYPOPHARYNX: Primary | ICD-10-CM

## 2023-06-07 PROCEDURE — 3074F SYST BP LT 130 MM HG: CPT | Mod: CPTII,S$GLB,, | Performed by: STUDENT IN AN ORGANIZED HEALTH CARE EDUCATION/TRAINING PROGRAM

## 2023-06-07 PROCEDURE — 1125F AMNT PAIN NOTED PAIN PRSNT: CPT | Mod: CPTII,S$GLB,, | Performed by: STUDENT IN AN ORGANIZED HEALTH CARE EDUCATION/TRAINING PROGRAM

## 2023-06-07 PROCEDURE — 31575 PR LARYNGOSCOPY, FLEXIBLE; DIAGNOSTIC: ICD-10-PCS | Mod: S$GLB,,, | Performed by: STUDENT IN AN ORGANIZED HEALTH CARE EDUCATION/TRAINING PROGRAM

## 2023-06-07 PROCEDURE — 1101F PR PT FALLS ASSESS DOC 0-1 FALLS W/OUT INJ PAST YR: ICD-10-PCS | Mod: CPTII,S$GLB,, | Performed by: STUDENT IN AN ORGANIZED HEALTH CARE EDUCATION/TRAINING PROGRAM

## 2023-06-07 PROCEDURE — 99213 PR OFFICE/OUTPT VISIT, EST, LEVL III, 20-29 MIN: ICD-10-PCS | Mod: 25,S$GLB,, | Performed by: STUDENT IN AN ORGANIZED HEALTH CARE EDUCATION/TRAINING PROGRAM

## 2023-06-07 PROCEDURE — 1125F PR PAIN SEVERITY QUANTIFIED, PAIN PRESENT: ICD-10-PCS | Mod: CPTII,S$GLB,, | Performed by: STUDENT IN AN ORGANIZED HEALTH CARE EDUCATION/TRAINING PROGRAM

## 2023-06-07 PROCEDURE — 99999 PR PBB SHADOW E&M-EST. PATIENT-LVL III: ICD-10-PCS | Mod: PBBFAC,,, | Performed by: STUDENT IN AN ORGANIZED HEALTH CARE EDUCATION/TRAINING PROGRAM

## 2023-06-07 PROCEDURE — 3288F FALL RISK ASSESSMENT DOCD: CPT | Mod: CPTII,S$GLB,, | Performed by: STUDENT IN AN ORGANIZED HEALTH CARE EDUCATION/TRAINING PROGRAM

## 2023-06-07 PROCEDURE — 1159F MED LIST DOCD IN RCRD: CPT | Mod: CPTII,S$GLB,, | Performed by: STUDENT IN AN ORGANIZED HEALTH CARE EDUCATION/TRAINING PROGRAM

## 2023-06-07 PROCEDURE — 31575 DIAGNOSTIC LARYNGOSCOPY: CPT | Mod: S$GLB,,, | Performed by: STUDENT IN AN ORGANIZED HEALTH CARE EDUCATION/TRAINING PROGRAM

## 2023-06-07 PROCEDURE — 1101F PT FALLS ASSESS-DOCD LE1/YR: CPT | Mod: CPTII,S$GLB,, | Performed by: STUDENT IN AN ORGANIZED HEALTH CARE EDUCATION/TRAINING PROGRAM

## 2023-06-07 PROCEDURE — 3078F PR MOST RECENT DIASTOLIC BLOOD PRESSURE < 80 MM HG: ICD-10-PCS | Mod: CPTII,S$GLB,, | Performed by: STUDENT IN AN ORGANIZED HEALTH CARE EDUCATION/TRAINING PROGRAM

## 2023-06-07 PROCEDURE — 99999 PR PBB SHADOW E&M-EST. PATIENT-LVL III: CPT | Mod: PBBFAC,,, | Performed by: STUDENT IN AN ORGANIZED HEALTH CARE EDUCATION/TRAINING PROGRAM

## 2023-06-07 PROCEDURE — 3078F DIAST BP <80 MM HG: CPT | Mod: CPTII,S$GLB,, | Performed by: STUDENT IN AN ORGANIZED HEALTH CARE EDUCATION/TRAINING PROGRAM

## 2023-06-07 PROCEDURE — 3288F PR FALLS RISK ASSESSMENT DOCUMENTED: ICD-10-PCS | Mod: CPTII,S$GLB,, | Performed by: STUDENT IN AN ORGANIZED HEALTH CARE EDUCATION/TRAINING PROGRAM

## 2023-06-07 PROCEDURE — 1159F PR MEDICATION LIST DOCUMENTED IN MEDICAL RECORD: ICD-10-PCS | Mod: CPTII,S$GLB,, | Performed by: STUDENT IN AN ORGANIZED HEALTH CARE EDUCATION/TRAINING PROGRAM

## 2023-06-07 PROCEDURE — 3074F PR MOST RECENT SYSTOLIC BLOOD PRESSURE < 130 MM HG: ICD-10-PCS | Mod: CPTII,S$GLB,, | Performed by: STUDENT IN AN ORGANIZED HEALTH CARE EDUCATION/TRAINING PROGRAM

## 2023-06-07 PROCEDURE — 99213 OFFICE O/P EST LOW 20 MIN: CPT | Mod: 25,S$GLB,, | Performed by: STUDENT IN AN ORGANIZED HEALTH CARE EDUCATION/TRAINING PROGRAM

## 2023-06-08 ENCOUNTER — TELEPHONE (OUTPATIENT)
Dept: HEMATOLOGY/ONCOLOGY | Facility: CLINIC | Age: 78
End: 2023-06-08
Payer: MEDICARE

## 2023-06-08 NOTE — TELEPHONE ENCOUNTER
Pt called and is appt with Dr Stout rescheduled with Dr Ambrosio with pt approval of day and time.

## 2023-07-03 ENCOUNTER — TELEPHONE (OUTPATIENT)
Dept: HEMATOLOGY/ONCOLOGY | Facility: CLINIC | Age: 78
End: 2023-07-03
Payer: MEDICARE

## 2023-07-26 ENCOUNTER — OFFICE VISIT (OUTPATIENT)
Dept: HEMATOLOGY/ONCOLOGY | Facility: CLINIC | Age: 78
End: 2023-07-26
Payer: MEDICARE

## 2023-07-26 VITALS
DIASTOLIC BLOOD PRESSURE: 62 MMHG | OXYGEN SATURATION: 95 % | WEIGHT: 137.13 LBS | TEMPERATURE: 98 F | RESPIRATION RATE: 16 BRPM | BODY MASS INDEX: 18.6 KG/M2 | HEART RATE: 75 BPM | SYSTOLIC BLOOD PRESSURE: 108 MMHG

## 2023-07-26 DIAGNOSIS — J39.2: Primary | ICD-10-CM

## 2023-07-26 DIAGNOSIS — I89.0 LYMPHEDEMA DUE TO RADIATION: ICD-10-CM

## 2023-07-26 DIAGNOSIS — R25.2 TRISMUS: ICD-10-CM

## 2023-07-26 DIAGNOSIS — C13.9 PRIMARY MALIGNANT NEOPLASM OF HYPOPHARYNX: ICD-10-CM

## 2023-07-26 DIAGNOSIS — R13.12 OROPHARYNGEAL DYSPHAGIA: ICD-10-CM

## 2023-07-26 PROCEDURE — 99999 PR PBB SHADOW E&M-EST. PATIENT-LVL III: CPT | Mod: PBBFAC,,, | Performed by: OTOLARYNGOLOGY

## 2023-07-26 PROCEDURE — 1101F PT FALLS ASSESS-DOCD LE1/YR: CPT | Mod: CPTII,S$GLB,, | Performed by: OTOLARYNGOLOGY

## 2023-07-26 PROCEDURE — 3288F PR FALLS RISK ASSESSMENT DOCUMENTED: ICD-10-PCS | Mod: CPTII,S$GLB,, | Performed by: OTOLARYNGOLOGY

## 2023-07-26 PROCEDURE — 31575 PR LARYNGOSCOPY, FLEXIBLE; DIAGNOSTIC: ICD-10-PCS | Mod: S$GLB,,, | Performed by: OTOLARYNGOLOGY

## 2023-07-26 PROCEDURE — 99213 PR OFFICE/OUTPT VISIT, EST, LEVL III, 20-29 MIN: ICD-10-PCS | Mod: 25,S$GLB,, | Performed by: OTOLARYNGOLOGY

## 2023-07-26 PROCEDURE — 3074F SYST BP LT 130 MM HG: CPT | Mod: CPTII,S$GLB,, | Performed by: OTOLARYNGOLOGY

## 2023-07-26 PROCEDURE — 3288F FALL RISK ASSESSMENT DOCD: CPT | Mod: CPTII,S$GLB,, | Performed by: OTOLARYNGOLOGY

## 2023-07-26 PROCEDURE — 1101F PR PT FALLS ASSESS DOC 0-1 FALLS W/OUT INJ PAST YR: ICD-10-PCS | Mod: CPTII,S$GLB,, | Performed by: OTOLARYNGOLOGY

## 2023-07-26 PROCEDURE — 1126F AMNT PAIN NOTED NONE PRSNT: CPT | Mod: CPTII,S$GLB,, | Performed by: OTOLARYNGOLOGY

## 2023-07-26 PROCEDURE — 99999 PR PBB SHADOW E&M-EST. PATIENT-LVL III: ICD-10-PCS | Mod: PBBFAC,,, | Performed by: OTOLARYNGOLOGY

## 2023-07-26 PROCEDURE — 3074F PR MOST RECENT SYSTOLIC BLOOD PRESSURE < 130 MM HG: ICD-10-PCS | Mod: CPTII,S$GLB,, | Performed by: OTOLARYNGOLOGY

## 2023-07-26 PROCEDURE — 3078F PR MOST RECENT DIASTOLIC BLOOD PRESSURE < 80 MM HG: ICD-10-PCS | Mod: CPTII,S$GLB,, | Performed by: OTOLARYNGOLOGY

## 2023-07-26 PROCEDURE — 1126F PR PAIN SEVERITY QUANTIFIED, NO PAIN PRESENT: ICD-10-PCS | Mod: CPTII,S$GLB,, | Performed by: OTOLARYNGOLOGY

## 2023-07-26 PROCEDURE — 99213 OFFICE O/P EST LOW 20 MIN: CPT | Mod: 25,S$GLB,, | Performed by: OTOLARYNGOLOGY

## 2023-07-26 PROCEDURE — 3078F DIAST BP <80 MM HG: CPT | Mod: CPTII,S$GLB,, | Performed by: OTOLARYNGOLOGY

## 2023-07-26 PROCEDURE — 31575 DIAGNOSTIC LARYNGOSCOPY: CPT | Mod: S$GLB,,, | Performed by: OTOLARYNGOLOGY

## 2023-07-26 NOTE — PROGRESS NOTES
Date of Encounter: 2/23/2022  Provider: Bere Stout MD  Rad Onc: Mehran Peñaloza MD  Med Onc: Zenaida Jones MD; Quentin Rahman MD  ENT: Enrique Maya MD  Dentist: BO Pitts; Cecilio Banks [facility]  GI Medicine: Michelle Weaver MD      CC: [id number] SCCA left hypopharynx, p 16 negative       HPI:    Patient is 76-year-old male who was referred for evaluation of a hypopharyngeal tumor for consideration of total laryngectomy.  Patient has a history of dysphagia and odynophagia.  He was treated with steroids and reflux medication without resolution.  He was seen by Dr Maya who noted a lesion involving the left oropharynx and hypopharynx.  He was taken to the operating room where he underwent endoscopy and biopsy.  The mass was found to be involving the left hypopharynx extending up to the lateral pharyngeal wall and down to the level of the cricopharyngeus.  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 His case was presented at multidisciplinary head neck tumor Board and it was recommended that he undergo adjuvant chemotherapy with consideration for total laryngectomy.    Patient reports that he has difficulty swallowing pills and therefore he was you taking liquid pain medicine which alleviates the pain.  He also complains of thick mucus in the back of his throat and thick postnasal drip.     Patient has a history of Burkitt's lymphoma for which he was treated with chemotherapy only by Dr. Rahman who is also involved it the care of this patient.  He was seen by Dr Rahman on Thursday who has ordered chemo (tentatively set for 3/8/2022) and has an appointment to have a port placed. He has not spoken with Dr Peñaloza since he has seen Josiah.      4/18/2022  Partient is here today for F/U. Tumor board recommended neoadjuvant chemo, followed with reimaging--if responds then proceed with concurrent chemorads--if not proceed to surgery.  He is being treated by Dr Quentin Rahman.   He is S/P 3 rounds of induction chemotherapy, once a week. He had one  week break and restarted chemo last Tuesday but he is now only receiving one drug instead of tow due to SOB.   Patient is to have another dose tomorrow but according to patient Dr Rahman will substitute another drug as he had a reaction to the other.  Patient had nausea and diarrhea last week and now is taking antiemetics 3 times a day.    Patient reports that since start of therapy, his throat pain has resolved in addition to otalgia and intermittent sharp shooting pains up the left side of his head    He was seen by ST who discussed diet consistencies. According to patient he was not given swallowing exercises.  He has had no issues swallowing but he has anorexia resulting in a 16 pound weight loss since diagnosis.   He was drinking 1-2 Ensures/day but has stopped since he developed diarrhea.  Patient is able to tolerate some fruits, soups, pureed foods such as applesauce baked beans and is starting to eat Belvita bars and oatmeal    He was diagnosed with shingles last week and is taking Valtrex.  He has not had the vaccine    5/30/2022  Patient is here today for cancer surveillance.  He has a history of squamous cell carcinoma of the hypopharynx.  He is receiving induction chemotherapy by Dr. Rahman.  He received his last dose of Carboplatinum last week(Taxol was stopped due to reaction).  He is to see Dr Peñaloza, Rad Onc, tomorrow to discuss concurrent chemorads.    He has not seen ST yet; he thought that he did not need an appointment. Patient reports that he is able to swallow fried foods and soft foods was having difficulty eating pasta and hamburger.  He feels like the food is getting caught in a flap in the back of his throat.  He also reports that he has a lot of white secretions that he is coughing up.  He is not using salt baking soda gargles as instructed    8/1/2022    Patient is here today for follow-up.  He is status post induction chemotherapy and now was undergoing concurrent chemoradiation.  Day 5  of cisplatin was given 07/26/2022.  He gets 6th chemo tomorrow and finishes XRT in 13 days (8/17/2022)  He is S/p PEG 7/6/2022. He is consuming 2 cans of Ensure/day. Patient only eats one meals/day which started recently. He has lost 5 pounds since May. According to his wife, his weight fluctuates.  He is doing dysphagia exercises 1-2 times/day.  Patient goes to PT twice weekly.    11/22/2022  Patient is here today for follow-up.  Following his last visit patient had modified barium swallow which showed no passage of food through the upper esophagus.  This was reviewed by me  and the speech therapist.  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  He was then sent to Dr. Mino Hart for transnasal esophagoscopy in the office.  The exam showed a 2 mm opening in the hypopharynx with insufflation.  Patient was then taken to the operating room for dilation.  However examination in the operating room showed complete hypopharyngeal stenosis.  GI was then consulted for a dual procedure; GI will go retrograde through the PEG while Dr. Hart will expose the  hypopharynx  at which time the scar will be excised and the patient will be dilated.  This will be scheduled in the future.    Patient just started new supplement last week--total of 2300 jesse/day( was getting 1700 jesse/day). Weight has remained stable. According to his wife his wife has remained stable for 1-2 months. It was 140 when seen by Dr Rahman.  He could not tolerate other supplements because he refluxed them into his mouth. This indicates that there is a small opening in his hypopharynx as seen by Dr Hart on insufflation in office with TNE. He stopped taking Protonix granules in the past and is no longer doing dysphagia exercises    PET CT is scheduled on 11/30/2022 post treatment.    1/26/2023  Patient is here today for post treatment follow-up.  He was seen by GI Medicine.  He has undergone 2 dilations for  stricture and severe dysphagia, last one yesterday. He was dilated to 12 mm.  Patient attempted teach scrambled eggs this morning but food got hung up.  His last PET-CT scan showed a hypermetabolic lymph node in the paratracheal area and hilum.TB recommended repeat PET CT in 3 months to monitor paratracheal and hilar nodes.    4/27/2023  Patient is here today for routine cancer surveillance.  Since he was last seen he has undergone dilatations for hypopharyngeal stenosis.  Patient reports that he is eating everything but meets at this time.  He feels stronger and wishes to restart his workout routine at the facility associated with [email or web address].  A referral has been sent    7/26/2023  Patient is here today for routine follow-up status post chemo for high before laryngeal to.  Patient had a recent dilation of hyper pharyngeal stenosis by Dr. Sagastume and is scheduled for another EGD in the near future.  Patient reports that he is able to eat anything that he wants although he is not gaining weight.  He has not been using salt and soda water gargles were doing his trismus exercises.  He does continue to work out at [facility] [facility] and is doing exercises to improve his posture and footdrop.    ROS: see HPI  Constitutional: Negative for activity change and appetite change, weight loss.   Eyes: Negative for discharge, visual changes.   Respiratory: Negative for difficulty breathing and wheezing   Cardiovascular: Negative for chest pain.   Gastrointestinal: Negative for abdominal distention and abdominal pain.   Endocrine: Negative for cold intolerance and heat intolerance.   Genitourinary: Negative for dysuria.   Musculoskeletal: Negative for gait problem, muscle pain and joint swelling.   Skin: Negative for color change and pallor; negative for skin lesions.   Neurological: Negative for syncope and weakness; no numbness face.   Psychiatric/Behavioral: Negative for agitation and confusion; negative for  depression.    Physical Exam:      Constitutional  General Appearance: well nourished, well-developed, alert, oriented, in no acute distress; walking with cane for balance; very thin--unchanged; neck is flexed forward  Communication: ability, understanding, normal  Head and Face  Inspection: normocephalic, atraumatic, no scars, lesions or masses   Palpation: no stepoffs, sinus tenderness or masses  Parotid glands: no masses, stones, swelling or tenderness  Eyes:            PERRLA; EOMI  Nose:            No external deformity or intranasal lesions  Oc/OP:         No lesions, mild trismus; tongue normal mobility  IDL:              patient could not tolerate  Neck:           Flexed forward; submental lymphedema  Lymph nodes:  No palpable lymphadenopathy  Heart:          Carotid pulses 2+ and symmetrical  Lungs:        Normal excursion  Neurological  Cranial Nerves: grossly intact  General: no focal deficits  Mood and Affect: no depression, anxiety or agitation  Psychiatric  Orientation: oriented to time, place and person    PROCEDURES:   -------------------- LARYNGOSCOPY / NASOPHARYNGOSCOPY -------------------------     Pre-op DX:  History of hypopharyngeal cancer; hypopharyngeal stenosis  Post-op DX: same  Anesthesia: Topical Neosynephrine / Tetracaine  Indications: to look at lesion  Adverse Events: None        Procedure in Detail:     Flexible endoscopy with video was performed through the nasal passages. The nasal cavity, nasopharynx, oropharynx, hypopharynx and larynx were adequately visualized. The true vocal cords and arytenoids were examined during phonation and repose.        Operative Findings: diffuse radiation chages     Nasal Cavity: Within normal limits  Nasopharynx: Within normal limits  Tongue Base: Within normal limits; copious pooling of secretions valleculae and supraglottis  Pharyngeal Walls: Within normal limits  Epiglottis / Aryepiglottic Folds: Within normal limits  Pyriform Sinus: Within normal  limits  Vocal Cords: Within normal limits; decrease abduction  Arytenoids: Within normal limits      Original  Path:  Pharynx, left posterior wall, biopsies:   - Squamous cell carcinoma, invasive, moderately differentiated   - Depth of invasion (DOI): at least 0.5 cm   - p16 status (IHC): Negative   - No lymphovascular invasion identified   - No perineural invasion identified   NOTE: Immunostain for p16 was performed and did not demonstrate   block-immunopositive staining (interpreted as negative).  Positive control   and internal negative control for immunostain was examined and was   appropriate.     Tumor Board recommendations:  TB recommendation for referral to H&N surgery. Given borderline T3/T4a, consideration for induction and TL/TP pending response.  There is questionable involvement of the posterior lateral thyroid ala.      Assessment:   Hypopharyngeal stenosis--S/P PEG and dilation up to 16 mm--tolerating a regular diet  Poor posture with hunched shoulders and flexed neck. Patient has to look down when he walks. He is doing home exercises  [id number] SCCA hypopharynx, S/P induction chemo followed with concurrent chemoradiation- ANA  Mild trismus    Plan:  Continue lymphedema therapy  Restart trismus exercises and salt and soda gargles  If GI can dilate the stenotic area to 18 mm, they were removed his PEG tube  Continue physical therapy for posture and footdrop  F/U 4 months

## 2023-09-04 NOTE — PROGRESS NOTES
[facility]  Follow Up Visit Note    Diagnosis:  Arthur Ribeiro is a 77 y.o. 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     Oncologic History:  He has a history of tobacco use and burkitt's lymphoma treated with chemotherapy alone with Dr. Rahman.   1/26/22: DL and Biopsy:  Path: L posterior wall with moderately differentiated squamous cell carcinoma, p16-, no PNI or LVI.   2/10/22: PET/CT with left pharyngeal and oropharyngeal mass, left level II nodes with avidity. Mildly avid mediastinal nodes, inflammatory etiology suggested.   5/30/22: completed induction carboplatin (did not tolerate taxol)  5/30/22: exam with H&N surgery with ANA on FNL  6/10/22: MRI neck with near complete resolution of the bilateral cervical lymph nodes and resolution of the left oropharyngeal lesions seen previously. CT chest with no interval abnormal mediastinal or hilar lymph node enlargement   6/28/22 - 8/19/22: 70 Gy in 35 fractions to the pre-chemotherapy gross disease, 61.25 Gy in 35 fractions to the gross disease with margin and the entire larynx. 56 Gy to the bilateral RP, right II-IV and left IB-V and level VI.  11/1/22: MRI soft tissue neck with ANA  2/17/23: Post treatment PET/CT with ANA.      Interval History  The patient presents today for follow up.     Since last visit met with H&N surgery on 7/26 with ANA on exam.    No otalgia, throat pain, voice changes, difficulty breathing, hemoptysis, neck masses. PEG removed a month ago, with no weight loss. He is tolerating almost a full diet with some difficulty with meats as they are dry.  Activity continues to improve. Working with PT. Remains on PPI     Review of Systems   ROS as above    Social History:  Social History     Tobacco Use    Smoking status: Former     Current packs/day: 0.00     Types: Cigarettes, Cigars      Quit date: 10/1/2005     Years since quittin.9    Smokeless tobacco: Never   Substance Use Topics    Alcohol use: Not Currently     Comment: daily    Drug use: No       Family History:  Cancer-related family history includes Cancer in his brother, father, and sister.    Exam:  Vitals:    23 1308   BP: (!) 120/59   Pulse: 83   Resp: 18   Temp: 98.3 °F (36.8 °C)   SpO2: (!) 94%   Weight: 63.5 kg (139 lb 15.9 oz)           Constitutional: Pleasant 77 y.o. male in no acute distress.  Well nourished. Well groomed.   HEENT: no appreciated gingival FOM, buccal, tonsil, soft palate lesions.   Lymph: no appreciated cervical adenopathy. +submental edema  Lungs: No audible wheezing.  Normal effort.   Musculoskeletal: No gross MSK deformities.ambulates with a walker  Skin: No rashes appreciated.   Psych: Alert and oriented with appropriate mood and affect.  Neuro: Grossly normal.  FNL deferred given recent exam with H&N surgery        Data review    Information obtained via chart review and discussion with Mr. 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  Doing well clinically. CT neck and chest without evidence of recurrecne  hypopharyngeal stenosis s/p multiple dilations  TSH WNL   lymphedema  ECOG: (1)    Plan:  We will see him back in 4 months   He will get TSH q6months with his PCP  Referral to pulm for progressive parenchymal changes concerning for chronic inflammatory changes/ fibrosis      Thuan Peñaloza MD  Radiation Oncology

## 2023-09-05 ENCOUNTER — HOSPITAL ENCOUNTER (OUTPATIENT)
Dept: RADIOLOGY | Facility: HOSPITAL | Age: 78
Discharge: HOME | End: 2023-09-05
Attending: STUDENT IN AN ORGANIZED HEALTH CARE EDUCATION/TRAINING PROGRAM
Payer: MEDICARE

## 2023-09-05 DIAGNOSIS — R59.0 MEDIASTINAL ADENOPATHY: ICD-10-CM

## 2023-09-05 DIAGNOSIS — C13.9 MALIGNANT NEOPLASM OF HYPOPHARYNGEAL WALL: Primary | ICD-10-CM

## 2023-09-05 DIAGNOSIS — C13.9 PRIMARY MALIGNANT NEOPLASM OF HYPOPHARYNX: ICD-10-CM

## 2023-09-05 PROCEDURE — 70490 CT SOFT TISSUE NECK W/O DYE: CPT | Mod: TC,PO

## 2023-09-05 PROCEDURE — 71250 CT THORAX DX C-: CPT | Mod: 26,,, | Performed by: RADIOLOGY

## 2023-09-05 PROCEDURE — 70490 CT SOFT TISSUE NECK W/O DYE: CPT | Mod: 26,,, | Performed by: RADIOLOGY

## 2023-09-05 PROCEDURE — 70490 CT SOFT TISSUE NECK WITHOUT CONTRAST: ICD-10-PCS | Mod: 26,,, | Performed by: RADIOLOGY

## 2023-09-05 PROCEDURE — 71250 CT CHEST WITHOUT CONTRAST: ICD-10-PCS | Mod: 26,,, | Performed by: RADIOLOGY

## 2023-09-05 PROCEDURE — 71250 CT THORAX DX C-: CPT | Mod: TC,PO

## 2023-09-06 ENCOUNTER — OFFICE VISIT (OUTPATIENT)
Dept: RADIATION ONCOLOGY | Facility: CLINIC | Age: 78
End: 2023-09-06
Payer: MEDICARE

## 2023-09-06 VITALS
TEMPERATURE: 98 F | WEIGHT: 140 LBS | HEART RATE: 83 BPM | DIASTOLIC BLOOD PRESSURE: 59 MMHG | RESPIRATION RATE: 18 BRPM | SYSTOLIC BLOOD PRESSURE: 120 MMHG | OXYGEN SATURATION: 94 % | BODY MASS INDEX: 18.99 KG/M2

## 2023-09-06 DIAGNOSIS — J98.4 INFLAMMATION OF LUNG: Primary | ICD-10-CM

## 2023-09-06 PROCEDURE — 3288F FALL RISK ASSESSMENT DOCD: CPT | Mod: CPTII,S$GLB,, | Performed by: STUDENT IN AN ORGANIZED HEALTH CARE EDUCATION/TRAINING PROGRAM

## 2023-09-06 PROCEDURE — 99999 PR PBB SHADOW E&M-EST. PATIENT-LVL III: CPT | Mod: PBBFAC,,, | Performed by: STUDENT IN AN ORGANIZED HEALTH CARE EDUCATION/TRAINING PROGRAM

## 2023-09-06 PROCEDURE — 1126F AMNT PAIN NOTED NONE PRSNT: CPT | Mod: CPTII,S$GLB,, | Performed by: STUDENT IN AN ORGANIZED HEALTH CARE EDUCATION/TRAINING PROGRAM

## 2023-09-06 PROCEDURE — 1159F MED LIST DOCD IN RCRD: CPT | Mod: CPTII,S$GLB,, | Performed by: STUDENT IN AN ORGANIZED HEALTH CARE EDUCATION/TRAINING PROGRAM

## 2023-09-06 PROCEDURE — 3074F SYST BP LT 130 MM HG: CPT | Mod: CPTII,S$GLB,, | Performed by: STUDENT IN AN ORGANIZED HEALTH CARE EDUCATION/TRAINING PROGRAM

## 2023-09-06 PROCEDURE — 99999 PR PBB SHADOW E&M-EST. PATIENT-LVL III: ICD-10-PCS | Mod: PBBFAC,,, | Performed by: STUDENT IN AN ORGANIZED HEALTH CARE EDUCATION/TRAINING PROGRAM

## 2023-09-06 PROCEDURE — 3288F PR FALLS RISK ASSESSMENT DOCUMENTED: ICD-10-PCS | Mod: CPTII,S$GLB,, | Performed by: STUDENT IN AN ORGANIZED HEALTH CARE EDUCATION/TRAINING PROGRAM

## 2023-09-06 PROCEDURE — 3078F DIAST BP <80 MM HG: CPT | Mod: CPTII,S$GLB,, | Performed by: STUDENT IN AN ORGANIZED HEALTH CARE EDUCATION/TRAINING PROGRAM

## 2023-09-06 PROCEDURE — 1159F PR MEDICATION LIST DOCUMENTED IN MEDICAL RECORD: ICD-10-PCS | Mod: CPTII,S$GLB,, | Performed by: STUDENT IN AN ORGANIZED HEALTH CARE EDUCATION/TRAINING PROGRAM

## 2023-09-06 PROCEDURE — 3074F PR MOST RECENT SYSTOLIC BLOOD PRESSURE < 130 MM HG: ICD-10-PCS | Mod: CPTII,S$GLB,, | Performed by: STUDENT IN AN ORGANIZED HEALTH CARE EDUCATION/TRAINING PROGRAM

## 2023-09-06 PROCEDURE — 1101F PT FALLS ASSESS-DOCD LE1/YR: CPT | Mod: CPTII,S$GLB,, | Performed by: STUDENT IN AN ORGANIZED HEALTH CARE EDUCATION/TRAINING PROGRAM

## 2023-09-06 PROCEDURE — 99213 OFFICE O/P EST LOW 20 MIN: CPT | Mod: S$GLB,,, | Performed by: STUDENT IN AN ORGANIZED HEALTH CARE EDUCATION/TRAINING PROGRAM

## 2023-09-06 PROCEDURE — 99213 PR OFFICE/OUTPT VISIT, EST, LEVL III, 20-29 MIN: ICD-10-PCS | Mod: S$GLB,,, | Performed by: STUDENT IN AN ORGANIZED HEALTH CARE EDUCATION/TRAINING PROGRAM

## 2023-09-06 PROCEDURE — 3078F PR MOST RECENT DIASTOLIC BLOOD PRESSURE < 80 MM HG: ICD-10-PCS | Mod: CPTII,S$GLB,, | Performed by: STUDENT IN AN ORGANIZED HEALTH CARE EDUCATION/TRAINING PROGRAM

## 2023-09-06 PROCEDURE — 1101F PR PT FALLS ASSESS DOC 0-1 FALLS W/OUT INJ PAST YR: ICD-10-PCS | Mod: CPTII,S$GLB,, | Performed by: STUDENT IN AN ORGANIZED HEALTH CARE EDUCATION/TRAINING PROGRAM

## 2023-09-06 PROCEDURE — 1126F PR PAIN SEVERITY QUANTIFIED, NO PAIN PRESENT: ICD-10-PCS | Mod: CPTII,S$GLB,, | Performed by: STUDENT IN AN ORGANIZED HEALTH CARE EDUCATION/TRAINING PROGRAM

## 2023-10-16 ENCOUNTER — TELEPHONE (OUTPATIENT)
Dept: HEMATOLOGY/ONCOLOGY | Facility: CLINIC | Age: 78
End: 2023-10-16
Payer: MEDICARE

## 2023-10-16 NOTE — TELEPHONE ENCOUNTER
Called to assist pt scheduling appt with Dr Wang.  LVM with appt date, time and location and instructions to call for any questions.

## 2023-10-24 NOTE — TELEPHONE ENCOUNTER
I spoke with Arthur about getting more PT scheduled and he voiced he liked to keep his appts around 2pm time since tx are at 1:30. I had nothing close to time requested 1st week of August so he said skip that week.        ----- Message from Juan A Lion MA sent at 7/28/2022  8:12 AM CDT -----  Regarding: PT  hey she will return after she is completed all of her radiation I think it is a duration of at least 4 weeks, so maybe call her when we get sept schedule so she can restart with a reassess thanks!      
Outpatient OT

## 2023-11-16 ENCOUNTER — OFFICE VISIT (OUTPATIENT)
Dept: HEMATOLOGY/ONCOLOGY | Facility: CLINIC | Age: 78
End: 2023-11-16
Payer: MEDICARE

## 2023-11-16 VITALS
HEART RATE: 75 BPM | OXYGEN SATURATION: 94 % | TEMPERATURE: 97 F | HEIGHT: 72 IN | WEIGHT: 146.63 LBS | RESPIRATION RATE: 18 BRPM | DIASTOLIC BLOOD PRESSURE: 70 MMHG | SYSTOLIC BLOOD PRESSURE: 128 MMHG | BODY MASS INDEX: 19.86 KG/M2

## 2023-11-16 DIAGNOSIS — J39.2: ICD-10-CM

## 2023-11-16 DIAGNOSIS — C13.9 PRIMARY MALIGNANT NEOPLASM OF HYPOPHARYNX: Primary | ICD-10-CM

## 2023-11-16 PROCEDURE — 3074F SYST BP LT 130 MM HG: CPT | Mod: CPTII,S$GLB,, | Performed by: STUDENT IN AN ORGANIZED HEALTH CARE EDUCATION/TRAINING PROGRAM

## 2023-11-16 PROCEDURE — 1126F AMNT PAIN NOTED NONE PRSNT: CPT | Mod: CPTII,S$GLB,, | Performed by: STUDENT IN AN ORGANIZED HEALTH CARE EDUCATION/TRAINING PROGRAM

## 2023-11-16 PROCEDURE — 1159F PR MEDICATION LIST DOCUMENTED IN MEDICAL RECORD: ICD-10-PCS | Mod: CPTII,S$GLB,, | Performed by: STUDENT IN AN ORGANIZED HEALTH CARE EDUCATION/TRAINING PROGRAM

## 2023-11-16 PROCEDURE — 1159F MED LIST DOCD IN RCRD: CPT | Mod: CPTII,S$GLB,, | Performed by: STUDENT IN AN ORGANIZED HEALTH CARE EDUCATION/TRAINING PROGRAM

## 2023-11-16 PROCEDURE — 3288F FALL RISK ASSESSMENT DOCD: CPT | Mod: CPTII,S$GLB,, | Performed by: STUDENT IN AN ORGANIZED HEALTH CARE EDUCATION/TRAINING PROGRAM

## 2023-11-16 PROCEDURE — 99214 PR OFFICE/OUTPT VISIT, EST, LEVL IV, 30-39 MIN: ICD-10-PCS | Mod: S$GLB,,, | Performed by: STUDENT IN AN ORGANIZED HEALTH CARE EDUCATION/TRAINING PROGRAM

## 2023-11-16 PROCEDURE — 99999 PR PBB SHADOW E&M-EST. PATIENT-LVL III: CPT | Mod: PBBFAC,,, | Performed by: STUDENT IN AN ORGANIZED HEALTH CARE EDUCATION/TRAINING PROGRAM

## 2023-11-16 PROCEDURE — 1101F PR PT FALLS ASSESS DOC 0-1 FALLS W/OUT INJ PAST YR: ICD-10-PCS | Mod: CPTII,S$GLB,, | Performed by: STUDENT IN AN ORGANIZED HEALTH CARE EDUCATION/TRAINING PROGRAM

## 2023-11-16 PROCEDURE — 99999 PR PBB SHADOW E&M-EST. PATIENT-LVL III: ICD-10-PCS | Mod: PBBFAC,,, | Performed by: STUDENT IN AN ORGANIZED HEALTH CARE EDUCATION/TRAINING PROGRAM

## 2023-11-16 PROCEDURE — 1126F PR PAIN SEVERITY QUANTIFIED, NO PAIN PRESENT: ICD-10-PCS | Mod: CPTII,S$GLB,, | Performed by: STUDENT IN AN ORGANIZED HEALTH CARE EDUCATION/TRAINING PROGRAM

## 2023-11-16 PROCEDURE — 3078F PR MOST RECENT DIASTOLIC BLOOD PRESSURE < 80 MM HG: ICD-10-PCS | Mod: CPTII,S$GLB,, | Performed by: STUDENT IN AN ORGANIZED HEALTH CARE EDUCATION/TRAINING PROGRAM

## 2023-11-16 PROCEDURE — 3078F DIAST BP <80 MM HG: CPT | Mod: CPTII,S$GLB,, | Performed by: STUDENT IN AN ORGANIZED HEALTH CARE EDUCATION/TRAINING PROGRAM

## 2023-11-16 PROCEDURE — 3074F PR MOST RECENT SYSTOLIC BLOOD PRESSURE < 130 MM HG: ICD-10-PCS | Mod: CPTII,S$GLB,, | Performed by: STUDENT IN AN ORGANIZED HEALTH CARE EDUCATION/TRAINING PROGRAM

## 2023-11-16 PROCEDURE — 99214 OFFICE O/P EST MOD 30 MIN: CPT | Mod: S$GLB,,, | Performed by: STUDENT IN AN ORGANIZED HEALTH CARE EDUCATION/TRAINING PROGRAM

## 2023-11-16 PROCEDURE — 3288F PR FALLS RISK ASSESSMENT DOCUMENTED: ICD-10-PCS | Mod: CPTII,S$GLB,, | Performed by: STUDENT IN AN ORGANIZED HEALTH CARE EDUCATION/TRAINING PROGRAM

## 2023-11-16 PROCEDURE — 1101F PT FALLS ASSESS-DOCD LE1/YR: CPT | Mod: CPTII,S$GLB,, | Performed by: STUDENT IN AN ORGANIZED HEALTH CARE EDUCATION/TRAINING PROGRAM

## 2023-11-16 RX ORDER — PANTOPRAZOLE SODIUM 40 MG/1
60 TABLET, DELAYED RELEASE ORAL
COMMUNITY
Start: 2023-07-27

## 2023-11-16 NOTE — PROGRESS NOTES
Note to patients: In accordance with the  Century Cures Act, patients are now granted immediate electronic access to their medical records. This note is primarily intended for communication among medical professionals. As a result, it may incorporate medical terminology, abbreviations, or language that could appear blunt or unfamiliar. If you have questions about this document, we encourage you to discuss it with your physician.      [facility]  Head & Neck Surgical Oncology Clinic    Patient: Arthur Ribeiro    : 1945    MRN: [id number]  Primary Care Provider: Zohaib Pryor MD  Rad Onc: Mehran Peñaloza MD  Med Onc: Zenaida Jones MD; Quentin Rahman MD  ENT: Enrique Maya MD  Dentist: BO Pitts; Cecilio Banks [facility]  GI Medicine: Dr. Sagastume (Prev. Dr. Weaver)  Date of Encounter: 2023    DIAGNOSIS:    Cancer Staging   Primary malignant neoplasm of hypopharynx  Staging form: Pharynx - Hypopharynx, AJCC 8th Edition  - Clinical stage from 2022: Stage RENO (cT3, cN2b, cM0) - Signed by Evon Galo NP on 2022      CC:   Chief Complaint   Patient presents with    Establish Care     4 mos hypopharyngeal tumor         HPI:   Arthur Ribeiro is a 78 y.o. male with a past medical history significant for [id number] SCC of the left hypopharynx. He was treated with induction chemotherapy followed by definitive CCRT completed 2022.    He required a PEG during his treatment and had completed hypopharyngeal stensis requiring retrograde dilation from Dr. Hart and GI. He has undergone multiple EGD/dilation including recently and is now able to eat whatever he'd like by mouth. He has gained weight since his last visit.    He is undergoing PT to build his strength. He reports occasional left muscle/back pain but denies masses or lesions - seems associated with muscle strength in this area.    Patient denies pain, fever, chills, night sweats, unintentional weight loss, neck  mass, enlarged lymph nodes outside of the head or neck, odynophagia, dysphagia, globus sensation, voice changes, cough, hemoptysis, shortness of breath, or new or concerning skin lesions.     TREATMENT HISTORY:   induction chemotherapy followed by definitive CCRT completed 8/2022    ALLERGIES:  Review of patient's allergies indicates:  No Known Allergies      MEDICATIONS:    Current Outpatient Medications:     gabapentin (NEURONTIN) 800 MG tablet, Take 1 tablet (800 mg total) by mouth 3 (three) times daily., Disp: 270 tablet, Rfl: 3    pantoprazole (PROTONIX) 40 MG tablet, 60 tablets., Disp: , Rfl:     LIDOCAINE VISCOUS 2 % solution, , Disp: , Rfl:   No current facility-administered medications for this visit.    Facility-Administered Medications Ordered in Other Visits:     lactated ringers infusion, , Intravenous, Continuous, Daniel Sagastume MD, Last Rate: 75 mL/hr at 11/06/23 0739, New Bag at 11/06/23 0739    PAST MEDICAL HISTORY:  Past Medical History:   Diagnosis Date    Allergic rhinitis, cause unspecified 08/04/2015    Cancer 2005    Rakesh's lymphoma     Cancer of overlapping sites of hypopharynx 01/17/2022    head, neck, pharynx - stage RENO (AJCC v8) TNM: cT3, cN2b, cM0    Dysphagia     Encounter for blood transfusion     Foot drop     Foot drop, left     Hiatal hernia     Male erectile disorder 03/19/2018    Melanoma in situ of upper arm, right 09/23/2022    suspicious irregular mulit-colored macule located on the right distal dorsal forearm    Neuropathy     Personal history of non-Hodgkin lymphomas 09/08/2016    Spinal stenosis         PAST SURGICAL HISTORY:  Past Surgical History:   Procedure Laterality Date    DIRECT LARYNGOSCOPY N/A 11/08/2022    Procedure: DIRECT LARYNGOSCOPY;  Surgeon: Kavon Hart MD;  Location: [facility] OR [address] [place] [address];  Service: ENT;  Laterality: N/A;  Telescopes, tower, airway basic, diverticuloscopes, pulmonary balloons/jagwire, pediatric gastroscope, phylicia Winn  NGT, FLUORO    ESOPHAGEAL DILATION N/A 7/20/2023    Procedure: DILATION, ESOPHAGUS;  Surgeon: Daniel Sagastume MD;  Location: [facility] ENDO;  Service: Endoscopy;  Laterality: N/A;  savory    ESOPHAGEAL DILATION N/A 8/3/2023    Procedure: DILATION, ESOPHAGUS;  Surgeon: Daniel Sagastume MD;  [facility];  Service: Endoscopy;  Laterality: N/A;  savory    ESOPHAGEAL DILATION N/A 9/14/2023    Procedure: DILATION, ESOPHAGUS;  Surgeon: Daniel Sagastume MD;  Location: [facility] ENDO;  Service: Endoscopy;  Laterality: N/A;    ESOPHAGEAL DILATION N/A 11/6/2023    Procedure: DILATION, ESOPHAGUS;  Surgeon: Daniel Sagastume MD;  [facility];  Service: Endoscopy;  Laterality: N/A;  savory    ESOPHAGOGASTRODUODENOSCOPY N/A 07/06/2022    Procedure: EGD (ESOPHAGOGASTRODUODENOSCOPY);  Surgeon: Daniel Sagastume MD;  Location: [facility] ENDO;  Service: Endoscopy;  Laterality: N/A;    ESOPHAGOGASTRODUODENOSCOPY N/A 01/03/2023    Procedure: EGD (ESOPHAGOGASTRODUODENOSCOPY);  Surgeon: Navin Weaver MD;  Location: [facility] (2ND FLR);  Service: Endoscopy;  Laterality: N/A;  MD Ira Almonte MA  Caller: Unspecified (3 days ago,  3:14 PM)  Clinic pt seen at [place]. Will need EGD under fluoro with me. OM only. Complex stricture that may require retrograde EGD through PEG tube. Schedule as a 90min case with gen    ESOPHAGOGASTRODUODENOSCOPY N/A 01/12/2023    Procedure: EGD (ESOPHAGOGASTRODUODENOSCOPY);  Surgeon: Navin Weaver MD;  Location: [facility] (2ND FLR);  Service: Endoscopy;  Laterality: N/A;  Pt done today. I need to do him next week. Will need EGD under fluoro. 45min case OK. Please try to find a time for me to do his case. I can come in for a 7am start, if needed.    Tube feeds off at midnight-DS  1/5/23-Instructions via portal-DS    ESOPHAGOGASTRODUODENOSCOPY N/A 01/25/2023    Procedure: EGD (ESOPHAGOGASTRODUODENOSCOPY);  Surgeon: Navin Weaver MD;  Location: [facility] ([address] [place] [address]);  Service:  Endoscopy;  Laterality: N/A;  Repeat upper endoscopy in 2 weeks for retreatment  inst via portal & email-MS    ESOPHAGOGASTRODUODENOSCOPY N/A 2023    Procedure: EGD (ESOPHAGOGASTRODUODENOSCOPY);  Surgeon: Navin Weaver MD;  Location: [address]);  Service: Endoscopy;  Laterality: N/A;  23-Instructions via portal-DS    ESOPHAGOGASTRODUODENOSCOPY N/A 2023    Procedure: EGD (ESOPHAGOGASTRODUODENOSCOPY);  Surgeon: Daniel Sagastume MD;  Location: [facility];  Service: Endoscopy;  Laterality: N/A;    ESOPHAGOGASTRODUODENOSCOPY N/A 8/3/2023    Procedure: EGD (ESOPHAGOGASTRODUODENOSCOPY);  Surgeon: Daniel Sagastume MD;  Location: [facility];  Service: Endoscopy;  Laterality: N/A;    ESOPHAGOGASTRODUODENOSCOPY N/A 2023    Procedure: EGD (ESOPHAGOGASTRODUODENOSCOPY);  Surgeon: Daniel Sagastume MD;  Location: [facility];  Service: Endoscopy;  Laterality: N/A;    ESOPHAGOGASTRODUODENOSCOPY N/A 2023    Procedure: EGD (ESOPHAGOGASTRODUODENOSCOPY);  Surgeon: Daniel Sagastume MD;  Location: [facility];  Service: Endoscopy;  Laterality: N/A;    INSERTION OF VENOUS ACCESS PORT      LARYNGOSCOPY Bilateral 2022    Procedure: LARYNGOSCOPY;  Surgeon: Enrique Maya MD;  Location: [facility];  Service: ENT;  Laterality: Bilateral;    SKIN CANCER EXCISION      TONSILLECTOMY          FAMILY HISTORY:  Family History   Problem Relation Age of Onset    Heart disease Mother     Hypertension Mother     Cancer Father         throat    Cancer Sister         thyroid    Cancer Brother         pancreatic    No Known Problems Daughter     No Known Problems Son        SOCIAL HISTORY:  Social History     Socioeconomic History    Marital status:    Tobacco Use    Smoking status: Former     Current packs/day: 0.00     Types: Cigarettes, Cigars     Quit date: 10/1/2005     Years since quittin.1    Smokeless tobacco: Never   Substance and Sexual Activity    Alcohol use: Not Currently      Comment: daily    Drug use: No     See above substance history    REVIEW OF SYSTEMS:   Comprehensive review of systems was discussed with the patient.  It is positive only for the above complaints.    PHYSICAL EXAMINATION:  Blood pressure 128/70, pulse 75, temperature 97.4 °F (36.3 °C), temperature source Temporal, resp. rate 18, height 6' (1.829 m), weight 66.5 kg (146 lb 9.7 oz), SpO2 (!) 94 %.    Constitutional: Non-toxic appearing, cervical kyphosis  Psychiatric: Appropriate mood and affect. Cooperative.  Voice: Non-dysphonic, speaking in full sentences.   Neurologic: Cranial nerves grossly intact, no focal deficits.  Head and face: Salivary glands are not enlarged. Face is symmetric. CN VII strength and sensation intact.  Skin: No concerning skin lesions.   Eyes: Vision grossly intact, bilateral extraocular movements intact  Ears: Bilateral pinna, mastoid, external ear canal normal. Hearing intact.   Nose: External nose appears normal.   Lips: No ulcers or lesions  Oral cavity: Mucosa is pink and moist. Buccal mucosa, gingiva, anterior tongue, floor of mouth, and hard palate appear normal. No leukoplakia, erythroplakia, ulceration, masses or lesions.  Oropharynx: Mucosa is pink and moist. Soft palate and base of tongue are normal. Posterior pharyngeal wall normal. Tonsils are normal. No lesions.  Neck: Central lymphedema, mobile larynx, no masses or lymphadenopathy  Respiratory: Chest expansion symmetric, no audible stridor or stertor. Breathing is unlabored. No active cough.    PROCEDURES:    FLEXIBLE LARYNGOSCOPY  Provider: Amirah Wang MD  Indication: History of cancer   The procedure was explained to the patient and verbal consent was obtained.   Anesthesia: topical lidocaine and neosynephrine applied within one or both nares with an atomizer    The scope was introduced in the usual fashion.    Findings:  Mucosa: normal  Inferior turbinates: no polypoid changes or hypertrophy  Septum: no lesion or  ulcer  Middle meatus: no purulence or polypoid change  Nasopharynx:  Adenoids: normal  Fossa of Rosenmuller: normal  Eustachian tubes: normal  Superior and posterior pharyngeal walls: narrowing circumferentially at level of the epiglottis  Epiglottis, vallecula, and base of tongue: thickened epiglottis fused to base of tongue bilaterally  Pyriform sinuses: effacement of left pyriform, narrow right piriform, some pooling of secretions   False vocal cords, arytenoids, aryepiglottic folds: normal, slight reducted abduction  True vocal cords are symmetrically mobile on phonation and inspiration.   Laryngeal sensation appears intact. There are no masses or ulcerations.     The scope was withdrawn. The patient tolerated the procedure well with no complications.      DATA REVIEWED:     LABORATORY:      Latest Ref Rng & Units 5/31/2023     2:56 PM 9/1/2023     2:45 PM 9/20/2023     7:57 AM   Thyroid Labs   TSH 0.400 - 4.000 uIU/mL   3.660    Free T4 0.78 - 2.19 ng/dL   0.92    Sodium 136 - 145 mmol/L 139  137     Potassium 3.5 - 5.1 mmol/L 4.1  4.3     Chloride 95 - 110 mmol/L 99  100     Carbon Dioxide 22 - 31 mmol/L 31  29     Glucose 70 - 110 mg/dL 103  103     Blood Urea Nitrogen 9 - 21 mg/dL 38  46     Creatinine 0.50 - 1.40 mg/dL 1.74  1.97     Calcium 8.4 - 10.2 mg/dL 9.1  8.9     Total Protein 6.0 - 8.4 g/dL 6.8  6.5     Albumin 3.5 - 5.2 g/dL 4.1  3.8     Total Bilirubin 0.2 - 1.3 mg/dL 0.4  0.3     AST 17 - 59 U/L 21  24     ALT 0 - 50 U/L 14  14     Anion Gap 5 - 12 mmol/L 9  8     WBC 3.90 - 12.70 K/uL 5.17  4.28     RBC 4.60 - 6.20 M/uL 3.09  3.00     Hemoglobin 14.0 - 18.0 g/dL 9.9  9.5     Hematocrit 40.0 - 54.0 % 29.9  29.4     MCV 82 - 98 fL 97  98     MCH 27.0 - 31.0 pg 32.0  31.7     MCHC 32.0 - 36.0 g/dL 33.1  32.3     RDW 11.5 - 14.5 % 12.6  13.7     Platelets 150 - 450 K/uL 181  163     MPV 9.2 - 12.9 fL 10.1  10.1     Gran # 1.8 - 7.7 K/uL 3.1  2.6     Lymph # 1.0 - 4.8 K/uL 1.4  1.1     Mono # 0.3 -  1.0 K/uL 0.5  0.4     Eos # 0.0 - 0.5 K/uL 0.1  0.2     Baso # 0.00 - 0.20 K/uL 0.02  0.02     Gran % 38.0 - 73.0 % 59.7  60.5     Lymph % 18.0 - 48.0 % 26.9  25.5     Mono% 4.0 - 15.0 % 10.1  9.1     Eos % 0.0 - 8.0 % 2.7  4.2     Baso % 0.0 - 1.9 % 0.4  0.5         PATHOLOGY:  Pharynx, left posterior wall, biopsies:   - Squamous cell carcinoma, invasive, moderately differentiated   - Depth of invasion (DOI): at least 0.5 cm   - p16 status (IHC): Negative   - No lymphovascular invasion identified   - No perineural invasion identified   NOTE: Immunostain for p16 was performed and did not demonstrate   block-immunopositive staining (interpreted as negative).  Positive control   and internal negative control for immunostain was examined and was   appropriate.     IMAGING:  CT neck/chest 9/2023 -  1. There are post treatment changes discussed above.  There is no obvious recurrent left lateral pharyngeal/hypopharyngeal mass.  The study is performed without contrast as mentioned above due to progressively decreasing GFR in increasing creatinine.  2. The epiglottis is mildly diffusely thickened.  This likely represents treatment change.  3. There is no pathologic or necrotic cervical lymphadenopathy.  4. There has been interval progression of biapical interstitial reticulonodular disease, further evaluated on concurrent CT chest.    1. Limited noncontrast CT of the chest is without evidence of any new/progressive lymphadenopathy within the chest as compared to prior PET-CT 02/27/2023.  No acute findings.  2. Progressive biapical/subpleural reticulonodular pulmonary parenchymal changes suggestive of chronic inflammatory changes/fibrosis as discussed above.    PET 2/2023 -  Persistent mildly hypermetabolic lymph nodes in the lower right paratracheal region and right hilum with max SUV values not higher than mediastinal blood pool.  The lack of significant change suggests that these are reactive nodes rather than metastatic  disease.     RADIOLOGY REVIEWED:  I have independently viewed and agree with the CT images and reports which demonstrate no evidence of disease recurrence.    ASSESSMENT AND PLAN:  1. Primary malignant neoplasm of hypopharynx    2. Hypopharyngeal stenosis         Arthur Ribeiro is a 78 y.o. male with [id number] SCC of the hypopharynx s/p induction chemo followed by CCRT completed 8/2022, with course c/b hypopharyngeal stenosis, now eating PO and doing well.  - Continue physical therapy  - Continue swallow exercises  - Follow-up 4 months for surveillance  - Will need carotid Dopplers after next visit    Patient encouraged to call with any questions, concerns, or new or worsening symptoms.     Follow up 4 mo

## 2023-11-20 ENCOUNTER — DOCUMENTATION ONLY (OUTPATIENT)
Dept: HEMATOLOGY/ONCOLOGY | Facility: CLINIC | Age: 78
End: 2023-11-20
Payer: MEDICARE

## 2024-01-24 ENCOUNTER — OFFICE VISIT (OUTPATIENT)
Dept: URGENT CARE | Facility: CLINIC | Age: 79
End: 2024-01-24
Payer: MEDICARE

## 2024-01-24 VITALS
OXYGEN SATURATION: 98 % | HEART RATE: 104 BPM | HEIGHT: 72 IN | DIASTOLIC BLOOD PRESSURE: 60 MMHG | WEIGHT: 146 LBS | SYSTOLIC BLOOD PRESSURE: 96 MMHG | TEMPERATURE: 99 F | BODY MASS INDEX: 19.77 KG/M2 | RESPIRATION RATE: 16 BRPM

## 2024-01-24 DIAGNOSIS — I95.9 HYPOTENSION, UNSPECIFIED HYPOTENSION TYPE: ICD-10-CM

## 2024-01-24 DIAGNOSIS — E86.0 DEHYDRATION: ICD-10-CM

## 2024-01-24 DIAGNOSIS — R53.83 FATIGUE, UNSPECIFIED TYPE: Primary | ICD-10-CM

## 2024-01-24 LAB
CTP QC/QA: YES
SARS-COV-2 AG RESP QL IA.RAPID: NEGATIVE

## 2024-01-24 PROCEDURE — 87811 SARS-COV-2 COVID19 W/OPTIC: CPT | Mod: QW,S$GLB,, | Performed by: INTERNAL MEDICINE

## 2024-01-24 PROCEDURE — 99214 OFFICE O/P EST MOD 30 MIN: CPT | Mod: S$GLB,,, | Performed by: INTERNAL MEDICINE

## 2024-01-24 NOTE — PATIENT INSTRUCTIONS
Advised to go to emergency room for fluid replacement.    Discussed with the patient who requesting that he will drink fluid at home.  I advised the patient to go to emergency room if he developed weakness and not able to drink oral fluids    If your condition worsens we recommend that you receive another evaluation at the emergency room immediately or contact your primary medical clinics after hours call service to discuss your concerns. You must understand that you've received an Urgent Care treatment only and that you may be released before all of your medical problems are known or treated. You, the patient, will arrange for follow up care as instructed.  Drink plenty of Fluids  Wash hands frequently using mild antibacterial soap lathering for at least 15 seconds then rinse  Get plenty of Rest  Follow up in 1-2 weeks with Primary Care physician if not significantly better.   If you are not allergic please take Tylenol every 4-6 hours as needed and/or Ibuprofen every 6-8 hours as needed, over the counter for pain or fever.

## 2024-01-24 NOTE — PROGRESS NOTES
Subjective:      Patient ID: Arthur Ribeiro is a 78 y.o. male.    Vitals:  height is 6' (1.829 m) and weight is 66.2 kg (146 lb). His oral temperature is 98.7 °F (37.1 °C). His blood pressure is 96/60 and his pulse is 104. His respiration is 16 and oxygen saturation is 98%.     Chief Complaint: Fever    Pt came in today with complaints of fever and fatigue that started last week. He has not taken any medications for his symptoms    Fever   This is a new problem. The current episode started in the past 7 days. The problem occurs intermittently. The problem has been gradually worsening. Associated symptoms include muscle aches. He has tried nothing for the symptoms. The treatment provided no relief.     Constitution: Positive for fever.      Objective:     Physical Exam   Constitutional: He is oriented to person, place, and time. He appears well-developed. He is cooperative.  Non-toxic appearance. He does not appear ill. No distress.   HENT:   Head: Normocephalic and atraumatic.   Ears:   Right Ear: Hearing, tympanic membrane, external ear and ear canal normal.   Left Ear: Hearing, tympanic membrane, external ear and ear canal normal.   Nose: Nose normal. No mucosal edema, rhinorrhea or nasal deformity. No epistaxis. Right sinus exhibits no maxillary sinus tenderness and no frontal sinus tenderness. Left sinus exhibits no maxillary sinus tenderness and no frontal sinus tenderness.   Mouth/Throat: Uvula is midline, oropharynx is clear and moist and mucous membranes are normal. No trismus in the jaw. Normal dentition. No uvula swelling. No oropharyngeal exudate, posterior oropharyngeal edema or posterior oropharyngeal erythema.   Eyes: Conjunctivae and lids are normal. Pupils are equal, round, and reactive to light. No scleral icterus.   Neck: Trachea normal and phonation normal. Neck supple. No edema present. No erythema present. No neck rigidity present.   Cardiovascular: Normal rate, regular rhythm, normal heart  sounds and normal pulses.   Pulmonary/Chest: Effort normal and breath sounds normal. No respiratory distress. He has no decreased breath sounds. He has no rhonchi.   Abdominal: Normal appearance.   Musculoskeletal: Normal range of motion.         General: No deformity. Normal range of motion.   Neurological: He is alert and oriented to person, place, and time. He exhibits normal muscle tone. Coordination normal.   Skin: Skin is warm, dry, intact, not diaphoretic and not pale.   Psychiatric: His speech is normal and behavior is normal. Judgment and thought content normal.   Nursing note and vitals reviewed.      Assessment:     1. Fatigue, unspecified type    2. Hypotension, unspecified hypotension type    3. Dehydration        Plan:       Fatigue, unspecified type  -     SARS Coronavirus 2 Antigen, POCT Manual Read    Hypotension, unspecified hypotension type    Dehydration      Patient Instructions   Advised to go to emergency room for fluid replacement.    Discussed with the patient who requesting that he will drink fluid at home.  I advised the patient to go to emergency room if he developed weakness and not able to drink oral fluids    If your condition worsens we recommend that you receive another evaluation at the emergency room immediately or contact your primary medical clinics after hours call service to discuss your concerns. You must understand that you've received an Urgent Care treatment only and that you may be released before all of your medical problems are known or treated. You, the patient, will arrange for follow up care as instructed.  Drink plenty of Fluids  Wash hands frequently using mild antibacterial soap lathering for at least 15 seconds then rinse  Get plenty of Rest  Follow up in 1-2 weeks with Primary Care physician if not significantly better.   If you are not allergic please take Tylenol every 4-6 hours as needed and/or Ibuprofen every 6-8 hours as needed, over the counter for pain or  fever.

## 2024-03-04 ENCOUNTER — TELEPHONE (OUTPATIENT)
Dept: HEMATOLOGY/ONCOLOGY | Facility: CLINIC | Age: 79
End: 2024-03-04
Payer: MEDICARE

## 2024-03-04 NOTE — TELEPHONE ENCOUNTER
Assisted pt rescheduling appt with Dr Wang. ----- Message from Pinky Gaston sent at 3/4/2024  4:29 PM CST -----  Regarding: reschedule  Contact: patient  Type:  Sooner Appointment Request    Caller is requesting a sooner appointment.  Caller declined first available appointment listed below.  Caller will not accept being placed on the waitlist and is requesting a message be sent to doctor.    Name of Caller:  patient  When is the first available appointment?    Symptoms:    Would the patient rather a call back or a response via [facility]? call  Best Call Back Number:  [phone] (home)     Additional Information:  please call patient to reschedule.  Thanks!

## 2024-03-04 NOTE — TELEPHONE ENCOUNTER
----- Message from Pinky Gaston sent at 3/4/2024  4:29 PM CST -----  Regarding: reschedule  Contact: patient  Type:  Sooner Appointment Request    Caller is requesting a sooner appointment.  Caller declined first available appointment listed below.  Caller will not accept being placed on the waitlist and is requesting a message be sent to doctor.    Name of Caller:  patient  When is the first available appointment?    Symptoms:    Would the patient rather a call back or a response via [facility]? call  Best Call Back Number:  [phone] (home)     Additional Information:  please call patient to reschedule.  Thanks!

## 2024-03-28 ENCOUNTER — OFFICE VISIT (OUTPATIENT)
Dept: HEMATOLOGY/ONCOLOGY | Facility: CLINIC | Age: 79
End: 2024-03-28
Payer: MEDICARE

## 2024-03-28 VITALS
BODY MASS INDEX: 20.74 KG/M2 | SYSTOLIC BLOOD PRESSURE: 137 MMHG | RESPIRATION RATE: 16 BRPM | OXYGEN SATURATION: 98 % | DIASTOLIC BLOOD PRESSURE: 67 MMHG | TEMPERATURE: 98 F | HEART RATE: 79 BPM | WEIGHT: 148.13 LBS | HEIGHT: 71 IN

## 2024-03-28 DIAGNOSIS — I25.10: ICD-10-CM

## 2024-03-28 DIAGNOSIS — C13.9 PRIMARY MALIGNANT NEOPLASM OF HYPOPHARYNX: Primary | ICD-10-CM

## 2024-03-28 DIAGNOSIS — R53.1 GENERALIZED WEAKNESS: ICD-10-CM

## 2024-03-28 DIAGNOSIS — T66.XXXS ADVERSE EFFECT OF RADIATION, SEQUELA: ICD-10-CM

## 2024-03-28 DIAGNOSIS — I89.0 LYMPHEDEMA DUE TO RADIATION: ICD-10-CM

## 2024-03-28 DIAGNOSIS — R47.02 DYSPHASIA: ICD-10-CM

## 2024-03-28 PROCEDURE — 31575 DIAGNOSTIC LARYNGOSCOPY: CPT | Mod: S$GLB,,, | Performed by: STUDENT IN AN ORGANIZED HEALTH CARE EDUCATION/TRAINING PROGRAM

## 2024-03-28 PROCEDURE — 1159F MED LIST DOCD IN RCRD: CPT | Mod: CPTII,S$GLB,, | Performed by: STUDENT IN AN ORGANIZED HEALTH CARE EDUCATION/TRAINING PROGRAM

## 2024-03-28 PROCEDURE — 99999 PR PBB SHADOW E&M-EST. PATIENT-LVL IV: CPT | Mod: PBBFAC,,, | Performed by: STUDENT IN AN ORGANIZED HEALTH CARE EDUCATION/TRAINING PROGRAM

## 2024-03-28 PROCEDURE — 3078F DIAST BP <80 MM HG: CPT | Mod: CPTII,S$GLB,, | Performed by: STUDENT IN AN ORGANIZED HEALTH CARE EDUCATION/TRAINING PROGRAM

## 2024-03-28 PROCEDURE — 1100F PTFALLS ASSESS-DOCD GE2>/YR: CPT | Mod: CPTII,S$GLB,, | Performed by: STUDENT IN AN ORGANIZED HEALTH CARE EDUCATION/TRAINING PROGRAM

## 2024-03-28 PROCEDURE — 3288F FALL RISK ASSESSMENT DOCD: CPT | Mod: CPTII,S$GLB,, | Performed by: STUDENT IN AN ORGANIZED HEALTH CARE EDUCATION/TRAINING PROGRAM

## 2024-03-28 PROCEDURE — 99213 OFFICE O/P EST LOW 20 MIN: CPT | Mod: 25,S$GLB,, | Performed by: STUDENT IN AN ORGANIZED HEALTH CARE EDUCATION/TRAINING PROGRAM

## 2024-03-28 PROCEDURE — 1126F AMNT PAIN NOTED NONE PRSNT: CPT | Mod: CPTII,S$GLB,, | Performed by: STUDENT IN AN ORGANIZED HEALTH CARE EDUCATION/TRAINING PROGRAM

## 2024-03-28 PROCEDURE — 3075F SYST BP GE 130 - 139MM HG: CPT | Mod: CPTII,S$GLB,, | Performed by: STUDENT IN AN ORGANIZED HEALTH CARE EDUCATION/TRAINING PROGRAM

## 2024-03-28 NOTE — PROGRESS NOTES
Note to patients: In accordance with the  Century Cures Act, patients are now granted immediate electronic access to their medical records. This note is primarily intended for communication among medical professionals. As a result, it may incorporate medical terminology, abbreviations, or language that could appear blunt or unfamiliar. If you have questions about this document, we encourage you to discuss it with your physician.      [facility]  Head & Neck Surgical Oncology [facility]    Patient: Arthur Ribeiro    : 1945    MRN: [id number]  Primary Care Provider: Zohaib Pryor MD  Rad Onc: Mehran Peñaloza MD  Med Onc: Zenaida Jones MD; Quentin Rahman MD  ENT: Enrique Maya MD  Dentist: BO Pitts; Cecilio Banks [facility] [place]  GI Medicine: Dr. Sagastume (Prev. Dr. Weaver)  Date of Encounter: 2024    DIAGNOSIS:    Cancer Staging   Primary malignant neoplasm of hypopharynx  Staging form: Pharynx - Hypopharynx, AJCC 8th Edition  - Clinical stage from 2022: Stage RENO (cT3, cN2b, cM0) - Signed by Evon Galo NP on 2022      CC:   Chief Complaint   Patient presents with    Follow-up       HPI:   Artuhr Ribeiro is a 78 y.o. male with a past medical history significant for [id number] SCC of the left hypopharynx. He was treated with induction chemotherapy followed by definitive CCRT completed 2022.    He required a PEG during his treatment and had completed hypopharyngeal stensis requiring retrograde dilation from Dr. Hart and GI. He has undergone multiple EGD/dilation including recently and is now able to eat whatever he'd like by mouth. He has gained weight since his last visit.    He is undergoing PT to build his strength. He reports occasional left muscle/back pain but denies masses or lesions - seems associated with muscle strength in this area. He is having neck stiffness that is worse on the left side, he states he has been having this issue since radiation. He does  "not remember going to PT in the past for this. He has an EGD set up with Dr. Sagastume on April 12th. He has dilated him the last 4-5xs. He is scoping him q6 months. He is having some dysphagia. His appetite is good and his weight is stable. He had his TSH checked by his PCP yesterday and his PCP started him on synthroid.     Patient denies pain, fever, chills, night sweats, unintentional weight loss, neck mass, enlarged lymph nodes outside of the head or neck, odynophagia, dysphagia, globus sensation, voice changes, cough, hemoptysis, shortness of breath, or new or concerning skin lesions.     TREATMENT HISTORY:   induction chemotherapy followed by definitive CCRT completed 8/2022    ALLERGIES:  Review of patient's allergies indicates:  No Known Allergies      MEDICATIONS:    Current Outpatient Medications:     gabapentin (NEURONTIN) 800 MG tablet, Take 1 tablet (800 mg total) by mouth 3 (three) times daily., Disp: 270 tablet, Rfl: 3    pantoprazole (PROTONIX) 40 MG tablet, 60 tablets., Disp: , Rfl:     levothyroxine (SYNTHROID) 50 MCG tablet, Take 1 tablet (50 mcg total) by mouth before breakfast. (Patient not taking: Reported on 3/28/2024), Disp: 90 tablet, Rfl: 0    LIDOCAINE VISCOUS 2 % solution, , Disp: , Rfl:   No current facility-administered medications for this visit.    Facility-Administered Medications Ordered in Other Visits:     lactated ringers infusion, , Intravenous, Continuous, Daniel Sagastume MD, Last Rate: 75 mL/hr at 11/06/23 0739, New Bag at 11/06/23 0739    OTHER HISTORY  Past medical, surgical, family, and social histories have been updated and reviewed as needed since last visit.     REVIEW OF SYSTEMS:   Comprehensive review of systems was discussed with the patient.  It is positive only for the above complaints.    PHYSICAL EXAMINATION:  Blood pressure 137/67, pulse 79, temperature 98 °F (36.7 °C), temperature source Temporal, resp. rate 16, height 5' 11" (1.803 m), weight 67.2 kg (148 " lb 2.4 oz), SpO2 98 %.    Constitutional: Non-toxic appearing, cervical kyphosis  Psychiatric: Appropriate mood and affect. Cooperative.  Voice: Non-dysphonic, speaking in full sentences.   Neurologic: Cranial nerves grossly intact, no focal deficits.  Head and face: Salivary glands are not enlarged. Face is symmetric. CN VII strength and sensation intact.  Skin: No concerning skin lesions.   Eyes: Vision grossly intact, bilateral extraocular movements intact  Ears: Bilateral pinna, mastoid, external ear canal normal. Hearing intact.   Nose: External nose appears normal.   Lips: No ulcers or lesions  Oral cavity: Mucosa is pink and moist. Buccal mucosa, gingiva, anterior tongue, floor of mouth, and hard palate appear normal. No leukoplakia, erythroplakia, ulceration, masses or lesions.  Oropharynx: Mucosa is pink and moist. Soft palate and base of tongue are normal. Posterior pharyngeal wall normal. Tonsils are normal. No lesions.  Neck: Central lymphedema, mobile larynx, no masses or lymphadenopathy  Respiratory: Chest expansion symmetric, no audible stridor or stertor. Breathing is unlabored. No active cough.    PROCEDURES:    FLEXIBLE LARYNGOSCOPY  Provider: Amirah Wang MD  Indication: History of cancer   The procedure was explained to the patient and verbal consent was obtained.   Anesthesia: topical lidocaine and neosynephrine applied within one or both nares with an atomizer    The scope was introduced in the usual fashion.    Findings:  Mucosa: normal  Inferior turbinates: no polypoid changes or hypertrophy  Septum: no lesion or ulcer  Middle meatus: no purulence or polypoid change  Nasopharynx:  Adenoids: normal  Fossa of Rosenmuller: normal  Eustachian tubes: normal  Superior and posterior pharyngeal walls: narrowing circumferentially at level of the epiglottis  Epiglottis, vallecula, and base of tongue: thickened epiglottis fused to base of tongue bilaterally  Pyriform sinuses: effacement of left  pyriform, narrow right piriform, some pooling of secretions   False vocal cords, arytenoids, aryepiglottic folds: normal, slight reducted abduction  True vocal cords are symmetrically mobile on phonation and inspiration.   Laryngeal sensation appears intact. There are no masses or ulcerations.     The scope was withdrawn. The patient tolerated the procedure well with no complications.      DATA REVIEWED:     LABORATORY:      Latest Ref Rng & Units 1/22/2024     3:25 PM 3/8/2024    10:25 AM 3/27/2024     8:31 AM   Thyroid Labs   TSH 0.400 - 4.000 uIU/mL   5.080    Free T4 0.78 - 2.19 ng/dL   1.04    Sodium 136 - 145 mmol/L 137  139     Potassium 3.5 - 5.1 mmol/L 4.1  4.1     Chloride 95 - 110 mmol/L 103  105     Carbon Dioxide 22 - 31 mmol/L 27  29     Glucose 70 - 110 mg/dL 112  72     Blood Urea Nitrogen 9 - 21 mg/dL 45  40     Creatinine 0.50 - 1.40 mg/dL 2.18  1.98     Calcium 8.4 - 10.2 mg/dL 9.1  9.0     Total Protein 6.0 - 8.4 g/dL 6.6  6.7     Albumin 3.5 - 5.2 g/dL 3.8  3.7     Total Bilirubin 0.2 - 1.3 mg/dL 0.5  0.6     AST 17 - 59 U/L 42  31     ALT 0 - 50 U/L 29  22     Anion Gap 5 - 12 mmol/L 7  5     WBC 3.90 - 12.70 K/uL 4.08  4.46     RBC 4.60 - 6.20 M/uL 3.27  3.28     Hemoglobin 14.0 - 18.0 g/dL 10.1  10.0     Hematocrit 40.0 - 54.0 % 31.2  31.2     MCV 82 - 98 fL 95  95     MCH 27.0 - 31.0 pg 30.9  30.5     MCHC 32.0 - 36.0 g/dL 32.4  32.1     RDW 11.5 - 14.5 % 13.2  13.9     Platelets 150 - 450 K/uL 150  156     MPV 9.2 - 12.9 fL 10.4  9.4     Gran # 1.8 - 7.7 K/uL 2.2  1.4     Lymph # 1.0 - 4.8 K/uL  2.3     Mono # 0.3 - 1.0 K/uL  0.5     Eos # 0.0 - 0.5 K/uL  0.2     Baso # 0.00 - 0.20 K/uL  0.04     Gran % 38.0 - 73.0 % 54.0  31.9     Lymph % 18.0 - 48.0 % 34.0  50.4     Mono% 4.0 - 15.0 % 12.0  11.4     Eos % 0.0 - 8.0 % 0.0  5.2     Baso % 0.0 - 1.9 % 0.0  0.9         PATHOLOGY:  Pharynx, left posterior wall, biopsies:   - Squamous cell carcinoma, invasive, moderately differentiated   -  Depth of invasion (DOI): at least 0.5 cm   - p16 status (IHC): Negative   - No lymphovascular invasion identified   - No perineural invasion identified   NOTE: Immunostain for p16 was performed and did not demonstrate   block-immunopositive staining (interpreted as negative).  Positive control   and internal negative control for immunostain was examined and was   appropriate.     IMAGING:  CT neck/chest 9/2023 -  1. There are post treatment changes discussed above.  There is no obvious recurrent left lateral pharyngeal/hypopharyngeal mass.  The study is performed without contrast as mentioned above due to progressively decreasing GFR in increasing creatinine.  2. The epiglottis is mildly diffusely thickened.  This likely represents treatment change.  3. There is no pathologic or necrotic cervical lymphadenopathy.  4. There has been interval progression of biapical interstitial reticulonodular disease, further evaluated on concurrent CT chest.    1. Limited noncontrast CT of the chest is without evidence of any new/progressive lymphadenopathy within the chest as compared to prior PET-CT 02/27/2023.  No acute findings.  2. Progressive biapical/subpleural reticulonodular pulmonary parenchymal changes suggestive of chronic inflammatory changes/fibrosis as discussed above.    PET 2/2023 -  Persistent mildly hypermetabolic lymph nodes in the lower right paratracheal region and right hilum with max SUV values not higher than mediastinal blood pool.  The lack of significant change suggests that these are reactive nodes rather than metastatic disease.     RADIOLOGY REVIEWED:  I have independently viewed and agree with the CT images and reports which demonstrate no evidence of disease recurrence.    ASSESSMENT AND PLAN:  1. Primary malignant neoplasm of hypopharynx    2. 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 Dysphasia     4. Generalized weakness    5. Adverse effect of radiation, sequela    6. Radiation-induced coronary artery  disease           Arthur Ribeiro is a 78 y.o. male with [id number] SCC of the hypopharynx s/p induction chemo followed by CCRT completed 8/2022, with course c/b hypopharyngeal stenosis, now eating PO and doing well.  - Continue physical therapy, will send in new referral for lymphedema treatment and to evaluate neck pain/stiffness to [facility]  - Follow-up 4 months for surveillance  - Will need carotid Dopplers next visit  - CT due in September    Patient encouraged to call with any questions, concerns, or new or worsening symptoms.     Follow up 4 mo

## 2024-04-02 ENCOUNTER — TELEPHONE (OUTPATIENT)
Dept: HEMATOLOGY/ONCOLOGY | Facility: CLINIC | Age: 79
End: 2024-04-02
Payer: MEDICARE

## 2024-07-19 ENCOUNTER — TELEPHONE (OUTPATIENT)
Dept: HEMATOLOGY/ONCOLOGY | Facility: CLINIC | Age: 79
End: 2024-07-19
Payer: MEDICARE

## 2024-07-19 NOTE — TELEPHONE ENCOUNTER
----- Message from Jdvishal Jacobo sent at 7/19/2024  2:06 PM CDT -----  Regarding: advice  Contact: howard  Type: Needs Medical Advice  Who Called:  Howard wife  Symptoms (please be specific):    How long has patient had these symptoms:    Pharmacy name and phone #:    Best Call Back Number: [phone]  Additional Information: Wife stated that she would like information on the pt upcoming appt. Please call to advise. Thanks

## 2024-07-19 NOTE — TELEPHONE ENCOUNTER
Pt's wife calling to discuss US that was ordered by Dr Wang, all questions answered to wife's satisfaction.  Wife updated me on pt's recent ER visit for weakness in his legs.  Noted CTA was done.  Will update Dr Wang.----- Message from Pinky Gaston sent at 7/19/2024  2:37 PM CDT -----  Regarding: return call  Contact: dary spouse  Type:  Patient Returning Call    Who Called:  Dary spouse  Who Left Message for Patient:  Iman  Does the patient know what this is regarding?:    Best Call Back Number:  [phone]  Additional Information:  Please call spouse to advise.  Thanks!

## 2024-07-22 NOTE — TELEPHONE ENCOUNTER
Notified that pt does not need to have carotid US done- pt's CTA is sufficient.  Appt canceled with wife's agreement.

## 2024-07-29 ENCOUNTER — OFFICE VISIT (OUTPATIENT)
Dept: HEMATOLOGY/ONCOLOGY | Facility: CLINIC | Age: 79
End: 2024-07-29
Payer: MEDICARE

## 2024-07-29 VITALS
SYSTOLIC BLOOD PRESSURE: 115 MMHG | HEART RATE: 64 BPM | RESPIRATION RATE: 16 BRPM | WEIGHT: 145.31 LBS | OXYGEN SATURATION: 97 % | TEMPERATURE: 98 F | DIASTOLIC BLOOD PRESSURE: 56 MMHG | BODY MASS INDEX: 19.68 KG/M2 | HEIGHT: 72 IN

## 2024-07-29 DIAGNOSIS — C13.9 PRIMARY MALIGNANT NEOPLASM OF HYPOPHARYNX: Primary | ICD-10-CM

## 2024-07-29 PROCEDURE — 99999 PR PBB SHADOW E&M-EST. PATIENT-LVL III: CPT | Mod: PBBFAC,,, | Performed by: STUDENT IN AN ORGANIZED HEALTH CARE EDUCATION/TRAINING PROGRAM

## 2024-07-29 PROCEDURE — 1101F PT FALLS ASSESS-DOCD LE1/YR: CPT | Mod: CPTII,S$GLB,, | Performed by: STUDENT IN AN ORGANIZED HEALTH CARE EDUCATION/TRAINING PROGRAM

## 2024-07-29 PROCEDURE — 31575 DIAGNOSTIC LARYNGOSCOPY: CPT | Mod: S$GLB,,, | Performed by: STUDENT IN AN ORGANIZED HEALTH CARE EDUCATION/TRAINING PROGRAM

## 2024-07-29 PROCEDURE — 1159F MED LIST DOCD IN RCRD: CPT | Mod: CPTII,S$GLB,, | Performed by: STUDENT IN AN ORGANIZED HEALTH CARE EDUCATION/TRAINING PROGRAM

## 2024-07-29 PROCEDURE — 3078F DIAST BP <80 MM HG: CPT | Mod: CPTII,S$GLB,, | Performed by: STUDENT IN AN ORGANIZED HEALTH CARE EDUCATION/TRAINING PROGRAM

## 2024-07-29 PROCEDURE — 99213 OFFICE O/P EST LOW 20 MIN: CPT | Mod: 25,S$GLB,, | Performed by: STUDENT IN AN ORGANIZED HEALTH CARE EDUCATION/TRAINING PROGRAM

## 2024-07-29 PROCEDURE — 3288F FALL RISK ASSESSMENT DOCD: CPT | Mod: CPTII,S$GLB,, | Performed by: STUDENT IN AN ORGANIZED HEALTH CARE EDUCATION/TRAINING PROGRAM

## 2024-07-29 PROCEDURE — 3074F SYST BP LT 130 MM HG: CPT | Mod: CPTII,S$GLB,, | Performed by: STUDENT IN AN ORGANIZED HEALTH CARE EDUCATION/TRAINING PROGRAM

## 2024-07-29 PROCEDURE — 1125F AMNT PAIN NOTED PAIN PRSNT: CPT | Mod: CPTII,S$GLB,, | Performed by: STUDENT IN AN ORGANIZED HEALTH CARE EDUCATION/TRAINING PROGRAM

## 2024-07-29 NOTE — PROGRESS NOTES
Note to patients: In accordance with the  Century Cures Act, patients are now granted immediate electronic access to their medical records. This note is primarily intended for communication among medical professionals. As a result, it may incorporate medical terminology, abbreviations, or language that could appear blunt or unfamiliar. If you have questions about this document, we encourage you to discuss it with your physician.      [facility]  Head & Neck Surgical Oncology Clinic    Patient: Arthur Ribeiro    : 1945    MRN: [id number]  Primary Care Provider: Zohaib Pryor MD  Rad Onc: Mehran Peñaloza MD  Med Onc: Zenaida Jones MD; Quentin Rahman MD  ENT: Enrique Maya MD  Dentist: BO Pitts; Cecilio Banks [facility] [place]  GI Medicine: Dr. Sagastume (Prev. Dr. Weaver)  Date of Encounter: 2024    DIAGNOSIS:    Cancer Staging   Primary malignant neoplasm of hypopharynx  Staging form: Pharynx - Hypopharynx, AJCC 8th Edition  - Clinical stage from 2022: Stage RENO (cT3, cN2b, cM0) - Signed by Evon Galo NP on 2022      CC:   Chief Complaint   Patient presents with    Primary malignant neoplasm of hypopharynx       HPI:   Arthur Ribeiro is a 78 y.o. male with a past medical history significant for [id number] SCC of the left hypopharynx. He was treated with induction chemotherapy followed by definitive CCRT completed 2022.    He required a PEG during his treatment and had completed hypopharyngeal stensis requiring retrograde dilation from Dr. Hart and GI. He has undergone multiple EGD/dilation including recently and is now able to eat whatever he'd like by mouth. He has gained weight since his last visit.    He is undergoing PT to build his strength. He reports occasional left muscle/back pain but denies masses or lesions - seems associated with muscle strength in this area. He is having neck stiffness that is worse on the left side, he states he has been having  this issue since radiation. He does not remember going to PT in the past for this. He has an EGD set up with Dr. Sagastume on April 12th. He has dilated him the last 4-5xs. He is scoping him q6 months. He is having some dysphagia. His appetite is good and his weight is stable. He had his TSH checked by his PCP yesterday and his PCP started him on synthroid.     Patient denies pain, fever, chills, night sweats, unintentional weight loss, neck mass, enlarged lymph nodes outside of the head or neck, odynophagia, dysphagia, globus sensation, voice changes, cough, hemoptysis, shortness of breath, or new or concerning skin lesions.     7/29/24: He was evaluated at ER on 6/12 for complaints of weakness. He had a CT of his head without contrast and CTA performed.  He is going to PT for his leg weakness. He is not doing PT for his neck or for his lymphedema. He is having his normal dysphagia and is getting dilated Q6 months by Dr. Sagastume.     TREATMENT HISTORY:   induction chemotherapy followed by definitive CCRT completed 8/2022    ALLERGIES:  Review of patient's allergies indicates:  No Known Allergies      MEDICATIONS:    Current Outpatient Medications:     gabapentin (NEURONTIN) 800 MG tablet, Take 1 tablet (800 mg total) by mouth 3 (three) times daily., Disp: 270 tablet, Rfl: 3    pantoprazole (PROTONIX) 40 MG tablet, 60 tablets., Disp: , Rfl:     levothyroxine (SYNTHROID) 50 MCG tablet, Take 1 tablet (50 mcg total) by mouth before breakfast. (Patient not taking: Reported on 7/29/2024), Disp: 90 tablet, Rfl: 0    LIDOCAINE VISCOUS 2 % solution, , Disp: , Rfl:   No current facility-administered medications for this visit.    Facility-Administered Medications Ordered in Other Visits:     lactated ringers infusion, , Intravenous, Continuous, Daniel Sagastume MD, Last Rate: 75 mL/hr at 11/06/23 0739, New Bag at 11/06/23 0739    OTHER HISTORY  Past medical, surgical, family, and social histories have been updated and  reviewed as needed since last visit.     REVIEW OF SYSTEMS:   Comprehensive review of systems was discussed with the patient.  It is positive only for the above complaints.    PHYSICAL EXAMINATION:  Blood pressure (!) 115/56, pulse 64, temperature 98.2 °F (36.8 °C), temperature source Temporal, resp. rate 16, height 6' (1.829 m), weight 65.9 kg (145 lb 4.5 oz), SpO2 97%.    Constitutional: Non-toxic appearing, cervical kyphosis  Psychiatric: Appropriate mood and affect. Cooperative.  Voice: Non-dysphonic, speaking in full sentences.   Neurologic: Cranial nerves grossly intact, no focal deficits.  Head and face: Salivary glands are not enlarged. Face is symmetric. CN VII strength and sensation intact.  Skin: No concerning skin lesions.   Eyes: Vision grossly intact, bilateral extraocular movements intact  Ears: Bilateral pinna, mastoid, external ear canal normal. Hearing intact.   Nose: External nose appears normal.   Lips: No ulcers or lesions  Oral cavity: Mucosa is pink and moist. Buccal mucosa, gingiva, anterior tongue, floor of mouth, and hard palate appear normal. No leukoplakia, erythroplakia, ulceration, masses or lesions.  Oropharynx: Mucosa is pink and moist. Soft palate and base of tongue are normal. Posterior pharyngeal wall normal. Tonsils are normal. No lesions.  Neck: Central lymphedema, mobile larynx, no masses or lymphadenopathy  Respiratory: Chest expansion symmetric, no audible stridor or stertor. Breathing is unlabored. No active cough.    PROCEDURES:    FLEXIBLE LARYNGOSCOPY  Provider: Amirah Wang MD  Indication: History of cancer   The procedure was explained to the patient and verbal consent was obtained.   Anesthesia: topical lidocaine and neosynephrine applied within one or both nares with an atomizer    The scope was introduced in the usual fashion.    Findings:  Mucosa: normal  Inferior turbinates: no polypoid changes or hypertrophy  Septum: no lesion or ulcer  Middle meatus: no purulence  or polypoid change  Nasopharynx:  Adenoids: normal  Fossa of Rosenmuller: normal  Eustachian tubes: normal  Superior and posterior pharyngeal walls: narrowing circumferentially at level of the epiglottis  Epiglottis, vallecula, and base of tongue: thickened epiglottis fused to base of tongue bilaterally  Pyriform sinuses: effacement of left pyriform, narrow right piriform, some pooling of secretions   False vocal cords, arytenoids, aryepiglottic folds: normal, slight reducted abduction  True vocal cords are symmetrically mobile on phonation and inspiration.   Laryngeal sensation appears intact. There are no masses or ulcerations.     The scope was withdrawn. The patient tolerated the procedure well with no complications.      DATA REVIEWED:     LABORATORY:      Latest Ref Rng & Units 3/27/2024     8:31 AM 6/12/2024    11:13 AM 7/19/2024     3:50 PM   Thyroid Labs   TSH 0.400 - 4.000 uIU/mL 5.080      Free T4 0.78 - 2.19 ng/dL 1.04      Sodium 136 - 145 mmol/L  140  138    Potassium 3.5 - 5.1 mmol/L  4.4  4.3    Chloride 95 - 110 mmol/L  106  102    Carbon Dioxide 22 - 31 mmol/L  26  28    Glucose 70 - 110 mg/dL  86  117    Blood Urea Nitrogen 9 - 21 mg/dL  47  46    Creatinine 0.50 - 1.40 mg/dL  1.66  1.79    Calcium 8.4 - 10.2 mg/dL  9.4  9.2    Total Protein 6.0 - 8.4 g/dL  7.3  6.8    Albumin 3.5 - 5.2 g/dL  4.0  3.9    Total Bilirubin 0.2 - 1.3 mg/dL  0.6  0.5    AST 17 - 59 U/L  29  23    ALT 0 - 50 U/L  14  14    Anion Gap 5 - 12 mmol/L  8  8    WBC 3.90 - 12.70 K/uL  4.03  3.98    RBC 4.60 - 6.20 M/uL  3.65  3.58    Hemoglobin 14.0 - 18.0 g/dL  11.1  10.9    Hematocrit 40.0 - 54.0 %  34.0  34.2    MCV 82 - 98 fL  93  96    MCH 27.0 - 31.0 pg  30.4  30.4    MCHC 32.0 - 36.0 g/dL  32.6  31.9    RDW 11.5 - 14.5 %  13.5  13.8    Platelets 150 - 450 K/uL  146  151    MPV 9.2 - 12.9 fL  9.3  9.7    Gran # 1.8 - 7.7 K/uL  2.0  2.1    Lymph # 1.0 - 4.8 K/uL  1.5  1.3    Mono # 0.3 - 1.0 K/uL  0.3  0.4    Eos # 0.0 -  0.5 K/uL  0.2  0.2    Baso # 0.00 - 0.20 K/uL  0.04  0.04    Gran % 38.0 - 73.0 %  48.9  52.4    Lymph % 18.0 - 48.0 %  36.7  31.9    Mono% 4.0 - 15.0 %  8.2  10.1    Eos % 0.0 - 8.0 %  5.2  4.3    Baso % 0.0 - 1.9 %  1.0  1.0        PATHOLOGY:  Pharynx, left posterior wall, biopsies:   - Squamous cell carcinoma, invasive, moderately differentiated   - Depth of invasion (DOI): at least 0.5 cm   - p16 status (IHC): Negative   - No lymphovascular invasion identified   - No perineural invasion identified   NOTE: Immunostain for p16 was performed and did not demonstrate   block-immunopositive staining (interpreted as negative).  Positive control   and internal negative control for immunostain was examined and was   appropriate.     IMAGING:  CTA STROKE MULTI-PHASE 6/12/24  Impression:  1. No occlusion, hemodynamically significant stenosis, aneurysm or dissection.  2. Nonspecific subcutaneous fat stranding and skin thickening in the submental region as well as edema in the pre epiglottic fat.    CT HEAD WITHOUT CONTRAST 6/12/24  Impression:  1. Generalized cerebral volume loss from cerebral and cerebellar atrophy.  2. No acute intracranial processes.  3. 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    CT neck/chest 9/2023 -  1. There are post treatment changes discussed above.  There is no obvious recurrent left lateral pharyngeal/hypopharyngeal mass.  The study is performed without contrast as mentioned above due to progressively decreasing GFR in increasing creatinine.  2. The epiglottis is mildly diffusely thickened.  This likely represents treatment change.  3. There is no pathologic or necrotic cervical lymphadenopathy.  4. There has been interval progression of biapical interstitial reticulonodular disease, further evaluated on concurrent CT chest.    1. Limited noncontrast CT of the chest is without evidence of any new/progressive lymphadenopathy within the chest as compared to prior PET-CT 02/27/2023.  No  acute findings.  2. Progressive biapical/subpleural reticulonodular pulmonary parenchymal changes suggestive of chronic inflammatory changes/fibrosis as discussed above.    PET 2/2023 -  Persistent mildly hypermetabolic lymph nodes in the lower right paratracheal region and right hilum with max SUV values not higher than mediastinal blood pool.  The lack of significant change suggests that these are reactive nodes rather than metastatic disease.     RADIOLOGY REVIEWED:  I have independently viewed and agree with the CT images and reports which demonstrate no evidence of disease recurrence.    ASSESSMENT AND PLAN:  1. Primary malignant neoplasm of hypopharynx        Arthur Ribeiro is a 78 y.o. male with [id number] SCC of the hypopharynx s/p induction chemo followed by CCRT completed 8/2022, with course c/b hypopharyngeal stenosis, now eating PO and doing well.  - Will discuss lymphedema treatment and neck pain/stiffness with his PT today at [facility] in [facility] to see if they can manage this if not will place lymphedema treatment referral   - Follow-up 4 months for surveillance  - CT neck/chest due in September, will push it to October due to creat being elevated    Patient encouraged to call with any questions, concerns, or new or worsening symptoms.     Follow up 4 mo

## 2024-07-31 ENCOUNTER — DOCUMENTATION ONLY (OUTPATIENT)
Dept: HEMATOLOGY/ONCOLOGY | Facility: CLINIC | Age: 79
End: 2024-07-31
Payer: MEDICARE

## 2024-08-05 ENCOUNTER — TELEPHONE (OUTPATIENT)
Dept: HEMATOLOGY/ONCOLOGY | Facility: CLINIC | Age: 79
End: 2024-08-05
Payer: MEDICARE

## 2024-08-05 DIAGNOSIS — I89.0 LYMPHEDEMA DUE TO RADIATION: ICD-10-CM

## 2024-08-05 DIAGNOSIS — C13.9 PRIMARY MALIGNANT NEOPLASM OF HYPOPHARYNX: Primary | ICD-10-CM

## 2024-08-06 ENCOUNTER — TELEPHONE (OUTPATIENT)
Dept: HEMATOLOGY/ONCOLOGY | Facility: CLINIC | Age: 79
End: 2024-08-06
Payer: MEDICARE

## 2024-08-12 ENCOUNTER — TELEPHONE (OUTPATIENT)
Dept: HEMATOLOGY/ONCOLOGY | Facility: CLINIC | Age: 79
End: 2024-08-12
Payer: MEDICARE

## 2024-08-12 ENCOUNTER — CLINICAL SUPPORT (OUTPATIENT)
Dept: REHABILITATION | Facility: HOSPITAL | Age: 79
End: 2024-08-12
Payer: MEDICARE

## 2024-08-12 DIAGNOSIS — I89.0 LYMPHEDEMA DUE TO RADIATION: ICD-10-CM

## 2024-08-12 DIAGNOSIS — C13.9 PRIMARY MALIGNANT NEOPLASM OF HYPOPHARYNX: Primary | ICD-10-CM

## 2024-08-12 DIAGNOSIS — C13.9 PRIMARY MALIGNANT NEOPLASM OF HYPOPHARYNX: ICD-10-CM

## 2024-08-12 DIAGNOSIS — I89.0 LYMPHEDEMA DUE TO RADIATION: Primary | ICD-10-CM

## 2024-08-12 PROCEDURE — 97162 PT EVAL MOD COMPLEX 30 MIN: CPT | Mod: PN

## 2024-08-12 NOTE — PATIENT INSTRUCTIONS
Recommendations:   Sleep on an incline at least 15-20 deg at night.    [employer] Tribute Head and Neck wrap, we will get orders and send to [employer] to see if insurance will cover.

## 2024-08-12 NOTE — PROGRESS NOTES
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 esophageal dilation 5 times   Evaluation Date: 8/12/2024  Authorization: pending  Plan of Care Expiration: 8/12/2024 -10/12/2024  Reassessment Due: 9/12/2024  FOTO Completed: 1 / 3    Visit: 1 / 12  PTA Visit: - / 5  Time In: 3:00PM  Time Out: 4:00 PM  Total Billable Time: 60 minutes    Precautions: Standard, Fall and lymphoma  Has previously been seen in our clinic in 3/7/2023 - 4/25/2023  Subjective     Pt reports: woke up about a month ago, and R leg would not work, spent 8 hours in ER, did all sorts of test, CT scans etc went to neurolgist did MRIwas not a stroke. Stopped wearing his fascioplasty. When he hadd trouble with his leg.   Able to eat any food he wants, has to chew it very well.   Pain  Location: denies pain  Current 0/10,       Past Medical History:   Past Medical History:   Diagnosis Date    Allergic rhinitis, cause unspecified 08/04/2015    Cancer 2005    Rakesh's lymphoma     Cancer of overlapping sites of hypopharynx 01/17/2022    head, neck, pharynx - stage RENO (AJCC v8) TNM: cT3, cN2b, cM0    Dysphagia     Encounter for blood transfusion     Foot drop     Foot drop, left     Hiatal hernia     Male erectile disorder 03/19/2018    Melanoma in situ of upper arm, right 09/23/2022    suspicious irregular mulit-colored macule located on the right distal dorsal forearm    Neuropathy     Personal history of non-Hodgkin lymphomas 09/08/2016    Spinal stenosis        Past Surgical History:  has a past surgical history that includes Skin cancer excision; Insertion of venous access  "port (2005); Laryngoscopy (Bilateral, 01/26/2022); Esophagogastroduodenoscopy (N/A, 07/06/2022); Direct laryngoscopy (N/A, 11/08/2022); Esophagogastroduodenoscopy (N/A, 01/03/2023); Esophagogastroduodenoscopy (N/A, 01/12/2023); Esophagogastroduodenoscopy (N/A, 01/25/2023); Tonsillectomy; Esophagogastroduodenoscopy (N/A, 2/24/2023); Esophagogastroduodenoscopy (N/A, 7/20/2023); Esophageal dilation (N/A, 7/20/2023); Esophagogastroduodenoscopy (N/A, 8/3/2023); Esophageal dilation (N/A, 8/3/2023); Esophagogastroduodenoscopy (N/A, 9/14/2023); Esophageal dilation (N/A, 9/14/2023); Esophagogastroduodenoscopy (N/A, 11/6/2023); Esophageal dilation (N/A, 11/6/2023); Esophagogastroduodenoscopy (N/A, 4/12/2024); and Esophageal dilation (N/A, 4/12/2024).    Medications: has a current medication list which includes the following prescription(s): gabapentin, levothyroxine, lidocaine viscous, and pantoprazole, and the following Facility-Administered Medications: lactated ringers.    Allergies: Review of patient's allergies indicates:  No Known Allergies     Hand Dominance: Right  Diet: States eating some by mouth but fatigues, PEG tube "easier to use the feeding tube"  Habitus: thin  BMI 19.70  Prior Therapy/Previous treatment included: No PT this calendar year.   DME owned: cane which has been using 7+ yrs now due to foot drop  Social History: Lives with wife, 2 story home, master bedroom on first floor. His office upstairs.  Occupation: Pt is retired.  Prior Exercise Routine: limited due to mobility issues  Prior Level of Function: mod I, wife drives  Current Level of Function: see above     Patient's Goals: reduce swelling in front of the neck.   treatment considerations: hx of had squamous cell carcinoma, chemo, then 35 radiation treatments   Objective      Mental Status: Alert/Oriented     Observation  Facial Symmetry: mild fullness to left submandibular regions compared to right  Posture: forward head rounded shoulders " posture      Prior Therapy/Previous treatment included:  yes having it for his R leg weakness,  PT this calendar year.   DME owned: rollator, bath seat with grab bars  Social History: lives with their spouse  Place of Residence (Steps/Adaptations): 2 story home, master bedroom on first floor. His office upstairs.   Occupation: Pt is retired.  Prior Exercise Routine: goes 3 days a week to gym   Prior Level of Function: indep  Current Level of Function: modified indep    Patient's Goals: reduce swelling in neck  Upcoming testing: CT Neck Chest With Contrast (XPD); Future; Expected date: 10/01/2024   Objective     Mental Status: Alert/Oriented    Observation  Facial Symmetry: yes  Posture: extreme forward head, rounded shoulders, slouched sitting posture  Joint Integrity: intact    Integumentary System  Skin Integrity: intact  Circulation: intact  Palpation: fullness in anterior/lateral neck, fullness in face, radiation fibrosis to neck with hx of 35 radiation tx  Edema   Amount: mod to max   Location: anterior/lateral neck, fullness in face    Sensation  Light Touch: intact bilateral  Proprioception: intact bilateral    A/PROM  (L) UE: UE limited with overhead reach, abduction 90 deg, shoulder flexion 120 deg,   (R) UE: limited shoulder flexion 150 deg, abduction 147  Limitations: see below  Difficulty with overhead reach    STRENGTH  (L) UE: decreased UE poor scapular stabilization, decreased tricep, rotator cuff and serratus anterior  (R) UE: decreased UE poor scapular stabilization, decreased tricep, rotator cuff and serratus anterior  Limitations:  Extreme forward head posture, with poor trunk support from scapula/hips/ weakness in all postural musculature  Observation: Posture: forward head rounded shoulders posture, continued slouched sitting posture with increased tone to bilateral upper traps        Measurements  ROM LEFT (deg)  03/07/2023  Eval RIGHT (deg)  03/07/2023  Eval LEFT (deg)  04/06/2023  Reassess  RIGHT (deg)  04/06/2023  Reassess LEFT (deg)  04/06/2023  Reassess RIGHT (deg)  04/06/2023  Reassess LEFT   (Deg)  8/12/2024   EVAL Right  (Deg)  8/12/2024   EVAL   Mandibular Depression TBD     33mm   30 mm    Cervical Flexion 40   45   full   full    Cervical Extension 30   30   30   20 deg    Cervical Lateral Flexion 20 25 30 25 28 34 28 deg 20 deg   Cervical Rotation 50 40 60 60 60 78 50 deg 50 deg   Shoulder Flexion        120 150   Shoulder Abduction       90 147    Improvement in cervical lateral flexion and rotation as compared to eval.    Mandibular depression could be improved, provided exercises for this in clinic.   Girth measurements  03/07/2023 04/06/2023 4/24/2023 8/12/2024   EVAL   2 in below earlobe 45.3  48.5 44.0 cm 53 cm   3 in below earlobe 39.0  46.0 36.7 cm 52.5 cm   4 in below earlobe 36.2  37.8 34.9cm 51.8 cm    Chin to Crown 61.2  63 63.6 71      Vitals 94% and HR 61 bpm              Garments recommended: tribute head and neck wrap  Advised pt to sleep at incline.     Functional Mobility   Bed mobility: independent   Roll to left: independent   Roll to right: independent   Supine to prone: independent   Scooting to edge of bed: independent   Supine to sit: independent   Sit to supine: independent   Transfers to bed: independent   Transfers to toilet: independent   Sit to stand: independent   Stand pivot: independent   Car transfers: independent   Showers with seat and hand held shower  Gait Assessment  AD used: RW  Assistance: independent  Distance: less than community distances  Endurance: WFL     Gait Pattern: ambulates with RW with decreased jasen       Treatment     Treatment Time In: 3:30 PM  Treatment Time Out: 4:00 PM  Total Treatment time separate from Evaluation: 30 minutes    Education: Instructed on general anatomy/physiology, lymphedema information (definitions, signs, symptoms, precautions), role of therapy in multi-disciplinary team, purpose of lymphedema physical therapy  and the benefits/risks of treatment, risks of refusing treatment, POC, and goals for therapy were discussed with the pt.  Trial of flexitouch head and neck pump at 30 mmHG with good tolerance improved facial swelling and reduction in anterior neck swelling noted after.  Written Home Exercises Provided:  at next session .  Exercises were reviewed and Arthur was able to demonstrate them prior to the end of the session. Arthur demonstrated good  understanding of the education provided.     See EMR under Patient Instructions for exercises provided  at next session .    Assessment     Arthur is a 78 y.o. male  referred to outpatient physical therapy with a medical diagnosis of Primary malignant neoplasm of hypopharynx, lymphedema. Significant increase in girth measurements in neck region as compared to 2023 measurements taken.  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 Poor postural support from weakness in musculature. Patient would benefit from MLD as well as need for instruction on self MLD, education on lymphatic flow exercises for home management, compression. Girth measurements were taken for baseline measures as well as pictures. Radiation tx comes with increase risk of lymphedema and this places pt at higher risk of infection. This pt would benefit from skilled therapy services to address the above impairments. I am recommending the Flexitouch system as an adjunct to conservative therapies to address both internal and external swelling. Additionally, this patient presents with fibrosis and the Flexitouch can break up fibrotic tissue over time. The Flexitouch  is the only pump available to treat head & neck  lymphedema to properly assist lymph drainage. Patient has been compliant with elevation and exercise for at least 4 weeks, yet swelling persists. Pt working on daily compliance with compression of 20-30 mmHg, There is no basic pump that is able to treat the head and neck.             Plan of care discussed with patient: Yes  Pt's spiritual, cultural and educational needs considered and patient is agreeable to the plan of care and goals as stated below:     Anticipated barriers for therapy:  none    Medical Necessity is demonstrated by the following:  History  Co-morbidities and personal factors that may impact the plan of care Co-morbidities:   advanced age, history of cancer, and level of undertstanding of current condition     Personal Factors:   level of understanding of current condition and age     moderate   Examination  Body Structures and Functions, activity limitations and participation restrictions that may impact the plan of care Body Systems:    ROM  strength  edema    Activity limitations:   Mobility  lifting and carrying objects  walking  driving (bike, car, motorcycle)    Self care  washing oneself (bathing, drying, washing hands)  looking after one's health  Takes longer than normal to shower    Domestic Life  shopping  cooking  doing house work (cleaning house, washing dishes, laundry)    Participation Restrictions:   none         moderate   Clinical Presentation evolving clinical presentation with changing clinical characteristics moderate   Decision Making/ Complexity Score: moderate       GOALS  Short Term Goals: 4 weeks    Patient will demonstrate 100% knowledge of lymphedema precautions and signs of infection.   Decrease girth by average 1 cm for improved mobility and safety with iADLs.  Patient to report compliance sleeping on 30 degree incline for lymphatic protection.   Patient will demonstrate proper posture with sitting and standing to decrease detrimental affects to adjacent structures.    Patient will tolerate HEP for better progression toward LTGs and self-management of presentation.      Long Term Goals: 8 weeks  Patient will be independent with HEP for self-management of symptoms and current status.   Decrease girth by average 2 cm for improved mobility and safety with iADLs.  Patient will perform self lymphatic drainage techniques for long term management of lymphedema.   Patient to don/doff compression garments for self management of lymphedema.   Patient to obtain Flexitouch pneumatic compression pump for daily home use to assist in maintaining reduced swelling.         Plan   PT will send order to [facility] once obtained for tribute wrap and will send off for flexitouch pump with [employer] to see if his insurance will cover.   Plan of Care Certification: 8/12/2024 to 10/15/2024    Outpatient Physical Therapy 2 time(s) a week for 6-8 weeks to include the following interventions: patient education, HEP, therapeutic exercises, neuromuscular re-education, therapeutic activity, manual therapy including pneumatic compression pump and lymphatouch, self care/home management, modalities, gait training, decongestive massage, multi-layered bandaging, self massage, self bandaging, and assistance in obtaining appropriate compression garment.      Candi Aburto, PT, CLT

## 2024-08-12 NOTE — TELEPHONE ENCOUNTER
Pt called to reschedule is PT/Lymph apt on 9/11/24 from 11am to 2:00 pm; confirmed her acceptance of apt time and change of the apt accepted ny wife Dary

## 2024-08-15 ENCOUNTER — CLINICAL SUPPORT (OUTPATIENT)
Dept: REHABILITATION | Facility: HOSPITAL | Age: 79
End: 2024-08-15
Payer: MEDICARE

## 2024-08-15 DIAGNOSIS — C13.9 PRIMARY MALIGNANT NEOPLASM OF HYPOPHARYNX: ICD-10-CM

## 2024-08-15 DIAGNOSIS — I89.0 LYMPHEDEMA DUE TO RADIATION: ICD-10-CM

## 2024-08-15 PROCEDURE — 97140 MANUAL THERAPY 1/> REGIONS: CPT | Mod: PN,CQ

## 2024-08-15 NOTE — PROGRESS NOTES
"[facility] Outpatient  Physical Therapy Progress Note  Lymphedema Therapy    Visit Date: 8/15/2024    Name: Arthur Ribeiro  MRN: [id number]  Therapy Diagnosis:   Encounter Diagnoses   Name Primary?    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 esophageal dilation 5 times   Evaluation Date: 8/12/2024  Authorization: pending  Plan of Care Expiration: 8/12/2024 -10/12/2024  Reassessment Due: 9/12/2024  FOTO Completed: 1 / 3     Visit: 2 / 12  PTA Visit: 1 / 5  Time In: 2:05 PM  Time Out: 3:00 PM  Total Billable Time: 55 minutes     Precautions: Standard, Fall and lymphoma  Has previously been seen in our clinic in 3/7/2023 - 4/25/2023      Subjective     Patient states: that he is receiving therapy at [facility] to build up his strength, recently had to go to ER because "my leg wouldn't work." Was wearing the Fascioplasty, but it wore out. Dr aWng wanted him to have more therapy to address the swelling, would like to get a pump so he can work on the swelling at home. Pt states that the doctors told him that there is something wrong with his spine.     Pain: 0/10   Location: Pt denies pain currently    Objective   Pt ambulates into gym with RW severe forward head/rounded shoulders posture  Arthur participated in / received the following treatment:    Discussed the purpose, mechanism, and indications of intermittent pneumatic compression (IPC) with pt. Pt was cleared of all contraindications and precautions, including but not limited to nonpitting chronic lymphedema, DVT, PE, thrombophlebitis, acute inflammation of skin, uncontrolled or severe cardiac " failure, pulmonary edema, ischemic vascular disease, local active metastatic disease, severe peripheral neuropathy, severe PAD, edema at root of extremity, truncal edema. Risks and benefits of tx were reviewed with patient, to which patient was in agreement to continue with tx today.     Discussed the purpose, mechanism, and indications of MLD with pt. Pt was cleared of all contraindications and precautions, including but not limited to cardiac edema, renal failure, acute infection, acute bronchitis, acute DVT, malignancies, bronchial asthma, HTN, pregnancy, ileus, aortic aneurysm, recent abdominal surgery, IBD, diverticulosis, XRT fibrosis or cystitis, colitis, carotid sinus syndrome, hyperthyroidism, >60 y.o. Risks and benefits of tx were reviewed with patient, to which patient was in agreement to continue with tx today.     Discussed the purpose, mechanism, and indications of Lymphatouch with pt. Pt was cleared of all contraindications precautions, including but not limited to acute DVT, acute infection, CHF, cardiac edema, kidney dysfunction, active cancer, pregnancy. Risks and benefits of tx were reviewed with patient, to which patient was in agreement to continue with tx today.        Therapeutic Exercise to develop strength, flexibility, and posture for 5 minutes including:  Seated  Scapula retractions  Chin tucks  Shoulder rolls  Cervical rotation  Cervical lateral flexion    Manual Therapy to develop flexibility, extensibility, desensitization, pliability, and contour for 55 minutes including:  Pre-treatment short neck series to include:  cervical terminus, lateral and posterior neck, axillae, and abdomen, followed by full head/neck sequence with rework, accessing all watershed areas of trunk. Concurrent use of pneumatic compression pump to head/neck area, and Lymphatouch was applied to sub mental area at 55 mmHg with vibration component at 20 hz.  Pt tolerated well and expressing comfort.     Education  Provided  - Progress toward goals   - Role of therapy   - Activity modification  - Reviewed HEP      Home Exercises Provided: HEP discussed, however no handout provided.  Exercises were reviewed and Arthur was able to demonstrate them prior to the end of the session.  Arthur demonstrated good  understanding of the education provided.   Will issue self MLD handout next session     Assessment   Pt presents with severe forward head postural alignment, is receiving functional therapy at [facility]. The Flexitouch pump was placed on pt in seated position in a chair, but he fell asleep and slipped forward in the chair. Advise that pump be utilized with patient in bed with head of bed elevated vs seated in a chair. Positive response to treatment noted with decreased swelling noted post treatment. Will fabricate a temporary garment for pt to wear at night next session, as well as educate on self MLD techniques. May need to teach spouse how to perform.     Arthur is a 78 y.o. male  referred to outpatient physical therapy with a medical diagnosis of Primary malignant neoplasm of hypopharynx, lymphedema. Significant increase in girth measurements in neck region as compared to 2023 measurements taken.  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 Poor postural support from weakness in musculature. Patient would benefit from MLD as well as need for instruction on self MLD, education on lymphatic flow exercises for home management, compression. Girth measurements were taken for baseline measures as well as pictures. Radiation tx comes with increase risk of lymphedema and this places pt at higher risk of infection. This pt would benefit from  skilled therapy services to address the above impairments. I am recommending the Flexitouch system as an adjunct to conservative therapies to address both internal and external swelling. Additionally, this patient presents with fibrosis and the Flexitouch can break up fibrotic tissue over time. The Flexitouch  is the only pump available to treat head & neck lymphedema to properly assist lymph drainage. Patient has been compliant with elevation and exercise for at least 4 weeks, yet swelling persists. Pt working on daily compliance with compression of 20-30 mmHg, There is no basic pump that is able to treat the head and neck.         Anticipated barriers for therapy: none        GOALS  Short Term Goals: 4 weeks     Patient will demonstrate 100% knowledge of lymphedema precautions and signs of infection.   Decrease girth by average 1 cm for improved mobility and safety with iADLs.  Patient to report compliance sleeping on 30 degree incline for lymphatic protection.   Patient will demonstrate proper posture with sitting and standing to decrease detrimental affects to adjacent structures.   Patient will tolerate HEP for better progression toward LTGs and self-management of presentation.      Long Term Goals: 8 weeks  Patient will be independent with HEP for self-management of symptoms and current status.   Decrease girth by average 2 cm for improved mobility and safety with iADLs.  Patient will perform self lymphatic drainage techniques for long term management of lymphedema.   Patient to don/doff compression garments for self management of lymphedema.   Patient to obtain Flexitouch pneumatic compression pump for daily home use to assist in maintaining reduced swelling.      Plan     Continue with established plan of care working toward PT goals.    Malou Lam, PTA

## 2024-08-20 ENCOUNTER — CLINICAL SUPPORT (OUTPATIENT)
Dept: REHABILITATION | Facility: HOSPITAL | Age: 79
End: 2024-08-20
Payer: MEDICARE

## 2024-08-20 DIAGNOSIS — I89.0 LYMPHEDEMA DUE TO RADIATION: ICD-10-CM

## 2024-08-20 DIAGNOSIS — C13.9 PRIMARY MALIGNANT NEOPLASM OF HYPOPHARYNX: Primary | ICD-10-CM

## 2024-08-20 PROCEDURE — 97140 MANUAL THERAPY 1/> REGIONS: CPT | Mod: PN,CQ

## 2024-08-20 NOTE — PROGRESS NOTES
[facility] Outpatient  Physical Therapy Progress Note  Lymphedema Therapy    Visit Date: 8/20/2024    Name: Arthur Ribeiro  MRN: [id number]  Therapy Diagnosis:   Encounter Diagnoses   Name Primary?    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 esophageal dilation 5 times   Evaluation Date: 8/12/2024  Authorization: pending  Plan of Care Expiration: 8/12/2024 -10/12/2024  Reassessment Due: 9/12/2024  FOTO Completed: 1 / 3     Visit: 3 / 12  PTA Visit: 2 / 5  Time In: 9:05 PM  Time Out: 10:00 PM  Total Billable Time: 55 minutes     Precautions: Standard, Fall and lymphoma  Has previously been seen in our clinic in 3/7/2023 - 4/25/2023      Subjective     Patient states: that he is receiving therapy at [facility] to build up his strength, knows that he needs to strengthen his back.  Was wearing the Fascioplasty, but it wore out. Dr Wang wanted him to have more therapy to address the swelling, would like to get a pump so he can work on the swelling at home. Pt states that the doctors told him that there is something wrong with his spine and that is why he can't stand straight. Pt states that his ankles swell and he is interested in getting a pump for his legs, have been looking online for a pump. Also, pt states that he has been trying to sleep on an incline.     Pain: 0/10   Location: Pt denies pain currently    Objective   Pt ambulates into gym with RW severe forward head/rounded shoulders posture  Arthur participated in / received the following treatment:    Discussed the purpose, mechanism, and indications of intermittent pneumatic compression (IPC)  with pt. Pt was cleared of all contraindications and precautions, including but not limited to nonpitting chronic lymphedema, DVT, PE, thrombophlebitis, acute inflammation of skin, uncontrolled or severe cardiac failure, pulmonary edema, ischemic vascular disease, local active metastatic disease, severe peripheral neuropathy, severe PAD, edema at root of extremity, truncal edema. Risks and benefits of tx were reviewed with patient, to which patient was in agreement to continue with tx today.     Discussed the purpose, mechanism, and indications of MLD with pt. Pt was cleared of all contraindications and precautions, including but not limited to cardiac edema, renal failure, acute infection, acute bronchitis, acute DVT, malignancies, bronchial asthma, HTN, pregnancy, ileus, aortic aneurysm, recent abdominal surgery, IBD, diverticulosis, XRT fibrosis or cystitis, colitis, carotid sinus syndrome, hyperthyroidism, >60 y.o. Risks and benefits of tx were reviewed with patient, to which patient was in agreement to continue with tx today.     Discussed the purpose, mechanism, and indications of Lymphatouch with pt. Pt was cleared of all contraindications precautions, including but not limited to acute DVT, acute infection, CHF, cardiac edema, kidney dysfunction, active cancer, pregnancy. Risks and benefits of tx were reviewed with patient, to which patient was in agreement to continue with tx today.        Therapeutic Exercise to develop strength, flexibility, and posture for 5 minutes including:  Seated  Scapula retractions  Chin tucks  Shoulder rolls  Cervical rotation  Cervical lateral flexion    Manual Therapy to develop flexibility, extensibility, desensitization, pliability, and contour for 55 minutes including:  Pre-treatment short neck series to include:  cervical terminus, lateral and posterior neck, axillae, and abdomen, followed by full head/neck sequence with rework, accessing all watershed areas of trunk.  Concurrent use of pneumatic compression pump to head/neck area, and Lymphatouch was applied to sub mental area at 55 mmHg with vibration component at 20 hz.  Manual stretching to bilateral shoulders, left tighter than right. Pt tolerated well and expressing comfort. Fabricated temporary nighttime garment and instructed pt to wear nightly. Pt verbalized understanding. (Pt was positioned in bed with wedge to increase incline vs sitting up in a chair). Discussed with pt that he needs to get MD approval before purchasing a pump.     Education Provided  - Progress toward goals   - Role of therapy   - Activity modification  - Reviewed HEP      Home Exercises Provided: HEP discussed, however no handout provided.  Exercises were reviewed and Arthur was able to demonstrate them prior to the end of the session.  Arthur demonstrated good  understanding of the education provided.   Will issue self MLD handout next session     Assessment   Pt presents with severe forward head postural alignment, is receiving functional therapy at [facility]. The Flexitouch pump was placed on pt in seated position in a chair last session and he fell asleep and slipped forward in the chair.  Pt positioned in supine with head of bed elevated and wedge under patient.  Positive response to treatment noted with decreased swelling noted post treatment.  Fabricated a temporary garment for pt to wear at night, will educate on self MLD techniques next session. May need to teach spouse how to perform. Pt would benefit from a postural brace to help stabilize his trunk so he can hold his head up; will discuss options with patient next session.     Arthur is a 78 y.o. male  referred to outpatient physical therapy with a medical diagnosis of Primary malignant neoplasm of hypopharynx, lymphedema. Significant increase in girth measurements in neck region as compared to 2023 measurements taken.  Patient presents with stage 2 secondary lymphedema due to history of  radiation treatment resulting in increased swelling to anterior neck, moderate left and right mandibular swelling, increased difficulty with swallowing, increased secretions more prevelent in am,  increased stiffness in the neck with lateral flexion and rotation and extension. Poor postural support from weakness in musculature. Patient would benefit from MLD as well as need for instruction on self MLD, education on lymphatic flow exercises for home management, compression. Girth measurements were taken for baseline measures as well as pictures. Radiation tx comes with increase risk of lymphedema and this places pt at higher risk of infection. This pt would benefit from skilled therapy services to address the above impairments. I am recommending the Flexitouch system as an adjunct to conservative therapies to address both internal and external swelling. Additionally, this patient presents with fibrosis and the Flexitouch can break up fibrotic tissue over time. The Flexitouch  is the only pump available to treat head & neck lymphedema to properly assist lymph drainage. Patient has been compliant with elevation and exercise for at least 4 weeks, yet swelling persists. Pt working on daily compliance with compression of 20-30 mmHg, There is no basic pump that is able to treat the head and neck.         Anticipated barriers for therapy: none        GOALS  Short Term Goals: 4 weeks     Patient will demonstrate 100% knowledge of lymphedema precautions and signs of infection.   Decrease girth by average 1 cm for improved mobility and safety with iADLs.  Patient to report compliance sleeping on 30 degree incline for lymphatic protection.   Patient will demonstrate proper posture with sitting and standing to decrease detrimental affects to adjacent structures.   Patient will tolerate HEP for better progression toward LTGs and self-management of presentation.      Long Term Goals: 8 weeks  Patient will be independent with  HEP for self-management of symptoms and current status.   Decrease girth by average 2 cm for improved mobility and safety with iADLs.  Patient will perform self lymphatic drainage techniques for long term management of lymphedema.   Patient to don/doff compression garments for self management of lymphedema.   Patient to obtain Flexitouch pneumatic compression pump for daily home use to assist in maintaining reduced swelling.      Plan     Continue with established plan of care working toward PT goals.    Malou Lam, PTA

## 2024-08-22 ENCOUNTER — CLINICAL SUPPORT (OUTPATIENT)
Dept: REHABILITATION | Facility: HOSPITAL | Age: 79
End: 2024-08-22
Payer: MEDICARE

## 2024-08-22 DIAGNOSIS — C13.9 PRIMARY MALIGNANT NEOPLASM OF HYPOPHARYNX: Primary | ICD-10-CM

## 2024-08-22 DIAGNOSIS — I89.0 LYMPHEDEMA DUE TO RADIATION: ICD-10-CM

## 2024-08-22 PROCEDURE — 97140 MANUAL THERAPY 1/> REGIONS: CPT | Mod: PN,CQ

## 2024-08-22 NOTE — PROGRESS NOTES
[facility] Outpatient  Physical Therapy Progress Note  Lymphedema Therapy    Visit Date: 8/22/2024    Name: Arthur Ribeiro  MRN: [id number]  Therapy Diagnosis:   Encounter Diagnoses   Name Primary?    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 esophageal dilation 5 times   Evaluation Date: 8/12/2024  Authorization: pending  Plan of Care Expiration: 8/12/2024 -10/12/2024  Reassessment Due: 9/12/2024  FOTO Completed: 1 / 3     Visit: 4 / 12  PTA Visit: 3 / 5  Time In: 9:05 PM  Time Out: 10:00 PM  Total Billable Time: 55 minutes     Precautions: Standard, Fall and lymphoma  Has previously been seen in our clinic in 3/7/2023 - 4/25/2023      Subjective     Patient states: that he is receiving therapy at [facility] to build up his strength, knows that he needs to strengthen his back.  Was wearing the Fascioplasty, but it wore out. Dr Wang wanted him to have more therapy to address the swelling, would like to get a pump so he can work on the swelling at home. Pt states that the doctors told him that there is something wrong with his spine and that is why he can't stand straight. Pt states that his ankles swell and he is interested in getting a pump for his legs, have been looking online for a pump. Also, pt states that he has been trying to sleep on an incline, but tends to sleep on his side. Have been trying to wear the garment but it comes off at night. Can tell a big difference when he uses the pump at therapy.    Pain: 0/10   Location: Pt denies pain currently    Objective   Pt ambulates into gym with RW severe forward head/rounded shoulders  liberty Gibson participated in / received the following treatment:    Discussed the purpose, mechanism, and indications of intermittent pneumatic compression (IPC) with pt. Pt was cleared of all contraindications and precautions, including but not limited to nonpitting chronic lymphedema, DVT, PE, thrombophlebitis, acute inflammation of skin, uncontrolled or severe cardiac failure, pulmonary edema, ischemic vascular disease, local active metastatic disease, severe peripheral neuropathy, severe PAD, edema at root of extremity, truncal edema. Risks and benefits of tx were reviewed with patient, to which patient was in agreement to continue with tx today.     Discussed the purpose, mechanism, and indications of MLD with pt. Pt was cleared of all contraindications and precautions, including but not limited to cardiac edema, renal failure, acute infection, acute bronchitis, acute DVT, malignancies, bronchial asthma, HTN, pregnancy, ileus, aortic aneurysm, recent abdominal surgery, IBD, diverticulosis, XRT fibrosis or cystitis, colitis, carotid sinus syndrome, hyperthyroidism, >60 y.o. Risks and benefits of tx were reviewed with patient, to which patient was in agreement to continue with tx today.     Discussed the purpose, mechanism, and indications of Lymphatouch with pt. Pt was cleared of all contraindications precautions, including but not limited to acute DVT, acute infection, CHF, cardiac edema, kidney dysfunction, active cancer, pregnancy. Risks and benefits of tx were reviewed with patient, to which patient was in agreement to continue with tx today.        Therapeutic Exercise to develop strength, flexibility, and posture for 5 minutes including:  Seated  Scapula retractions  Chin tucks  Shoulder rolls  Cervical rotation  Cervical lateral flexion    Manual Therapy to develop flexibility, extensibility, desensitization, pliability, and contour for 55 minutes including:  Pre-treatment short neck series to include:   cervical terminus, lateral and posterior neck, axillae, and abdomen, followed by full head/neck sequence with rework, accessing all watershed areas of trunk. Concurrent use of pneumatic compression pump to head/neck area, and Lymphatouch was applied to sub mental area at 55 mmHg with vibration component at 20 hz.  Manual stretching to bilateral shoulders, left tighter than right. Pt tolerated well and expressing comfort. Fabricated temporary nighttime garment and instructed pt to wear nightly. Pt verbalized understanding. (Pt was positioned in bed with wedge to increase incline vs sitting up in a chair). Discussed with pt that he needs to get MD approval before purchasing a pump.     Education Provided  - Progress toward goals   - Role of therapy   - Activity modification  - Reviewed HEP      Home Exercises Provided: HEP discussed, however no handout provided.  Exercises were reviewed and Arthur was able to demonstrate them prior to the end of the session.  Arthur demonstrated good  understanding of the education provided.   Will issue self MLD handout next session     Assessment   Pt presents with severe forward head postural alignment, is receiving functional therapy at [facility]. The Flexitouch pump was placed on pt in seated position in a chair last session and he fell asleep and slipped forward in the chair.  Pt positioned in supine with head of bed elevated and wedge under patient.  Positive response to treatment noted with decreased swelling noted post treatment.  Fabricated a temporary garment for pt to wear at night, will educate on self MLD techniques next session. May need to teach spouse how to perform. Pt would benefit from a postural brace to help stabilize his trunk so he can hold his head up; will give some recommendations next session.     Arthur is a 78 y.o. male  referred to outpatient physical therapy with a medical diagnosis of Primary malignant neoplasm of hypopharynx, lymphedema. Significant increase  in girth measurements in neck region as compared to 2023 measurements taken.  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 Poor postural support from weakness in musculature. Patient would benefit from MLD as well as need for instruction on self MLD, education on lymphatic flow exercises for home management, compression. Girth measurements were taken for baseline measures as well as pictures. Radiation tx comes with increase risk of lymphedema and this places pt at higher risk of infection. This pt would benefit from skilled therapy services to address the above impairments. I am recommending the Flexitouch system as an adjunct to conservative therapies to address both internal and external swelling. Additionally, this patient presents with fibrosis and the Flexitouch can break up fibrotic tissue over time. The Flexitouch  is the only pump available to treat head & neck lymphedema to properly assist lymph drainage. Patient has been compliant with elevation and exercise for at least 4 weeks, yet swelling persists. Pt working on daily compliance with compression of 20-30 mmHg, There is no basic pump that is able to treat the head and neck.         Anticipated barriers for therapy: none        GOALS  Short Term Goals: 4 weeks     Patient will demonstrate 100% knowledge of lymphedema precautions and signs of infection.   Decrease girth by average 1 cm for improved mobility and safety with iADLs.  Patient to report compliance sleeping on 30 degree incline for lymphatic protection.   Patient will demonstrate proper posture with sitting and standing to decrease detrimental affects to adjacent structures.   Patient will  tolerate HEP for better progression toward LTGs and self-management of presentation.      Long Term Goals: 8 weeks  Patient will be independent with HEP for self-management of symptoms and current status.   Decrease girth by average 2 cm for improved mobility and safety with iADLs.  Patient will perform self lymphatic drainage techniques for long term management of lymphedema.   Patient to don/doff compression garments for self management of lymphedema.   Patient to obtain Flexitouch pneumatic compression pump for daily home use to assist in maintaining reduced swelling.      Plan     Continue with established plan of care working toward PT goals.    Malou Lam, PTA

## 2024-08-26 ENCOUNTER — CLINICAL SUPPORT (OUTPATIENT)
Dept: REHABILITATION | Facility: HOSPITAL | Age: 79
End: 2024-08-26
Payer: MEDICARE

## 2024-08-26 DIAGNOSIS — C13.9 PRIMARY MALIGNANT NEOPLASM OF HYPOPHARYNX: ICD-10-CM

## 2024-08-26 DIAGNOSIS — I89.0 LYMPHEDEMA DUE TO RADIATION: Primary | ICD-10-CM

## 2024-08-26 PROCEDURE — 97140 MANUAL THERAPY 1/> REGIONS: CPT | Mod: PN

## 2024-08-26 NOTE — PROGRESS NOTES
[facility] Outpatient  Physical Therapy Progress Note  Lymphedema Therapy    Visit Date: 8/26/2024    Name: Arthur Ribeiro  MRN: [id number]  Therapy Diagnosis:   No diagnosis found.      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 esophageal dilation 5 times   Evaluation Date: 8/12/2024  Authorization: 20 visits    08/12/2024 12/31/2024     Plan of Care Expiration: 8/12/2024 -10/12/2024  Reassessment Due: 9/12/2024  FOTO Completed: 1 / 3     Visit: 5 / 12  PTA Visit: 0 / 5  Time In: 11:07 PM  Time Out: 12:07 PM  Total Billable Time: 60 minutes     Precautions: Standard, Fall and lymphoma  Has previously been seen in our clinic in 3/7/2023 - 4/25/2023      Subjective     Patient states: Was wearing the Fascioplasty, but it wore out. Pt states that he has been trying to sleep on an incline, but tends to sleep on his side. Have been trying to wear the night garment [employer] made for wearing at night but it moves around at night or comes off.     Pain: 0/10   Location: Pt denies pain currently    Objective   Pt ambulates into gym with RW severe forward head/rounded shoulders posture  Arthur participated in / received the following treatment:    Discussed the purpose, mechanism, and indications of intermittent pneumatic compression (IPC) with pt. Pt was cleared of all contraindications and precautions, including but not limited to nonpitting chronic lymphedema, DVT, PE, thrombophlebitis, acute inflammation of skin, uncontrolled or severe cardiac failure, pulmonary edema, ischemic vascular disease, local active metastatic disease, severe peripheral neuropathy, severe PAD, edema at root of extremity, truncal edema. Risks and benefits of tx  were reviewed with patient, to which patient was in agreement to continue with tx today.     Discussed the purpose, mechanism, and indications of MLD with pt. Pt was cleared of all contraindications and precautions, including but not limited to cardiac edema, renal failure, acute infection, acute bronchitis, acute DVT, malignancies, bronchial asthma, HTN, pregnancy, ileus, aortic aneurysm, recent abdominal surgery, IBD, diverticulosis, XRT fibrosis or cystitis, colitis, carotid sinus syndrome, hyperthyroidism, >60 y.o. Risks and benefits of tx were reviewed with patient, to which patient was in agreement to continue with tx today.     Discussed the purpose, mechanism, and indications of Lymphatouch with pt. Pt was cleared of all contraindications precautions, including but not limited to acute DVT, acute infection, CHF, cardiac edema, kidney dysfunction, active cancer, pregnancy. Risks and benefits of tx were reviewed with patient, to which patient was in agreement to continue with tx today.        Therapeutic Exercise to develop strength, flexibility, and posture for 5 minutes including:  Seated  Scapula retractions  Chin tucks  Shoulder rolls  Cervical rotation  Cervical lateral flexion    Manual Therapy to develop flexibility, extensibility, desensitization, pliability, and contour for 55 minutes including:  Pre-treatment short neck series to include:  cervical terminus, lateral and posterior neck, axillae, and abdomen, followed by full head/neck sequence with rework, accessing all watershed areas of trunk. Concurrent use of pneumatic compression pump to head/neck area, and Lymphatouch was applied to sub mental area at 55 mmHg with vibration component at 20 hz.  Manual stretching to bilateral upper traps,  bilateral shoulders, left tighter than right. Pt tolerated well and expressing comfort.   (Pt was positioned in bed with wedge, HOB elevated, towel roll placed under chin for cervical support vs sitting up in a  chair). Discussed with pt that he needs to get MD approval before purchasing a pump. PT sent request for H&N pump to referring MD. Pt in agreement for PT to send to [facility] once received.    Previous visit: Fabricated temporary nighttime garment and instructed pt to wear nightly.Pt verbalized understanding.    Education Provided  - Progress toward goals   - Role of therapy   - Activity modification  - Reviewed HEP      Home Exercises Provided: HEP discussed, however no handout provided.  Exercises were reviewed and Arthur was able to demonstrate them prior to the end of the session.  Arthur demonstrated good  understanding of the education provided.   Will issue self MLD handout next session     Assessment   Pt presents with severe forward head postural alignment, is receiving functional therapy at [facility]. The Flexitouch pump was placed on pt in seated position in a chair last session and he fell asleep and slipped forward in the chair.  Pt positioned in supine with head of bed elevated and wedge under patient during trial with Flexitouch pump, also added towel roll under pts chin to assist with head extension positioning to assist with lymphatic drainage.  Positive response to treatment noted with decreased swelling noted post treatment.  Pt to continue to try wearing fabricated temporary garment at night. Prescription for Tribute Night garment sent to [facility] on 8/20/2024. Will educate on self MLD techniques next session. May need to teach spouse how to perform. Pt would benefit from a postural brace to help stabilize his trunk so he can hold his head up; will give some recommendations next session.     Arthur is a 78 y.o. male  referred to outpatient physical therapy with a medical diagnosis of Primary malignant neoplasm of hypopharynx, lymphedema. Significant increase in girth measurements in neck region as compared to 2023 measurements taken.  Patient presents with stage 2 secondary lymphedema due to  history of radiation treatment resulting in increased swelling to anterior neck, moderate left and right mandibular swelling, increased difficulty with swallowing, increased secretions more prevelent in am,  increased stiffness in the neck with lateral flexion and rotation and extension. Poor postural support from weakness in musculature. Patient would benefit from MLD as well as need for instruction on self MLD, education on lymphatic flow exercises for home management, compression. Girth measurements were taken for baseline measures as well as pictures. Radiation tx comes with increase risk of lymphedema and this places pt at higher risk of infection. This pt would benefit from skilled therapy services to address the above impairments. I am recommending the Flexitouch system as an adjunct to conservative therapies to address both internal and external swelling. Additionally, this patient presents with fibrosis and the Flexitouch can break up fibrotic tissue over time. The Flexitouch  is the only pump available to treat head & neck lymphedema to properly assist lymph drainage. Patient has been compliant with elevation and exercise for at least 4 weeks, yet swelling persists. Pt working on daily compliance with compression of 20-30 mmHg, There is no basic pump that is able to treat the head and neck.         Anticipated barriers for therapy: none        GOALS  Short Term Goals: 4 weeks     Patient will demonstrate 100% knowledge of lymphedema precautions and signs of infection.   Decrease girth by average 1 cm for improved mobility and safety with iADLs.  Patient to report compliance sleeping on 30 degree incline for lymphatic protection.   Patient will demonstrate proper posture with sitting and standing to decrease detrimental affects to adjacent structures.   Patient will tolerate HEP for better progression toward LTGs and self-management of presentation.      Long Term Goals: 8 weeks  Patient will be  independent with HEP for self-management of symptoms and current status.   Decrease girth by average 2 cm for improved mobility and safety with iADLs.  Patient will perform self lymphatic drainage techniques for long term management of lymphedema.   Patient to don/doff compression garments for self management of lymphedema.   Patient to obtain Flexitouch pneumatic compression pump for daily home use to assist in maintaining reduced swelling.      Plan   Request prescription for Flexitouch pump.   Continue with established plan of care working toward PT goals.    Leticia Moses, PT , CLT

## 2024-08-28 ENCOUNTER — CLINICAL SUPPORT (OUTPATIENT)
Dept: REHABILITATION | Facility: HOSPITAL | Age: 79
End: 2024-08-28
Payer: MEDICARE

## 2024-08-28 DIAGNOSIS — C13.9 PRIMARY MALIGNANT NEOPLASM OF HYPOPHARYNX: ICD-10-CM

## 2024-08-28 DIAGNOSIS — I89.0 LYMPHEDEMA DUE TO RADIATION: Primary | ICD-10-CM

## 2024-08-28 PROCEDURE — 97140 MANUAL THERAPY 1/> REGIONS: CPT | Mod: PN

## 2024-08-28 NOTE — PATIENT INSTRUCTIONS
Self Manual Lymphatic Drainage Face, Head and Neck    For patients who were treated for head and neck cancer with:    Surgery to remove a tumour(s) or lymph nodes   Radiation therapy    Read this resource to learn:   what is your lymphatic system   what lymphatic self-massage is and why it is important   how to do lymphatic self-massage step-by-step    What is your lymphatic system?  Your lymphatic system filters and removes extra fluid and waste from your body. It plays an important role in your immune function. Your lymphatic system is made up of many lymph nodes that are connected together by lymph vessels.     Your lymph nodes are bean-shaped organs that are found all over your body. Large groups or chains of lymph nodes can be found in your neck, under your arms and in your groin (see the image below).     Surgery or radiation to your lymph nodes damage your lymph nodes and vessels. This damage prevents fluid from flowing well and causes swelling. Swelling from damaged lymph nodes and vessels is called lymphedema    What is lymphatic self-massage and why is it important?  Lymphatic self-massage is a gentle skin massage where the skin is gently stretched and released along lymph pathways. Lymphatic self-massage helps move extra fluid from swollen areas damaged by cancer treatment. This extra fluid can be moved into an area where the lymph nodes are working well.    Lymphatic self-massage can help move extra fluid away from:    areas of your face that have had treatment   areas of your neck that have had treatment    Lymphatic self-massage can help to move extra fluid to:    lymph vessels and lymph nodes in areas of your face or neck not affected by treatment   lymph vessels and lymph nodes in your underarms    How to do lymphatic self-massage   Keep your hands soft and relaxed. Use a light pressure on your skin. The pressure of your hands should be just enough to gently stretch the skin. Only stretch the skin as  far as it can go naturally without causing pain. Release the pressure and let your skin come back as it was. If you can feel your muscles under your skin you are pressing too hard.   Use the flat part (palms) of your hands instead of your fingertips. Your palms allow more contact with the skin to stimulate (pump) the lymph vessels.   Massage towards areas of your body that have not been treated for cancer such as your chest and underarms.   Make sure you are in a comfortable position. You can self-massage while sitting, standing or lying down. Choose a position that is most comfortable for you.   Massage when you are comfortably warm or when you are in a nice, warm room. If your muscles are warm, they are more flexible.   Do self-massage regularly. You can use self-massage as time to relax, breathe, and take care of yourself.    What to avoid   Do not strain your shoulders, neck, arm or hand   Do not self-massage if it causes pain   Do not do self-massage if you have an infection in the area that has swelling    Do not do self-massage if you think you have an infection. Infections can occur in your head, neck or face where your lymph nodes have been removed or you have had radiation.     Signs of an infection may include:    Swelling and redness of the skin. This redness can quickly spread.   Pain or soreness in your head, face or neck where you had treatment.   Warm or hot feeling in your head, face or neck where you had treatment.   Fever or chills.   Feeling unwell.    If you think you have an infection go to:   Your family doctor   Walk-in Clinic   Urgent Care Clinic   Emergency department    If you have had an infection, only start self-massage again when you have finished your antibiotics (medicine). Or if your doctor says it is okay to start again.    Try different ways to make self-massage a part of your routine. Try self-massage while you are watching TV or having a shower so it does not take time away from  your day. Try to make self-massage a time for yourself. Or make it a part of your routine for relaxing    Below are the steps for doing a lymphatic self-massage. Follow the instructions closely. Talk to your health care team if you have any questions.    Deep Breathing    Deep breathing is an important part of your self-care. Deep breathing works like a pump in your body. This pump helps the lymph nodes and vessels move fluid. You can practice deep breathing at any time!   What to do:    Place the palms of both hands on your stomach.   Take a deep breathe in through your nose until your stomach pushes against your hands.   Breathe out slowly through pursed lips (like you are blowing out candles). Then let your stomach go flat.   Repeat 5 times. Take a short rest between each breath so you do not feel dizzy       2. Stretch and release the skin at the front of your neck    This motion helps lymph fluid drain back to your heart. You can massage one side at a time or both sides at the same time. You may find it easier to cross your hands if you are doing both at the same time.   What to do:    Place the flats of your 2nd and 3rd fingers on either side of your neck just above your collarbone.   Massage down and inwards toward your collarbone. Always keep your fingers above your collarbone. Start massage on the area of your neck that is close to your shoulder and gently stretch the skin towards the middle of your neck.   Gently stretch the skin just as far as it goes without pain. Then let go of the skin.   This massage will look like 2 letter Js facing one another.   Repeat 10-15 times on each side.     3. Prepare your underarm lymph nodes    This prepares the lymph vessels and nodes under your arm to take in lymph fluid from your face and neck. Place your arm in a comfortable position. Your arm should be slightly raised and supported. You may want to place your arm on an armrest or  table for comfort.   What to do:     Place your palm of your opposite hand against your underarm.   Gently pull up and in toward your body, then release. Pause for a moment and then start again. Repeat 10-15 times   Do the same pumping on your other underarm. Repeat 10-15 times.       4. Stretch and release the skin from your chest to your underarm    Place you hand on your collarbone. Move your hand down your chest in half circles toward your underarm. Massage your chest to help reduce swelling. This massage will move the lymph fluid from your neck and chest to your underarm lymph vessels and nodes.   What to do:    Place your hand over your collar bone   Gently stretch the skin (not muscles) down your chest and towards your underarm. Then let go of the skin. Pause for a moment. Now repeat this massage stroke as you gradually move your hand down your chest towards your underarm.   Repeat this massage 10-15 times.     5. Stretch and release the skin from the front of your neck to your chest    Massage the front of your neck to help reduce swelling. This massage will move the lymph fluid from your neck to your chest. Place your hand on the front of your neck where you have swelling. Move your hand down your neck towards your collar bone and chest.     What to do:    Place your hand over the swelling at the front of your neck   Gently stretch the skin (not muscles) towards your collarbone. Then let go of the skin. Pause for a moment. Now repeat this massage stroke as you gradually move your hand past your collar bone and down your chest.   Repeat this massage 10-15 times.     6. Stretch and release the skin at the side of your neck    Massage the side of your neck to lower or prevent the swelling in your face and neck. This massage helps stimulate (pump) the vessels at the side of your neck. Do not massage both sides of your neck at the same time if you have radiation treatment.   What to do:    Place your hands flat on the side of your neck   Gently  stretch the skin away from your face and down. Then release.   Massage your neck and side of the face in a slow and gentle way.   Repeat 10-15 times.     7. Stretch and release the skin on the back of your neck    Massage the back of your neck to lower or prevent the swelling in your face and neck. This massage helps stimulate (pump) the vessels at the back of your neck.   What to do:    Place the palms of your hands on the back of your neck, just belowyour hairline.   Stretch the skin towards your spine and then down towards your back.   Repeat 10-15 times.       8. Massage your scar     Massage your scar. This massage is only if you have had surgery. Do not massage until three weeks after surgery. Do not massage until all staples and clips have been removed. Your scar may feel very sensitive, tight or itchy. Scar massage will help reduce these feelings.This massage helps soften the scar and allows better blood flow to the area. Scar massage should always be pain-free. Do not use oil while doing the scar massage. Apply any lotions or oils after the massage.    What to do:    Place the palm of your hand over the scar.    Move up and down in a zigzag pattern or [place] pattern along the scar. See pictures above for help.   Apply firm but gentle pressure while moving along the scar. Try to move the skin.   If possible, gently lift the skin along the scar.   Repeat 5 or 6 times on the scar.   Now place your fingertips just above the scar. Gently stretch the skin away from the scar and release.   Repeat 5 times.   Place your fingertips below the scar. Gently stretch the skin away from the scar. Then release the skin.   Repeat 5 times. If your skin and swollen tissue in your neck or face feels hard, ask your therapist to show you gentle kneading techniques to help soften the firm tissue.    Here is how you will soften the firm tissue on your own at home:   Gently place the pads of your fingers on the tissue or skin that  feels firm or hard.   Gently press down with the pads of your fingers and let go. As you release the pressure, move your fingers down slightly.    Repeat 10-15 times in one area. Move to another area and repeat.     9. Massage for face swelling     Your therapist will draw arrows on the image. This will show you the   way you should do your self-massage.     10. Massage for swelling inside the mouth    Your therapist will draw arrows on the image. This will show you the way you should do your self-massage.   Do not massage inside your mouth if you have any sores   or cuts or pain.   Make sure your hand is clean before you start to massage inside your mouth.     11. Stretch and release the skin at the front of your neck    This motion helps lymph fluid drain back to your heart. You can massage 1 side at a time or both sides at the same time. You may find it easier to cross your hands if you are doing both at the same time.   What to do:    Place the flats of your 2nd and 3rd fingers on either side of your neck just above your collarbone.   Massage down and inwards toward your collarbone. Always keep your fingers above your collarbone. Start massage on the area of your neck that is close to your shoulder and gently stretch the skin towards the middle of your neck.   Gently stretch the skin just as far as it goes without pain. Then let go of the skin.   This massage will look like 2 letter Js facing one another.   Repeat 15 times on each side.

## 2024-08-28 NOTE — PROGRESS NOTES
[facility] Outpatient  Physical Therapy Progress Note  Lymphedema Therapy    Visit Date: 8/28/2024    Name: Arthur Ribeiro  MRN: [id number]  Therapy Diagnosis:   Encounter Diagnoses   Name Primary?    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 esophageal dilation 5 times   Evaluation Date: 8/12/2024  Authorization: 20 visits    08/12/2024 12/31/2024     Plan of Care Expiration: 8/12/2024 -10/12/2024  Reassessment Due: 9/12/2024  FOTO Completed: 1 / 3     Visit: 6 / 12  PTA Visit: 0 / 5  Time In: 11:07 PM  Time Out: 12:07 PM  Total Billable Time: 55 minutes     Precautions: Standard, Fall and lymphoma  Has previously been seen in our clinic in 3/7/2023 - 4/25/2023      Subjective     Patient states: Having more swelling which started yesterday and weeping, though I had saliva dripping from my mouth. Spent most of the morning yesterday sitting at my computer then went to PT at [facility] after lunch.    Pain: 0/10   Location: Pt denies pain currently    Objective   Pt ambulates into gym with RW severe forward head/rounded shoulders posture, increased submental swelling with mild weeping today.    Arthur participated in / received the following treatment:    Discussed the purpose, mechanism, and indications of intermittent pneumatic compression (IPC) with pt. Pt was cleared of all contraindications and precautions, including but not limited to nonpitting chronic lymphedema, DVT, PE, thrombophlebitis, acute inflammation of skin, uncontrolled or severe cardiac failure, pulmonary edema, ischemic vascular disease, local active metastatic disease, severe  peripheral neuropathy, severe PAD, edema at root of extremity, truncal edema. Risks and benefits of tx were reviewed with patient, to which patient was in agreement to continue with tx today.     Discussed the purpose, mechanism, and indications of MLD with pt. Pt was cleared of all contraindications and precautions, including but not limited to cardiac edema, renal failure, acute infection, acute bronchitis, acute DVT, malignancies, bronchial asthma, HTN, pregnancy, ileus, aortic aneurysm, recent abdominal surgery, IBD, diverticulosis, XRT fibrosis or cystitis, colitis, carotid sinus syndrome, hyperthyroidism, >60 y.o. Risks and benefits of tx were reviewed with patient, to which patient was in agreement to continue with tx today.     Discussed the purpose, mechanism, and indications of Lymphatouch with pt. Pt was cleared of all contraindications precautions, including but not limited to acute DVT, acute infection, CHF, cardiac edema, kidney dysfunction, active cancer, pregnancy. Risks and benefits of tx were reviewed with patient, to which patient was in agreement to continue with tx today.        Therapeutic Exercise to develop strength, flexibility, and posture for 5 minutes including:  Diaphragm breathing  Mouth opening wide/closing    Deferred due to time:  Seated  Scapula retractions  Chin tucks  Shoulder rolls  Cervical rotation  Cervical lateral flexion    Manual Therapy to develop flexibility, extensibility, desensitization, pliability, and contour for 55 minutes including:  Instruction on lymphatic system, purpose of self MLD, frequency, proper positioning to perform, instruction on diaphragm breathing. Hand over hand instruction of each technique provided.   Pre-treatment short neck series to include:  cervical terminus, lateral and posterior neck, axillae, and abdomen, followed by full head/neck sequence with rework, accessing all watershed areas of trunk. Concurrent use of pneumatic compression pump to  head/neck area, and Lymphatouch was applied to sub mental area at 55 mmHg with vibration component at 20 hz.  Pt tolerated well and expressing comfort.   (Pt was positioned in bed with wedge, HOB elevated, towel roll placed under chin for cervical support vs sitting up in a chair).    Received prescription for H&N pump, Pt in agreement for PT to send to [facility] once received.    Deferred today: Manual stretching to bilateral upper traps,  bilateral shoulders, left tighter than right.     Discussed with pt that he needs to get MD approval before purchasing a pump on his own for his legs.     Previous visit: Fabricated temporary nighttime garment and instructed pt to wear nightly.Pt verbalized understanding.    Education Provided  - Progress toward goals   - Role of therapy   - Activity modification  - Reviewed HEP      Home Exercises Provided: HEP discussed, however no handout provided.  Exercises were reviewed and Arthur was able to demonstrate them prior to the end of the session.  Arthur demonstrated good  understanding of the education provided.   Will issue self MLD handout next session     Assessment   Pt presents with severe forward head postural alignment, is receiving functional therapy at [facility]. The Flexitouch pump was placed on pt positioned in supine with head of bed elevated and wedge under patient during trial with Flexitouch pump, also added towel roll under pts chin to assist with head extension positioning to assist with lymphatic drainage.  Positive response to treatment noted with decreased swelling and increased wrinkling to submental region noted post treatment.Pt educated on self MLD techniques today with handouts provided, may need to teach pts spouse how to perform.  Pt to continue to try wearing fabricated temporary garment at night. Prescription for Tribute Night garment sent to [employer] on 8/20/2024. Provided pts spouse with number to Call [employer] to check on status of garment  request.   Pt would benefit from a postural brace to help stabilize his trunk so he can hold his head up; will give some recommendations next session.     Arthur is a 78 y.o. male  referred to outpatient physical therapy with a medical diagnosis of Primary malignant neoplasm of hypopharynx, lymphedema. Significant increase in girth measurements in neck region as compared to 2023 measurements taken.  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 Poor postural support from weakness in musculature. Patient would benefit from MLD as well as need for instruction on self MLD, education on lymphatic flow exercises for home management, compression. Girth measurements were taken for baseline measures as well as pictures. Radiation tx comes with increase risk of lymphedema and this places pt at higher risk of infection. This pt would benefit from skilled therapy services to address the above impairments. I am recommending the Flexitouch system as an adjunct to conservative therapies to address both internal and external swelling. Additionally, this patient presents with fibrosis and the Flexitouch can break up fibrotic tissue over time. The Flexitouch  is the only pump available to treat head & neck lymphedema to properly assist lymph drainage. Patient has been compliant with elevation and exercise for at least 4 weeks, yet swelling persists. Pt working on daily compliance with compression of 20-30 mmHg, There is no basic pump that is able to treat the head and neck.         Anticipated barriers for therapy: none        GOALS  Short Term Goals: 4 weeks     Patient will demonstrate 100% knowledge of lymphedema precautions and  signs of infection.   Decrease girth by average 1 cm for improved mobility and safety with iADLs.  Patient to report compliance sleeping on 30 degree incline for lymphatic protection.   Patient will demonstrate proper posture with sitting and standing to decrease detrimental affects to adjacent structures.   Patient will tolerate HEP for better progression toward LTGs and self-management of presentation.      Long Term Goals: 8 weeks  Patient will be independent with HEP for self-management of symptoms and current status.   Decrease girth by average 2 cm for improved mobility and safety with iADLs.  Patient will perform self lymphatic drainage techniques for long term management of lymphedema.   Patient to don/doff compression garments for self management of lymphedema.   Patient to obtain Flexitouch pneumatic compression pump for daily home use to assist in maintaining reduced swelling.      Plan     Continue with established plan of care working toward PT goals.    Leticia Moses, PT , CLT

## 2024-09-03 ENCOUNTER — CLINICAL SUPPORT (OUTPATIENT)
Dept: REHABILITATION | Facility: HOSPITAL | Age: 79
End: 2024-09-03
Payer: MEDICARE

## 2024-09-03 DIAGNOSIS — C13.9 PRIMARY MALIGNANT NEOPLASM OF HYPOPHARYNX: ICD-10-CM

## 2024-09-03 DIAGNOSIS — I89.0 LYMPHEDEMA DUE TO RADIATION: Primary | ICD-10-CM

## 2024-09-03 PROCEDURE — 97140 MANUAL THERAPY 1/> REGIONS: CPT | Mod: PN

## 2024-09-03 NOTE — PROGRESS NOTES
[facility] Outpatient  Physical Therapy Progress Note  Lymphedema Therapy    Visit Date: 9/3/2024    Name: Arthur Ribeiro  MRN: [id number]  Therapy Diagnosis:   Encounter Diagnoses   Name Primary?    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 esophageal dilation 5 times   Evaluation Date: 8/12/2024  Authorization: 20 visits    08/12/2024 12/31/2024     Plan of Care Expiration: 8/12/2024 -10/12/2024  Reassessment Due: 9/12/2024  FOTO Completed: 1 / 3     Visit: 8 / 12  PTA Visit: 0 / 5  Time In:  M  Time Out: M  Total Billable Time: 55 minutes     Precautions: Standard, Fall and lymphoma  Has previously been seen in our clinic in 3/7/2023 - 4/25/2023      Subjective     Patient states: feels more swollen today as compared to the day before  Pain: 0/10   Location: Pt denies pain currently    Objective   Pt ambulates into gym with RW severe forward head/rounded shoulders posture, increased submental swelling without mild weeping today.  PT called [employer] and they said they just got approval and pt will need to make 20%payment, pt provided that info during tx.   Arthur participated in / received the following treatment:    Discussed the purpose, mechanism, and indications of intermittent pneumatic compression (IPC) with pt. Pt was cleared of all contraindications and precautions, including but not limited to nonpitting chronic lymphedema, DVT, PE, thrombophlebitis, acute inflammation of skin, uncontrolled or severe cardiac failure, pulmonary edema, ischemic vascular disease, local active metastatic disease, severe peripheral neuropathy, severe PAD,  edema at root of extremity, truncal edema. Risks and benefits of tx were reviewed with patient, to which patient was in agreement to continue with tx today.     Discussed the purpose, mechanism, and indications of MLD with pt. Pt was cleared of all contraindications and precautions, including but not limited to cardiac edema, renal failure, acute infection, acute bronchitis, acute DVT, malignancies, bronchial asthma, HTN, pregnancy, ileus, aortic aneurysm, recent abdominal surgery, IBD, diverticulosis, XRT fibrosis or cystitis, colitis, carotid sinus syndrome, hyperthyroidism, >60 y.o. Risks and benefits of tx were reviewed with patient, to which patient was in agreement to continue with tx today.     Discussed the purpose, mechanism, and indications of Lymphatouch with pt. Pt was cleared of all contraindications precautions, including but not limited to acute DVT, acute infection, CHF, cardiac edema, kidney dysfunction, active cancer, pregnancy. Risks and benefits of tx were reviewed with patient, to which patient was in agreement to continue with tx today.        Therapeutic Exercise to develop strength, flexibility, and posture for 5 minutes including:  Diaphragm breathing  Mouth opening wide/closing    Deferred due to time:  Seated  Scapula retractions  Chin tucks  Shoulder rolls  Cervical rotation  Cervical lateral flexion    Manual Therapy to develop flexibility, extensibility, desensitization, pliability, and contour for 55 minutes including:  Instruction on lymphatic system, purpose of self MLD, frequency, proper positioning to perform, instruction on diaphragm breathing. Hand over hand instruction of each technique provided.   Pre-treatment short neck series to include:  cervical terminus, lateral and posterior neck, axillae, and abdomen, followed by full head/neck sequence with rework, accessing all watershed areas of trunk. Concurrent use of pneumatic compression pump to head/neck area, and Lymphatouch  was applied to sub mental area at 55 mmHg with vibration component at 20 hz.  Pt tolerated well and expressing comfort.   (Pt was positioned in bed with wedge, HOB elevated, towel roll placed under chin for cervical support vs sitting up in a chair).     Last session: Received prescription for H&N pump, Pt in agreement for PT to send to [facility] once received.    Deferred today: Manual stretching to bilateral upper traps,  bilateral shoulders, left tighter than right.     Discussed with pt that he needs to get MD approval before purchasing a pump on his own for his legs.     Previous visit: Fabricated temporary nighttime garment and instructed pt to wear nightly.Pt verbalized understanding.    Education Provided  - Progress toward goals   - Role of therapy   - Activity modification  - Reviewed HEP      Home Exercises Provided: HEP discussed, however no handout provided.  Exercises were reviewed and Arthur was able to demonstrate them prior to the end of the session.  Arthur demonstrated good  understanding of the education provided.   Will issue self MLD handout next session     Assessment   Pt presents with severe forward head postural alignment, is receiving functional therapy at [facility]. The Flexitouch pump was placed on pt positioned in supine with head of bed elevated and wedge under patient during trial with Flexitouch pump, also added towel roll under pts chin to assist with head extension positioning to assist with lymphatic drainage.  Positive response to treatment noted with decreased swelling and increased wrinkling to submental region noted post treatment.Pt educated on self MLD techniques at last visit with handouts provided, may need to teach pts spouse how to perform.  Pt to continue to try wearing fabricated temporary garment at night. Called [employer] to follow up about [employer] and they said they had just checked his coverage and took his co-pay today. Pt reports [employer] did not call them  back.    Pt would benefit from a postural brace to help stabilize his trunk so he can hold his head up; will give some recommendations next session.     Arthur is a 78 y.o. male  referred to outpatient physical therapy with a medical diagnosis of Primary malignant neoplasm of hypopharynx, lymphedema. Significant increase in girth measurements in neck region as compared to 2023 measurements taken.  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 Poor postural support from weakness in musculature. Patient would benefit from MLD as well as need for instruction on self MLD, education on lymphatic flow exercises for home management, compression. Girth measurements were taken for baseline measures as well as pictures. Radiation tx comes with increase risk of lymphedema and this places pt at higher risk of infection. This pt would benefit from skilled therapy services to address the above impairments. I am recommending the Flexitouch system as an adjunct to conservative therapies to address both internal and external swelling. Additionally, this patient presents with fibrosis and the Flexitouch can break up fibrotic tissue over time. The Flexitouch  is the only pump available to treat head & neck lymphedema to properly assist lymph drainage. Patient has been compliant with elevation and exercise for at least 4 weeks, yet swelling persists. Pt working on daily compliance with compression of 20-30 mmHg, There is no basic pump that is able to treat the head and neck.         Anticipated barriers for therapy: none        GOALS  Short Term Goals: 4 weeks     Patient will demonstrate 100% knowledge of lymphedema precautions and  signs of infection.   Decrease girth by average 1 cm for improved mobility and safety with iADLs.  Patient to report compliance sleeping on 30 degree incline for lymphatic protection.   Patient will demonstrate proper posture with sitting and standing to decrease detrimental affects to adjacent structures.   Patient will tolerate HEP for better progression toward LTGs and self-management of presentation.      Long Term Goals: 8 weeks  Patient will be independent with HEP for self-management of symptoms and current status.   Decrease girth by average 2 cm for improved mobility and safety with iADLs.  Patient will perform self lymphatic drainage techniques for long term management of lymphedema.   Patient to don/doff compression garments for self management of lymphedema.   Patient to obtain Flexitouch pneumatic compression pump for daily home use to assist in maintaining reduced swelling.      Plan     Continue with established plan of care working toward PT goals.    Candi Aburto, PT , CLT

## 2024-09-05 ENCOUNTER — CLINICAL SUPPORT (OUTPATIENT)
Dept: REHABILITATION | Facility: HOSPITAL | Age: 79
End: 2024-09-05
Payer: MEDICARE

## 2024-09-05 DIAGNOSIS — C13.9 PRIMARY MALIGNANT NEOPLASM OF HYPOPHARYNX: ICD-10-CM

## 2024-09-05 DIAGNOSIS — I89.0 LYMPHEDEMA DUE TO RADIATION: Primary | ICD-10-CM

## 2024-09-05 PROCEDURE — 97140 MANUAL THERAPY 1/> REGIONS: CPT | Mod: PN,CQ

## 2024-09-05 NOTE — PROGRESS NOTES
[facility] Outpatient  Physical Therapy Progress Note  Lymphedema Therapy    Visit Date: 9/5/2024    Name: Arthur Ribeiro  MRN: [id number]  Therapy Diagnosis:   Encounter Diagnoses   Name Primary?    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 esophageal dilation 5 times   Evaluation Date: 8/12/2024  Authorization: 20 visits    08/12/2024 12/31/2024     Plan of Care Expiration: 8/12/2024 -10/12/2024  Reassessment Due: 9/12/2024  FOTO Completed: 1 / 3     Visit: 9 / 12  PTA Visit: 1 / 5  Time In:  11:00 AM  Time Out: 12:00 PM  Total Billable Time: 55 minutes     Precautions: Standard, Fall and lymphoma  Has previously been seen in our clinic in 3/7/2023 - 4/25/2023      Subjective     Patient states: that he gave [employer] his credit card and paid for the Tribute garment, should be in the early part of next week. Can tell that he is less swollen, doing his self MLD several days a week. Have a postural vest but it's too hard to put on and very uncomfortable.   Pain: 0/10   Location: Pt denies pain currently    Objective   Pt ambulates into gym with RW severe forward head/rounded shoulders posture  PT called [employer] and they said they just got approval and pt will need to make 20%payment, pt provided that info during tx. - this was done last session  Arthur participated in / received the following treatment:    Discussed the purpose, mechanism, and indications of intermittent pneumatic compression (IPC) with pt. Pt was cleared of all contraindications and precautions, including but not limited to nonpitting chronic lymphedema, DVT, PE,  thrombophlebitis, acute inflammation of skin, uncontrolled or severe cardiac failure, pulmonary edema, ischemic vascular disease, local active metastatic disease, severe peripheral neuropathy, severe PAD, edema at root of extremity, truncal edema. Risks and benefits of tx were reviewed with patient, to which patient was in agreement to continue with tx today.     Discussed the purpose, mechanism, and indications of MLD with pt. Pt was cleared of all contraindications and precautions, including but not limited to cardiac edema, renal failure, acute infection, acute bronchitis, acute DVT, malignancies, bronchial asthma, HTN, pregnancy, ileus, aortic aneurysm, recent abdominal surgery, IBD, diverticulosis, XRT fibrosis or cystitis, colitis, carotid sinus syndrome, hyperthyroidism, >60 y.o. Risks and benefits of tx were reviewed with patient, to which patient was in agreement to continue with tx today.     Discussed the purpose, mechanism, and indications of Lymphatouch with pt. Pt was cleared of all contraindications precautions, including but not limited to acute DVT, acute infection, CHF, cardiac edema, kidney dysfunction, active cancer, pregnancy. Risks and benefits of tx were reviewed with patient, to which patient was in agreement to continue with tx today.        Therapeutic Exercise to develop strength, flexibility, and posture for 5 minutes including:  Diaphragm breathing  Mouth opening wide/closing    Deferred due to time:  Seated  Scapula retractions  Chin tucks  Shoulder rolls  Cervical rotation  Cervical lateral flexion    Manual Therapy to develop flexibility, extensibility, desensitization, pliability, and contour for 55 minutes including:  Instruction on lymphatic system, purpose of self MLD, frequency, proper positioning to perform, instruction on diaphragm breathing. Hand over hand instruction of each technique provided. - last session  Pre-treatment short neck series to include:  cervical terminus,  lateral and posterior neck, axillae, and abdomen, followed by full head/neck sequence with rework, accessing all watershed areas of trunk. Concurrent use of pneumatic compression pump to head/neck area, and Lymphatouch was applied to sub mental area at 55 mmHg with vibration component at 20 hz.  Pt tolerated well and expressing comfort.   (Pt was positioned in bed with wedge, HOB elevated, towel roll placed under chin for cervical support vs sitting up in a chair).   Looked at different upper back postural support braces and encouraged pt to do research at home.  Last session: Received prescription for H&N pump, Pt in agreement for PT to send to [facility] once received.    Deferred today: Manual stretching to bilateral upper traps,  bilateral shoulders, left tighter than right.     Discussed with pt that he needs to get MD approval before purchasing a pump on his own for his legs.     Previous visit: Fabricated temporary nighttime garment and instructed pt to wear nightly.Pt verbalized understanding.    Education Provided  - Progress toward goals   - Role of therapy   - Activity modification  - Reviewed HEP      Home Exercises Provided: HEP discussed, however no handout provided.  Exercises were reviewed and Arthur was able to demonstrate them prior to the end of the session.  Arthur demonstrated good  understanding of the education provided.   Will issue self MLD handout next session     Assessment   Pt presents with severe forward head postural alignment, is receiving functional therapy at [facility]. The Flexitouch pump was placed on pt positioned in supine with head of bed elevated, lymph pad in submental area,  and wedge under patient during trial with Flexitouch pump, also added towel roll under pts chin to assist with head extension positioning to assist with lymphatic drainage.  Positive response to treatment noted with decreased swelling and increased wrinkling to submental region noted post treatment. Pt  educated on self MLD techniques at last visit with handouts provided, may need to teach pts spouse how to perform.  Pt to continue to try wearing fabricated temporary garment at night.   Pt would benefit from a postural brace to help stabilize his trunk so he can hold his head up; pt to look on computer tonight at different types to see which one he would like to try.     Arthur is a 78 y.o. male  referred to outpatient physical therapy with a medical diagnosis of Primary malignant neoplasm of hypopharynx, lymphedema. Significant increase in girth measurements in neck region as compared to 2023 measurements taken.  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 Poor postural support from weakness in musculature. Patient would benefit from MLD as well as need for instruction on self MLD, education on lymphatic flow exercises for home management, compression. Girth measurements were taken for baseline measures as well as pictures. Radiation tx comes with increase risk of lymphedema and this places pt at higher risk of infection. This pt would benefit from skilled therapy services to address the above impairments. I am recommending the Flexitouch system as an adjunct to conservative therapies to address both internal and external swelling. Additionally, this patient presents with fibrosis and the Flexitouch can break up fibrotic tissue over time. The Flexitouch  is the only pump available to treat head & neck lymphedema to properly assist lymph drainage. Patient has been compliant with elevation and exercise for at least 4 weeks, yet swelling persists. Pt working on daily compliance with compression of 20-30 mmHg, There  is no basic pump that is able to treat the head and neck.         Anticipated barriers for therapy: none        GOALS  Short Term Goals: 4 weeks     Patient will demonstrate 100% knowledge of lymphedema precautions and signs of infection.   Decrease girth by average 1 cm for improved mobility and safety with iADLs.  Patient to report compliance sleeping on 30 degree incline for lymphatic protection.   Patient will demonstrate proper posture with sitting and standing to decrease detrimental affects to adjacent structures.   Patient will tolerate HEP for better progression toward LTGs and self-management of presentation.      Long Term Goals: 8 weeks  Patient will be independent with HEP for self-management of symptoms and current status.   Decrease girth by average 2 cm for improved mobility and safety with iADLs.  Patient will perform self lymphatic drainage techniques for long term management of lymphedema.   Patient to don/doff compression garments for self management of lymphedema.   Patient to obtain Flexitouch pneumatic compression pump for daily home use to assist in maintaining reduced swelling.      Plan     Continue with established plan of care working toward PT goals.    Malou Lam, PTA , CLT

## 2024-09-10 ENCOUNTER — TELEPHONE (OUTPATIENT)
Dept: HEMATOLOGY/ONCOLOGY | Facility: CLINIC | Age: 79
End: 2024-09-10
Payer: MEDICARE
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
## 2024-09-16 ENCOUNTER — CLINICAL SUPPORT (OUTPATIENT)
Dept: REHABILITATION | Facility: HOSPITAL | Age: 79
End: 2024-09-16
Payer: MEDICARE

## 2024-09-16 DIAGNOSIS — C13.9 PRIMARY MALIGNANT NEOPLASM OF HYPOPHARYNX: ICD-10-CM

## 2024-09-16 DIAGNOSIS — I89.0 LYMPHEDEMA DUE TO RADIATION: Primary | ICD-10-CM

## 2024-09-16 PROCEDURE — 97140 MANUAL THERAPY 1/> REGIONS: CPT | Mod: PN

## 2024-09-16 NOTE — PROGRESS NOTES
[facility] Outpatient  Physical Therapy Progress Note/Reassessment  Lymphedema Therapy    Visit Date: 9/16/2024    Name: Arthur Ribeiro  MRN: [id number]  Therapy Diagnosis:   Encounter Diagnoses   Name Primary?    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 esophageal dilation 5 times   Evaluation Date: 8/12/2024  Reassessment: 9/16/2024    Authorization: 20 visits    08/12/2024 12/31/2024     Plan of Care Expiration: 8/12/2024 -10/12/2024  Reassessment Due: 9/12/2024  FOTO Completed: 1 / 3     Visit: 9 / 12  PTA Visit: 0 / 5  Time In:  03:00 PM  Time Out: 04:00 PM  Total Billable Time: 55 minutes     Precautions: Standard, Fall and lymphoma  Has previously been seen in our clinic in 3/7/2023 - 4/25/2023      Subjective     Patient states: has been wearing the Tribute night garment last 3 days but this morning he woke up having discomfort to the right side of his jaw/face but it is better now. Brought in garment to make sure was putting on correctly. Can tell that he is less swollen, doing his self MLD several days a week. Have a postural vest but it's too hard to put on and very uncomfortable. Continues to go to PT at [facility] for strengthening.    During session, wife came into treatment room, spoke to Rep with [employer], was informed his insurance would not cover the Flexi touch compression pump, would have the option to pay out of pocket or could pay toward it interest free for a certain amount of time. Pt wishes to think about it and discuss at home.    Pain: 0/10   Location: Pt denies pain currently    Objective   Pt ambulates into gym  with RW severe forward head/rounded shoulders posture    Arthur participated in / received the following treatment:    Discussed the purpose, mechanism, and indications of intermittent pneumatic compression (IPC) with pt. Pt was cleared of all contraindications and precautions, including but not limited to nonpitting chronic lymphedema, DVT, PE, thrombophlebitis, acute inflammation of skin, uncontrolled or severe cardiac failure, pulmonary edema, ischemic vascular disease, local active metastatic disease, severe peripheral neuropathy, severe PAD, edema at root of extremity, truncal edema. Risks and benefits of tx were reviewed with patient, to which patient was in agreement to continue with tx today.     Discussed the purpose, mechanism, and indications of MLD with pt. Pt was cleared of all contraindications and precautions, including but not limited to cardiac edema, renal failure, acute infection, acute bronchitis, acute DVT, malignancies, bronchial asthma, HTN, pregnancy, ileus, aortic aneurysm, recent abdominal surgery, IBD, diverticulosis, XRT fibrosis or cystitis, colitis, carotid sinus syndrome, hyperthyroidism, >60 y.o. Risks and benefits of tx were reviewed with patient, to which patient was in agreement to continue with tx today.     Discussed the purpose, mechanism, and indications of Lymphatouch with pt. Pt was cleared of all contraindications precautions, including but not limited to acute DVT, acute infection, CHF, cardiac edema, kidney dysfunction, active cancer, pregnancy. Risks and benefits of tx were reviewed with patient, to which patient was in agreement to continue with tx today.           Girth measurements  03/07/2023  Evaluation  04/06/2023 4/24/2023  Discharge 8/12/2024   EVAL 9/16/2024  reassessment   2 in below earlobe 45.3  48.5 44.0 cm 53 cm 50.0   3 in below earlobe 39.0  46.0 36.7 cm 52.5 cm 50.0   4 in below earlobe 36.2  37.8 34.9cm 51.8 cm  37.0 (may be an error)   Chin to Crown 61.2   63 63.6 71 67.0     9/16/2024 Reassess      9/16/2024 reassess      9/16/2024 reassess      Evaluation 8/12/2024      Evaluation 8/12/2024      Evaluation 8/12/2024          Therapeutic Exercise to develop strength, flexibility, and posture for 0 minutes including:  Diaphragm breathing  Mouth opening wide/closing    Deferred due to time:  Seated  Scapula retractions  Chin tucks  Shoulder rolls  Cervical rotation  Cervical lateral flexion    Self Care/Home Management 5 minutes: discussed with pt proper application of Tribute night garment, pt was assured he was donning correctly. Assessment of fit of garment with excellent fit noted.     Manual Therapy to develop flexibility, extensibility, desensitization, pliability, and contour for 55 minutes including:  Instruction on lymphatic system, purpose of self MLD, frequency, proper positioning to perform, instruction on diaphragm breathing. Hand over hand instruction of each technique provided. - last session  Reassessment of girth measurements.   Pre-treatment short neck series to include:  cervical terminus, lateral and posterior neck, axillae, and abdomen, sternal nodes and para vertebral nodes , followed by full head/neck sequence with rework, accessing all watershed areas of anterior and posterior trunk. Worked with pt in supine, R side lying and L side lying, Lymphatouch was applied to sub mental area at 55 mmHg with vibration component at 20 hz.  Pt tolerated well and expressing comfort, positive response to manual treatment and use of lympha touch with increased wrinkling and improved tissue pliability to submental region noted.   (Pt was positioned in bed with wedge, HOB elevated).   Rock tape applied to anterior neck and submental region in lymphatic weave pattern to assist with lymphangiomotoricity. Instructed to remove after 3 days or should remove is he experiences and itching or irritation. Pt verbalized understanding.     Deferred today due to time for  reassessment: Concurrent use of pneumatic compression pump to head/neck area  Deferred today: Manual stretching to bilateral upper traps,  bilateral shoulders, left tighter than right.   Discussed with pt that he needs to get MD approval before purchasing a pump on his own for his legs.       Education Provided  - Progress toward goals   - Role of therapy   - Activity modification  - Reviewed HEP      Home Exercises Provided: HEP discussed, however no handout provided.  Exercises were reviewed and Arthur was able to demonstrate them prior to the end of the session.  Arthur demonstrated good  understanding of the education provided.   Will issue self MLD handout next session     Assessment   Reduction noted in measurements since coming to therapy. Pt now has night time compression garment, will continue to monitor containment with garment, reviewed with pt self MLD. Due to ongoing issue with submental swelling and readdressing conservative measures to help manage his swelling symptoms, feel pt would benefit from a home pneumatic compression pump  to reduce future risk on infections .     Pt presents with severe forward head postural alignment which directly effects efficiency of facial and submental congestion. Patient received recommended [employer]ue Night compression garments and has worn for last 3 nights, does feel has helped some however this morning reported having discomfort in his right jaw/side of face which later resolved, unsure if he donned garment properly. Reviewed with pt application, excellent fit noted.   Positive response to treatment noted with decreased swelling and increased wrinkling to submental region noted post treatment. Pt educated on self MLD techniques at last visit with handouts provided, reports he does weekly.   Pt was informed his insurance will not cover the Flexitouch Head and Neck Pump, would be an out of pocket cost. Pt to discuss with his wife if will decide to purchase.   During  previous session the Flexitouch pump was placed on pt positioned in supine with head of bed elevated, lymph pad in submental area,  and wedge under patient during trial with Flexitouch pump, also added towel roll under pts chin to assist with head extension positioning to assist with lymphatic drainage. Held use of compression pump for todays visit due to time needed for reassessment and review of night time garment application.         Pt continues to receive functional therapy at [facility].    Pt would benefit from a postural brace to help stabilize his trunk so he can hold his head up; pt to look on computer tonight at different types to see which one he would like to try.     Arthur is a 78 y.o. male  referred to outpatient physical therapy with a medical diagnosis of Primary malignant neoplasm of hypopharynx, lymphedema. Significant increase in girth measurements in neck region as compared to 2023 measurements taken.  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 Poor postural support from weakness in musculature. Patient would benefit from MLD as well as need for instruction on self MLD, education on lymphatic flow exercises for home management, compression. Girth measurements were taken for baseline measures as well as pictures. Radiation tx comes with increase risk of lymphedema and this places pt at higher risk of infection. This pt would benefit from skilled therapy services to address the above impairments. I am recommending the Flexitouch system as an adjunct to conservative therapies to address both internal and external swelling. Additionally, this patient presents with fibrosis and the Flexitouch can break up  fibrotic tissue over time. The Flexitouch  is the only pump available to treat head & neck lymphedema to properly assist lymph drainage. Patient has been compliant with elevation and exercise for at least 4 weeks, yet swelling persists. Pt working on daily compliance with compression of 20-30 mmHg, There is no basic pump that is able to treat the head and neck.         Anticipated barriers for therapy: none        GOALS  Short Term Goals: 4 weeks     Patient will demonstrate 100% knowledge of lymphedema precautions and signs of infection.   Decrease girth by average 1 cm for improved mobility and safety with iADLs. MET 9/16/2024  Patient to report compliance sleeping on 30 degree incline for lymphatic protection.  Not met, pt reports sleeps on 15 degree incline.  Patient will demonstrate proper posture with sitting and standing to decrease detrimental affects to adjacent structures. Not met, pt with poor postural weakness.  Patient will tolerate HEP for better progression toward LTGs and self-management of presentation. MET 9/16/2024     Long Term Goals: 8 weeks  Patient will be independent with HEP for self-management of symptoms and current status.   Decrease girth by average 2 cm for improved mobility and safety with iADLs.  Patient will perform self lymphatic drainage techniques for long term management of lymphedema.   Patient to don/doff compression garments for self management of lymphedema. MET 9/16/2024  Patient to obtain Flexitouch pneumatic compression pump for daily home use to assist in maintaining reduced swelling.      Plan     Continue with established plan of care working toward PT goals.    Leticia Moses, PT , CLT

## 2024-09-19 PROBLEM — Z93.1 GASTROSTOMY STATUS: Status: ACTIVE | Noted: 2024-09-19

## 2024-09-19 PROBLEM — N18.32 STAGE 3B CHRONIC KIDNEY DISEASE: Status: ACTIVE | Noted: 2024-09-19

## 2024-09-26 ENCOUNTER — CLINICAL SUPPORT (OUTPATIENT)
Dept: REHABILITATION | Facility: HOSPITAL | Age: 79
End: 2024-09-26
Payer: MEDICARE

## 2024-09-26 DIAGNOSIS — I89.0 LYMPHEDEMA DUE TO RADIATION: Primary | ICD-10-CM

## 2024-09-26 PROCEDURE — 97140 MANUAL THERAPY 1/> REGIONS: CPT | Mod: PN

## 2024-09-26 NOTE — PROGRESS NOTES
[facility] Outpatient  Physical Therapy Progress Note  Lymphedema Therapy    Visit Date: 9/26/2024    Name: Arthur Ribeiro  MRN: [id number]  Therapy Diagnosis:   Encounter Diagnoses   Name Primary?    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 esophageal dilation 5 times   Evaluation Date: 8/12/2024  Reassessment: 9/16/2024    Authorization: 20 visits    08/12/2024 12/31/2024     Plan of Care Expiration: 8/12/2024 -10/12/2024  Reassessment Due: 10/16/2024  FOTO Completed: 1 / 3     Visit: 10 / 12  PTA Visit: 0 / 5  Time In:  2:00 PM  Time Out: 3:00 PM  Total Billable Time: 55 minutes     Precautions: Standard, Fall and lymphoma  Has previously been seen in our clinic in 3/7/2023 - 4/25/2023      Subjective     Patient states: has been wearing the Tribute night garment last 3 days but this morning he woke up having discomfort to the right side of his jaw/face but it is better now. Can tell that he is less swollen, doing his self MLD several days a week. Have a postural vest but it's too hard to put on and very uncomfortable. Continues to go to PT at [facility] for strengthening.  Rep[facility] to get pump approved.   During session, wife came into treatment room, spoke to Rep with [employer], was informed his insurance would not cover the Flexi touch compression pump, would have the option to pay out of pocket or could pay toward it interest free for a certain amount of time. Pt wishes to think about it and discuss at home.    Pain: 0/10   Location: Pt denies pain currently    Objective   Pt ambulates into gym with RW severe  forward head/rounded shoulders posture    Arthur participated in / received the following treatment:    Discussed the purpose, mechanism, and indications of intermittent pneumatic compression (IPC) with pt. Pt was cleared of all contraindications and precautions, including but not limited to nonpitting chronic lymphedema, DVT, PE, thrombophlebitis, acute inflammation of skin, uncontrolled or severe cardiac failure, pulmonary edema, ischemic vascular disease, local active metastatic disease, severe peripheral neuropathy, severe PAD, edema at root of extremity, truncal edema. Risks and benefits of tx were reviewed with patient, to which patient was in agreement to continue with tx today.     Discussed the purpose, mechanism, and indications of MLD with pt. Pt was cleared of all contraindications and precautions, including but not limited to cardiac edema, renal failure, acute infection, acute bronchitis, acute DVT, malignancies, bronchial asthma, HTN, pregnancy, ileus, aortic aneurysm, recent abdominal surgery, IBD, diverticulosis, XRT fibrosis or cystitis, colitis, carotid sinus syndrome, hyperthyroidism, >60 y.o. Risks and benefits of tx were reviewed with patient, to which patient was in agreement to continue with tx today.     Discussed the purpose, mechanism, and indications of Lymphatouch with pt. Pt was cleared of all contraindications precautions, including but not limited to acute DVT, acute infection, CHF, cardiac edema, kidney dysfunction, active cancer, pregnancy. Risks and benefits of tx were reviewed with patient, to which patient was in agreement to continue with tx today.      Therapeutic Exercise to develop strength, flexibility, and posture for 0 minutes including:  Diaphragm breathing  Mouth opening wide/closing    Deferred due to time:  Seated  Scapula retractions  Chin tucks  Shoulder rolls  Cervical rotation  Cervical lateral flexion    Self Care/Home Management 5 minutes: discussed with pt proper  application of Tribute night garment, pt was assured he was donning correctly. Assessment of fit of garment with excellent fit noted.   Advised to try to wear during the day as well for submental assist to lymphatics.   Manual Therapy to develop flexibility, extensibility, desensitization, pliability, and contour for 55 minutes including:  Instruction on lymphatic system, purpose of self MLD, frequency, proper positioning to perform, instruction on diaphragm breathing. Hand over hand instruction of each technique provided. - last session  Pre-treatment short neck series to include:  cervical terminus, lateral and posterior neck, axillae, and abdomen, sternal nodes and para vertebral nodes , followed by full head/neck sequence with rework, accessing all watershed areas of anterior and posterior trunk. Concurrent use of pneumatic compression pump to head/neck area with use of komprex padding at submental area. Worked with pt in supine, R side lying and L side lying, Lymphatouch was applied to sub mental area at 55 mmHg with vibration component at 20 hz.  Pt tolerated well and expressing comfort, positive response to manual treatment and use of lympha touch with increased wrinkling and improved tissue pliability to submental region noted.   (Pt was positioned in bed with wedge, HOB elevated).   Rock tape applied to anterior neck and submental region in lymphatic weave pattern to assist with lymphangiomotoricity. Instructed to remove after 3 days or should remove is he experiences and itching or irritation. Pt verbalized understanding.     Deferred today: Manual stretching to bilateral upper traps,  bilateral shoulders, left tighter than right.   Discussed with pt that he needs to get MD approval before purchasing a pump on his own for his legs.       Education Provided  - Progress toward goals   - Role of therapy   - Activity modification  - Reviewed HEP      Home Exercises Provided: HEP discussed, however no handout  provided.  Exercises were reviewed and Arthur was able to demonstrate them prior to the end of the session.  Arthur demonstrated good  understanding of the education provided.   Will issue self MLD handout next session     Assessment   Reduction noted in measurements since coming to therapy. Pt now has night time compression garment, will continue to monitor containment with garment, reviewed with pt self MLD. Due to ongoing issue with submental swelling and readdressing conservative measures to help manage his swelling symptoms, feel pt would benefit from a home pneumatic compression pump  to reduce future risk on infections . Provided pt with komprex padding for placement at submental region while using his tribute garment at home.    Pt presents with severe forward head postural alignment which directly effects efficiency of facial and submental congestion. Patient received recommended Tribtue Night compression garments and has worn for last 3 nights, does feel has helped some however this morning reported having discomfort in his right jaw/side of face which later resolved, unsure if he donned garment properly. Reviewed with pt application, excellent fit noted.   Positive response to treatment noted with decreased swelling and increased wrinkling to submental region noted post treatment. Pt educated on self MLD techniques at last visit with handouts provided, reports he does weekly.   Pt was informed his insurance will not cover the Flexitouch Head and Neck Pump, would be an out of pocket cost. Pt to discuss with his wife if will decide to purchase.   During previous session the Flexitouch pump was placed on pt positioned in supine with head of bed elevated, lymph pad in submental area,  and wedge under patient during trial with Flexitouch pump, also added towel roll under pts chin to assist with head extension positioning to assist with lymphatic drainage. Held use of compression pump for todays visit due to time  needed for reassessment and review of night time garment application.         Pt continues to receive functional therapy at [facility].    Pt would benefit from a postural brace to help stabilize his trunk so he can hold his head up; pt to look on computer tonight at different types to see which one he would like to try.     Arthur is a 78 y.o. male  referred to outpatient physical therapy with a medical diagnosis of Primary malignant neoplasm of hypopharynx, lymphedema. Significant increase in girth measurements in neck region as compared to 2023 measurements taken.  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 Poor postural support from weakness in musculature. Patient would benefit from MLD as well as need for instruction on self MLD, education on lymphatic flow exercises for home management, compression. Girth measurements were taken for baseline measures as well as pictures. Radiation tx comes with increase risk of lymphedema and this places pt at higher risk of infection. This pt would benefit from skilled therapy services to address the above impairments. I am recommending the Flexitouch system as an adjunct to conservative therapies to address both internal and external swelling. Additionally, this patient presents with fibrosis and the Flexitouch can break up fibrotic tissue over time. The Flexitouch  is the only pump available to treat head & neck lymphedema to properly assist lymph drainage. Patient has been compliant with elevation and exercise for at least 4 weeks, yet swelling persists. Pt working on daily compliance with compression of 20-30 mmHg, There is no basic pump that is able to treat the head and  neck.         Anticipated barriers for therapy: none        GOALS  Short Term Goals: 4 weeks     Patient will demonstrate 100% knowledge of lymphedema precautions and signs of infection.   Decrease girth by average 1 cm for improved mobility and safety with iADLs. MET 9/16/2024  Patient to report compliance sleeping on 30 degree incline for lymphatic protection.  Not met, pt reports sleeps on 15 degree incline.  Patient will demonstrate proper posture with sitting and standing to decrease detrimental affects to adjacent structures. Not met, pt with poor postural weakness.  Patient will tolerate HEP for better progression toward LTGs and self-management of presentation. MET 9/16/2024     Long Term Goals: 8 weeks  Patient will be independent with HEP for self-management of symptoms and current status.   Decrease girth by average 2 cm for improved mobility and safety with iADLs.  Patient will perform self lymphatic drainage techniques for long term management of lymphedema.   Patient to don/doff compression garments for self management of lymphedema. MET 9/16/2024  Patient to obtain Flexitouch pneumatic compression pump for daily home use to assist in maintaining reduced swelling.      Plan     Continue with established plan of care working toward PT goals.    Candi Aburto, PT , CLT

## 2024-10-02 ENCOUNTER — TELEPHONE (OUTPATIENT)
Dept: HEMATOLOGY/ONCOLOGY | Facility: CLINIC | Age: 79
End: 2024-10-02
Payer: MEDICARE

## 2024-10-02 NOTE — TELEPHONE ENCOUNTER
Returned call and spoke to wife R/S appt as per pt request. Wife confirmed day/time and verbalized understanding and thanked me for calling  ----- Message from [place] sent at 10/2/2024  1:05 PM CDT -----  Type: Needs Medical Advice  Who Called:  aDry (spouse)  Symptoms (please be specific):    How long has patient had these symptoms:    Pharmacy name and phone #:    Best Call Back Number: [phone]  Additional Information: Dary is requesting a call back from Joseline to reschedule her  appt. on 10/4.

## 2024-10-16 ENCOUNTER — CLINICAL SUPPORT (OUTPATIENT)
Dept: REHABILITATION | Facility: HOSPITAL | Age: 79
End: 2024-10-16
Payer: MEDICARE

## 2024-10-16 DIAGNOSIS — I89.0 LYMPHEDEMA DUE TO RADIATION: Primary | ICD-10-CM

## 2024-10-16 PROCEDURE — 97140 MANUAL THERAPY 1/> REGIONS: CPT | Mod: PN

## 2024-10-16 NOTE — PROGRESS NOTES
[facility] Outpatient  Physical Therapy Progress Note  Lymphedema Therapy    Visit Date: 10/16/2024    Name: Arthur Ribeiro  MRN: [id number]  Therapy Diagnosis:   Encounter Diagnoses   Name Primary?    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 esophageal dilation 5 times   Evaluation Date: 8/12/2024  Reassessment: 9/16/2024  Re-assessmente 10/16/2024    Authorization: 20 visits    08/12/2024 12/31/2024     Plan of Care Expiration:10/16/2024- 11/16/2024  Reassessment Due: 10/16/2024  FOTO Completed: 1 / 3     Visit: 10/ 12  PTA Visit: 0 / 5  Time In:  1:15 PM  Time Out: 2:00 PM  Total Billable Time: 55 minutes     Precautions: Standard, Fall and lymphoma  Has previously been seen in our clinic in 3/7/2023 - 4/25/2023      Subjective     Patient states: has been wearing the Tribute night garment at night when he sleeps. Can tell that he is less swollen, doing his self MLD several days a week. Have a postural vest but it's too hard to put on and very uncomfortable. Continues to go to PT at [facility] for strengthening.  Reports [facility] to get pump approved.       Pain: 0/10   Location: Pt denies pain currently    Objective   Pt ambulates into gym with RW severe forward head/rounded shoulders posture         Girth measurements  03/07/2023  Evaluation  04/06/2023 4/24/2023  Discharge 8/12/2024   EVAL 9/16/2024  reassessment 10/16/2024 Change from beginning   2 in below earlobe 45.3  48.5 44.0 cm 53 cm 50.0 46.5 -3.5   3 in below earlobe 39.0  46.0 36.7 cm 52.5 cm 50.0 37.1 -1.9   4 in below earlobe 36.2  37.8 34.9cm 51.8 cm  37.0 (may be  an error) 35.2  -1.0   Chin to Crown 61.2  63 63.6 71 67.0 67.2 + 6.0    reduction noted at 2 inches below earlobe level, softer and more pliable tissue is appreciated.  Arthur participated in / received the following treatment:    Discussed the purpose, mechanism, and indications of intermittent pneumatic compression (IPC) with pt. Pt was cleared of all contraindications and precautions, including but not limited to nonpitting chronic lymphedema, DVT, PE, thrombophlebitis, acute inflammation of skin, uncontrolled or severe cardiac failure, pulmonary edema, ischemic vascular disease, local active metastatic disease, severe peripheral neuropathy, severe PAD, edema at root of extremity, truncal edema. Risks and benefits of tx were reviewed with patient, to which patient was in agreement to continue with tx today.     Discussed the purpose, mechanism, and indications of MLD with pt. Pt was cleared of all contraindications and precautions, including but not limited to cardiac edema, renal failure, acute infection, acute bronchitis, acute DVT, malignancies, bronchial asthma, HTN, pregnancy, ileus, aortic aneurysm, recent abdominal surgery, IBD, diverticulosis, XRT fibrosis or cystitis, colitis, carotid sinus syndrome, hyperthyroidism, >60 y.o. Risks and benefits of tx were reviewed with patient, to which patient was in agreement to continue with tx today.     Discussed the purpose, mechanism, and indications of Lymphatouch with pt. Pt was cleared of all contraindications precautions, including but not limited to acute DVT, acute infection, CHF, cardiac edema, kidney dysfunction, active cancer, pregnancy. Risks and benefits of tx were reviewed with patient, to which patient was in agreement to continue with tx today.      Therapeutic Exercise to develop strength, flexibility, and posture for 0 minutes including:  Diaphragm breathing  Mouth opening wide/closing    Deferred due to time:  Seated  Scapula retractions  Chin  tucks  Shoulder rolls  Cervical rotation  Cervical lateral flexion    Self Care/Home Management 0 minutes: prior visit discussed with pt proper application of Tribute night garment, pt was assured he was donning correctly. Assessment of fit of garment with excellent fit noted.   Advised to try to wear during the day as well for submental assist to lymphatics.   Manual Therapy to develop flexibility, extensibility, desensitization, pliability, and contour for 55 minutes including:  Instruction on lymphatic system, purpose of self MLD, frequency, proper positioning to perform, instruction on diaphragm breathing. Hand over hand instruction of each technique provided. - last session  Pre-treatment short neck series to include:  submental nodes, pre and post auricular nodes, occipital nodes cervical terminus, lateral and posterior neck, axillae, and abdomen, sternal nodes and para vertebral nodes , followed by full head/neck sequence with rework, accessing all watershed areas of anterior and posterior trunk. Concurrent use of pneumatic compression pump to head/neck area with use of komprex padding at submental area. Worked with pt in supine, R side lying and L side lying, Lymphatouch was applied to sub mental area at 55 mmHg with vibration component at 20 hz.  Pt tolerated well and expressing comfort, positive response to manual treatment and use of lympha touch with increased wrinkling and improved tissue pliability to submental region noted.   (Pt was positioned in bed with wedge, HOB elevated).   Defer Rock tape applied to anterior neck and submental region in lymphatic weave pattern to assist with lymphangiomotoricity. Instructed to remove after 3 days or should remove is he experiences and itching or irritation. Pt verbalized understanding.     Deferred today: Manual stretching to bilateral upper traps,  bilateral shoulders, left tighter than right.   Discussed with pt that he needs to get MD approval before  purchasing a pump on his own for his legs.       Education Provided  - Progress toward goals   - Role of therapy   - Activity modification  - Reviewed HEP      Home Exercises Provided: HEP discussed, however no handout provided.  Exercises were reviewed and Arthur was able to demonstrate them prior to the end of the session.  Arthur demonstrated good  understanding of the education provided.   Will issue self MLD handout next session     Assessment   Reduction noted in measurements since coming to therapy. Pt now has night time compression garment, will continue to monitor containment with garment, reviewed with pt self MLD. Due to ongoing issue with submental swelling and readdressing conservative measures to help manage his swelling symptoms, feel pt would benefit from a home pneumatic compression pump  to reduce future risk on infections . Previously provided pt with komprex padding for placement at submental region while using his tribute garment at home.    Pt presents with severe forward head postural alignment which directly effects efficiency of facial and submental congestion. Patient received recommended Tribtue Night compression garments and has been compliant with wearing at night. Feels pump helps the most.   Positive response to treatment noted with decreased swelling and increased wrinkling to submental region noted post treatment. Pt educated on self MLD techniques at last visit with handouts provided, reports he does weekly.   Pt was informed his insurance will not cover the Flexitouch Head and Neck Pump, would be an out of pocket cost. Pt to discuss with his wife if will decide to purchase.   During previous session the Flexitouch pump was placed on pt positioned in supine with head of bed elevated, lymph pad in submental area,  and wedge under patient during trial with Flexitouch pump, also added towel roll under pts chin to assist with head extension positioning to assist with lymphatic drainage.  Held use of compression pump for todays visit due to time needed for reassessment and review of night time garment application.         Pt continues to receive functional therapy at [facility].    Pt would benefit from a postural brace to help stabilize his trunk so he can hold his head up; pt to look on computer tonight at different types to see which one he would like to try.     Arthur is a 78 y.o. male  referred to outpatient physical therapy with a medical diagnosis of Primary malignant neoplasm of hypopharynx, lymphedema. Significant increase in girth measurements in neck region as compared to 2023 measurements taken.  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 Poor postural support from weakness in musculature. Patient would benefit from MLD as well as need for instruction on self MLD, education on lymphatic flow exercises for home management, compression. Girth measurements were taken for baseline measures as well as pictures. Radiation tx comes with increase risk of lymphedema and this places pt at higher risk of infection. This pt would benefit from skilled therapy services to address the above impairments. I am recommending the Flexitouch system as an adjunct to conservative therapies to address both internal and external swelling. Additionally, this patient presents with fibrosis and the Flexitouch can break up fibrotic tissue over time. The Flexitouch  is the only pump available to treat head & neck lymphedema to properly assist lymph drainage. Patient has been compliant with elevation and exercise for at least 4 weeks, yet swelling persists. Pt working on daily compliance with compression of 20-30 mmHg,  There is no basic pump that is able to treat the head and neck.         Anticipated barriers for therapy: none        GOALS  Short Term Goals: 4 weeks     Patient will demonstrate 100% knowledge of lymphedema precautions and signs of infection.   Decrease girth by average 1 cm for improved mobility and safety with iADLs. MET 9/16/2024  Patient to report compliance sleeping on 30 degree incline for lymphatic protection.  Not met, pt reports sleeps on 15 degree incline.  Patient will demonstrate proper posture with sitting and standing to decrease detrimental affects to adjacent structures. Not met, pt with poor postural weakness.  Patient will tolerate HEP for better progression toward LTGs and self-management of presentation. MET 9/16/2024     Long Term Goals: 8 weeks  Patient will be independent with HEP for self-management of symptoms and current status. In progress 10/16/2024  Decrease girth by average 2 cm for improved mobility and safety with iADLs. Met for submental region MET 10/16/2024  Patient will perform self lymphatic drainage techniques for long term management of lymphedema. MET 10/16/2024  Patient to don/doff compression garments for self management of lymphedema. MET 10/16/2024  Patient to obtain Flexitouch pneumatic compression pump for daily home use to assist in maintaining reduced swelling. In progress 10/16/2024     Plan   Cont 1-2 x week x 4 weeks, pt [facility] if [facility]. His primary insurance denied.   Continue with established plan of care working toward PT goals.    Candi Aburto, PT , CLT

## 2024-10-23 ENCOUNTER — PATIENT MESSAGE (OUTPATIENT)
Dept: RESEARCH | Facility: HOSPITAL | Age: 79
End: 2024-10-23
Payer: MEDICARE

## 2024-10-28 ENCOUNTER — CLINICAL SUPPORT (OUTPATIENT)
Dept: REHABILITATION | Facility: HOSPITAL | Age: 79
End: 2024-10-28
Payer: MEDICARE

## 2024-10-28 DIAGNOSIS — I89.0 LYMPHEDEMA DUE TO RADIATION: Primary | ICD-10-CM

## 2024-10-28 PROCEDURE — 97140 MANUAL THERAPY 1/> REGIONS: CPT | Mod: PN

## 2024-10-29 ENCOUNTER — TELEPHONE (OUTPATIENT)
Dept: HEMATOLOGY/ONCOLOGY | Facility: CLINIC | Age: 79
End: 2024-10-29
Payer: MEDICARE

## 2024-10-29 DIAGNOSIS — C13.9 MALIGNANT NEOPLASM OF HYPOPHARYNGEAL WALL: Primary | ICD-10-CM

## 2024-10-30 ENCOUNTER — TELEPHONE (OUTPATIENT)
Dept: SURGERY | Facility: CLINIC | Age: 79
End: 2024-10-30
Payer: MEDICARE

## 2024-11-01 DIAGNOSIS — C13.9 PRIMARY MALIGNANT NEOPLASM OF HYPOPHARYNX: Primary | ICD-10-CM

## 2024-11-04 ENCOUNTER — CLINICAL SUPPORT (OUTPATIENT)
Dept: REHABILITATION | Facility: HOSPITAL | Age: 79
End: 2024-11-04
Payer: MEDICARE

## 2024-11-04 DIAGNOSIS — I89.0 LYMPHEDEMA DUE TO RADIATION: Primary | ICD-10-CM

## 2024-11-04 DIAGNOSIS — C13.9 PRIMARY MALIGNANT NEOPLASM OF HYPOPHARYNX: ICD-10-CM

## 2024-11-04 PROCEDURE — 97140 MANUAL THERAPY 1/> REGIONS: CPT | Mod: PN

## 2024-11-04 NOTE — PROGRESS NOTES
[facility] Outpatient  Physical Therapy Progress Note  Lymphedema Therapy    Visit Date: 11/4/2024    Name: Arthur Ribeiro  MRN: [id number]  Therapy Diagnosis:   Encounter Diagnoses   Name Primary?    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 esophageal dilation 5 times   Evaluation Date: 8/12/2024  Reassessment: 9/16/2024  Re-assessmente 10/16/2024    Authorization: 20 visits    08/12/2024 12/31/2024     Plan of Care Expiration:10/16/2024- 11/16/2024  Reassessment Due: 10/16/2024  FOTO Completed: 1 / 3     Visit: 13/ 16  PTA Visit: 0 / 5  Time In:  02:05 PM  Time Out: 02:55 PM  Total Billable Time: 50 minutes     Precautions: Standard, Fall and lymphoma  Has previously been seen in our clinic in 3/7/2023 - 4/25/2023      Subjective     Patient states: no new problems, has been using his night time compression garment and can tell a difference in the swelling if he doesn't use it. [facility] with [facility] for assist with getting  to assist with paying for the flexitouch pump.     Pain: 0/10   Location: Pt denies pain currently    Objective   Pt ambulates into gym with RW severe forward head/rounded shoulders posture   Arthur participated in / received the following treatment:    Discussed the purpose, mechanism, and indications of intermittent pneumatic compression (IPC) with pt. Pt was cleared of all contraindications and precautions, including but not limited to nonpitting chronic lymphedema, DVT, PE, thrombophlebitis, acute inflammation of skin, uncontrolled or severe cardiac failure, pulmonary  edema, ischemic vascular disease, local active metastatic disease, severe peripheral neuropathy, severe PAD, edema at root of extremity, truncal edema. Risks and benefits of tx were reviewed with patient, to which patient was in agreement to continue with tx today.     Discussed the purpose, mechanism, and indications of MLD with pt. Pt was cleared of all contraindications and precautions, including but not limited to cardiac edema, renal failure, acute infection, acute bronchitis, acute DVT, malignancies, bronchial asthma, HTN, pregnancy, ileus, aortic aneurysm, recent abdominal surgery, IBD, diverticulosis, XRT fibrosis or cystitis, colitis, carotid sinus syndrome, hyperthyroidism, >60 y.o. Risks and benefits of tx were reviewed with patient, to which patient was in agreement to continue with tx today.     Discussed the purpose, mechanism, and indications of Lymphatouch with pt. Pt was cleared of all contraindications precautions, including but not limited to acute DVT, acute infection, CHF, cardiac edema, kidney dysfunction, active cancer, pregnancy. Risks and benefits of tx were reviewed with patient, to which patient was in agreement to continue with tx today.      Therapeutic Exercise to develop strength, flexibility, and posture for 0 minutes including:  Diaphragm breathing  Mouth opening wide/closing    Deferred due to time:  Seated  Scapula retractions  Chin tucks  Shoulder rolls  Cervical rotation  Cervical lateral flexion    Self Care/Home Management 0 minutes: prior visit discussed with pt proper application of Tribute night garment, pt was assured he was donning correctly. Assessment of fit of garment with excellent fit noted.   Advised to try to wear during the day as well for submental assist to lymphatics.     Manual Therapy to develop flexibility, extensibility, desensitization, pliability, and contour for 55 minutes including:  Instruction on lymphatic system, purpose of self MLD, frequency, proper  positioning to perform, instruction on diaphragm breathing. Hand over hand instruction of each technique provided. - last session  Pre-treatment short neck series to include:  submental nodes, pre and post auricular nodes, occipital nodes cervical terminus, lateral and posterior neck, axillae, and abdomen, sternal nodes and para vertebral nodes , followed by full head/neck sequence with rework, accessing all watershed areas of anterior and posterior trunk. Concurrent use of pneumatic compression pump to head/neck area with use of komprex padding at submental area. Worked with pt in supine, R side lying and L side lying, Lymphatouch was applied to sub mental area at 55 mmHg with vibration component at 20 hz.  Pt tolerated well and expressing comfort, positive response to manual treatment and use of lympha touch with increased wrinkling and improved tissue pliability to submental region noted.   (Pt was positioned in bed with wedge, HOB elevated).   STM to B upper traps, cervical musculature, left tighter than right, gentle suboccipital release. Pt reports reduced discomfort after manual treatment.     Defer Rock tape applied to anterior neck and submental region in lymphatic weave pattern to assist with lymphangiomotoricity. Instructed to remove after 3 days or should remove is he experiences and itching or irritation. Pt verbalized understanding.     Deferred today: Manual stretching to bilateral upper traps,  bilateral shoulders, left tighter than right.   Discussed with pt that he needs to get MD approval before purchasing a pump on his own for his legs.       Education Provided  - Progress toward goals   - Role of therapy   - Activity modification  - Reviewed HEP      Home Exercises Provided: HEP discussed, however no handout provided.  Exercises were reviewed and Arthur was able to demonstrate them prior to the end of the session.  Arthur demonstrated good  understanding of the education provided.   Will issue self  MLD handout next session     Assessment   Pt with some fullness in submental region which responds well to MLD, lymphatouch and pneumatic compression pump.  Pt now has night time compression garment, will continue to monitor containment with garment, reviewed with pt self MLD. Due to ongoing issue with submental swelling and readdressing conservative measures to help manage his swelling symptoms, feel pt would benefit from a home pneumatic compression pump  to reduce future risk on infections . 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     Pt presents with severe forward head postural alignment which directly effects efficiency of facial and submental congestion. Patient received recommended Tribtue Night compression garments and has been compliant with wearing at night. Feels pump helps the most.   Positive response to treatment noted with decreased swelling and increased wrinkling to submental region noted post treatment. Pt educated on self MLD techniques at last visit with handouts provided, reports he does weekly.   Pt was informed his insurance will not cover the Flexitouch Head and Neck Pump, would be an out of pocket cost. Pt to discuss with his wife if will decide to purchase.   During previous session the Flexitouch pump was placed on pt positioned in supine with head of bed elevated, lymph pad in submental area,  and wedge under patient during trial with Flexitouch pump, also added towel roll under pts chin to assist with head extension positioning to assist with lymphatic drainage. Held use of compression pump for todays visit due to time needed for reassessment and review of night time garment application.         Pt continues to receive functional therapy at [facility].    Pt would benefit from a postural brace to help stabilize  his trunk so he can hold his head up; pt to look on computer tonight at different types to see which one he would like to try.     Arthur is a 78 y.o. male  referred to outpatient physical therapy with a medical diagnosis of Primary malignant neoplasm of hypopharynx, lymphedema. Significant increase in girth measurements in neck region as compared to 2023 measurements taken.  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 Poor postural support from weakness in musculature. Patient would benefit from MLD as well as need for instruction on self MLD, education on lymphatic flow exercises for home management, compression. Girth measurements were taken for baseline measures as well as pictures. Radiation tx comes with increase risk of lymphedema and this places pt at higher risk of infection. This pt would benefit from skilled therapy services to address the above impairments. I am recommending the Flexitouch system as an adjunct to conservative therapies to address both internal and external swelling. Additionally, this patient presents with fibrosis and the Flexitouch can break up fibrotic tissue over time. The Flexitouch  is the only pump available to treat head & neck lymphedema to properly assist lymph drainage. Patient has been compliant with elevation and exercise for at least 4 weeks, yet swelling persists. Pt working on daily compliance with compression of 20-30 mmHg, There is no basic pump that is able to treat the head and neck.         Anticipated barriers for therapy: none        GOALS  Short Term Goals: 4 weeks     Patient will demonstrate 100% knowledge of lymphedema precautions and signs of infection.    Decrease girth by average 1 cm for improved mobility and safety with iADLs. MET 9/16/2024  Patient to report compliance sleeping on 30 degree incline for lymphatic protection.  Not met, pt reports sleeps on 15 degree incline.  Patient will demonstrate proper posture with sitting and standing to decrease detrimental affects to adjacent structures. Not met, pt with poor postural weakness.  Patient will tolerate HEP for better progression toward LTGs and self-management of presentation. MET 9/16/2024     Long Term Goals: 8 weeks  Patient will be independent with HEP for self-management of symptoms and current status. In progress 10/16/2024  Decrease girth by average 2 cm for improved mobility and safety with iADLs. Met for submental region MET 10/16/2024  Patient will perform self lymphatic drainage techniques for long term management of lymphedema. MET 10/16/2024  Patient to don/doff compression garments for self management of lymphedema. MET 10/16/2024  Patient to obtain Flexitouch pneumatic compression pump for daily home use to assist in maintaining reduced swelling. In progress 10/16/2024     Plan   3 visits remaining in PT POC. [facility] His primary insurance denied. Continue with established plan of care working toward PT goals.    Leticia Moses, PT , CLT

## 2024-11-06 ENCOUNTER — TELEPHONE (OUTPATIENT)
Dept: HEMATOLOGY/ONCOLOGY | Facility: CLINIC | Age: 79
End: 2024-11-06
Payer: MEDICARE

## 2024-11-06 NOTE — TELEPHONE ENCOUNTER
Pt called the office to reschedule his PT apt on 11/20/24 to 11/22/24 at 11:00 am. Location and check-in process known to pt. He verbalized his acceptance of the apt change.

## 2024-11-11 ENCOUNTER — HOSPITAL ENCOUNTER (OUTPATIENT)
Dept: RADIOLOGY | Facility: HOSPITAL | Age: 79
Discharge: HOME | End: 2024-11-11
Attending: STUDENT IN AN ORGANIZED HEALTH CARE EDUCATION/TRAINING PROGRAM
Payer: MEDICARE

## 2024-11-11 DIAGNOSIS — C13.9 PRIMARY MALIGNANT NEOPLASM OF HYPOPHARYNX: ICD-10-CM

## 2024-11-11 LAB — GLUCOSE SERPL-MCNC: 79 MG/DL (ref 70–110)

## 2024-11-11 PROCEDURE — 78815 PET IMAGE W/CT SKULL-THIGH: CPT | Mod: 26,PS,, | Performed by: STUDENT IN AN ORGANIZED HEALTH CARE EDUCATION/TRAINING PROGRAM

## 2024-11-11 PROCEDURE — 78815 PET IMAGE W/CT SKULL-THIGH: CPT | Mod: TC,PN

## 2024-11-11 PROCEDURE — A9552 F18 FDG: HCPCS | Mod: PN | Performed by: STUDENT IN AN ORGANIZED HEALTH CARE EDUCATION/TRAINING PROGRAM

## 2024-11-11 RX ORDER — FLUDEOXYGLUCOSE F18 500 MCI/ML
12 INJECTION INTRAVENOUS
Status: COMPLETED | OUTPATIENT
Start: 2024-11-11 | End: 2024-11-11

## 2024-11-11 RX ADMIN — FLUDEOXYGLUCOSE F-18 11.5 MILLICURIE: 500 INJECTION INTRAVENOUS at 08:11

## 2024-11-11 NOTE — PROGRESS NOTES
PET Imaging Questionnaire    Are you a Diabetic? Recent Blood Sugar level? No    Are you anemic? Bone Marrow Stimulation Meds? No    Have you had a CT Scan, if so when & where was your last one? Yes -     Have you had a PET Scan, if so when & where was your last one? Yes -     Chemotherapy or currently on Chemotherapy? Yes    Radiation therapy? Yes    Surgical History:   Past Surgical History:   Procedure Laterality Date    DIRECT LARYNGOSCOPY N/A 11/08/2022    Procedure: DIRECT LARYNGOSCOPY;  Surgeon: Kavon Hart MD;  [facility];  Service: ENT;  Laterality: N/A;  Telescopes, tower, airway basic, diverticuloscopes, pulmonary balloons/jagwire, pediatric gastroscope, daniel NGT, [place] sump NGT, FLUORO    ESOPHAGEAL DILATION N/A 7/20/2023    Procedure: DILATION, ESOPHAGUS;  Surgeon: Daniel Sagastume MD;  Location: [facility];  Service: Endoscopy;  Laterality: N/A;  savory    ESOPHAGEAL DILATION N/A 8/3/2023    Procedure: DILATION, ESOPHAGUS;  Surgeon: Daniel Sagastume MD;  Location: [facility];  Service: Endoscopy;  Laterality: N/A;  savory    ESOPHAGEAL DILATION N/A 9/14/2023    Procedure: DILATION, ESOPHAGUS;  Surgeon: Daniel Sagastume MD;  Location: [facility];  Service: Endoscopy;  Laterality: N/A;    ESOPHAGEAL DILATION N/A 11/6/2023    Procedure: DILATION, ESOPHAGUS;  Surgeon: Daniel Sagastume MD;  Location: [facility];  Service: Endoscopy;  Laterality: N/A;  savory    ESOPHAGEAL DILATION N/A 4/12/2024    Procedure: DILATION, ESOPHAGUS;  Surgeon: Daniel Sagastume MD;  Location: [facility];  Service: Endoscopy;  Laterality: N/A;  savory    ESOPHAGOGASTRODUODENOSCOPY N/A 07/06/2022    Procedure: EGD (ESOPHAGOGASTRODUODENOSCOPY);  Surgeon: Daniel Sagastume MD;  Location: [facility];  Service: Endoscopy;  Laterality: N/A;    ESOPHAGOGASTRODUODENOSCOPY N/A 01/03/2023    Procedure: EGD (ESOPHAGOGASTRODUODENOSCOPY);  Surgeon: Navin Weaver MD;  [facility]  Service:  Endoscopy;  Laterality: N/A;  MD Ira Almonte MA  Caller: Unspecified (3 days ago,  3:14 PM)  Clinic pt seen at [place]. Will need EGD under fluoro with me. [facility] only. Complex stricture that may require retrograde EGD through PEG tube. Schedule as a 90min case with gen    ESOPHAGOGASTRODUODENOSCOPY N/A 01/12/2023    Procedure: EGD (ESOPHAGOGASTRODUODENOSCOPY);  Surgeon: Navin Weaver MD;  Location: [facility] (2ND FLR);  Service: Endoscopy;  Laterality: N/A;  Pt done today. I need to do him next week. Will need EGD under fluoro. 45min case OK. Please try to find a time for me to do his case. I can come in for a 7am start, if needed.    Tube feeds off at midnight-DS  1/5/23-Instructions via portal-DS    ESOPHAGOGASTRODUODENOSCOPY N/A 01/25/2023    Procedure: EGD (ESOPHAGOGASTRODUODENOSCOPY);  Surgeon: Navin Weaver MD;  Location: [facility] (2ND FLR);  Service: Endoscopy;  Laterality: N/A;  Repeat upper endoscopy in 2 weeks for retreatment  inst via portal & email-MS    ESOPHAGOGASTRODUODENOSCOPY N/A 2/24/2023    Procedure: EGD (ESOPHAGOGASTRODUODENOSCOPY);  Surgeon: Navin Weaver MD;  Location: [facility] (2ND FLR);  Service: Endoscopy;  Laterality: N/A;  1/30/23-Instructions via portal-DS    ESOPHAGOGASTRODUODENOSCOPY N/A 7/20/2023    Procedure: EGD (ESOPHAGOGASTRODUODENOSCOPY);  Surgeon: Daniel Sagastume MD;  Location: [facility];  Service: Endoscopy;  Laterality: N/A;    ESOPHAGOGASTRODUODENOSCOPY N/A 8/3/2023    Procedure: EGD (ESOPHAGOGASTRODUODENOSCOPY);  Surgeon: Daniel Sagastume MD;  Location: [facility];  Service: Endoscopy;  Laterality: N/A;    ESOPHAGOGASTRODUODENOSCOPY N/A 9/14/2023    Procedure: EGD (ESOPHAGOGASTRODUODENOSCOPY);  Surgeon: Daniel Sagastume MD;  Location: [facility];  Service: Endoscopy;  Laterality: N/A;    ESOPHAGOGASTRODUODENOSCOPY N/A 11/6/2023    Procedure: EGD (ESOPHAGOGASTRODUODENOSCOPY);  Surgeon: Daniel Sagastume MD;  Location: [facility];  Service:  Endoscopy;  Laterality: N/A;    ESOPHAGOGASTRODUODENOSCOPY N/A 4/12/2024    Procedure: EGD (ESOPHAGOGASTRODUODENOSCOPY);  Surgeon: Daniel Sagastume MD;  Location: [facility];  Service: Endoscopy;  Laterality: N/A;    INSERTION OF VENOUS ACCESS PORT  2005    LARYNGOSCOPY Bilateral 01/26/2022    Procedure: LARYNGOSCOPY;  Surgeon: Enrique Maya MD;  Location: [facility];  Service: ENT;  Laterality: Bilateral;    SKIN CANCER EXCISION      TONSILLECTOMY          Have you been fasting for at least 6 hours? Yes    Is there any chance you may be pregnant or breastfeeding? No    Assay: 12.68 MCi@:820   Injection Site:RT ARM     Residual: 1.17 mCi@: 822   Technologist: Nancy Laguna Injected:11.5mCi

## 2024-11-13 ENCOUNTER — CLINICAL SUPPORT (OUTPATIENT)
Dept: REHABILITATION | Facility: HOSPITAL | Age: 79
End: 2024-11-13
Payer: MEDICARE

## 2024-11-13 DIAGNOSIS — I89.0 LYMPHEDEMA DUE TO RADIATION: Primary | ICD-10-CM

## 2024-11-13 PROCEDURE — 97140 MANUAL THERAPY 1/> REGIONS: CPT | Mod: PN

## 2024-11-13 NOTE — PROGRESS NOTES
[facility] Outpatient  Physical Therapy Progress Note  Lymphedema Therapy    Visit Date: 11/13/2024    Name: Arthur Ribeiro  MRN: [id number]  Therapy Diagnosis:   Encounter Diagnoses   Name Primary?    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 esophageal dilation 5 times   Evaluation Date: 8/12/2024  Reassessment: 9/16/2024  Re-assessmente 10/16/2024  Re-assessment 11/13/2024 rec 1 x week x 6 weeks     Authorization: 20 visits    08/12/2024 12/31/2024     Plan of Care Expiration:10/16/2024- 11/16/2024  Reassessment Performed 11/13/2024 cont with 2 more visits  FOTO Completed: 1 / 3     Visit: 14/ 16 adding to POC 14/20   PTA Visit: 0 / 5  Time In:  9 AM  Time Out: 10 AM  Total Billable Time: 55 minutes     Precautions: Standard, Fall and lymphoma  Has previously been seen in our clinic in 3/7/2023 - 4/25/2023  Completed the lymph survey today at reassess    Subjective     Patient states: no new problems, has been using his night time compression garment and can tell a difference in the swelling if he doesn't use it. Has an  appointment with the doctor maci VANN to assist with getting  to assist with paying for the flexitouch pump.     Pain: 0/10   Location: Pt denies pain currently    Objective   Pt ambulates into gym with RW severe forward head/rounded shoulders posture         Girth measurements  03/07/2023  Evaluation  04/06/2023 4/24/2023  Discharge 8/12/2024   EVAL 9/16/2024  reassessment 10/16/2024 Change from beginning 11/13/2024  Re-assess Change from beginning   2 in below earlobe 45.3  48.5 44.0 cm 53 cm 50.0 46.5 -3.5 42.9  10.1   3  in below earlobe 39.0  46.0 36.7 cm 52.5 cm 50.0 37.1 -1.9 40.0 -12.5    4 in below earlobe 36.2  37.8 34.9cm 51.8 cm  37.0 (may be an error) 35.2  -1.0 37.0 -14.8   Chin to Crown 61.2  63 63.6 71 67.0 67.2 + 6.0 67.5 -3.5   Avg loss:          X of loss 10.2 cm   softer and more pliable tissue is appreciated   Arthur participated in / received the following treatment:    Discussed the purpose, mechanism, and indications of intermittent pneumatic compression (IPC) with pt. Pt was cleared of all contraindications and precautions, including but not limited to nonpitting chronic lymphedema, DVT, PE, thrombophlebitis, acute inflammation of skin, uncontrolled or severe cardiac failure, pulmonary edema, ischemic vascular disease, local active metastatic disease, severe peripheral neuropathy, severe PAD, edema at root of extremity, truncal edema. Risks and benefits of tx were reviewed with patient, to which patient was in agreement to continue with tx today.     Discussed the purpose, mechanism, and indications of MLD with pt. Pt was cleared of all contraindications and precautions, including but not limited to cardiac edema, renal failure, acute infection, acute bronchitis, acute DVT, malignancies, bronchial asthma, HTN, pregnancy, ileus, aortic aneurysm, recent abdominal surgery, IBD, diverticulosis, XRT fibrosis or cystitis, colitis, carotid sinus syndrome, hyperthyroidism, >60 y.o. Risks and benefits of tx were reviewed with patient, to which patient was in agreement to continue with tx today.     Discussed the purpose, mechanism, and indications of Lymphatouch with pt. Pt was cleared of all contraindications precautions, including but not limited to acute DVT, acute infection, CHF, cardiac edema, kidney dysfunction, active cancer, pregnancy. Risks and benefits of tx were reviewed with patient, to which patient was in agreement to continue with tx today.      Therapeutic Exercise to develop strength, flexibility, and  posture for 5 minutes including:  Diaphragm breathing  Active assisted ROM cervical and lateral flexion  Deferred due to time:  Seated  Scapula retractions  Chin tucks  Shoulder rolls  Cervical rotation  Cervical lateral flexion  Mouth opening wide/closing  Self Care/Home Management 0 minutes: prior visit discussed with pt proper application of Tribute night garment, pt was assured he was donning correctly. Assessment of fit of garment with excellent fit noted.   Advised to try to wear during the day as well for submental assist to lymphatics.     Manual Therapy to develop flexibility, extensibility, desensitization, pliability, and contour for 55 minutes including:  Instruction on lymphatic system, purpose of self MLD, frequency, proper positioning to perform, instruction on diaphragm breathing. Hand over hand instruction of each technique provided.   Pre-treatment short neck series to include:  submental nodes, pre and post auricular nodes, occipital nodes cervical terminus, lateral and posterior neck, axillae, and abdomen, sternal nodes and para vertebral nodes , followed by full head/neck sequence with rework, accessing all watershed areas of anterior and posterior trunk. Concurrent use of pneumatic compression pump to head/neck area with use of komprex padding at submental area. Worked with pt in supine, R side lying and L side lying, Lymphatouch was applied to sub mental area at 55 mmHg with vibration component at 40 hz.  Pt tolerated well and expressing comfort, positive response to manual treatment and use of lympha touch with increased wrinkling and improved tissue pliability to submental region noted.   (Pt was positioned in bed with wedge, HOB elevated). Pt reports reduced discomfort after manual treatment.  Deferred STM to B upper traps, cervical musculature, left tighter than right, gentle suboccipital release.      Defer Rock tape applied to anterior neck and submental region in lymphatic weave pattern  to assist with lymphangiomotoricity. Instructed to remove after 3 days or should remove is he experiences and itching or irritation. Pt verbalized understanding.     Deferred today: Manual stretching to bilateral upper traps,  bilateral shoulders, left tighter than right.   Discussed with pt that he needs to get MD approval before purchasing a pump on his own for his legs.       Education Provided  - Progress toward goals   - Role of therapy   - Activity modification  - Reviewed HEP      Home Exercises Provided: HEP discussed, however no handout provided.  Exercises were reviewed and Arthur was able to demonstrate them prior to the end of the session.  Arthur demonstrated good  understanding of the education provided.   issued self MLD handout this session     Assessment   Significant reduction in submental edema, based on measurements. Pt with extreme forward head, and kyphotic posture which may benefit from postural support brace. Pt with some fullness in submental region which responds well to MLD, lymphatouch and pneumatic compression pump.  Pt compliant with night time compression garment, will continue to monitor containment with garment, reviewed with pt self MLD. Due to ongoing issue with submental swelling and readdressing conservative measures to help manage his swelling symptoms, feel pt would benefit from a home pneumatic compression pump  to reduce future risk on infections . 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   I am recommending the Flexitouch system as an adjunct to conservative therapies to address both internal and external swelling. Additionally, this patient presents with fibrosis and the Flexitouch can break up fibrotic tissue over time. The Flexitouch  is the only pump available to treat head & neck lymphedema to  properly assist lymph drainage. Patient has been compliant with compression of 20-30 mmHg, elevation and exercise for at least 4 weeks, yet swelling persists. There is no basic pump that is able to treat the head and neck.    Pt presents with severe forward head postural alignment which directly effects efficiency of facial and submental congestion. Patient received recommended Tribtue Night compression garments and has been compliant with wearing at night.Also compliant with sleeping at incline.  Feels pump helps the most.   Positive response to treatment noted with decreased swelling and increased wrinkling to submental region noted post treatment. Pt educated on self MLD techniques at last visit with handouts provided, reports he does weekly.   [facility], will try to get [facility] to [facility] the pump, which rep feels will not be a problem since the cause of the cancer was due to agent orange during time served.   During previous session the Flexitouch pump was placed on pt positioned in supine with head of bed elevated, lymph pad in submental area,  and wedge under patient during trial with Flexitouch pump, also added towel roll under pts chin to assist with head extension positioning to assist with lymphatic drainage. Held use of compression pump for todays visit due to time needed for reassessment and review of night time garment application.  Pt continues to receive functional therapy at [facility].    Pt would benefit from a postural brace to help stabilize his trunk so he can hold his head up; pt to look on computer tonight at different types to see which one he would like to try.     Arthur is a 78 y.o. male  referred to outpatient physical therapy with a medical diagnosis of Primary malignant neoplasm of hypopharynx, lymphedema. Significant increase in girth measurements in neck region as compared to 2023 measurements taken.  Patient presents with stage 2 secondary lymphedema due to  history of radiation treatment resulting in increased swelling to anterior neck, moderate left and right mandibular swelling, increased difficulty with swallowing, increased secretions more prevelent in am,  increased stiffness in the neck with lateral flexion and rotation and extension. Poor postural support from weakness in musculature. Patient would benefit from MLD as well as need for instruction on self MLD, education on lymphatic flow exercises for home management, compression. Girth measurements were taken for baseline measures as well as pictures. Radiation tx comes with increase risk of lymphedema and this places pt at higher risk of infection. This pt would benefit from skilled therapy services to address the above impairments..         Anticipated barriers for therapy: none        GOALS  Short Term Goals: 4 weeks     Patient will demonstrate 100% knowledge of lymphedema precautions and signs of infection.   Decrease girth by average 1 cm for improved mobility and safety with iADLs. MET 9/16/2024  Patient to report compliance sleeping on 30 degree incline for lymphatic protection.  Not met, pt reports sleeps on 15 degree incline.  Patient will demonstrate proper posture with sitting and standing to decrease detrimental affects to adjacent structures. Not met, pt with poor postural weakness.  Patient will tolerate HEP for better progression toward LTGs and self-management of presentation. MET 9/16/2024     Long Term Goals: 8 weeks  Patient will be independent with HEP for self-management of symptoms and current status. In progress 11/13/2024  Decrease girth by average 2 cm for improved mobility and safety with iADLs. Met for submental region MET 10/16/2024  Patient will perform self lymphatic drainage techniques for long term management of lymphedema. MET 10/16/2024  Patient to don/doff compression garments for self management of lymphedema. MET 10/16/2024  Patient to obtain Flexitouch pneumatic compression  pump for daily home use to assist in maintaining reduced swelling. In progress 11/13/2024     Plan   [facility]. His primary insurance denied. Continue with established plan of care working toward PT goals.  Recommend to continue 1 week until end of Dec, then pt will [facility] in Jan.   Candi Aburto, PT , CLT

## 2024-11-20 NOTE — PROGRESS NOTES
Note to patients: In accordance with the  Century Cures Act, patients are now granted immediate electronic access to their medical records. This note is primarily intended for communication among medical professionals. As a result, it may incorporate medical terminology, abbreviations, or language that could appear blunt or unfamiliar. If you have questions about this document, we encourage you to discuss it with your physician.      [facility]  Head & Neck Surgical Oncology Clinic    Patient: Arthur Ribeiro    : 1945    MRN: [id number]  Primary Care Provider: Zohaib Pryor MD  Rad Onc: Mehran Peñaloza MD  Med Onc: Zenaida Jones MD; Quentin Rahman MD  ENT: Enrique Maya MD  [place] [facility] [place] Medicine: Dr. Sagastume (Prev. Dr. Weaver)  Date of Encounter: 2024    DIAGNOSIS:    Cancer Staging   Primary malignant neoplasm of hypopharynx  Staging form: Pharynx - Hypopharynx, AJCC 8th Edition  - Clinical stage from 2022: Stage RENO (cT3, cN2b, cM0) - Signed by Evon Galo NP on 2022      CC:   Chief Complaint   Patient presents with    Primary malignant neoplasm of hypopharynx     4 month follow up with scope        HPI:   Arthur Ribeiro is a 79 y.o. male with a past medical history significant for [id number] SCC of the left hypopharynx. He was treated with induction chemotherapy followed by definitive CCRT completed 2022.    He required a PEG during his treatment and had completed hypopharyngeal stensis requiring retrograde dilation from Dr. Hart and GI. He has undergone multiple EGD/dilation including recently and is now able to eat whatever he'd like by mouth. He has gained weight since his last visit.    He is undergoing PT to build his strength. He reports occasional left muscle/back pain but denies masses or lesions - seems associated with muscle strength in this area. He is having neck stiffness that is worse on the left  side, he states he has been having this issue since radiation. He does not remember going to PT in the past for this. He has an EGD set up with Dr. Sagastume on April 12th. He has dilated him the last 4-5xs. He is scoping him q6 months. He is having some dysphagia. His appetite is good and his weight is stable. He had his TSH checked by his PCP yesterday and his PCP started him on synthroid.     Patient denies pain, fever, chills, night sweats, unintentional weight loss, neck mass, enlarged lymph nodes outside of the head or neck, odynophagia, dysphagia, globus sensation, voice changes, cough, hemoptysis, shortness of breath, or new or concerning skin lesions.     7/29/24: He was evaluated at ER on 6/12 for complaints of weakness. He had a CT of his head without contrast and CTA performed.  He is going to PT for his leg weakness. He is not doing PT for his neck or for his lymphedema. He is having his normal dysphagia and is getting dilated Q6 months by Dr. Sagastume.     11/20/24: He is still going to PT for his leg. He just had a dilation yesterday. He has notice some improvement so far today. He is being manage by his PCP for his thyroid and will have a repeat in a couple years. He [facility] and will try to get approval for the vest for his lymphedema. He still does have some coughing with liquids and foods. He says that he aspirated the fluids.     TREATMENT HISTORY:   induction chemotherapy followed by definitive CCRT completed 8/2022    ALLERGIES:  Review of patient's allergies indicates:  No Known Allergies      MEDICATIONS:    Current Outpatient Medications:     gabapentin (NEURONTIN) 800 MG tablet, TAKE 1 TABLET(800 MG) BY MOUTH THREE TIMES DAILY, Disp: 270 tablet, Rfl: 3    pantoprazole (PROTONIX) 40 MG tablet, 60 tablets., Disp: , Rfl:   No current facility-administered medications for this visit.    Facility-Administered Medications Ordered in Other Visits:     lactated  ringers infusion, , Intravenous, Continuous, Daniel Sagastume MD, Last Rate: 75 mL/hr at 11/06/23 0739, New Bag at 11/06/23 0739    OTHER HISTORY  Past medical, surgical, family, and social histories have been updated and reviewed as needed since last visit.     REVIEW OF SYSTEMS:   Comprehensive review of systems was discussed with the patient.  It is positive only for the above complaints.    PHYSICAL EXAMINATION:  Blood pressure 102/60, pulse 91, temperature 98.5 °F (36.9 °C), temperature source Temporal, resp. rate 16, height 6' (1.829 m), weight 68 kg (149 lb 14.6 oz), SpO2 96%.    Constitutional: Non-toxic appearing, cervical kyphosis  Psychiatric: Appropriate mood and affect. Cooperative.  Voice: Non-dysphonic, speaking in full sentences.   Neurologic: Cranial nerves grossly intact, no focal deficits.  Head and face: Salivary glands are not enlarged. Face is symmetric. CN VII strength and sensation intact.  Skin: No concerning skin lesions.   Eyes: Vision grossly intact, bilateral extraocular movements intact  Ears: Bilateral pinna, mastoid, external ear canal normal. Hearing intact.   Nose: External nose appears normal.   Lips: No ulcers or lesions  Oral cavity: Mucosa is pink and moist. Buccal mucosa, gingiva, anterior tongue, floor of mouth, and hard palate appear normal. No leukoplakia, erythroplakia, ulceration, masses or lesions.  Oropharynx: Mucosa is pink and moist. Soft palate and base of tongue are normal. Posterior pharyngeal wall normal. Tonsils are normal. No lesions.  Neck: Central lymphedema, mobile larynx, no masses or lymphadenopathy  Respiratory: Chest expansion symmetric, no audible stridor or stertor. Breathing is unlabored. No active cough.    PROCEDURES:    FLEXIBLE LARYNGOSCOPY  Provider: Micheline MOSQUEDAP-C  Indication: History of cancer   The procedure was explained to the patient and verbal consent was obtained.   Anesthesia: topical lidocaine and neosynephrine applied within  one or both nares with an atomizer    The scope was introduced in the usual fashion.    Findings:  Mucosa: normal  Inferior turbinates: no polypoid changes or hypertrophy  Septum: no lesion or ulcer  Middle meatus: no purulence or polypoid change  Nasopharynx:  Adenoids: normal  Fossa of Rosenmuller: normal  Eustachian tubes: normal  Superior and posterior pharyngeal walls: narrowing circumferentially at level of the epiglottis  Epiglottis, vallecula, and base of tongue: thickened epiglottis fused to base of tongue bilaterally  Pyriform sinuses: effacement of left pyriform, narrow right piriform, some pooling of secretions   False vocal cords, arytenoids, aryepiglottic folds: normal, slight reducted abduction  True vocal cords are symmetrically mobile on phonation and inspiration.   Laryngeal sensation appears intact. There are no masses or ulcerations.     The scope was withdrawn. The patient tolerated the procedure well with no complications.      DATA REVIEWED:     LABORATORY:      Latest Ref Rng & Units 7/19/2024     3:50 PM 10/2/2024     8:05 AM 11/15/2024    10:09 AM   Thyroid Labs   TSH 0.400 - 4.000 uIU/mL  6.030     Free T4 0.78 - 2.19 ng/dL  0.94     Sodium 136 - 145 mmol/L 138  140  139    Potassium 3.5 - 5.1 mmol/L 4.3  4.5  4.6    Chloride 95 - 110 mmol/L 102  105  105    Carbon Dioxide 22 - 31 mmol/L 28  28  27    Glucose 70 - 110 mg/dL 117  90  79    Blood Urea Nitrogen 9 - 21 mg/dL 46  55  45    Creatinine 0.50 - 1.40 mg/dL 1.79  1.90  1.77    Calcium 8.4 - 10.2 mg/dL 9.2  9.1  8.8    Total Protein 6.0 - 8.4 g/dL 6.8  6.7  6.6    Albumin 3.5 - 5.2 g/dL 3.9  3.8  3.8    Total Bilirubin 0.2 - 1.3 mg/dL 0.5  0.5  0.5    AST 17 - 59 U/L 23  24  25    ALT 0 - 50 U/L 14  13  13    Anion Gap 5 - 12 mmol/L 8  7  7    WBC 3.90 - 12.70 K/uL 3.98  3.47  3.78    RBC 4.60 - 6.20 M/uL 3.58  3.47  3.43    Hemoglobin 14.0 - 18.0 g/dL 10.9  10.7  10.7    Hematocrit 40.0 - 54.0 % 34.2  33.0  33.1    MCV 82 - 98 fL 96   95  97    MCH 27.0 - 31.0 pg 30.4  30.8  31.2    MCHC 32.0 - 36.0 g/dL 31.9  32.4  32.3    RDW 11.5 - 14.5 % 13.8  13.9  14.0    Platelets 150 - 450 K/uL 151  158  150    MPV 9.2 - 12.9 fL 9.7  9.8  9.8    Gran # 1.8 - 7.7 K/uL 2.1  1.6  2.1    Lymph # 1.0 - 4.8 K/uL 1.3  1.3  1.2    Mono # 0.3 - 1.0 K/uL 0.4  0.4  0.4    Eos # 0.0 - 0.5 K/uL 0.2  0.2  0.1    Baso # 0.00 - 0.20 K/uL 0.04  0.03  0.03    Gran % 38.0 - 73.0 % 52.4  45.4  54.7    Lymph % 18.0 - 48.0 % 31.9  37.8  30.7    Mono% 4.0 - 15.0 % 10.1  10.4  10.3    Eos % 0.0 - 8.0 % 4.3  5.2  3.2    Baso % 0.0 - 1.9 % 1.0  0.9  0.8        PATHOLOGY:  Pharynx, left posterior wall, biopsies:   - Squamous cell carcinoma, invasive, moderately differentiated   - Depth of invasion (DOI): at least 0.5 cm   - p16 status (IHC): Negative   - No lymphovascular invasion identified   - No perineural invasion identified   NOTE: Immunostain for p16 was performed and did not demonstrate   block-immunopositive staining (interpreted as negative).  Positive control   and internal negative control for immunostain was examined and was   appropriate.     IMAGING:    PET 2/2023 -  Persistent mildly hypermetabolic lymph nodes in the lower right paratracheal region and right hilum with max SUV values not higher than mediastinal blood pool.  The lack of significant change suggests that these are reactive nodes rather than metastatic disease.     CT neck/chest 9/2023 -  1. There are post treatment changes discussed above.  There is no obvious recurrent left lateral pharyngeal/hypopharyngeal mass.  The study is performed without contrast as mentioned above due to progressively decreasing GFR in increasing creatinine.  2. The epiglottis is mildly diffusely thickened.  This likely represents treatment change.  3. There is no pathologic or necrotic cervical lymphadenopathy.  4. There has been interval progression of biapical interstitial reticulonodular disease, further evaluated on  concurrent CT chest.    1. Limited noncontrast CT of the chest is without evidence of any new/progressive lymphadenopathy within the chest as compared to prior PET-CT 02/27/2023.  No acute findings.  2. Progressive biapical/subpleural reticulonodular pulmonary parenchymal changes suggestive of chronic inflammatory changes/fibrosis as discussed above.    CT HEAD WITHOUT CONTRAST 6/12/24  Impression:  1. Generalized cerebral volume loss from cerebral and cerebellar atrophy.  2. No acute intracranial processes.  3. 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    CTA STROKE MULTI-PHASE 6/12/24  Impression:  1. No occlusion, hemodynamically significant stenosis, aneurysm or dissection.  2. Nonspecific subcutaneous fat stranding and skin thickening in the submental region as well as edema in the pre epiglottic fat.    NM PET CT FDG SKULL BASE TO MID THIGH 11/11/24:  Impression:     No definitive evidence of FDG avid disease on this exam.  There is persistence low level metabolic activity in the right suprahilar region although this has decreased from the prior exam.  This remains indeterminate.  Recommend attention on follow-up.    RADIOLOGY REVIEWED:  I have independently viewed and agree with the PET images and reports which demonstrate no evidence of disease recurrence.    ASSESSMENT AND PLAN:  1. Primary malignant neoplasm of hypopharynx    2. 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 Adverse effect of radiation, sequela    4. Dysphagia, unspecified type        Arthur Ribeiro is a 79 y.o. male with [id number] SCC of the hypopharynx s/p induction chemo followed by CCRT completed 8/2022.  - Recent PET reviewed   - Continue lymphedema treatment   - Follow-up 4 months for surveillance      Patient encouraged to call with any questions, concerns, or new or worsening symptoms. 30 minutes spend in direct patient care    Follow up 4 mo     Dr. Wang reviewed scope

## 2024-11-21 ENCOUNTER — OFFICE VISIT (OUTPATIENT)
Dept: HEMATOLOGY/ONCOLOGY | Facility: CLINIC | Age: 79
End: 2024-11-21
Payer: MEDICARE

## 2024-11-21 VITALS
TEMPERATURE: 99 F | RESPIRATION RATE: 16 BRPM | HEIGHT: 72 IN | SYSTOLIC BLOOD PRESSURE: 102 MMHG | BODY MASS INDEX: 20.31 KG/M2 | HEART RATE: 91 BPM | DIASTOLIC BLOOD PRESSURE: 60 MMHG | WEIGHT: 149.94 LBS | OXYGEN SATURATION: 96 %

## 2024-11-21 DIAGNOSIS — T66.XXXS ADVERSE EFFECT OF RADIATION, SEQUELA: ICD-10-CM

## 2024-11-21 DIAGNOSIS — R13.10 DYSPHAGIA, UNSPECIFIED TYPE: ICD-10-CM

## 2024-11-21 DIAGNOSIS — C13.9 PRIMARY MALIGNANT NEOPLASM OF HYPOPHARYNX: Primary | ICD-10-CM

## 2024-11-21 DIAGNOSIS — I89.0 LYMPHEDEMA DUE TO RADIATION: ICD-10-CM

## 2024-11-21 PROCEDURE — 99999 PR PBB SHADOW E&M-EST. PATIENT-LVL III: CPT | Mod: PBBFAC,,, | Performed by: NURSE PRACTITIONER

## 2024-11-22 ENCOUNTER — CLINICAL SUPPORT (OUTPATIENT)
Dept: REHABILITATION | Facility: HOSPITAL | Age: 79
End: 2024-11-22
Payer: MEDICARE

## 2024-11-22 DIAGNOSIS — I89.0 LYMPHEDEMA DUE TO RADIATION: Primary | ICD-10-CM

## 2024-11-22 PROCEDURE — 97140 MANUAL THERAPY 1/> REGIONS: CPT | Mod: PN,CQ

## 2024-11-22 NOTE — PROGRESS NOTES
[facility] Outpatient  Physical Therapy Progress Note  Lymphedema Therapy    Visit Date: 11/22/2024    Name: Arthur Ribeiro  MRN: [id number]  Therapy Diagnosis:   Encounter Diagnoses   Name Primary?    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 esophageal dilation 5 times   Evaluation Date: 8/12/2024  Reassessment: 9/16/2024  Re-assessmente 10/16/2024  Re-assessment 11/13/2024 rec 1 x week x 6 weeks     Authorization: 20 visits    08/12/2024 12/31/2024     Plan of Care Expiration:10/16/2024- 11/16/2024  Reassessment Performed 11/13/2024 cont with 2 more visits  FOTO Completed: 1 / 3     Visit: 15/ 16 adding to POC 14/20   PTA Visit: 1 / 5  Time In:  11:00 AM  Time Out: 11:55 AM  Total Billable Time: 55 minutes     Precautions: Standard, Fall and lymphoma  Has previously been seen in our clinic in 3/7/2023 - 4/25/2023  Completed the lymph survey today at reassess    Subjective     Patient states: no new problems, has been using his night time compression garment and can tell a difference in the swelling if he doesn't use it. Has an  appointment with the doctor maci VANN to assist with getting  to assist with paying for the flexitouch pump in January. Bought a device off [employer] that he pumps up and it helps elongate his neck; feel like that is helping.     Pain: 0/10   Location: Pt denies pain currently    Objective   Pt ambulates into gym with RW severe forward head/rounded shoulders posture         Arthur participated in / received the following treatment:    Discussed the purpose, mechanism, and indications of intermittent pneumatic compression  (IPC) with pt. Pt was cleared of all contraindications and precautions, including but not limited to nonpitting chronic lymphedema, DVT, PE, thrombophlebitis, acute inflammation of skin, uncontrolled or severe cardiac failure, pulmonary edema, ischemic vascular disease, local active metastatic disease, severe peripheral neuropathy, severe PAD, edema at root of extremity, truncal edema. Risks and benefits of tx were reviewed with patient, to which patient was in agreement to continue with tx today.     Discussed the purpose, mechanism, and indications of MLD with pt. Pt was cleared of all contraindications and precautions, including but not limited to cardiac edema, renal failure, acute infection, acute bronchitis, acute DVT, malignancies, bronchial asthma, HTN, pregnancy, ileus, aortic aneurysm, recent abdominal surgery, IBD, diverticulosis, XRT fibrosis or cystitis, colitis, carotid sinus syndrome, hyperthyroidism, >60 y.o. Risks and benefits of tx were reviewed with patient, to which patient was in agreement to continue with tx today.     Discussed the purpose, mechanism, and indications of Lymphatouch with pt. Pt was cleared of all contraindications precautions, including but not limited to acute DVT, acute infection, CHF, cardiac edema, kidney dysfunction, active cancer, pregnancy. Risks and benefits of tx were reviewed with patient, to which patient was in agreement to continue with tx today.      Therapeutic Exercise to develop strength, flexibility, and posture for 5 minutes including:  Diaphragm breathing  Active assisted ROM cervical and lateral flexion  Deferred due to time:  Seated  Scapula retractions  Chin tucks  Shoulder rolls  Cervical rotation  Cervical lateral flexion  Mouth opening wide/closing  Self Care/Home Management 0 minutes: prior visit discussed with pt proper application of Tribute night garment, pt was assured he was donning correctly. Assessment of fit of garment with excellent fit  noted.   Advised to try to wear during the day as well for submental assist to lymphatics.     Manual Therapy to develop flexibility, extensibility, desensitization, pliability, and contour for 55 minutes including:  Instruction on lymphatic system, purpose of self MLD, frequency, proper positioning to perform, instruction on diaphragm breathing. Hand over hand instruction of each technique provided.   Pre-treatment short neck series to include:  submental nodes, pre and post auricular nodes, occipital nodes cervical terminus, lateral and posterior neck, axillae, and abdomen, sternal nodes and para vertebral nodes , followed by full head/neck sequence with rework, accessing all watershed areas of anterior and posterior trunk. Concurrent use of pneumatic compression pump to head/neck area with use of komprex padding at submental area. Worked with pt in supine, R side lying and L side lying, Lymphatouch was applied to sub mental area at 55 mmHg with vibration component at 40 hz.  Pt tolerated well and expressing comfort, positive response to manual treatment and use of lympha touch with increased wrinkling and improved tissue pliability to submental region noted.   (Pt was positioned in bed with wedge, HOB elevated). Pt reports reduced discomfort after manual treatment.  Deferred STM to B upper traps, cervical musculature, left tighter than right, gentle suboccipital release.      Defer Rock tape applied to anterior neck and submental region in lymphatic weave pattern to assist with lymphangiomotoricity. Instructed to remove after 3 days or should remove is he experiences and itching or irritation. Pt verbalized understanding.     Deferred today: Manual stretching to bilateral upper traps,  bilateral shoulders, left tighter than right.   Discussed with pt that he needs to get MD approval before purchasing a pump on his own for his legs.       Education Provided  - Progress toward goals   - Role of therapy   - Activity  modification  - Reviewed HEP      Home Exercises Provided: HEP discussed, however no handout provided.  Exercises were reviewed and Arthur was able to demonstrate them prior to the end of the session.  Arthur demonstrated good  understanding of the education provided.   issued self MLD handout this session     Assessment   Significant reduction in submental edema with consistent use of nighttime compression garment. Pt with extreme forward head, and kyphotic posture which may benefit from postural support brace. Pt is using what sounds like a home cervical traction device at home with subjective report that it is helping him hold his head up better. Pt with some fullness in submental region which responds well to MLD, lymphatouch and pneumatic compression pump.  Pt compliant with night time compression garment, will continue to monitor containment with garment, reviewed with pt self MLD. Due to ongoing issue with submental swelling and readdressing conservative measures to help manage his swelling symptoms, feel pt would benefit from a home pneumatic compression pump  to reduce future risk on infections . 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   I am recommending the Flexitouch system as an adjunct to conservative therapies to address both internal and external swelling. Additionally, this patient presents with fibrosis and the Flexitouch can break up fibrotic tissue over time. The Flexitouch  is the only pump available to treat head & neck lymphedema to properly assist lymph drainage. Patient has been compliant with compression of 20-30 mmHg, elevation and exercise for at least 4 weeks, yet swelling persists. There is no basic pump that is able to treat the head and neck.    Pt presents with severe forward head postural alignment which  directly effects efficiency of facial and submental congestion. Patient received recommended Tribtue Night compression garments and has been compliant with wearing at night.Also compliant with sleeping at incline.  Feels pump helps the most.   Positive response to treatment noted with decreased swelling and increased wrinkling to submental region noted post treatment. Pt educated on self MLD techniques at last visit with handouts provided, reports he does weekly.   [facility], will [facility] to get [facility] to [facility] pump, which rep feels will not be a problem since the cause of the cancer was due to agent orange during time served.   During previous session the Flexitouch pump was placed on pt positioned in supine with head of bed elevated, lymph pad in submental area,  and wedge under patient during trial with Flexitouch pump, also added towel roll under pts chin to assist with head extension positioning to assist with lymphatic drainage. Held use of compression pump for todays visit due to time needed for reassessment and review of night time garment application.  Pt continues to receive functional therapy at [facility].    Pt would benefit from a postural brace to help stabilize his trunk so he can hold his head up; pt to look on computer tonight at different types to see which one he would like to try.     Arthur is a 78 y.o. male  referred to outpatient physical therapy with a medical diagnosis of Primary malignant neoplasm of hypopharynx, lymphedema. Significant increase in girth measurements in neck region as compared to 2023 measurements taken.  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  Poor postural support from weakness in musculature. Patient would benefit from MLD as well as need for instruction on self MLD, education on lymphatic flow exercises for home management, compression. Girth measurements were taken for baseline measures as well as pictures. Radiation tx comes with increase risk of lymphedema and this places pt at higher risk of infection. This pt would benefit from skilled therapy services to address the above impairments..         Anticipated barriers for therapy: none        GOALS  Short Term Goals: 4 weeks     Patient will demonstrate 100% knowledge of lymphedema precautions and signs of infection.   Decrease girth by average 1 cm for improved mobility and safety with iADLs. MET 9/16/2024  Patient to report compliance sleeping on 30 degree incline for lymphatic protection.  Not met, pt reports sleeps on 15 degree incline.  Patient will demonstrate proper posture with sitting and standing to decrease detrimental affects to adjacent structures. Not met, pt with poor postural weakness.  Patient will tolerate HEP for better progression toward LTGs and self-management of presentation. MET 9/16/2024     Long Term Goals: 8 weeks  Patient will be independent with HEP for self-management of symptoms and current status. In progress 11/13/2024  Decrease girth by average 2 cm for improved mobility and safety with iADLs. Met for submental region MET 10/16/2024  Patient will perform self lymphatic drainage techniques for long term management of lymphedema. MET 10/16/2024  Patient to don/doff compression garments for self management of lymphedema. MET 10/16/2024  Patient to obtain Flexitouch pneumatic compression pump for daily home use to assist in maintaining reduced swelling. In progress 11/13/2024     Plan   [facility]. His primary insurance denied. Continue with established plan of care working toward PT goals.  Recommend to continue 1 week  until end of Dec, then [facility] will [facility] in Jan.   Malou Lam, PTA , CLT

## 2024-12-03 ENCOUNTER — TELEPHONE (OUTPATIENT)
Dept: HEMATOLOGY/ONCOLOGY | Facility: CLINIC | Age: 79
End: 2024-12-03
Payer: MEDICARE

## 2024-12-03 NOTE — TELEPHONE ENCOUNTER
Patient called to reschedule his PT appt on 12/4 to 12/6 at 2pm due to scheduling conflict. Location and check-in process is known to patient.

## 2024-12-06 ENCOUNTER — CLINICAL SUPPORT (OUTPATIENT)
Dept: REHABILITATION | Facility: HOSPITAL | Age: 79
End: 2024-12-06
Payer: MEDICARE

## 2024-12-06 DIAGNOSIS — I89.0 LYMPHEDEMA DUE TO RADIATION: Primary | ICD-10-CM

## 2024-12-06 PROCEDURE — 97140 MANUAL THERAPY 1/> REGIONS: CPT | Mod: PN,CQ

## 2024-12-06 NOTE — PROGRESS NOTES
[facility] Outpatient  Physical Therapy Progress Note  Lymphedema Therapy    Visit Date: 12/6/2024    Name: Arthur Ribeiro  MRN: [id number]  Therapy Diagnosis:   Encounter Diagnoses   Name Primary?    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 esophageal dilation 5 times   Evaluation Date: 8/12/2024  Reassessment: 9/16/2024  Re-assessmente 10/16/2024  Re-assessment 11/13/2024 rec 1 x week x 6 weeks     Authorization: 20 visits    08/12/2024 12/31/2024     Plan of Care Expiration:10/16/2024- 11/16/2024  Reassessment Performed 11/13/2024 cont with 2 more visits  FOTO Completed: 1 / 3     Visit: 15/ 16 adding to POC 15/20   PTA Visit: 2 / 5  Time In:  2:00 PM  Time Out: 3:55 PM  Total Billable Time: 55 minutes     Precautions: Standard, Fall and lymphoma  Has previously been seen in our clinic in 3/7/2023 - 4/25/2023  Completed the lymph survey today at reassess    Subjective     Patient states: no new problems, has been using his night time compression garment and can tell a difference in the swelling if he doesn't use it. Has an  appointment with the doctor maci VANN to assist with getting  to assist with paying for the flexitouch pump in January. Bought a device off [employer] that he pumps up and it helps elongate his neck; feel like that is helping.     Pain: 0/10   Location: Pt denies pain currently    Objective   Pt ambulates into gym with RW severe forward head/rounded shoulders posture         Arthur participated in / received the following treatment:    Discussed the purpose, mechanism, and indications of intermittent pneumatic compression  (IPC) with pt. Pt was cleared of all contraindications and precautions, including but not limited to nonpitting chronic lymphedema, DVT, PE, thrombophlebitis, acute inflammation of skin, uncontrolled or severe cardiac failure, pulmonary edema, ischemic vascular disease, local active metastatic disease, severe peripheral neuropathy, severe PAD, edema at root of extremity, truncal edema. Risks and benefits of tx were reviewed with patient, to which patient was in agreement to continue with tx today.     Discussed the purpose, mechanism, and indications of MLD with pt. Pt was cleared of all contraindications and precautions, including but not limited to cardiac edema, renal failure, acute infection, acute bronchitis, acute DVT, malignancies, bronchial asthma, HTN, pregnancy, ileus, aortic aneurysm, recent abdominal surgery, IBD, diverticulosis, XRT fibrosis or cystitis, colitis, carotid sinus syndrome, hyperthyroidism, >60 y.o. Risks and benefits of tx were reviewed with patient, to which patient was in agreement to continue with tx today.     Discussed the purpose, mechanism, and indications of Lymphatouch with pt. Pt was cleared of all contraindications precautions, including but not limited to acute DVT, acute infection, CHF, cardiac edema, kidney dysfunction, active cancer, pregnancy. Risks and benefits of tx were reviewed with patient, to which patient was in agreement to continue with tx today.      Therapeutic Exercise to develop strength, flexibility, and posture for 5 minutes including:  Diaphragm breathing  Active assisted ROM cervical and lateral flexion  Deferred due to time:  Seated  Scapula retractions  Chin tucks  Shoulder rolls  Cervical rotation  Cervical lateral flexion  Mouth opening wide/closing  Self Care/Home Management 0 minutes: prior visit discussed with pt proper application of Tribute night garment, pt was assured he was donning correctly. Assessment of fit of garment with excellent fit  noted.   Advised to try to wear during the day as well for submental assist to lymphatics.     Manual Therapy to develop flexibility, extensibility, desensitization, pliability, and contour for 55 minutes including:  Instruction on lymphatic system, purpose of self MLD, frequency, proper positioning to perform, instruction on diaphragm breathing. Hand over hand instruction of each technique provided.   Pre-treatment short neck series to include:  submental nodes, pre and post auricular nodes, occipital nodes cervical terminus, lateral and posterior neck, axillae, and abdomen, sternal nodes and para vertebral nodes , followed by full head/neck sequence with rework, accessing all watershed areas of anterior and posterior trunk. Concurrent use of pneumatic compression pump to head/neck area with use of komprex padding at submental area. Worked with pt in supine, R side lying and L side lying, Lymphatouch was applied to sub mental area at 55 mmHg with vibration component at 40 hz.  Pt tolerated well and expressing comfort, positive response to manual treatment and use of lympha touch with increased wrinkling and improved tissue pliability to submental region noted.   (Pt was positioned in bed with wedge, HOB elevated). Pt reports reduced discomfort after manual treatment.  Deferred STM to B upper traps, cervical musculature, left tighter than right, gentle suboccipital release.      Defer Rock tape applied to anterior neck and submental region in lymphatic weave pattern to assist with lymphangiomotoricity. Instructed to remove after 3 days or should remove is he experiences and itching or irritation. Pt verbalized understanding.     Deferred today: Manual stretching to bilateral upper traps,  bilateral shoulders, left tighter than right.   Discussed with pt that he needs to get MD approval before purchasing a pump on his own for his legs.       Education Provided  - Progress toward goals   - Role of therapy   - Activity  modification  - Reviewed HEP      Home Exercises Provided: HEP discussed, however no handout provided.  Exercises were reviewed and Arthur was able to demonstrate them prior to the end of the session.  Arthur demonstrated good  understanding of the education provided.   issued self MLD handout this session     Assessment   Significant reduction in submental edema with consistent use of nighttime compression garment. Pt with extreme forward head, and kyphotic posture which may benefit from postural support brace. Pt is using what sounds like a home cervical traction device at home with subjective report that it is helping him hold his head up better. Pt with some fullness in submental region which responds well to MLD, lymphatouch and pneumatic compression pump.  Pt compliant with night time compression garment, will continue to monitor containment with garment, reviewed with pt self MLD. Due to ongoing issue with submental swelling and readdressing conservative measures to help manage his swelling symptoms, feel pt would benefit from a home pneumatic compression pump  to reduce future risk on infections . 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   I am recommending the Flexitouch system as an adjunct to conservative therapies to address both internal and external swelling. Additionally, this patient presents with fibrosis and the Flexitouch can break up fibrotic tissue over time. The Flexitouch  is the only pump available to treat head & neck lymphedema to properly assist lymph drainage. Patient has been compliant with compression of 20-30 mmHg, elevation and exercise for at least 4 weeks, yet swelling persists. There is no basic pump that is able to treat the head and neck.    Pt presents with severe forward head postural alignment which  directly effects efficiency of facial and submental congestion. Patient received recommended Tribtue Night compression garments and has been compliant with wearing at night.Also compliant with sleeping at incline.  Feels pump helps the most.   Positive response to treatment noted with decreased swelling and increased wrinkling to submental region noted post treatment. Pt educated on self MLD techniques at last visit with handouts provided, reports he does weekly.   [facility], [facility], will try to get [facility] to [facility] the pump, which rep feels will not be a problem since the cause of the cancer was due to agent orange during time served.   During previous session the Flexitouch pump was placed on pt positioned in supine with head of bed elevated, lymph pad in submental area,  and wedge under patient during trial with Flexitouch pump, also added towel roll under pts chin to assist with head extension positioning to assist with lymphatic drainage. Held use of compression pump for todays visit due to time needed for reassessment and review of night time garment application.  Pt [facility] to [facility].    Pt would benefit from a postural brace to help stabilize his trunk so he can hold his head up; pt to look on computer tonight at different types to see which one he would like to try.     Arthur is a 78 y.o. male  referred to outpatient physical therapy with a medical diagnosis of Primary malignant neoplasm of hypopharynx, lymphedema. Significant increase in girth measurements in neck region as compared to 2023 measurements taken.  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  Poor postural support from weakness in musculature. Patient would benefit from MLD as well as need for instruction on self MLD, education on lymphatic flow exercises for home management, compression. Girth measurements were taken for baseline measures as well as pictures. Radiation tx comes with increase risk of lymphedema and this places pt at higher risk of infection. This pt would benefit from skilled therapy services to address the above impairments..         Anticipated barriers for therapy: none        GOALS  Short Term Goals: 4 weeks     Patient will demonstrate 100% knowledge of lymphedema precautions and signs of infection.   Decrease girth by average 1 cm for improved mobility and safety with iADLs. MET 9/16/2024  Patient to report compliance sleeping on 30 degree incline for lymphatic protection.  Not met, pt reports sleeps on 15 degree incline.  Patient will demonstrate proper posture with sitting and standing to decrease detrimental affects to adjacent structures. Not met, pt with poor postural weakness.  Patient will tolerate HEP for better progression toward LTGs and self-management of presentation. MET 9/16/2024     Long Term Goals: 8 weeks  Patient will be independent with HEP for self-management of symptoms and current status. In progress 11/13/2024  Decrease girth by average 2 cm for improved mobility and safety with iADLs. Met for submental region MET 10/16/2024  Patient will perform self lymphatic drainage techniques for long term management of lymphedema. MET 10/16/2024  Patient to don/doff compression garments for self management of lymphedema. MET 10/16/2024  Patient to obtain Flexitouch pneumatic compression pump for daily home use to assist in maintaining reduced swelling. In progress 11/13/2024     Plan   [facility]. His primary insurance denied. Continue with established plan of care working toward PT goals.  Recommend to continue 1 week  until end of Dec, then [facility] will [facility] in Jan.   Malou Lam, PTA , CLT

## 2024-12-11 ENCOUNTER — CLINICAL SUPPORT (OUTPATIENT)
Dept: REHABILITATION | Facility: HOSPITAL | Age: 79
End: 2024-12-11
Payer: MEDICARE

## 2024-12-11 DIAGNOSIS — C13.9 PRIMARY MALIGNANT NEOPLASM OF HYPOPHARYNX: ICD-10-CM

## 2024-12-11 DIAGNOSIS — I89.0 LYMPHEDEMA DUE TO RADIATION: Primary | ICD-10-CM

## 2024-12-11 PROCEDURE — 97140 MANUAL THERAPY 1/> REGIONS: CPT | Mod: PN

## 2024-12-11 NOTE — PROGRESS NOTES
[facility]  Physical Therapy Progress Note  Lymphedema Therapy    Visit Date: 12/11/2024    Name: Arthur Ribeiro  MRN: [id number]  Therapy Diagnosis:   Encounter Diagnoses   Name Primary?    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 esophageal dilation 5 times   Evaluation Date: 8/12/2024  Reassessment: 9/16/2024  Re-assessmente 10/16/2024  Re-assessment 11/13/2024 rec 1 x week x 6 weeks     Authorization: 30 visits    08/12/2024 12/31/2024     Plan of Care Expiration:10/16/2024- 11/16/2024, 11/13/2024-12/27/2024   Reassessment Performed 11/13/2024 cont with 2 more visits  FOTO Completed: 1 / 3     Visit: 15/ 16 adding to POC 16/20   PTA Visit: 0/ 5  Time In:  10:03 AM  Time Out: 10:55 PM  Total Billable Time: 50 minutes     Precautions: Standard, Fall and lymphoma  Has previously been seen in our clinic in 3/7/2023 - 4/25/2023  Completed the lymph survey today at reassess    Subjective     Patient states: swelling isn't too bad today, no new problems, has been using his night time compression garment and can tell a difference in the swelling if he doesn't use it. Has an  appointment with the doctor maci VANN January 8th to assist with getting  to assist with paying for the flexitouch pump. Bought a device off [employer] that he pumps up and it helps elongate his neck; feel like that is helping.     Pain: 0/10   Location: Pt denies pain currently    Objective   Pt ambulates into gym with RW severe forward head/rounded shoulders posture         Arthur participated in / received the following treatment:    Discussed the purpose, mechanism,  and indications of intermittent pneumatic compression (IPC) with pt. Pt was cleared of all contraindications and precautions, including but not limited to nonpitting chronic lymphedema, DVT, PE, thrombophlebitis, acute inflammation of skin, uncontrolled or severe cardiac failure, pulmonary edema, ischemic vascular disease, local active metastatic disease, severe peripheral neuropathy, severe PAD, edema at root of extremity, truncal edema. Risks and benefits of tx were reviewed with patient, to which patient was in agreement to continue with tx today.     Discussed the purpose, mechanism, and indications of MLD with pt. Pt was cleared of all contraindications and precautions, including but not limited to cardiac edema, renal failure, acute infection, acute bronchitis, acute DVT, malignancies, bronchial asthma, HTN, pregnancy, ileus, aortic aneurysm, recent abdominal surgery, IBD, diverticulosis, XRT fibrosis or cystitis, colitis, carotid sinus syndrome, hyperthyroidism, >60 y.o. Risks and benefits of tx were reviewed with patient, to which patient was in agreement to continue with tx today.     Discussed the purpose, mechanism, and indications of Lymphatouch with pt. Pt was cleared of all contraindications precautions, including but not limited to acute DVT, acute infection, CHF, cardiac edema, kidney dysfunction, active cancer, pregnancy. Risks and benefits of tx were reviewed with patient, to which patient was in agreement to continue with tx today.        Therapeutic Exercise to develop strength, flexibility, and posture for 0minutes including:  Diaphragm breathing  Active assisted ROM cervical and lateral flexion  Deferred due to time:  Seated  Scapula retractions  Chin tucks  Shoulder rolls  Cervical rotation  Cervical lateral flexion  Mouth opening wide/closing  Self Care/Home Management 0 minutes: prior visit discussed with pt proper application of Tribute night garment, pt was assured he was donning  correctly. Assessment of fit of garment with excellent fit noted.   Advised to try to wear during the day as well for submental assist to lymphatics.     Manual Therapy to develop flexibility, extensibility, desensitization, pliability, and contour for 50 minutes including:  Instruction on lymphatic system, purpose of self MLD, frequency, proper positioning to perform, instruction on diaphragm breathing. Hand over hand instruction of each technique provided.   Pre-treatment short neck series to include:  submental nodes, pre and post auricular nodes, occipital nodes cervical terminus, lateral and posterior neck, axillae, and abdomen, sternal nodes and para vertebral nodes , followed by full head/neck sequence with rework, accessing all watershed areas of anterior and posterior trunk. Concurrent use of pneumatic compression pump to head/neck area with use of komprex padding at submental area. Worked with pt in supine, R side lying and L side lying, Lymphatouch was applied to sub mental area at 55 mmHg with vibration component at 40 hz.  Pt tolerated well and expressing comfort, positive response to manual treatment and use of lympha touch with increased wrinkling and improved tissue pliability to submental region noted.   (Pt was positioned in bed with wedge, HOB elevated). Pt reports reduced discomfort after manual treatment.      Deferred STM to B upper traps, cervical musculature, left tighter than right, gentle suboccipital release.    Defer Rock tape applied to anterior neck and submental region in lymphatic weave pattern to assist with lymphangiomotoricity. Instructed to remove after 3 days or should remove is he experiences and itching or irritation. Pt verbalized understanding.     Deferred today: Manual stretching to bilateral upper traps,  bilateral shoulders, left tighter than right.   Discussed with pt that he needs to get MD approval before purchasing a pump on his own for his legs.       Education  Provided  - Progress toward goals   - Role of therapy   - Activity modification  - Reviewed HEP      Home Exercises Provided: HEP discussed, however no handout provided.  Exercises were reviewed and Arthur was able to demonstrate them prior to the end of the session.  Arthur demonstrated good  understanding of the education provided.   issued self MLD handout this session     Assessment   Significant reduction in submental edema with consistent use of nighttime compression garment. Pt with extreme forward head, and kyphotic posture which may benefit from postural support brace. Pt is using what sounds like a home cervical traction device at home with subjective report that it is helping him hold his head up better. Pt with some fullness in submental region which responds well to MLD, lymphatouch and pneumatic compression pump.  Pt compliant with night time compression garment, will continue to monitor containment with garment, reviewed with pt self MLD. Due to ongoing issue with submental swelling and readdressing conservative measures to help manage his swelling symptoms, feel pt would benefit from a home pneumatic compression pump  to reduce future risk on infections . 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     I am recommending the Flexitouch system as an adjunct to conservative therapies to address both internal and external swelling. Additionally, this patient presents with fibrosis and the Flexitouch can break up fibrotic tissue over time. The Flexitouch  is the only pump available to treat head & neck lymphedema to properly assist lymph drainage. Patient has been compliant with compression of 20-30 mmHg, elevation and exercise for at least 4 weeks, yet swelling persists. There is no basic pump that is able to treat the head and  neck.      Pt presents with severe forward head postural alignment which directly effects efficiency of facial and submental congestion. Patient received recommended Tribtue Night compression garments and has been compliant with wearing at night.Also compliant with sleeping at incline.  Feels pump helps the most.   Positive response to treatment noted with decreased swelling and increased wrinkling to submental region noted post treatment. Pt educated on self MLD techniques at last visit with handouts provided, reports he does weekly.     [facility], [facility] get [facility], which rep feels will not be a problem since the cause of the cancer was due to agent orange during time served.   During previous session the Flexitouch pump was placed on pt positioned in supine with head of bed elevated, lymph pad in submental area,  and wedge under patient during trial with Flexitouch pump, also added towel roll under pts chin to assist with head extension positioning to assist with lymphatic drainage.   Pt continues to receive functional [facility] at [facility].    Pt would benefit from a postural brace to help stabilize his trunk so he can hold his head up; pt to look on computer tonight at different types to see which one he would like to try.     Arthur is a 78 y.o. male  referred to outpatient physical therapy with a medical diagnosis of Primary malignant neoplasm of hypopharynx, lymphedema. Significant increase in girth measurements in neck region as compared to 2023 measurements taken.  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 Poor postural support from weakness in  musculature. Patient would benefit from MLD as well as need for instruction on self MLD, education on lymphatic flow exercises for home management, compression. Girth measurements were taken for baseline measures as well as pictures. Radiation tx comes with increase risk of lymphedema and this places pt at higher risk of infection. This pt would benefit from skilled therapy services to address the above impairments..         Anticipated barriers for therapy: none        GOALS  Short Term Goals: 4 weeks     Patient will demonstrate 100% knowledge of lymphedema precautions and signs of infection.   Decrease girth by average 1 cm for improved mobility and safety with iADLs. MET 9/16/2024  Patient to report compliance sleeping on 30 degree incline for lymphatic protection.  Not met, pt reports sleeps on 15 degree incline.  Patient will demonstrate proper posture with sitting and standing to decrease detrimental affects to adjacent structures. Not met, pt with poor postural weakness.  Patient will tolerate HEP for better progression toward LTGs and self-management of presentation. MET 9/16/2024     Long Term Goals: 8 weeks  Patient will be independent with HEP for self-management of symptoms and current status. In progress 11/13/2024  Decrease girth by average 2 cm for improved mobility and safety with iADLs. Met for submental region MET 10/16/2024  Patient will perform self lymphatic drainage techniques for long term management of lymphedema. MET 10/16/2024  Patient to don/doff compression garments for self management of lymphedema. MET 10/16/2024  Patient to obtain Flexitouch pneumatic compression pump for daily home use to assist in maintaining reduced swelling. In progress 11/13/2024     Plan   PT [facility] with [facility] if [facility]. His primary insurance denied. Continue with established plan of care working toward PT goals.  Recommend to continue 1 week until end of Dec, then [facility] in Jan.   Leticia Moses, PT , CLT

## 2024-12-18 ENCOUNTER — CLINICAL SUPPORT (OUTPATIENT)
Dept: REHABILITATION | Facility: HOSPITAL | Age: 79
End: 2024-12-18
Payer: MEDICARE

## 2024-12-18 DIAGNOSIS — C13.9 PRIMARY MALIGNANT NEOPLASM OF HYPOPHARYNX: ICD-10-CM

## 2024-12-18 DIAGNOSIS — I89.0 LYMPHEDEMA DUE TO RADIATION: Primary | ICD-10-CM

## 2024-12-18 PROCEDURE — 97140 MANUAL THERAPY 1/> REGIONS: CPT | Mod: PN

## 2024-12-18 PROCEDURE — 97535 SELF CARE MNGMENT TRAINING: CPT | Mod: PN

## 2024-12-18 NOTE — PROGRESS NOTES
[facility] Outpatient  Physical Therapy Progress Note/Discharge Visit  Lymphedema Therapy    Visit Date: 12/18/2024    Name: Arthur Ribeiro  MRN: [id number]  Therapy Diagnosis:   Encounter Diagnoses   Name Primary?    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 esophageal dilation 5 times   Evaluation Date: 8/12/2024  Reassessment: 9/16/2024  Re-assessmente 10/16/2024  Re-assessment 11/13/2024 rec 1 x week x 6 weeks     Authorization: 30 visits    08/12/2024 12/31/2024     Plan of Care Expiration:10/16/2024- 11/16/2024, 11/13/2024-12/27/2024   Reassessment Performed 11/13/2024 cont with 2 more visits  FOTO Completed: 1 / 3     Visit: 15/ 16 adding to POC 17/20   PTA Visit: 0/ 5  Time In:  10:00 AM  Time Out: 10:55 PM  Total Billable Time: 50 minutes     Precautions: Standard, Fall and lymphoma  Has previously been seen in our clinic in 3/7/2023 - 4/25/2023  Completed the lymph survey today at reassess    Subjective     Patient states: wearing the night time garment, and feels henry it helps, trying to stay consistent. Has an  appointment with the doctor maci VANN January 8th to assist with getting  to assist with paying for the flexitouch pump.     Pain: 0/10   Location: Pt denies pain currently    Objective   Pt ambulates into gym with RW severe forward head/rounded shoulders posture         Arthur participated in / received the following treatment:    Discussed the purpose, mechanism, and indications of intermittent pneumatic compression (IPC) with pt. Pt was cleared of all contraindications and precautions, including but not limited to nonpitting  chronic lymphedema, DVT, PE, thrombophlebitis, acute inflammation of skin, uncontrolled or severe cardiac failure, pulmonary edema, ischemic vascular disease, local active metastatic disease, severe peripheral neuropathy, severe PAD, edema at root of extremity, truncal edema. Risks and benefits of tx were reviewed with patient, to which patient was in agreement to continue with tx today.     Discussed the purpose, mechanism, and indications of MLD with pt. Pt was cleared of all contraindications and precautions, including but not limited to cardiac edema, renal failure, acute infection, acute bronchitis, acute DVT, malignancies, bronchial asthma, HTN, pregnancy, ileus, aortic aneurysm, recent abdominal surgery, IBD, diverticulosis, XRT fibrosis or cystitis, colitis, carotid sinus syndrome, hyperthyroidism, >60 y.o. Risks and benefits of tx were reviewed with patient, to which patient was in agreement to continue with tx today.     Discussed the purpose, mechanism, and indications of Lymphatouch with pt. Pt was cleared of all contraindications precautions, including but not limited to acute DVT, acute infection, CHF, cardiac edema, kidney dysfunction, active cancer, pregnancy. Risks and benefits of tx were reviewed with patient, to which patient was in agreement to continue with tx today.        Therapeutic Exercise to develop strength, flexibility, and posture for 0minutes including:  Diaphragm breathing  Active assisted ROM cervical and lateral flexion  Deferred due to time:  Seated  Scapula retractions  Chin tucks  Shoulder rolls  Cervical rotation  Cervical lateral flexion  Mouth opening wide/closing    Self Care/Home Management 10 minutes: Reviewed with pt importance of wearing tribute night garment every night for best results. Patient attempts to perform self lymph drainage best he can. Advised to try to wear garment during the day as well for submental assist to lymphatics.     Manual Therapy to develop  flexibility, extensibility, desensitization, pliability, and contour for 40 minutes including:  Instruction on lymphatic system, purpose of self MLD, frequency, proper positioning to perform, instruction on diaphragm breathing. Hand over hand instruction of each technique provided.   Pre-treatment short neck series to include:  submental nodes, pre and post auricular nodes, occipital nodes cervical terminus, lateral and posterior neck, axillae, and abdomen, sternal nodes and para vertebral nodes , followed by full head/neck sequence with rework, accessing all watershed areas of anterior and posterior trunk. Concurrent use of pneumatic compression pump to head/neck area with use of komprex padding at submental area. Worked with pt in supine, R side lying and L side lying, Lymphatouch was applied to sub mental area at 55 mmHg with vibration component at 40 hz.  Pt tolerated well and expressing comfort, positive response to manual treatment and use of lympha touch with increased wrinkling and improved tissue pliability to submental region noted.   (Pt was positioned in bed with wedge, HOB elevated). Pt reports reduced discomfort after manual treatment.      Education Provided  - Progress toward goals   - Role of therapy   - Activity modification  - Reviewed HEP and maintaining consistency with wearing garment at night, sleeping with HOB elevated in managing symptoms.       Home Exercises Provided: HEP discussed, however no handout provided.  Exercises were reviewed and Arthur was able to demonstrate them prior to the end of the session.  Arthur demonstrated good  understanding of the education provided.   issued self MLD handout this session     Assessment     Significant reduction in submental edema with consistent use of nighttime compression garment. Pt with extreme forward head, and kyphotic posture which may benefit from postural support brace.  Pt with fluctuating fullness in submental region which responds well to  MLD, and pneumatic compression pump which would assist in managing symptoms.  Pt compliant with night time compression garment, will continue to monitor containment with garment, reviewed with pt self MLD. Due to ongoing issue with submental swelling and readdressing conservative measures to help manage his swelling symptoms, feel pt would benefit from a home pneumatic compression pump  to reduce future risk on infections . Previously provided pt with komprex padding for placement at submental region while using his tribute garment at home.His cancer was caused from agent orange with subsequent treatment of radiation that caused lymphedema.     I am recommending the Flexitouch system as an adjunct to conservative therapies to address both internal and external swelling. Additionally, this patient presents with fibrosis and the Flexitouch can break up fibrotic tissue over time. The Flexitouch  is the only pump available to treat head & neck lymphedema to properly assist lymph drainage. Patient has been compliant with compression of 20-30 mmHg, elevation and exercise for at least 4 weeks, yet swelling persists. There is no basic pump that is able to treat the head and neck.      [facility], will [facility] to get [facility] pump, which rep feels will not be a problem since the cause of the cancer was due to agent orange during time served.       Arthur is a 78 y.o. male  referred to outpatient physical therapy with a medical diagnosis of Primary malignant neoplasm of hypopharynx, lymphedema. Significant increase in girth measurements in neck region as compared to 2023 measurements taken.  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 Poor postural support from weakness in musculature. Patient was seen for MLD as well as instruction on self MLD, education on lymphatic flow exercises for home management, compression.  No further skilled therapy needs recommended at this time.      Anticipated barriers for therapy: none        GOALS  Short Term Goals: 4 weeks     Patient will demonstrate 100% knowledge of lymphedema precautions and signs of infection. MET  Decrease girth by average 1 cm for improved mobility and safety with iADLs. MET 9/16/2024  Patient to report compliance sleeping on 30 degree incline for lymphatic protection.  Not met, pt reports sleeps on 15 degree incline.  Patient will demonstrate proper posture with sitting and standing to decrease detrimental affects to adjacent structures. Not met, pt with poor postural weakness.  Patient will tolerate HEP for better progression toward LTGs and self-management of presentation. MET 9/16/2024     Long Term Goals: 8 weeks  Patient will be independent with HEP for self-management of symptoms and current status. In progress 11/13/2024  Decrease girth by average 2 cm for improved mobility and safety with iADLs. Met for submental region MET 10/16/2024  Patient will perform self lymphatic drainage techniques for long term management of lymphedema. MET 10/16/2024  Patient to don/doff compression garments for self management of lymphedema. MET 10/16/2024  Patient to obtain Flexitouch pneumatic compression pump for daily home use to assist in maintaining reduced swelling. In progress 11/13/2024     Plan   Discharge PT. [facility] in [facility].   Leticia Moses, PT , CLT

## 2025-01-07 ENCOUNTER — TELEPHONE (OUTPATIENT)
Dept: HEMATOLOGY/ONCOLOGY | Facility: CLINIC | Age: 80
End: 2025-01-07
Payer: MEDICARE

## 2025-03-24 ENCOUNTER — OFFICE VISIT (OUTPATIENT)
Dept: HEMATOLOGY/ONCOLOGY | Facility: CLINIC | Age: 80
End: 2025-03-24
Payer: MEDICARE

## 2025-03-24 VITALS
HEART RATE: 80 BPM | WEIGHT: 156.31 LBS | BODY MASS INDEX: 21.17 KG/M2 | HEIGHT: 72 IN | DIASTOLIC BLOOD PRESSURE: 65 MMHG | TEMPERATURE: 98 F | RESPIRATION RATE: 16 BRPM | OXYGEN SATURATION: 97 % | SYSTOLIC BLOOD PRESSURE: 125 MMHG

## 2025-03-24 DIAGNOSIS — J30.0 VASOMOTOR RHINITIS: ICD-10-CM

## 2025-03-24 DIAGNOSIS — T66.XXXS ADVERSE EFFECT OF RADIATION, SEQUELA: ICD-10-CM

## 2025-03-24 DIAGNOSIS — C13.9 PRIMARY MALIGNANT NEOPLASM OF HYPOPHARYNX: Primary | ICD-10-CM

## 2025-03-24 DIAGNOSIS — S15.092S: ICD-10-CM

## 2025-03-24 PROCEDURE — 31575 DIAGNOSTIC LARYNGOSCOPY: CPT | Mod: S$GLB,,, | Performed by: NURSE PRACTITIONER

## 2025-03-24 PROCEDURE — 1125F AMNT PAIN NOTED PAIN PRSNT: CPT | Mod: CPTII,S$GLB,, | Performed by: NURSE PRACTITIONER

## 2025-03-24 PROCEDURE — 1101F PT FALLS ASSESS-DOCD LE1/YR: CPT | Mod: CPTII,S$GLB,, | Performed by: NURSE PRACTITIONER

## 2025-03-24 PROCEDURE — 99214 OFFICE O/P EST MOD 30 MIN: CPT | Mod: 25,S$GLB,, | Performed by: NURSE PRACTITIONER

## 2025-03-24 PROCEDURE — 99999 PR PBB SHADOW E&M-EST. PATIENT-LVL III: CPT | Mod: PBBFAC,,, | Performed by: NURSE PRACTITIONER

## 2025-03-24 PROCEDURE — 1159F MED LIST DOCD IN RCRD: CPT | Mod: CPTII,S$GLB,, | Performed by: NURSE PRACTITIONER

## 2025-03-24 PROCEDURE — 3078F DIAST BP <80 MM HG: CPT | Mod: CPTII,S$GLB,, | Performed by: NURSE PRACTITIONER

## 2025-03-24 PROCEDURE — 3288F FALL RISK ASSESSMENT DOCD: CPT | Mod: CPTII,S$GLB,, | Performed by: NURSE PRACTITIONER

## 2025-03-24 PROCEDURE — 3074F SYST BP LT 130 MM HG: CPT | Mod: CPTII,S$GLB,, | Performed by: NURSE PRACTITIONER

## 2025-03-24 NOTE — PROGRESS NOTES
Note to patients: In accordance with the  Century Cures Act, patients are now granted immediate electronic access to their medical records. This note is primarily intended for communication among medical professionals. As a result, it may incorporate medical terminology, abbreviations, or language that could appear blunt or unfamiliar. If you have questions about this document, we encourage you to discuss it with your physician.      [facility]  Head & Ne[facility]    Patient: Arthur Ribeiro    : 1945    MRN: [id number]  Primary Care Provider: Zohaib Pryro MD  Rad Onc: Mehran Peñaloza MD  Med Onc: Zenaida Jones MD; Quentin Rahman MD  ENT: Enrique Maya MD  Dentist: BO Pitts; Cecilio Banks [facility]  GI Medicine: Dr. Sagastume (Prev. Dr. Weaver)  Date of Encounter: 2025    DIAGNOSIS:    Cancer Staging   Primary malignant neoplasm of hypopharynx  Staging form: Pharynx - Hypopharynx, AJCC 8th Edition  - Clinical stage from 2022: Stage RENO (cT3, cN2b, cM0) - Signed by Evon Galo NP on 2022      CC: routine surveillance     HPI:   Arthur Ribeiro is a 79 y.o. male with a past medical history significant for [id number] SCC of the left hypopharynx. He was treated with induction chemotherapy followed by definitive CCRT completed 2022.    He required a PEG during his treatment and had completed hypopharyngeal stensis requiring retrograde dilation from Dr. Hart and GI. He has undergone multiple EGD/dilation including recently and is now able to eat whatever he'd like by mouth. He has gained weight since his last visit.    He is undergoing PT to build his strength. He reports occasional left muscle/back pain but denies masses or lesions - seems associated with muscle strength in this area. He is having neck stiffness that is worse on the left side, he states he has been having this issue since radiation. He does not remember going to PT in the  past for this. He has an EGD set up with Dr. Sagastume on April 12th. He has dilated him the last 4-5xs. He is scoping him q6 months. He is having some dysphagia. His appetite is good and his weight is stable. He had his TSH checked by his PCP yesterday and his PCP started him on synthroid.     Patient denies pain, fever, chills, night sweats, unintentional weight loss, neck mass, enlarged lymph nodes outside of the head or neck, odynophagia, dysphagia, globus sensation, voice changes, cough, hemoptysis, shortness of breath, or new or concerning skin lesions.     7/29/24: He was evaluated at ER on 6/12 for complaints of weakness. He had a CT of his head without contrast and CTA performed.  He is going to PT for his leg weakness. He is not doing PT for his neck or for his lymphedema. He is having his normal dysphagia and is getting dilated Q6 months by Dr. Sagasutme.     11/20/24: He is still going to PT for his leg. He just had a dilation yesterday. He has notice some improvement so far today. He is being manage by his PCP for his thyroid and will have a repeat in a couple years. [facility] and will [facility] to get [facility] for the vest for his lymphedema. He still does have some coughing with liquids and foods. He says that he aspirated the fluids.     3/24/25:  He did se his PCP this AM. He is not taking his thyroid medication because it makes him feel bad. He will stay off his medication. His swallowing is doing okay, he has another EGD soon; he is doing an EGD Q6 months. He denies any coughing with liquids. He is using his trampoline a couple times a week. He got his Vest for his lymphedema and helps some. He has a runny nose with eating. He has an appointment with Dr. Rahman tomorrow.    TREATMENT HISTORY:   induction chemotherapy followed by definitive CCRT completed 8/2022    ALLERGIES:  Review of patient's allergies indicates:  No Known Allergies      MEDICATIONS:    Current  Outpatient Medications:     gabapentin (NEURONTIN) 800 MG tablet, TAKE 1 TABLET(800 MG) BY MOUTH THREE TIMES DAILY, Disp: 270 tablet, Rfl: 3    pantoprazole (PROTONIX) 40 MG tablet, 60 tablets., Disp: , Rfl:   No current facility-administered medications for this visit.    Facility-Administered Medications Ordered in Other Visits:     lactated ringers infusion, , Intravenous, Continuous, Daniel Sagastume MD, Last Rate: 75 mL/hr at 11/06/23 0739, New Bag at 11/06/23 0739    OTHER HISTORY  Past medical, surgical, family, and social histories have been updated and reviewed as needed since last visit.     REVIEW OF SYSTEMS:   Comprehensive review of systems was discussed with the patient.  It is positive only for the above complaints.    PHYSICAL EXAMINATION:  Blood pressure 125/65, pulse 80, temperature 97.8 °F (36.6 °C), temperature source Temporal, resp. rate 16, height 6' (1.829 m), weight 70.9 kg (156 lb 4.9 oz), SpO2 97%.    Constitutional: Non-toxic appearing, cervical kyphosis  Psychiatric: Appropriate mood and affect. Cooperative.  Voice: Non-dysphonic, speaking in full sentences.   Neurologic: Cranial nerves grossly intact, no focal deficits.  Head and face: Salivary glands are not enlarged. Face is symmetric. CN VII strength and sensation intact.  Skin: No concerning skin lesions.   Eyes: Vision grossly intact, bilateral extraocular movements intact  Ears: Bilateral pinna, mastoid, external ear canal normal. Hearing intact.   Nose: External nose appears normal.   Lips: No ulcers or lesions  Oral cavity: Mucosa is pink and moist. Buccal mucosa, gingiva, anterior tongue, floor of mouth, and hard palate appear normal. No leukoplakia, erythroplakia, ulceration, masses or lesions.  Oropharynx: Mucosa is pink and moist. Soft palate and base of tongue are normal. Posterior pharyngeal wall normal. Tonsils are normal. No lesions.  Neck: Central lymphedema, mobile larynx, no masses or lymphadenopathy  Respiratory:  Chest expansion symmetric, no audible stridor or stertor. Breathing is unlabored. No active cough.    PROCEDURES:    FLEXIBLE LARYNGOSCOPY  Provider: Micheline HAYWOOD-TAYA  Indication: History of cancer   The procedure was explained to the patient and verbal consent was obtained.   Anesthesia: topical lidocaine and neosynephrine applied within one or both nares with an atomizer    The scope was introduced in the usual fashion.    Findings:  Mucosa: normal  Inferior turbinates: no polypoid changes or hypertrophy  Septum: no lesion or ulcer  Middle meatus: no purulence or polypoid change  Nasopharynx:  Adenoids: normal  Fossa of Rosenmuller: normal  Eustachian tubes: normal  Superior and posterior pharyngeal walls: narrowing circumferentially at level of the epiglottis  Epiglottis, vallecula, and base of tongue: thickened epiglottis fused to base of tongue bilaterally  Pyriform sinuses: effacement of left pyriform, narrow right piriform, some pooling of secretions   False vocal cords, arytenoids, aryepiglottic folds: normal, slight reducted abduction  True vocal cords are symmetrically mobile on phonation and inspiration.   Laryngeal sensation appears intact. There are no masses or ulcerations.     The scope was withdrawn. The patient tolerated the procedure well with no complications.      DATA REVIEWED:     LABORATORY:      Latest Ref Rng & Units 10/2/2024     8:05 AM 11/15/2024    10:09 AM 3/19/2025    11:00 AM   Thyroid Labs   TSH 0.400 - 4.000 uIU/mL 6.030   4.787    Free T4 0.71 - 1.51 ng/dL 0.94   0.90    Sodium 136 - 145 mmol/L 140  139  140    Potassium 3.5 - 5.1 mmol/L 4.5  4.6  4.3    Chloride 95 - 110 mmol/L 105  105  107    Carbon Dioxide 23 - 29 mmol/L 28  27  27    Glucose 70 - 110 mg/dL 90  79  75    Blood Urea Nitrogen 8 - 23 mg/dL 55  45  37    Creatinine 0.50 - 1.40 mg/dL  0.50 - 1.40 mg/dL 1.90  1.77  1.79     1.79    Calcium 8.7 - 10.5 mg/dL 9.1  8.8  8.7    Total Protein 6.0 - 8.4 g/dL 6.7  6.6   6.6    Albumin 3.5 - 5.2 g/dL 3.8  3.8  3.8    Total Bilirubin 0.2 - 1.0 mg/dL 0.5  0.5  0.4    AST 10 - 40 U/L 24  25  19    ALT 10 - 44 U/L 13  13  11    Anion Gap 8 - 16 mmol/L 7  7  6    WBC 3.90 - 12.70 K/uL 3.47  3.78  3.82    RBC 4.60 - 6.20 M/uL 3.47  3.43  3.46    Hemoglobin 14.0 - 18.0 g/dL 10.7  10.7  10.8    Hematocrit 40.0 - 54.0 % 33.0  33.1  33.0    MCV 82 - 98 fL 95  97  95    MCH 27.0 - 31.0 pg 30.8  31.2  31.2    MCHC 32.0 - 36.0 g/dL 32.4  32.3  32.7    RDW 11.5 - 14.5 % 13.9  14.0  14.1    Platelets 150 - 450 K/uL 158  150  154    MPV 9.2 - 12.9 fL 9.8  9.8  10.1    Gran # 1.8 - 7.7 K/uL 1.6  2.1  2.3    Lymph # 1.0 - 4.8 K/uL 1.3  1.2  1.0    Mono # 0.3 - 1.0 K/uL 0.4  0.4  0.4    Eos # 0.0 - 0.5 K/uL 0.2  0.1  0.1    Baso # 0.00 - 0.20 K/uL 0.03  0.03  0.03    Gran % 38.0 - 73.0 % 45.4  54.7  59.7    Lymph % 18.0 - 48.0 % 37.8  30.7  26.7    Mono% 4.0 - 15.0 % 10.4  10.3  9.9    Eos % 0.0 - 8.0 % 5.2  3.2  2.6    Baso % 0.0 - 1.9 % 0.9  0.8  0.8        PATHOLOGY:  Pharynx, left posterior wall, biopsies:   - Squamous cell carcinoma, invasive, moderately differentiated   - Depth of invasion (DOI): at least 0.5 cm   - p16 status (IHC): Negative   - No lymphovascular invasion identified   - No perineural invasion identified   NOTE: Immunostain for p16 was performed and did not demonstrate   block-immunopositive staining (interpreted as negative).  Positive control   and internal negative control for immunostain was examined and was   appropriate.     IMAGING:    PET 2/2023 -  Persistent mildly hypermetabolic lymph nodes in the lower right paratracheal region and right hilum with max SUV values not higher than mediastinal blood pool.  The lack of significant change suggests that these are reactive nodes rather than metastatic disease.     CT neck/chest 9/2023 -  1. There are post treatment changes discussed above.  There is no obvious recurrent left lateral pharyngeal/hypopharyngeal mass.  The study  is performed without contrast as mentioned above due to progressively decreasing GFR in increasing creatinine.  2. The epiglottis is mildly diffusely thickened.  This likely represents treatment change.  3. There is no pathologic or necrotic cervical lymphadenopathy.  4. There has been interval progression of biapical interstitial reticulonodular disease, further evaluated on concurrent CT chest.    1. Limited noncontrast CT of the chest is without evidence of any new/progressive lymphadenopathy within the chest as compared to prior PET-CT 02/27/2023.  No acute findings.  2. Progressive biapical/subpleural reticulonodular pulmonary parenchymal changes suggestive of chronic inflammatory changes/fibrosis as discussed above.    CT HEAD WITHOUT CONTRAST 6/12/24  Impression:  1. Generalized cerebral volume loss from cerebral and cerebellar atrophy.  2. No acute intracranial processes.  3. 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    CTA STROKE MULTI-PHASE 6/12/24  Impression:  1. No occlusion, hemodynamically significant stenosis, aneurysm or dissection.  2. Nonspecific subcutaneous fat stranding and skin thickening in the submental region as well as edema in the pre epiglottic fat.    NM PET CT FDG SKULL BASE TO MID THIGH 11/11/24:  Impression:     No definitive evidence of FDG avid disease on this exam.  There is persistence low level metabolic activity in the right suprahilar region although this has decreased from the prior exam.  This remains indeterminate.  Recommend attention on follow-up.    RADIOLOGY REVIEWED:  I have independently viewed and agree with the PET images and reports which demonstrate no evidence of disease recurrence.    ASSESSMENT AND PLAN:  1. Primary malignant neoplasm of hypopharynx    2. Adverse effect of radiation, sequela    3. Other specified injury of left carotid artery, sequela    4. Vasomotor rhinitis        Arthur Ribeiro is a 79 y.o. male with [id number] SCC of the  hypopharynx s/p induction chemo followed by CCRT completed 8/2022.  - Recent PET reviewed   - Continue lymphedema treatment   - Follow-up 4 months for surveillance  - CT neck/chest without contrast in August due to elevated kidney function  - CTA performed in 6/2024 that did assess carotids already so no doppler ordered  - Discussed can sent in Atrovent for runny nose while eating but he would like to hold off for now   - Continue follow-up with Dr. Rahman    Patient encouraged to call with any questions, concerns, or new or worsening symptoms. 35 minutes spend in direct patient care, chart review, documentation and case review with collaborative MD.     Follow up 4 mo     Dr. Wang reviewed scope and agrees with CT in August

## 2025-03-26 ENCOUNTER — TELEPHONE (OUTPATIENT)
Dept: HEMATOLOGY/ONCOLOGY | Facility: CLINIC | Age: 80
End: 2025-03-26
Payer: MEDICARE

## 2025-03-26 NOTE — TELEPHONE ENCOUNTER
Pt calling to discuss upcoming appt with Micheline NP and CT scan.  Pt states that he is unable to lay for a CT scan scheduled in August.  Discussed that we will evaluate the necessity of CT at his appt with Micheline in July. Pt was able to complete PET scan in Nov 2024 with modifications. ----- Message from Jamn sent at 3/26/2025  1:15 PM CDT -----  Type: Needs Medical AdviceWho Called:  pt Symptoms (please be specific):  How long has patient had these symptoms:  Pharmacy name and phone #:  Best Call Back Number: [phone] Information: pt is requesting a call back to schedule an appt

## 2025-04-08 NOTE — PATIENT INSTRUCTIONS
----- Message from Wendy Gordon DPM sent at 4/8/2025  3:15 PM CDT -----  Please call patient to report that the nail sample does not show fungal elements.  No prescription medication is needed.  Patient may use Kerasal to soften nails.  This sometimes helps the appearance of the nails as well.   Self Manual Lymphatic Drainage Face, Head and Neck    For patients who were treated for head and neck cancer with:    Surgery to remove a tumour(s) or lymph nodes   Radiation therapy    Read this resource to learn:   what is your lymphatic system   what lymphatic self-massage is and why it is important   how to do lymphatic self-massage step-by-step    What is your lymphatic system?  Your lymphatic system filters and removes extra fluid and waste from your body. It plays an important role in your immune function. Your lymphatic system is made up of many lymph nodes that are connected together by lymph vessels.     Your lymph nodes are bean-shaped organs that are found all over your body. Large groups or chains of lymph nodes can be found in your neck, under your arms and in your groin (see the image below).     Surgery or radiation to your lymph nodes damage your lymph nodes and vessels. This damage prevents fluid from flowing well and causes swelling. Swelling from damaged lymph nodes and vessels is called lymphedema    What is lymphatic self-massage and why is it important?  Lymphatic self-massage is a gentle skin massage where the skin is gently stretched and released along lymph pathways. Lymphatic self-massage helps move extra fluid from swollen areas damaged by cancer treatment. This extra fluid can be moved into an area where the lymph nodes are working well.    Lymphatic self-massage can help move extra fluid away from:    areas of your face that have had treatment   areas of your neck that have had treatment    Lymphatic self-massage can help to move extra fluid to:    lymph vessels and lymph nodes in areas of your face or neck not affected by treatment   lymph vessels and lymph nodes in your underarms    How to do lymphatic self-massage   Keep your hands soft and relaxed. Use a light pressure on your skin. The pressure of your hands should be just enough to gently stretch the skin. Only stretch the skin as  far as it can go naturally without causing pain. Release the pressure and let your skin come back as it was. If you can feel your muscles under your skin you are pressing too hard.   Use the flat part (palms) of your hands instead of your fingertips. Your palms allow more contact with the skin to stimulate (pump) the lymph vessels.   Massage towards areas of your body that have not been treated for cancer such as your chest and underarms.   Make sure you are in a comfortable position. You can self-massage while sitting, standing or lying down. Choose a position that is most comfortable for you.   Massage when you are comfortably warm or when you are in a nice, warm room. If your muscles are warm, they are more flexible.   Do self-massage regularly. You can use self-massage as time to relax, breathe, and take care of yourself.    What to avoid   Do not strain your shoulders, neck, arm or hand   Do not self-massage if it causes pain   Do not do self-massage if you have an infection in the area that has swelling    Do not do self-massage if you think you have an infection. Infections can occur in your head, neck or face where your lymph nodes have been removed or you have had radiation.     Signs of an infection may include:    Swelling and redness of the skin. This redness can quickly spread.   Pain or soreness in your head, face or neck where you had treatment.   Warm or hot feeling in your head, face or neck where you had treatment.   Fever or chills.   Feeling unwell.    If you think you have an infection go to:   Your family doctor   Walk-in Clinic   Urgent Care Clinic   Emergency department    If you have had an infection, only start self-massage again when you have finished your antibiotics (medicine). Or if your doctor says it is okay to start again.    Try different ways to make self-massage a part of your routine. Try self-massage while you are watching TV or having a shower so it does not take time away from  your day. Try to make self-massage a time for yourself. Or make it a part of your routine for relaxing    Below are the steps for doing a lymphatic self-massage. Follow the instructions closely. Talk to your health care team if you have any questions.    Deep Breathing    Deep breathing is an important part of your self-care. Deep breathing works like a pump in your body. This pump helps the lymph nodes and vessels move fluid. You can practice deep breathing at any time!   What to do:    Place the palms of both hands on your stomach.   Take a deep breathe in through your nose until your stomach pushes against your hands.   Breathe out slowly through pursed lips (like you are blowing out candles). Then let your stomach go flat.   Repeat 5 times. Take a short rest between each breath so you do not feel dizzy       2. Stretch and release the skin at the front of your neck    This motion helps lymph fluid drain back to your heart. You can massage one side at a time or both sides at the same time. You may find it easier to cross your hands if you are doing both at the same time.   What to do:    Place the flats of your 2nd and 3rd fingers on either side of your neck just above your collarbone.   Massage down and inwards toward your collarbone. Always keep your fingers above your collarbone. Start massage on the area of your neck that is close to your shoulder and gently stretch the skin towards the middle of your neck.   Gently stretch the skin just as far as it goes without pain. Then let go of the skin.   This massage will look like 2 letter Js facing one another.   Repeat 10-15 times on each side.     3. Prepare your underarm lymph nodes    This prepares the lymph vessels and nodes under your arm to take in lymph fluid from your face and neck. Place your arm in a comfortable position. Your arm should be slightly raised and supported. You may want to place your arm on an armrest or  table for comfort.   What to do:     Place your palm of your opposite hand against your underarm.   Gently pull up and in toward your body, then release. Pause for a moment and then start again. Repeat 10-15 times   Do the same pumping on your other underarm. Repeat 10-15 times.       4. Stretch and release the skin from your chest to your underarm    Place you hand on your collarbone. Move your hand down your chest in half circles toward your underarm. Massage your chest to help reduce swelling. This massage will move the lymph fluid from your neck and chest to your underarm lymph vessels and nodes.   What to do:    Place your hand over your collar bone   Gently stretch the skin (not muscles) down your chest and towards your underarm. Then let go of the skin. Pause for a moment. Now repeat this massage stroke as you gradually move your hand down your chest towards your underarm.   Repeat this massage 10-15 times.     5. Stretch and release the skin from the front of your neck to your chest    Massage the front of your neck to help reduce swelling. This massage will move the lymph fluid from your neck to your chest. Place your hand on the front of your neck where you have swelling. Move your hand down your neck towards your collar bone and chest.     What to do:    Place your hand over the swelling at the front of your neck   Gently stretch the skin (not muscles) towards your collarbone. Then let go of the skin. Pause for a moment. Now repeat this massage stroke as you gradually move your hand past your collar bone and down your chest.   Repeat this massage 10-15 times.     6. Stretch and release the skin at the side of your neck    Massage the side of your neck to lower or prevent the swelling in your face and neck. This massage helps stimulate (pump) the vessels at the side of your neck. Do not massage both sides of your neck at the same time if you have radiation treatment.   What to do:    Place your hands flat on the side of your neck   Gently  stretch the skin away from your face and down. Then release.   Massage your neck and side of the face in a slow and gentle way.   Repeat 10-15 times.     7. Stretch and release the skin on the back of your neck    Massage the back of your neck to lower or prevent the swelling in your face and neck. This massage helps stimulate (pump) the vessels at the back of your neck.   What to do:    Place the palms of your hands on the back of your neck, just belowyour hairline.   Stretch the skin towards your spine and then down towards your back.   Repeat 10-15 times.       8. Massage your scar     Massage your scar. This massage is only if you have had surgery. Do not massage until three weeks after surgery. Do not massage until all staples and clips have been removed. Your scar may feel very sensitive, tight or itchy. Scar massage will help reduce these feelings.This massage helps soften the scar and allows better blood flow to the area. Scar massage should always be pain-free. Do not use oil while doing the scar massage. Apply any lotions or oils after the massage.    What to do:    Place the palm of your hand over the scar.    Move up and down in a zigzag pattern or [place] pattern along the scar. See pictures above for help.   Apply firm but gentle pressure while moving along the scar. Try to move the skin.   If possible, gently lift the skin along the scar.   Repeat 5 or 6 times on the scar.   Now place your fingertips just above the scar. Gently stretch the skin away from the scar and release.   Repeat 5 times.   Place your fingertips below the scar. Gently stretch the skin away from the scar. Then release the skin.   Repeat 5 times. If your skin and swollen tissue in your neck or face feels hard, ask your therapist to show you gentle kneading techniques to help soften the firm tissue.    Here is how you will soften the firm tissue on your own at home:   Gently place the pads of your fingers on the tissue or skin that  feels firm or hard.   Gently press down with the pads of your fingers and let go. As you release the pressure, move your fingers down slightly.    Repeat 10-15 times in one area. Move to another area and repeat.     9. Massage for face swelling     Your therapist will draw arrows on the image. This will show you the   way you should do your self-massage.     10. Massage for swelling inside the mouth    Your therapist will draw arrows on the image. This will show you the way you should do your self-massage.   Do not massage inside your mouth if you have any sores   or cuts or pain.   Make sure your hand is clean before you start to massage inside your mouth.     11. Stretch and release the skin at the front of your neck    This motion helps lymph fluid drain back to your heart. You can massage 1 side at a time or both sides at the same time. You may find it easier to cross your hands if you are doing both at the same time.   What to do:    Place the flats of your 2nd and 3rd fingers on either side of your neck just above your collarbone.   Massage down and inwards toward your collarbone. Always keep your fingers above your collarbone. Start massage on the area of your neck that is close to your shoulder and gently stretch the skin towards the middle of your neck.   Gently stretch the skin just as far as it goes without pain. Then let go of the skin.   This massage will look like 2 letter Js facing one another.   Repeat 15 times on each side.

## 2025-06-12 ENCOUNTER — RESULTS FOLLOW-UP (OUTPATIENT)
Dept: URGENT CARE | Facility: CLINIC | Age: 80
End: 2025-06-12

## 2025-06-12 ENCOUNTER — TELEPHONE (OUTPATIENT)
Dept: URGENT CARE | Facility: CLINIC | Age: 80
End: 2025-06-12

## 2025-06-12 ENCOUNTER — OFFICE VISIT (OUTPATIENT)
Dept: URGENT CARE | Facility: CLINIC | Age: 80
End: 2025-06-12
Payer: MEDICARE

## 2025-06-12 ENCOUNTER — HOSPITAL ENCOUNTER (OUTPATIENT)
Dept: RADIOLOGY | Facility: HOSPITAL | Age: 80
Discharge: HOME | End: 2025-06-12
Attending: EMERGENCY MEDICINE
Payer: MEDICARE

## 2025-06-12 VITALS
OXYGEN SATURATION: 98 % | HEART RATE: 61 BPM | HEIGHT: 72 IN | DIASTOLIC BLOOD PRESSURE: 79 MMHG | SYSTOLIC BLOOD PRESSURE: 135 MMHG | RESPIRATION RATE: 16 BRPM | BODY MASS INDEX: 21.13 KG/M2 | WEIGHT: 156 LBS | TEMPERATURE: 98 F

## 2025-06-12 DIAGNOSIS — L03.116 CELLULITIS OF LEFT LOWER LEG: ICD-10-CM

## 2025-06-12 DIAGNOSIS — I87.2 VENOUS STASIS DERMATITIS OF LEFT LOWER EXTREMITY: ICD-10-CM

## 2025-06-12 DIAGNOSIS — I87.2 VENOUS INSUFFICIENCY OF BOTH LOWER EXTREMITIES: ICD-10-CM

## 2025-06-12 DIAGNOSIS — R22.42 LOCALIZED SWELLING OF LEFT LOWER LEG: ICD-10-CM

## 2025-06-12 DIAGNOSIS — R22.42 LOCALIZED SWELLING OF LEFT LOWER LEG: Primary | ICD-10-CM

## 2025-06-12 PROCEDURE — 93971 EXTREMITY STUDY: CPT | Mod: 26,LT,, | Performed by: RADIOLOGY

## 2025-06-12 PROCEDURE — 93971 EXTREMITY STUDY: CPT | Mod: TC,PO,LT

## 2025-06-12 PROCEDURE — 99214 OFFICE O/P EST MOD 30 MIN: CPT | Mod: S$GLB,,, | Performed by: EMERGENCY MEDICINE

## 2025-06-12 RX ORDER — CEPHALEXIN 500 MG/1
500 CAPSULE ORAL EVERY 8 HOURS
Qty: 21 CAPSULE | Refills: 0 | Status: SHIPPED | OUTPATIENT
Start: 2025-06-12 | End: 2025-06-19

## 2025-06-12 NOTE — PATIENT INSTRUCTIONS
Ultrasound appointment is at 3:45pm at [address] in [place].     Elevate leg.     Wear compression stockings at all times except during sleep.     Take antibiotic with food. While on antibiotics, take a daily probiotic such as Align or Culturelle or eat yogurt with live cultures (Activia is one example). This will lessen the chances of stomach upset.     Recheck with primary doctor for new/worsening symptoms.     You must understand that you've received an Urgent Care treatment only and that you may be released before all your medical problems are known or treated. You, the patient, will arrange for follow up care as instructed.    Follow up with your PCP or specialty clinic as directed if not improved or as needed. You can call [phone] to schedule an appointment with the appropriate provider.      You, the patient, will arrange for follow up care as instructed.     If your condition worsens or fails to improve we recommend that you receive another evaluation at the ER immediately or contact your PCP to discuss your concerns.     Patient aware of treatment plan and verbalized understanding.

## 2025-06-12 NOTE — PROGRESS NOTES
Subjective:      Patient ID: Arthur Ribeiro is a 79 y.o. male.    Vitals:  height is 6' (1.829 m) and weight is 70.8 kg (156 lb). His oral temperature is 97.8 °F (36.6 °C). His blood pressure is 135/79 and his pulse is 61. His respiration is 16 and oxygen saturation is 98%.     Chief Complaint: Leg Swelling    Patient c/o increasing left leg swelling above baseline x 1 week and new redness in past 2 days. +itching. No pain. No trauma. Has chronic BLE edema but left leg is worse than normal. No h/o DVT. No h/o CHF. No fluid pills.     Leg Pain   The incident occurred less than 1 hour ago. The incident occurred at home. There was no injury mechanism. The pain is present in the left leg. The pain is at a severity of 0/10. The patient is experiencing no pain. Pertinent negatives include no inability to bear weight, loss of motion, loss of sensation, muscle weakness, numbness or tingling. He reports no foreign bodies present. Nothing aggravates the symptoms. He has tried nothing for the symptoms.       Cardiovascular:  Positive for leg swelling.   Respiratory:  Negative for cough and shortness of breath.    Musculoskeletal:  Negative for pain and trauma.   Skin:  Positive for erythema (Left lower leg with 10cm x 7cm area of erythema/warmth; there is 1+ pitting edema to pretibial area and foot, 2+ pedal pulses, sensation intact, no calf TTP, some changes c/w venous stasis).   Neurological:  Negative for numbness and tingling.      Objective:     Physical Exam   Constitutional: He does not appear ill. No distress.      Comments:Here with wife     HENT:   Mouth/Throat: Mucous membranes are moist.   Cardiovascular: Normal rate and normal pulses.   Pulmonary/Chest: No respiratory distress. He has no wheezes.   Abdominal: Normal appearance.   Neurological: no focal deficit. He is alert.   Skin: Skin is warm. Capillary refill takes less than 2 seconds. erythema (Left lower leg with 10cm x 7cm area of erythema/warmth; there  is 1+ pitting edema to pretibial area and foot, 2+ pedal pulses, sensation intact, no calf TTP, some changes c/w venous stasis)        Nursing note and vitals reviewed.            Assessment:     1. Localized swelling of left lower leg    2. Cellulitis of left lower leg    3. Venous stasis dermatitis of left lower extremity    4. Venous insufficiency of both lower extremities        Plan:     Likely venous stasis dermatitis with superimposed cellulitis. However, will get venous US to r/o DVT - scheduled for 3:45pm today.     Encouraged to wear compression stockings and elevate legs.     Will start Keflex. Close f/u with PCP needed. To hospital ED for rapid worsening.    Venous US LLE negative for DVT.      Localized swelling of left lower leg  -     Cancel: US Lower Extremity Veins Left; Future; Expected date: 06/12/2025  -     US Lower Extremity Veins Left; Future; Expected date: 06/12/2025    Cellulitis of left lower leg  -     cephALEXin (KEFLEX) 500 MG capsule; Take 1 capsule (500 mg total) by mouth every 8 (eight) hours. for 7 days  Dispense: 21 capsule; Refill: 0    Venous stasis dermatitis of left lower extremity    Venous insufficiency of both lower extremities        Patient Instructions   Ultrasound appointment is at 3:45pm at [address] in [place].     Elevate leg.     Wear compression stockings at all times except during sleep.     Take antibiotic with food. While on antibiotics, take a daily probiotic such as Align or Culturelle or eat yogurt with live cultures (Activia is one example). This will lessen the chances of stomach upset.     Recheck with primary doctor for new/worsening symptoms.     You must understand that you've received an Urgent Care treatment only and that you may be released before all your medical problems are known or treated. You, the patient, will arrange for follow up care as instructed.    Follow up with your PCP or specialty clinic as directed if not improved or as  needed. You can call [phone] to schedule an appointment with the appropriate provider.      You, the patient, will arrange for follow up care as instructed.     If your condition worsens or fails to improve we recommend that you receive another evaluation at the ER immediately or contact your PCP to discuss your concerns.     Patient aware of treatment plan and verbalized understanding.

## 2025-06-13 ENCOUNTER — TELEPHONE (OUTPATIENT)
Dept: URGENT CARE | Facility: CLINIC | Age: 80
End: 2025-06-13
Payer: MEDICARE

## 2025-06-13 NOTE — TELEPHONE ENCOUNTER
Patient returned call from last night. Confirmed name/. I informed him of normal US results to r/o DVT. He is doing well today. Advised to continue tx plan as discussed at yesterday's visit. F/u as needed.

## 2025-06-13 NOTE — TELEPHONE ENCOUNTER
Patient called back and was able to give US results. Patient aware, verbalized understanding and agreed with plan of care.

## 2025-07-23 NOTE — PROGRESS NOTES
Note to patients: In accordance with the  Century Cures Act, patients are now granted immediate electronic access to their medical records. This note is primarily intended for communication among medical professionals. As a result, it may incorporate medical terminology, abbreviations, or language that could appear blunt or unfamiliar. If you have questions about this document, we encourage you to discuss it with your physician.      [facility]  [facility] & [facility]    Patient: Arthur Ribeiro    : 1945    MRN: [id number]  Primary Care Provider: Zohaib Pryor MD  Rad Onc: Mehran Peñaloza MD  Med Onc: Zenaida Jones MD; Quentin Rahman MD  ENT: Enrique Maya MD  Dentist: BO Pitts; Cecilio Banks [facility] Medicine: Dr. Sagastume (Prev. Dr. Weaver)  Date of Encounter: 2025    DIAGNOSIS:    Cancer Staging   Primary malignant neoplasm of hypopharynx  Staging form: Pharynx - Hypopharynx, AJCC 8th Edition  - Clinical stage from 2022: Stage RENO (cT3, cN2b, cM0) - Signed by Evon Galo NP on 2022      CC: routine surveillance     HPI:   Arthur Ribeiro is a 79 y.o. male with a past medical history significant for [id number] SCC of the left hypopharynx. He was treated with induction chemotherapy followed by definitive CCRT completed 2022.    He required a PEG during his treatment and had completed hypopharyngeal stensis requiring retrograde dilation from Dr. Hart and GI. He has undergone multiple EGD/dilation including recently and is now able to eat whatever he'd like by mouth. He has gained weight since his last visit.    He is undergoing PT to build his strength. He reports occasional left muscle/back pain but denies masses or lesions - seems associated with muscle strength in this area. He is having neck stiffness that is worse on the left side, he states he has been having this issue since radiation. He does not remember going to PT in the  past for this. He has an EGD set up with Dr. Sagastume on April 12th. He has dilated him the last 4-5xs. He is scoping him q6 months. He is having some dysphagia. His appetite is good and his weight is stable. He had his TSH checked by his PCP yesterday and his PCP started him on synthroid.     Patient denies pain, fever, chills, night sweats, unintentional weight loss, neck mass, enlarged lymph nodes outside of the head or neck, odynophagia, dysphagia, globus sensation, voice changes, cough, hemoptysis, shortness of breath, or new or concerning skin lesions.     7/29/24: He was evaluated at ER on 6/12 for complaints of weakness. He had a CT of his head without contrast and CTA performed.  He is going to PT for his leg weakness. He is not doing PT for his neck or for his lymphedema. He is having his normal dysphagia and is getting dilated Q6 months by Dr. Sagastume.     11/20/24: He is still going to PT for his leg. He just had a dilation yesterday. He has notice some improvement so far today. He is being manage by his PCP for his thyroid and will have a repeat in a couple years. [facility] and will try to get [facility] for the vest for his lymphedema. He still does have some coughing with liquids and foods. He says that he aspirated the fluids.     3/24/25:  He did se his PCP this AM. He is not taking his thyroid medication because it makes him feel bad. He will stay off his medication. His swallowing is doing okay, he has another EGD soon; he is doing an EGD Q6 months. He denies any coughing with liquids. He is using his trampoline a couple times a week. He got his Vest for his lymphedema and helps some. He has a runny nose with eating. He has an appointment with Dr. Rahman tomorrow.    7/23/25: History of Present Illness    Mr. Ribeiro presents today for follow-up after chemoradiation therapy. He completed chemoradiation therapy (CCRT) in August 2022. CT of neck and chest is  scheduled for August 26th for ongoing surveillance. He reports ongoing swallowing difficulties, primarily occurring in the morning upon waking, which improve with daily activity. He experiences aspiration with both liquids and food, accompanied by involuntary coughing after eating. He undergoes periodic esophagogastroduodenoscopy (EGD) with dilation every six months but is currently overdue for his next scheduled scope. He experiences daily morning rhinorrhea which he attributes to radiation-induced salivary gland changes. He is not currently using any nasal spray treatment. March thyroid levels were slightly elevated but lower compared to nine months prior. He discontinued thyroid medication due to adverse effects and reports feeling significantly better. He denies current thyroid-related symptoms. He continues using lymphedema management vest but acknowledges suboptimal compliance with recommended frequency.      ROS:  ROS as indicated in HPI.          TREATMENT HISTORY:   induction chemotherapy followed by definitive CCRT completed 8/2022    ALLERGIES:  Review of patient's allergies indicates:  No Known Allergies      MEDICATIONS:    Current Outpatient Medications:     gabapentin (NEURONTIN) 800 MG tablet, TAKE 1 TABLET(800 MG) BY MOUTH THREE TIMES DAILY, Disp: 270 tablet, Rfl: 3    pantoprazole (PROTONIX) 40 MG tablet, 60 tablets., Disp: , Rfl:     ipratropium (ATROVENT) 42 mcg (0.06 %) nasal spray, 2 sprays by Each Nostril route 3 (three) times daily as needed (runny nose (rhinitis))., Disp: 15 mL, Rfl: 11  No current facility-administered medications for this visit.    Facility-Administered Medications Ordered in Other Visits:     lactated ringers infusion, , Intravenous, Continuous, Daniel Sagastume MD, Last Rate: 75 mL/hr at 11/06/23 0739, New Bag at 11/06/23 0739    OTHER HISTORY  Past medical, surgical, family, and social histories have been updated and reviewed as needed since last visit.     REVIEW  OF SYSTEMS:   Comprehensive review of systems was discussed with the patient.  It is positive only for the above complaints.    PHYSICAL EXAMINATION:  Blood pressure 113/61, pulse 70, temperature 99 °F (37.2 °C), temperature source Temporal, resp. rate 16, height 6' (1.829 m), weight 70.7 kg (155 lb 13.8 oz), SpO2 98%.    Constitutional: Non-toxic appearing, cervical kyphosis  Psychiatric: Appropriate mood and affect. Cooperative.  Voice: Non-dysphonic, speaking in full sentences.   Neurologic: Cranial nerves grossly intact, no focal deficits.  Head and face: Salivary glands are not enlarged. Face is symmetric. CN VII strength and sensation intact.  Skin: No concerning skin lesions.   Eyes: Vision grossly intact, bilateral extraocular movements intact  Ears: Bilateral pinna, mastoid, external ear canal normal. Hearing intact.   Nose: External nose appears normal.   Lips: No ulcers or lesions  Oral cavity: Mucosa is pink and moist. Buccal mucosa, gingiva, anterior tongue, floor of mouth, and hard palate appear normal. No leukoplakia, erythroplakia, ulceration, masses or lesions.  Oropharynx: Mucosa is pink and moist. Soft palate and base of tongue are normal. Posterior pharyngeal wall normal. Tonsils are normal. No lesions.  Neck: Central lymphedema, mobile larynx, no masses or lymphadenopathy  Respiratory: Chest expansion symmetric, no audible stridor or stertor. Breathing is unlabored. No active cough.    PROCEDURES:    FLEXIBLE LARYNGOSCOPY  Provider: Micheline MOSQUEDAP-C  Indication: History of cancer   The procedure was explained to the patient and verbal consent was obtained.   Anesthesia: topical lidocaine and neosynephrine applied within one or both nares with an atomizer    The scope was introduced in the usual fashion.    Findings:  Mucosa: normal  Inferior turbinates: no polypoid changes or hypertrophy  Septum: no lesion or ulcer  Middle meatus: no purulence or polypoid change  Nasopharynx:  Adenoids:  normal  Fossa of Rosenmuller: normal  Eustachian tubes: normal  Superior and posterior pharyngeal walls: narrowing circumferentially at level of the epiglottis  Epiglottis, vallecula, and base of tongue: thickened epiglottis fused to base of tongue bilaterally  Pyriform sinuses: effacement of left pyriform, narrow right piriform, ++ pooling of secretions   False vocal cords, arytenoids, aryepiglottic folds: normal, slight reducted abduction  True vocal cords are symmetrically mobile on phonation and inspiration.   Laryngeal sensation appears intact. There are no masses or ulcerations.     The scope was withdrawn. The patient tolerated the procedure well with no complications.      DATA REVIEWED:     LABORATORY:      Latest Ref Rng & Units 10/2/2024     8:05 AM 11/15/2024    10:09 AM 3/19/2025    11:00 AM   Thyroid Labs   TSH 0.400 - 4.000 uIU/mL 6.030   4.787    Free T4 0.71 - 1.51 ng/dL 0.94   0.90    Sodium 136 - 145 mmol/L 140  139  140    Potassium 3.5 - 5.1 mmol/L 4.5  4.6  4.3    Chloride 95 - 110 mmol/L 105  105  107    Carbon Dioxide 23 - 29 mmol/L 28  27  27    Glucose 70 - 110 mg/dL 90  79  75    Blood Urea Nitrogen 8 - 23 mg/dL 55  45  37    Creatinine 0.50 - 1.40 mg/dL  0.50 - 1.40 mg/dL 1.90  1.77  1.79     1.79    Calcium 8.7 - 10.5 mg/dL 9.1  8.8  8.7    Total Protein 6.0 - 8.4 g/dL 6.7  6.6  6.6    Albumin 3.5 - 5.2 g/dL 3.8  3.8  3.8    Total Bilirubin 0.2 - 1.0 mg/dL 0.5  0.5  0.4    AST 10 - 40 U/L 24  25  19    ALT 10 - 44 U/L 13  13  11    Anion Gap 8 - 16 mmol/L 7  7  6    WBC 3.90 - 12.70 K/uL 3.47  3.78  3.82    RBC 4.60 - 6.20 M/uL 3.47  3.43  3.46    Hemoglobin 14.0 - 18.0 g/dL 10.7  10.7  10.8    Hematocrit 40.0 - 54.0 % 33.0  33.1  33.0    MCV 82 - 98 fL 95  97  95    MCH 27.0 - 31.0 pg 30.8  31.2  31.2    MCHC 32.0 - 36.0 g/dL 32.4  32.3  32.7    RDW 11.5 - 14.5 % 13.9  14.0  14.1    Platelets 150 - 450 K/uL 158  150  154    MPV 9.2 - 12.9 fL 9.8  9.8  10.1    Gran # 1.8 - 7.7 K/uL 1.6   2.1  2.3    Lymph # 1.0 - 4.8 K/uL 1.3  1.2  1.0    Mono # 0.3 - 1.0 K/uL 0.4  0.4  0.4    Eos # 0.0 - 0.5 K/uL 0.2  0.1  0.1    Baso # 0.00 - 0.20 K/uL 0.03  0.03  0.03    Gran % 38.0 - 73.0 % 45.4  54.7  59.7    Lymph % 18.0 - 48.0 % 37.8  30.7  26.7    Mono% 4.0 - 15.0 % 10.4  10.3  9.9    Eos % 0.0 - 8.0 % 5.2  3.2  2.6    Baso % 0.0 - 1.9 % 0.9  0.8  0.8        PATHOLOGY:  Pharynx, left posterior wall, biopsies:   - Squamous cell carcinoma, invasive, moderately differentiated   - Depth of invasion (DOI): at least 0.5 cm   - p16 status (IHC): Negative   - No lymphovascular invasion identified   - No perineural invasion identified   NOTE: Immunostain for p16 was performed and did not demonstrate   block-immunopositive staining (interpreted as negative).  Positive control   and internal negative control for immunostain was examined and was   appropriate.     IMAGING:    PET 2/2023 -  Persistent mildly hypermetabolic lymph nodes in the lower right paratracheal region and right hilum with max SUV values not higher than mediastinal blood pool.  The lack of significant change suggests that these are reactive nodes rather than metastatic disease.     CT neck/chest 9/2023 -  1. There are post treatment changes discussed above.  There is no obvious recurrent left lateral pharyngeal/hypopharyngeal mass.  The study is performed without contrast as mentioned above due to progressively decreasing GFR in increasing creatinine.  2. The epiglottis is mildly diffusely thickened.  This likely represents treatment change.  3. There is no pathologic or necrotic cervical lymphadenopathy.  4. There has been interval progression of biapical interstitial reticulonodular disease, further evaluated on concurrent CT chest.    1. Limited noncontrast CT of the chest is without evidence of any new/progressive lymphadenopathy within the chest as compared to prior PET-CT 02/27/2023.  No acute findings.  2. Progressive biapical/subpleural  reticulonodular pulmonary parenchymal changes suggestive of chronic inflammatory changes/fibrosis as discussed above.    CT HEAD WITHOUT CONTRAST 6/12/24  Impression:  1. Generalized cerebral volume loss from cerebral and cerebellar atrophy.  2. No acute intracranial processes.  3. 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    CTA STROKE MULTI-PHASE 6/12/24  Impression:  1. No occlusion, hemodynamically significant stenosis, aneurysm or dissection.  2. Nonspecific subcutaneous fat stranding and skin thickening in the submental region as well as edema in the pre epiglottic fat.    NM PET CT FDG SKULL BASE TO MID THIGH 11/11/24:  Impression:     No definitive evidence of FDG avid disease on this exam.  There is persistence low level metabolic activity in the right suprahilar region although this has decreased from the prior exam.  This remains indeterminate.  Recommend attention on follow-up.    RADIOLOGY REVIEWED:  I have independently viewed and agree with the PET images and reports which demonstrate no evidence of disease recurrence.    ASSESSMENT AND PLAN:  1. Primary malignant neoplasm of hypopharynx    2. Adverse effect of radiation, sequela    3. Dysphagia, unspecified type    4. Vasomotor rhinitis        Arthur Ribeiro is a 79 y.o. male with [id number] SCC of the hypopharynx s/p induction chemo followed by CCRT completed 8/2022.  - Continue lymphedema treatment   - Follow-up 4 months instead of 6 due to swallowing concerns   - CT neck/chest without contrast in August due to elevated kidney function  - CTA performed in 6/2024 that did assess carotids already so no doppler ordered  - Can use Atrovent for runny nose   - Continue follow-up with Dr. Rahman  - Refer back to SLP for swallowing   - F/u with GI for EGD with dilation   - TSH checked 3/2025    Patient encouraged to call with any questions, concerns, or new or worsening symptoms. 35 minutes spend in direct patient care, chart review, documentation  and case review with collaborative MD.     Follow up 4-6 mo, will plan on 4 months per patient request to evaluate swallowing     Dr. Wang reviewed scope and agrees with CT in August     This note was generated with the assistance of ambient listening technology. Verbal consent was obtained by the patient and accompanying visitor(s) for the recording of patient appointment to facilitate this note. I attest to having reviewed and edited the generated note for accuracy, though some syntax or spelling errors may persist. Please contact the author of this note for any clarification.

## 2025-07-24 ENCOUNTER — OFFICE VISIT (OUTPATIENT)
Dept: HEMATOLOGY/ONCOLOGY | Facility: CLINIC | Age: 80
End: 2025-07-24
Payer: MEDICARE

## 2025-07-24 VITALS
OXYGEN SATURATION: 98 % | WEIGHT: 155.88 LBS | HEIGHT: 72 IN | RESPIRATION RATE: 16 BRPM | DIASTOLIC BLOOD PRESSURE: 61 MMHG | SYSTOLIC BLOOD PRESSURE: 113 MMHG | BODY MASS INDEX: 21.11 KG/M2 | HEART RATE: 70 BPM | TEMPERATURE: 99 F

## 2025-07-24 DIAGNOSIS — C13.9 PRIMARY MALIGNANT NEOPLASM OF HYPOPHARYNX: Primary | ICD-10-CM

## 2025-07-24 DIAGNOSIS — R13.10 DYSPHAGIA, UNSPECIFIED TYPE: ICD-10-CM

## 2025-07-24 DIAGNOSIS — J30.0 VASOMOTOR RHINITIS: ICD-10-CM

## 2025-07-24 DIAGNOSIS — T66.XXXS ADVERSE EFFECT OF RADIATION, SEQUELA: ICD-10-CM

## 2025-07-24 PROCEDURE — 3288F FALL RISK ASSESSMENT DOCD: CPT | Mod: CPTII,S$GLB,, | Performed by: NURSE PRACTITIONER

## 2025-07-24 PROCEDURE — 99214 OFFICE O/P EST MOD 30 MIN: CPT | Mod: 25,S$GLB,, | Performed by: NURSE PRACTITIONER

## 2025-07-24 PROCEDURE — 3074F SYST BP LT 130 MM HG: CPT | Mod: CPTII,S$GLB,, | Performed by: NURSE PRACTITIONER

## 2025-07-24 PROCEDURE — 3078F DIAST BP <80 MM HG: CPT | Mod: CPTII,S$GLB,, | Performed by: NURSE PRACTITIONER

## 2025-07-24 PROCEDURE — 1101F PT FALLS ASSESS-DOCD LE1/YR: CPT | Mod: CPTII,S$GLB,, | Performed by: NURSE PRACTITIONER

## 2025-07-24 PROCEDURE — 99999 PR PBB SHADOW E&M-EST. PATIENT-LVL III: CPT | Mod: PBBFAC,,, | Performed by: NURSE PRACTITIONER

## 2025-07-24 PROCEDURE — 1126F AMNT PAIN NOTED NONE PRSNT: CPT | Mod: CPTII,S$GLB,, | Performed by: NURSE PRACTITIONER

## 2025-07-24 PROCEDURE — 1159F MED LIST DOCD IN RCRD: CPT | Mod: CPTII,S$GLB,, | Performed by: NURSE PRACTITIONER

## 2025-07-24 RX ORDER — IPRATROPIUM BROMIDE 42 UG/1
2 SPRAY, METERED NASAL 3 TIMES DAILY PRN
Qty: 15 ML | Refills: 11 | Status: SHIPPED | OUTPATIENT
Start: 2025-07-24

## 2025-08-06 ENCOUNTER — TELEPHONE (OUTPATIENT)
Dept: NEPHROLOGY | Facility: CLINIC | Age: 80
End: 2025-08-06
Payer: MEDICARE

## 2025-08-06 NOTE — TELEPHONE ENCOUNTER
Returned call to patient. Patient requested to reschedule appointment. Appointment rescheduled for 09/03/25 at 1320 with Dr. Cheng.

## 2025-08-06 NOTE — TELEPHONE ENCOUNTER
Copied from CRM #[id number]. Topic: General Inquiry - Return Call  >> Aug 6, 2025  2:14 PM Liat wrote:  Type:  Patient Returning Call    Who Called:pt  Who Left Message for Patient:Leena Monzon  Does the patient know what this is regarding?:appt  Would the patient rather a call back or a response via [facility]? call  Best Call Back Number:[phone]    Additional Information:

## 2025-08-06 NOTE — TELEPHONE ENCOUNTER
Copied from CRM #[id number]. Topic: General Inquiry - Patient Advice  >> Aug 6, 2025 12:39 PM Tito Park wrote:  Type:  Needs Medical Advice    Who Called: ASHISH Posada - [facility]    Regarding (please be specific): Referral for Nephrology Appt    Would the patient rather a call back or a response via [facility]? Call back    Best Call Back Number: [phone] / fax [phone]    Additional Information: Referral sent on 7/30; pt can be seen by any provider accepting new pts; call back to advise.

## 2025-08-26 ENCOUNTER — HOSPITAL ENCOUNTER (OUTPATIENT)
Dept: RADIOLOGY | Facility: HOSPITAL | Age: 80
Discharge: HOME | End: 2025-08-26
Attending: NURSE PRACTITIONER
Payer: MEDICARE

## 2025-08-26 DIAGNOSIS — C13.9 PRIMARY MALIGNANT NEOPLASM OF HYPOPHARYNX: ICD-10-CM

## 2025-08-26 PROCEDURE — 71250 CT THORAX DX C-: CPT | Mod: TC,PO

## 2025-08-26 PROCEDURE — 70490 CT SOFT TISSUE NECK W/O DYE: CPT | Mod: TC,PO

## 2025-08-26 PROCEDURE — 70490 CT SOFT TISSUE NECK W/O DYE: CPT | Mod: 26,,, | Performed by: RADIOLOGY

## 2025-08-26 PROCEDURE — 71250 CT THORAX DX C-: CPT | Mod: 26,,, | Performed by: RADIOLOGY

## 2025-09-03 ENCOUNTER — OFFICE VISIT (OUTPATIENT)
Dept: NEPHROLOGY | Facility: CLINIC | Age: 80
End: 2025-09-03
Payer: MEDICARE

## 2025-09-03 VITALS — OXYGEN SATURATION: 96 % | SYSTOLIC BLOOD PRESSURE: 102 MMHG | HEART RATE: 69 BPM | DIASTOLIC BLOOD PRESSURE: 52 MMHG

## 2025-09-03 DIAGNOSIS — R80.9 PROTEINURIA, UNSPECIFIED TYPE: ICD-10-CM

## 2025-09-03 DIAGNOSIS — N18.4 CHRONIC KIDNEY DISEASE (CKD), STAGE IV (SEVERE): Primary | ICD-10-CM

## 2025-09-03 DIAGNOSIS — E86.0 DEHYDRATION: ICD-10-CM

## 2025-09-03 PROCEDURE — 3078F DIAST BP <80 MM HG: CPT | Mod: CPTII,S$GLB,, | Performed by: INTERNAL MEDICINE

## 2025-09-03 PROCEDURE — 3074F SYST BP LT 130 MM HG: CPT | Mod: CPTII,S$GLB,, | Performed by: INTERNAL MEDICINE

## 2025-09-03 PROCEDURE — 99204 OFFICE O/P NEW MOD 45 MIN: CPT | Mod: S$GLB,,, | Performed by: INTERNAL MEDICINE

## 2025-09-03 PROCEDURE — 99999 PR PBB SHADOW E&M-EST. PATIENT-LVL II: CPT | Mod: PBBFAC,,, | Performed by: INTERNAL MEDICINE

## 2025-09-03 PROCEDURE — 1159F MED LIST DOCD IN RCRD: CPT | Mod: CPTII,S$GLB,, | Performed by: INTERNAL MEDICINE

## 2025-09-03 PROCEDURE — 1160F RVW MEDS BY RX/DR IN RCRD: CPT | Mod: CPTII,S$GLB,, | Performed by: INTERNAL MEDICINE

## (undated) DEVICE — SEE MEDLINE ITEM 157128

## (undated) DEVICE — TRAY ENT 4/CS

## (undated) DEVICE — GLOVE SURGICAL LATEX SZ 7

## (undated) DEVICE — DRESSING TELFA STRL 4X3 LF

## (undated) DEVICE — SEE MEDLINE ITEM 146292

## (undated) DEVICE — SOL 0.9% NACL IRRI.IN STERIL

## (undated) DEVICE — KIT ANTIFOG W/SPONG & FLUID

## (undated) DEVICE — SPONGE PATTY SURGICAL .5X3IN

## (undated) DEVICE — KIT ANTIFOG